# Patient Record
Sex: MALE | Race: BLACK OR AFRICAN AMERICAN | Employment: UNEMPLOYED | URBAN - METROPOLITAN AREA
[De-identification: names, ages, dates, MRNs, and addresses within clinical notes are randomized per-mention and may not be internally consistent; named-entity substitution may affect disease eponyms.]

---

## 2019-09-15 ENCOUNTER — HOSPITAL ENCOUNTER (EMERGENCY)
Age: 44
Discharge: HOME | End: 2019-09-15
Payer: MEDICARE

## 2019-09-15 DIAGNOSIS — L23.7 ALLERGIC DERMATITIS DUE TO POISON IVY: Primary | ICD-10-CM

## 2019-09-15 PROCEDURE — 99282 EMERGENCY DEPT VISIT SF MDM: CPT

## 2019-10-09 ENCOUNTER — HOSPITAL ENCOUNTER (EMERGENCY)
Age: 44
Discharge: HOME | End: 2019-10-09
Attending: EMERGENCY MEDICINE
Payer: MEDICARE

## 2019-10-09 ENCOUNTER — HOSPITAL ENCOUNTER (EMERGENCY)
Age: 44
Discharge: HOME | End: 2019-10-10
Attending: FAMILY MEDICINE
Payer: MEDICARE

## 2019-10-09 ENCOUNTER — APPOINTMENT (OUTPATIENT)
Dept: GENERAL RADIOLOGY | Age: 44
End: 2019-10-09
Payer: MEDICARE

## 2019-10-09 VITALS
SYSTOLIC BLOOD PRESSURE: 119 MMHG | TEMPERATURE: 98 F | BODY MASS INDEX: 25.06 KG/M2 | WEIGHT: 185 LBS | DIASTOLIC BLOOD PRESSURE: 88 MMHG | OXYGEN SATURATION: 100 % | HEART RATE: 73 BPM | RESPIRATION RATE: 14 BRPM | HEIGHT: 72 IN

## 2019-10-09 LAB
ALBUMIN SERPL-MCNC: 4.1 GM/DL (ref 3.4–5)
ALBUMIN SERPL-MCNC: 4.3 GM/DL (ref 3.4–5)
ALP BLD-CCNC: 102 IU/L (ref 40–129)
ALP BLD-CCNC: 87 IU/L (ref 40–128)
ALT SERPL-CCNC: 28 U/L (ref 10–40)
ALT SERPL-CCNC: 32 U/L (ref 10–40)
ANION GAP SERPL CALCULATED.3IONS-SCNC: 10 MMOL/L (ref 4–16)
ANION GAP SERPL CALCULATED.3IONS-SCNC: 13 MMOL/L (ref 4–16)
AST SERPL-CCNC: 51 IU/L (ref 15–37)
AST SERPL-CCNC: 65 IU/L (ref 15–37)
BACTERIA: NEGATIVE /HPF
BASOPHILS ABSOLUTE: 0 K/CU MM
BASOPHILS ABSOLUTE: 0 K/CU MM
BASOPHILS RELATIVE PERCENT: 0.2 % (ref 0–1)
BASOPHILS RELATIVE PERCENT: 0.5 % (ref 0–1)
BILIRUB SERPL-MCNC: 0.3 MG/DL (ref 0–1)
BILIRUB SERPL-MCNC: 0.4 MG/DL (ref 0–1)
BILIRUBIN URINE: NEGATIVE MG/DL
BLOOD, URINE: NEGATIVE
BUN BLDV-MCNC: 6 MG/DL (ref 6–23)
BUN BLDV-MCNC: 9 MG/DL (ref 6–23)
CALCIUM SERPL-MCNC: 8.6 MG/DL (ref 8.3–10.6)
CALCIUM SERPL-MCNC: 9.1 MG/DL (ref 8.3–10.6)
CHLORIDE BLD-SCNC: 95 MMOL/L (ref 99–110)
CHLORIDE BLD-SCNC: 98 MMOL/L (ref 99–110)
CLARITY: CLEAR
CO2: 26 MMOL/L (ref 21–32)
CO2: 26 MMOL/L (ref 21–32)
COLOR: ABNORMAL
CREAT SERPL-MCNC: 0.8 MG/DL (ref 0.9–1.3)
CREAT SERPL-MCNC: 0.9 MG/DL (ref 0.9–1.3)
DIFFERENTIAL TYPE: ABNORMAL
DIFFERENTIAL TYPE: ABNORMAL
EOSINOPHILS ABSOLUTE: 0 K/CU MM
EOSINOPHILS ABSOLUTE: 0.1 K/CU MM
EOSINOPHILS RELATIVE PERCENT: 0.7 % (ref 0–3)
EOSINOPHILS RELATIVE PERCENT: 2 % (ref 0–3)
GFR AFRICAN AMERICAN: >60 ML/MIN/1.73M2
GFR AFRICAN AMERICAN: >60 ML/MIN/1.73M2
GFR NON-AFRICAN AMERICAN: >60 ML/MIN/1.73M2
GFR NON-AFRICAN AMERICAN: >60 ML/MIN/1.73M2
GLUCOSE BLD-MCNC: 117 MG/DL (ref 70–99)
GLUCOSE BLD-MCNC: 90 MG/DL (ref 70–99)
GLUCOSE, URINE: NEGATIVE MG/DL
HCT VFR BLD CALC: 39.4 % (ref 42–52)
HCT VFR BLD CALC: 43.3 % (ref 42–52)
HEMOGLOBIN: 12.6 GM/DL (ref 13.5–18)
HEMOGLOBIN: 13.8 GM/DL (ref 13.5–18)
IMMATURE NEUTROPHIL %: 0.2 % (ref 0–0.43)
IMMATURE NEUTROPHIL %: 0.2 % (ref 0–0.43)
KETONES, URINE: NEGATIVE MG/DL
LEUKOCYTE ESTERASE, URINE: NEGATIVE
LIPASE: 57 IU/L (ref 13–60)
LIPASE: 78 IU/L (ref 13–60)
LYMPHOCYTES ABSOLUTE: 1.9 K/CU MM
LYMPHOCYTES ABSOLUTE: 2.3 K/CU MM
LYMPHOCYTES RELATIVE PERCENT: 45.8 % (ref 24–44)
LYMPHOCYTES RELATIVE PERCENT: 57 % (ref 24–44)
MCH RBC QN AUTO: 29.4 PG (ref 27–31)
MCH RBC QN AUTO: 29.4 PG (ref 27–31)
MCHC RBC AUTO-ENTMCNC: 31.9 % (ref 32–36)
MCHC RBC AUTO-ENTMCNC: 32 % (ref 32–36)
MCV RBC AUTO: 92.1 FL (ref 78–100)
MCV RBC AUTO: 92.3 FL (ref 78–100)
MONOCYTES ABSOLUTE: 0.4 K/CU MM
MONOCYTES ABSOLUTE: 0.5 K/CU MM
MONOCYTES RELATIVE PERCENT: 11.6 % (ref 0–4)
MONOCYTES RELATIVE PERCENT: 9.6 % (ref 0–4)
NITRITE URINE, QUANTITATIVE: NEGATIVE
NUCLEATED RBC %: 0 %
NUCLEATED RBC %: 0 %
PDW BLD-RTO: 13.8 % (ref 11.7–14.9)
PDW BLD-RTO: 13.9 % (ref 11.7–14.9)
PH, URINE: 6 (ref 5–8)
PLATELET # BLD: 172 K/CU MM (ref 140–440)
PLATELET # BLD: 189 K/CU MM (ref 140–440)
PMV BLD AUTO: 8.6 FL (ref 7.5–11.1)
PMV BLD AUTO: 8.9 FL (ref 7.5–11.1)
POTASSIUM SERPL-SCNC: 3.6 MMOL/L (ref 3.5–5.1)
POTASSIUM SERPL-SCNC: 3.8 MMOL/L (ref 3.5–5.1)
PROTEIN UA: NEGATIVE MG/DL
RBC # BLD: 4.28 M/CU MM (ref 4.6–6.2)
RBC # BLD: 4.69 M/CU MM (ref 4.6–6.2)
RBC URINE: ABNORMAL /HPF (ref 0–3)
SEGMENTED NEUTROPHILS ABSOLUTE COUNT: 1.2 K/CU MM
SEGMENTED NEUTROPHILS ABSOLUTE COUNT: 1.7 K/CU MM
SEGMENTED NEUTROPHILS RELATIVE PERCENT: 30.7 % (ref 36–66)
SEGMENTED NEUTROPHILS RELATIVE PERCENT: 41.5 % (ref 36–66)
SODIUM BLD-SCNC: 131 MMOL/L (ref 135–145)
SODIUM BLD-SCNC: 137 MMOL/L (ref 135–145)
SPECIFIC GRAVITY UA: 1 (ref 1–1.03)
SQUAMOUS EPITHELIAL: <1 /HPF
TOTAL IMMATURE NEUTOROPHIL: 0.01 K/CU MM
TOTAL IMMATURE NEUTOROPHIL: 0.01 K/CU MM
TOTAL NUCLEATED RBC: 0 K/CU MM
TOTAL NUCLEATED RBC: 0 K/CU MM
TOTAL PROTEIN: 7.1 GM/DL (ref 6.4–8.2)
TOTAL PROTEIN: 7.6 GM/DL (ref 6.4–8.2)
TRICHOMONAS: ABNORMAL /HPF
UROBILINOGEN, URINE: NORMAL MG/DL (ref 0.2–1)
WBC # BLD: 4.1 K/CU MM (ref 4–10.5)
WBC # BLD: 4.2 K/CU MM (ref 4–10.5)
WBC UA: ABNORMAL /HPF (ref 0–2)

## 2019-10-09 PROCEDURE — 83690 ASSAY OF LIPASE: CPT

## 2019-10-09 PROCEDURE — 71045 X-RAY EXAM CHEST 1 VIEW: CPT

## 2019-10-09 PROCEDURE — 81001 URINALYSIS AUTO W/SCOPE: CPT

## 2019-10-09 PROCEDURE — 85025 COMPLETE CBC W/AUTO DIFF WBC: CPT

## 2019-10-09 PROCEDURE — 99284 EMERGENCY DEPT VISIT MOD MDM: CPT

## 2019-10-09 PROCEDURE — 36415 COLL VENOUS BLD VENIPUNCTURE: CPT

## 2019-10-09 PROCEDURE — 80053 COMPREHEN METABOLIC PANEL: CPT

## 2019-10-09 RX ORDER — 0.9 % SODIUM CHLORIDE 0.9 %
1000 INTRAVENOUS SOLUTION INTRAVENOUS ONCE
Status: COMPLETED | OUTPATIENT
Start: 2019-10-09 | End: 2019-10-10

## 2019-10-09 ASSESSMENT — PAIN SCALES - GENERAL
PAINLEVEL_OUTOF10: 10
PAINLEVEL_OUTOF10: 7

## 2019-10-09 ASSESSMENT — PAIN DESCRIPTION - PAIN TYPE
TYPE: ACUTE PAIN
TYPE: ACUTE PAIN

## 2019-10-09 ASSESSMENT — PAIN DESCRIPTION - ORIENTATION: ORIENTATION: RIGHT;UPPER

## 2019-10-09 ASSESSMENT — PAIN DESCRIPTION - LOCATION
LOCATION: ABDOMEN
LOCATION: ABDOMEN

## 2019-10-09 NOTE — ED PROVIDER NOTES
Triage Chief Complaint:   Abdominal Pain (RUQ)    [place]:  Shawn Richards is a 37 y.o. male that presents with main complaint of abdominal pain. He states that over the last 3 months he has had intermittent right upper quadrant pain that is worse with movement. Denies any recent injury. Denies nausea, vomiting, diarrhea, constipation, chest pain, shortness of breath, fever, cough. He has been eating and drinking without difficulty. States the pain is not going away and that is why he came in. He has not tried to take anything for the pain. Also has a myriad of other chronic complaints including nosebleed within the past few weeks, and episode last month were his body \"froze up\" and then resumed working and is worried that he had a stroke. Also states that in the past he has had blood in his stool and blood in his urine. Denies any drug or alcohol use. ROS:  At least 14 systems reviewed and otherwise acutely negative except as in the [address] Past Medical History:   Diagnosis Date    Hypertension     does not take medications     History reviewed. No pertinent surgical history. History reviewed. No pertinent family history.   Social History     Socioeconomic History    Marital status: Single     Spouse name: Not on file    Number of children: Not on file    Years of education: Not on file    Highest education level: Not on file   Occupational History    Not on file   Social Needs    Financial resource strain: Not on file    Food insecurity:     Worry: Not on file     Inability: Not on file    Transportation needs:     Medical: Not on file     Non-medical: Not on file   Tobacco Use    Smoking status: Current Some Day Smoker    Smokeless tobacco: Never Used   Substance and Sexual Activity    Alcohol use: Yes     Comment: most days    Drug use: No    Sexual activity: Not on file   Lifestyle    Physical activity:     Days per week: Not on file     Minutes per session: Not on file    Stress: Not on file   Relationships    Social connections:     Talks on phone: Not on file     Gets together: Not on file     Attends Anabaptist service: Not on file     Active member of club or organization: Not on file     Attends meetings of clubs or organizations: Not on file     Relationship status: Not on file    Intimate partner violence:     Fear of current or ex partner: Not on file     Emotionally abused: Not on file     Physically abused: Not on file     Forced sexual activity: Not on file   Other Topics Concern    Not on file   Social History Narrative    Not on file     No current facility-administered medications for this encounter. Current Outpatient Medications   Medication Sig Dispense Refill    naproxen (NAPROSYN) 500 MG tablet Take 1 tablet by mouth 2 times daily 60 tablet 0     Allergies   Allergen Reactions    Pcn [Penicillins] Nausea And Vomiting    Vicodin [Hydrocodone-Acetaminophen] Nausea And Vomiting       Nursing Notes Reviewed    Physical Exam:  ED Triage Vitals [10/09/19 0217]   Enc Vitals Group      BP (!) 146/102      Pulse 86      Resp 16      Temp 98 °F (36.7 °C)      Temp Source Oral      SpO2       Weight 185 lb (83.9 kg)      Height 6' (1.829 m)      Head Circumference       Peak Flow       Pain Score       Pain Loc       Pain Edu? Excl. in [address]? GENERAL APPEARANCE: Awake and alert. Cooperative. No acute distress. HEAD: Normocephalic. Atraumatic. EYES: EOM's grossly intact. Sclera anicteric. ENT: Mucous membranes are moist. Tolerates saliva. No trismus. NECK: Supple. Trachea midline. HEART: RRR. Radial pulses 2+. LUNGS: Respirations unlabored. CTAB  ABDOMEN: Soft. Non-tender. No guarding or rebound. EXTREMITIES: No acute deformities. SKIN: Warm and dry. NEUROLOGICAL: No gross facial drooping. Moves all 4 extremities spontaneously. PSYCHIATRIC: Normal mood.     I have reviewed and interpreted all of the currently available lab results from this visit (if applicable):  Results for orders placed or performed during the hospital encounter of 10/09/19   Comprehensive Metabolic Panel w/ Reflex to MG   Result Value Ref Range    Sodium 137 135 - 145 MMOL/L    Potassium 3.8 3.5 - 5.1 MMOL/L    Chloride 98 (L) 99 - 110 mMol/L    CO2 26 21 - 32 MMOL/L    BUN 6 6 - 23 MG/DL    CREATININE 0.8 (L) 0.9 - 1.3 MG/DL    Glucose 90 70 - 99 MG/DL    Calcium 9.1 8.3 - 10.6 MG/DL    Alb 4.3 3.4 - 5.0 GM/DL    Total Protein 7.6 6.4 - 8.2 GM/DL    Total Bilirubin 0.4 0.0 - 1.0 MG/DL    ALT 28 10 - 40 U/L    AST 51 (H) 15 - 37 IU/L    Alkaline Phosphatase 87 40 - 128 IU/L    GFR Non-African American >60 >60 mL/min/1.73m2    GFR African American >60 >60 mL/min/1.73m2    Anion Gap 13 4 - 16   CBC Auto Differential   Result Value Ref Range    WBC 4.1 4.0 - 10.5 K/CU MM    RBC 4.69 4.6 - 6.2 M/CU MM    Hemoglobin 13.8 13.5 - 18.0 GM/DL    Hematocrit 43.3 42 - 52 %    MCV 92.3 78 - 100 FL    MCH 29.4 27 - 31 PG    MCHC 31.9 (L) 32.0 - 36.0 %    RDW 13.8 11.7 - 14.9 %    Platelets 007 104 - 357 K/CU MM    MPV 8.9 7.5 - 11.1 FL    Differential Type AUTOMATED DIFFERENTIAL     Segs Relative 30.7 (L) 36 - 66 %    Lymphocytes % 57.0 (H) 24 - 44 %    Monocytes % 9.6 (H) 0 - 4 %    Eosinophils % 2.0 0 - 3 %    Basophils % 0.5 0 - 1 %    Segs Absolute 1.2 K/CU MM    Lymphocytes Absolute 2.3 K/CU MM    Monocytes Absolute 0.4 K/CU MM    Eosinophils Absolute 0.1 K/CU MM    Basophils Absolute 0.0 K/CU MM    Nucleated RBC % 0.0 %    Total Nucleated RBC 0.0 K/CU MM    Total Immature Neutrophil 0.01 K/CU MM    Immature Neutrophil % 0.2 0 - 0.43 %   Lipase   Result Value Ref Range    Lipase 78 (H) 13 - 60 IU/L   Urinalysis (Lab)   Result Value Ref Range    Color, UA STRAW (A) YELLOW    Clarity, UA CLEAR CLEAR    Glucose, Urine NEGATIVE NEGATIVE MG/DL    Bilirubin Urine NEGATIVE NEGATIVE MG/DL    Ketones, Urine NEGATIVE NEGATIVE MG/DL    Specific Gravity, UA 1.002 1.001 - 1.035    Blood, Urine NEGATIVE NEGATIVE pH, Urine 6.0 5.0 - 8.0    Protein, UA NEGATIVE NEGATIVE MG/DL    Urobilinogen, Urine NORMAL 0.2 - 1.0 MG/DL    Nitrite Urine, Quantitative NEGATIVE NEGATIVE    Leukocyte Esterase, Urine NEGATIVE NEGATIVE    RBC, UA NONE SEEN 0 - 3 /HPF    WBC, UA NONE SEEN 0 - 2 /HPF    Bacteria, UA NEGATIVE NEGATIVE /HPF    Squam Epithel, UA <1 /HPF    Trichomonas, UA NONE SEEN NONE SEEN /HPF      Radiographs (if obtained):  [] The following radiograph was interpreted by myself in the absence of a radiologist:  [] Radiologist's Report Reviewed:    EKG (if obtained): (All EKG's are interpreted by myself in the absence of a cardiologist)    MDM:  Plan of care is discussed thoroughly with the patient and family if present. If performed, all imaging and lab work also discussed with patient. All relevant prior results and chart reviewed if available. Patient is well-appearing with normal vital signs, chronic complaints but mostly concerned about right upper quadrant pain. He has benign abdominal exam and I do not suspect acute intra-abdominal emergency at this time. His lab work is unremarkable including no evidence of anemia or electrolyte abnormality. He has no transaminitis, hyperbilirubinemia or evidence of pancreatitis. Patient is resting comfortably on reevaluation. He is given outpatient follow-up information and discharged in good condition. Clinical Impression:  1.  Abdominal pain, right upper quadrant      (Please note that portions of this note may have been completed with a voice recognition program. Efforts were made to edit the dictations but occasionally words are mis-transcribed.)    MD Margie Beaulieu MD  10/09/19 9405

## 2019-10-10 ENCOUNTER — APPOINTMENT (OUTPATIENT)
Dept: CT IMAGING | Age: 44
End: 2019-10-10
Payer: MEDICARE

## 2019-10-10 VITALS
TEMPERATURE: 98.2 F | SYSTOLIC BLOOD PRESSURE: 150 MMHG | WEIGHT: 185 LBS | DIASTOLIC BLOOD PRESSURE: 96 MMHG | RESPIRATION RATE: 14 BRPM | HEART RATE: 76 BPM | OXYGEN SATURATION: 97 % | HEIGHT: 72 IN | BODY MASS INDEX: 25.06 KG/M2

## 2019-10-10 LAB
ALCOHOL SCREEN SERUM: 0.36 %WT/VOL
AMPHETAMINES: NEGATIVE
BACTERIA: ABNORMAL /HPF
BARBITURATE SCREEN URINE: NEGATIVE
BENZODIAZEPINE SCREEN, URINE: NEGATIVE
BILIRUBIN URINE: NEGATIVE MG/DL
BLOOD, URINE: ABNORMAL
CANNABINOID SCREEN URINE: NEGATIVE
CLARITY: CLEAR
COCAINE METABOLITE: NEGATIVE
COLOR: ABNORMAL
GLUCOSE, URINE: NEGATIVE MG/DL
KETONES, URINE: NEGATIVE MG/DL
LEUKOCYTE ESTERASE, URINE: NEGATIVE
MUCUS: ABNORMAL HPF
NITRITE URINE, QUANTITATIVE: NEGATIVE
OPIATES, URINE: NEGATIVE
OXYCODONE: NORMAL
PH, URINE: 5 (ref 5–8)
PHENCYCLIDINE, URINE: NEGATIVE
PROTEIN UA: NEGATIVE MG/DL
RBC URINE: 1 /HPF (ref 0–3)
SPECIFIC GRAVITY UA: 1.02 (ref 1–1.03)
TRICHOMONAS: ABNORMAL /HPF
UROBILINOGEN, URINE: NORMAL MG/DL (ref 0.2–1)
WBC UA: <1 /HPF (ref 0–2)

## 2019-10-10 PROCEDURE — G0480 DRUG TEST DEF 1-7 CLASSES: HCPCS

## 2019-10-10 PROCEDURE — 2580000003 HC RX 258: Performed by: FAMILY MEDICINE

## 2019-10-10 PROCEDURE — 81001 URINALYSIS AUTO W/SCOPE: CPT

## 2019-10-10 PROCEDURE — 96374 THER/PROPH/DIAG INJ IV PUSH: CPT

## 2019-10-10 PROCEDURE — 6370000000 HC RX 637 (ALT 250 FOR IP): Performed by: FAMILY MEDICINE

## 2019-10-10 PROCEDURE — 6360000002 HC RX W HCPCS: Performed by: FAMILY MEDICINE

## 2019-10-10 PROCEDURE — 6360000004 HC RX CONTRAST MEDICATION: Performed by: FAMILY MEDICINE

## 2019-10-10 PROCEDURE — 80307 DRUG TEST PRSMV CHEM ANLYZR: CPT

## 2019-10-10 PROCEDURE — 74177 CT ABD & PELVIS W/CONTRAST: CPT

## 2019-10-10 RX ORDER — HYOSCYAMINE SULFATE 0.125 MG
250 TABLET ORAL EVERY 4 HOURS PRN
Status: DISCONTINUED | OUTPATIENT
Start: 2019-10-10 | End: 2019-10-10

## 2019-10-10 RX ORDER — MAGNESIUM HYDROXIDE/ALUMINUM HYDROXICE/SIMETHICONE 120; 1200; 1200 MG/30ML; MG/30ML; MG/30ML
30 SUSPENSION ORAL ONCE
Status: COMPLETED | OUTPATIENT
Start: 2019-10-10 | End: 2019-10-10

## 2019-10-10 RX ORDER — SODIUM CHLORIDE 0.9 % (FLUSH) 0.9 %
10 SYRINGE (ML) INJECTION 2 TIMES DAILY
Status: DISCONTINUED | OUTPATIENT
Start: 2019-10-10 | End: 2019-10-10 | Stop reason: HOSPADM

## 2019-10-10 RX ORDER — SUCRALFATE 1 G/1
1 TABLET ORAL 4 TIMES DAILY
Qty: 60 TABLET | Refills: 0 | Status: SHIPPED | OUTPATIENT
Start: 2019-10-10 | End: 2021-02-15

## 2019-10-10 RX ORDER — 0.9 % SODIUM CHLORIDE 0.9 %
1000 INTRAVENOUS SOLUTION INTRAVENOUS ONCE
Status: COMPLETED | OUTPATIENT
Start: 2019-10-10 | End: 2019-10-10

## 2019-10-10 RX ORDER — ONDANSETRON 2 MG/ML
8 INJECTION INTRAMUSCULAR; INTRAVENOUS ONCE
Status: COMPLETED | OUTPATIENT
Start: 2019-10-10 | End: 2019-10-10

## 2019-10-10 RX ORDER — OMEPRAZOLE 20 MG/1
40 CAPSULE, DELAYED RELEASE ORAL DAILY
Qty: 60 CAPSULE | Refills: 0 | Status: SHIPPED | OUTPATIENT
Start: 2019-10-10 | End: 2021-02-15

## 2019-10-10 RX ADMIN — SODIUM CHLORIDE 1000 ML: 9 INJECTION, SOLUTION INTRAVENOUS at 00:26

## 2019-10-10 RX ADMIN — Medication 10 ML: at 00:51

## 2019-10-10 RX ADMIN — ONDANSETRON 8 MG: 2 INJECTION INTRAMUSCULAR; INTRAVENOUS at 00:26

## 2019-10-10 RX ADMIN — SODIUM CHLORIDE 1000 ML: 9 INJECTION, SOLUTION INTRAVENOUS at 00:27

## 2019-10-10 RX ADMIN — IOPAMIDOL 80 ML: 755 INJECTION, SOLUTION INTRAVENOUS at 00:51

## 2019-10-10 RX ADMIN — ALUMINUM HYDROXIDE, MAGNESIUM HYDROXIDE, AND SIMETHICONE 30 ML: 200; 200; 20 SUSPENSION ORAL at 00:26

## 2019-10-10 NOTE — ED PROVIDER NOTES
file     Inability: Not on file    Transportation needs:     Medical: Not on file     Non-medical: Not on file   Tobacco Use    Smoking status: Current Some Day Smoker    Smokeless tobacco: Never Used   Substance and Sexual Activity    Alcohol use: Yes     Comment: most days    Drug use: No    Sexual activity: Not on file   Lifestyle    Physical activity:     Days per week: Not on file     Minutes per session: Not on file    Stress: Not on file   Relationships    Social connections:     Talks on phone: Not on file     Gets together: Not on file     Attends Congregation service: Not on file     Active member of club or organization: Not on file     Attends meetings of clubs or organizations: Not on file     Relationship status: Not on file    Intimate partner violence:     Fear of current or ex partner: Not on file     Emotionally abused: Not on file     Physically abused: Not on file     Forced sexual activity: Not on file   Other Topics Concern    Not on file   Social History Narrative    Not on file     Current Facility-Administered Medications   Medication Dose Route Frequency Provider Last Rate Last Dose    sodium chloride flush 0.9 % injection 10 mL  10 mL Intravenous BID Genia Chacko MD   10 mL at 10/10/19 0051     Current Outpatient Medications   Medication Sig Dispense Refill    omeprazole (PRILOSEC) 20 MG delayed release capsule Take 2 capsules by mouth Daily 60 capsule 0    sucralfate (CARAFATE) 1 GM tablet Take 1 tablet by mouth 4 times daily for 15 days 60 tablet 0    naproxen (NAPROSYN) 500 MG tablet Take 1 tablet by mouth 2 times daily 60 tablet 0     Allergies   Allergen Reactions    Pcn [Penicillins] Nausea And Vomiting    Vicodin [Hydrocodone-Acetaminophen] Nausea And Vomiting       Nursing Notes Reviewed    Physical Exam:  ED Triage Vitals [10/09/19 2143]   Enc Vitals Group      BP (!) 142/107      Pulse 95      Resp 18      Temp 98.2 °F (36.8 °C)      Temp Source Oral BOWEL/ULCER, thus I consider the discharge disposition reasonable. Tyler Oden (or their surrogate) and I have discussed the diagnosis and risks, and we agree with discharging home with close follow-up. We also discussed returning to the Emergency Department immediately if new or worsening symptoms occur. We have discussed the symptoms which are most concerning that necessitate immediate return. Clinical Impression:  1. Dyspepsia    2. Acute alcoholic intoxication without complication [facility])      Disposition referral (if applicable):  [facility] Emergency Department  [address] [phone]  Go to   If symptoms worsen    Disposition medications (if applicable):  New Prescriptions    OMEPRAZOLE (PRILOSEC) 20 MG DELAYED RELEASE CAPSULE    Take 2 capsules by mouth Daily    SUCRALFATE (CARAFATE) 1 GM TABLET    Take 1 tablet by mouth 4 times daily for 15 days       Comment: Please note this report has been produced using speech recognition software and may contain errors related to that system including errors in grammar, punctuation, and spelling, as well as words and phrases that may be inappropriate. If there are any questions or concerns please feel free to contact the dictating provider for clarification.      Wes Giordano MD  10/10/19 0162

## 2020-12-26 ENCOUNTER — APPOINTMENT (OUTPATIENT)
Dept: GENERAL RADIOLOGY | Age: 45
DRG: 351 | End: 2020-12-26
Payer: MEDICARE

## 2020-12-26 ENCOUNTER — APPOINTMENT (OUTPATIENT)
Dept: ULTRASOUND IMAGING | Age: 45
DRG: 351 | End: 2020-12-26
Payer: MEDICARE

## 2020-12-26 ENCOUNTER — HOSPITAL ENCOUNTER (INPATIENT)
Age: 45
LOS: 12 days | Discharge: NURSING, REHAB OR LONG-TERM CARE | DRG: 351 | End: 2021-01-07
Attending: EMERGENCY MEDICINE | Admitting: INTERNAL MEDICINE
Payer: MEDICARE

## 2020-12-26 DIAGNOSIS — R74.8 ELEVATED CK: ICD-10-CM

## 2020-12-26 DIAGNOSIS — T69.9XXA COLD INJURY, INITIAL ENCOUNTER: Primary | ICD-10-CM

## 2020-12-26 DIAGNOSIS — E87.20 LACTIC ACIDOSIS: ICD-10-CM

## 2020-12-26 PROBLEM — M62.82 RHABDOMYOLYSIS: Status: ACTIVE | Noted: 2020-12-26

## 2020-12-26 LAB
ANION GAP SERPL CALCULATED.3IONS-SCNC: 15 MMOL/L (ref 4–16)
BASOPHILS ABSOLUTE: 0.1 K/CU MM
BASOPHILS RELATIVE PERCENT: 0.5 % (ref 0–1)
BUN BLDV-MCNC: 15 MG/DL (ref 6–23)
CALCIUM SERPL-MCNC: 9.4 MG/DL (ref 8.3–10.6)
CHLORIDE BLD-SCNC: 94 MMOL/L (ref 99–110)
CO2: 24 MMOL/L (ref 21–32)
CREAT SERPL-MCNC: 1.2 MG/DL (ref 0.9–1.3)
DIFFERENTIAL TYPE: ABNORMAL
EOSINOPHILS ABSOLUTE: 0 K/CU MM
EOSINOPHILS RELATIVE PERCENT: 0.1 % (ref 0–3)
GFR AFRICAN AMERICAN: >60 ML/MIN/1.73M2
GFR NON-AFRICAN AMERICAN: >60 ML/MIN/1.73M2
GLUCOSE BLD-MCNC: 85 MG/DL (ref 70–99)
HCT VFR BLD CALC: 46.3 % (ref 42–52)
HEMOGLOBIN: 15.3 GM/DL (ref 13.5–18)
IMMATURE NEUTROPHIL %: 0.3 % (ref 0–0.43)
LACTATE: 2.6 MMOL/L (ref 0.4–2)
LYMPHOCYTES ABSOLUTE: 1.1 K/CU MM
LYMPHOCYTES RELATIVE PERCENT: 10.7 % (ref 24–44)
MCH RBC QN AUTO: 29.3 PG (ref 27–31)
MCHC RBC AUTO-ENTMCNC: 33 % (ref 32–36)
MCV RBC AUTO: 88.7 FL (ref 78–100)
MONOCYTES ABSOLUTE: 0.9 K/CU MM
MONOCYTES RELATIVE PERCENT: 8.6 % (ref 0–4)
NUCLEATED RBC %: 0 %
PDW BLD-RTO: 14.6 % (ref 11.7–14.9)
PLATELET # BLD: 280 K/CU MM (ref 140–440)
PMV BLD AUTO: 8.4 FL (ref 7.5–11.1)
POTASSIUM SERPL-SCNC: 4.1 MMOL/L (ref 3.5–5.1)
RBC # BLD: 5.22 M/CU MM (ref 4.6–6.2)
SEGMENTED NEUTROPHILS ABSOLUTE COUNT: 8.1 K/CU MM
SEGMENTED NEUTROPHILS RELATIVE PERCENT: 79.8 % (ref 36–66)
SODIUM BLD-SCNC: 133 MMOL/L (ref 135–145)
TOTAL CK: ABNORMAL IU/L (ref 38–174)
TOTAL IMMATURE NEUTOROPHIL: 0.03 K/CU MM
TOTAL NUCLEATED RBC: 0 K/CU MM
WBC # BLD: 10.1 K/CU MM (ref 4–10.5)

## 2020-12-26 PROCEDURE — 6360000002 HC RX W HCPCS: Performed by: EMERGENCY MEDICINE

## 2020-12-26 PROCEDURE — 73620 X-RAY EXAM OF FOOT: CPT

## 2020-12-26 PROCEDURE — 96375 TX/PRO/DX INJ NEW DRUG ADDON: CPT

## 2020-12-26 PROCEDURE — 71045 X-RAY EXAM CHEST 1 VIEW: CPT

## 2020-12-26 PROCEDURE — 6370000000 HC RX 637 (ALT 250 FOR IP): Performed by: PHYSICIAN ASSISTANT

## 2020-12-26 PROCEDURE — 87040 BLOOD CULTURE FOR BACTERIA: CPT

## 2020-12-26 PROCEDURE — 2580000003 HC RX 258: Performed by: PHYSICIAN ASSISTANT

## 2020-12-26 PROCEDURE — 85025 COMPLETE CBC W/AUTO DIFF WBC: CPT

## 2020-12-26 PROCEDURE — 96374 THER/PROPH/DIAG INJ IV PUSH: CPT

## 2020-12-26 PROCEDURE — 82550 ASSAY OF CK (CPK): CPT

## 2020-12-26 PROCEDURE — 6360000002 HC RX W HCPCS: Performed by: PHYSICIAN ASSISTANT

## 2020-12-26 PROCEDURE — 90715 TDAP VACCINE 7 YRS/> IM: CPT | Performed by: PHYSICIAN ASSISTANT

## 2020-12-26 PROCEDURE — 93925 LOWER EXTREMITY STUDY: CPT

## 2020-12-26 PROCEDURE — 1200000000 HC SEMI PRIVATE

## 2020-12-26 PROCEDURE — 83605 ASSAY OF LACTIC ACID: CPT

## 2020-12-26 PROCEDURE — 80048 BASIC METABOLIC PNL TOTAL CA: CPT

## 2020-12-26 PROCEDURE — 90471 IMMUNIZATION ADMIN: CPT | Performed by: PHYSICIAN ASSISTANT

## 2020-12-26 PROCEDURE — 99285 EMERGENCY DEPT VISIT HI MDM: CPT

## 2020-12-26 PROCEDURE — 90471 IMMUNIZATION ADMIN: CPT

## 2020-12-26 RX ORDER — FENTANYL CITRATE 50 UG/ML
25 INJECTION, SOLUTION INTRAMUSCULAR; INTRAVENOUS ONCE
Status: COMPLETED | OUTPATIENT
Start: 2020-12-26 | End: 2020-12-26

## 2020-12-26 RX ORDER — SODIUM CHLORIDE 9 MG/ML
INJECTION, SOLUTION INTRAVENOUS CONTINUOUS
Status: DISCONTINUED | OUTPATIENT
Start: 2020-12-26 | End: 2020-12-27

## 2020-12-26 RX ORDER — 0.9 % SODIUM CHLORIDE 0.9 %
1000 INTRAVENOUS SOLUTION INTRAVENOUS ONCE
Status: COMPLETED | OUTPATIENT
Start: 2020-12-26 | End: 2020-12-26

## 2020-12-26 RX ORDER — HYDROCODONE BITARTRATE AND ACETAMINOPHEN 5; 325 MG/1; MG/1
1 TABLET ORAL ONCE
Status: COMPLETED | OUTPATIENT
Start: 2020-12-26 | End: 2020-12-26

## 2020-12-26 RX ORDER — KETOROLAC TROMETHAMINE 30 MG/ML
30 INJECTION, SOLUTION INTRAMUSCULAR; INTRAVENOUS ONCE
Status: COMPLETED | OUTPATIENT
Start: 2020-12-26 | End: 2020-12-26

## 2020-12-26 RX ORDER — ONDANSETRON 2 MG/ML
4 INJECTION INTRAMUSCULAR; INTRAVENOUS EVERY 30 MIN PRN
Status: DISCONTINUED | OUTPATIENT
Start: 2020-12-26 | End: 2020-12-27 | Stop reason: SDUPTHER

## 2020-12-26 RX ORDER — BUTALBITAL, ACETAMINOPHEN AND CAFFEINE 50; 325; 40 MG/1; MG/1; MG/1
2 TABLET ORAL ONCE
Status: COMPLETED | OUTPATIENT
Start: 2020-12-26 | End: 2020-12-26

## 2020-12-26 RX ADMIN — FENTANYL CITRATE 25 MCG: 50 INJECTION INTRAMUSCULAR; INTRAVENOUS at 22:21

## 2020-12-26 RX ADMIN — HYDROCODONE BITARTRATE AND ACETAMINOPHEN 1 TABLET: 5; 325 TABLET ORAL at 20:21

## 2020-12-26 RX ADMIN — BUTALBITAL, ACETAMINOPHEN AND CAFFEINE 2 TABLET: 50; 325; 40 TABLET ORAL at 20:20

## 2020-12-26 RX ADMIN — TETANUS TOXOID, REDUCED DIPHTHERIA TOXOID AND ACELLULAR PERTUSSIS VACCINE, ADSORBED 0.5 ML: 5; 2.5; 8; 8; 2.5 SUSPENSION INTRAMUSCULAR at 20:20

## 2020-12-26 RX ADMIN — ONDANSETRON 4 MG: 2 INJECTION INTRAMUSCULAR; INTRAVENOUS at 20:21

## 2020-12-26 RX ADMIN — SODIUM CHLORIDE: 9 INJECTION, SOLUTION INTRAVENOUS at 23:24

## 2020-12-26 RX ADMIN — KETOROLAC TROMETHAMINE 30 MG: 30 INJECTION, SOLUTION INTRAMUSCULAR; INTRAVENOUS at 20:21

## 2020-12-26 RX ADMIN — SODIUM CHLORIDE 1000 ML: 9 INJECTION, SOLUTION INTRAVENOUS at 20:20

## 2020-12-26 RX ADMIN — SODIUM CHLORIDE 1000 ML: 9 INJECTION, SOLUTION INTRAVENOUS at 22:20

## 2020-12-26 ASSESSMENT — PAIN SCALES - GENERAL
PAINLEVEL_OUTOF10: 10
PAINLEVEL_OUTOF10: 10
PAINLEVEL_OUTOF10: 9

## 2020-12-27 LAB
ANION GAP SERPL CALCULATED.3IONS-SCNC: 6 MMOL/L (ref 4–16)
ANION GAP SERPL CALCULATED.3IONS-SCNC: 8 MMOL/L (ref 4–16)
BACTERIA: NEGATIVE /HPF
BASOPHILS ABSOLUTE: 0 K/CU MM
BASOPHILS RELATIVE PERCENT: 0.3 % (ref 0–1)
BILIRUBIN URINE: NEGATIVE MG/DL
BLOOD, URINE: ABNORMAL
BUN BLDV-MCNC: 15 MG/DL (ref 6–23)
BUN BLDV-MCNC: 21 MG/DL (ref 6–23)
CALCIUM SERPL-MCNC: 6.9 MG/DL (ref 8.3–10.6)
CALCIUM SERPL-MCNC: 8.1 MG/DL (ref 8.3–10.6)
CHLORIDE BLD-SCNC: 101 MMOL/L (ref 99–110)
CHLORIDE BLD-SCNC: 103 MMOL/L (ref 99–110)
CLARITY: CLEAR
CO2: 24 MMOL/L (ref 21–32)
CO2: 24 MMOL/L (ref 21–32)
COLOR: YELLOW
CREAT SERPL-MCNC: 0.9 MG/DL (ref 0.9–1.3)
CREAT SERPL-MCNC: 1.3 MG/DL (ref 0.9–1.3)
DIFFERENTIAL TYPE: ABNORMAL
EOSINOPHILS ABSOLUTE: 0 K/CU MM
EOSINOPHILS RELATIVE PERCENT: 0.1 % (ref 0–3)
GFR AFRICAN AMERICAN: >60 ML/MIN/1.73M2
GFR AFRICAN AMERICAN: >60 ML/MIN/1.73M2
GFR NON-AFRICAN AMERICAN: 59 ML/MIN/1.73M2
GFR NON-AFRICAN AMERICAN: >60 ML/MIN/1.73M2
GLUCOSE BLD-MCNC: 87 MG/DL (ref 70–99)
GLUCOSE BLD-MCNC: 91 MG/DL (ref 70–99)
GLUCOSE, URINE: NEGATIVE MG/DL
HCT VFR BLD CALC: 36 % (ref 42–52)
HEMOGLOBIN: 11.7 GM/DL (ref 13.5–18)
IMMATURE NEUTROPHIL %: 0.3 % (ref 0–0.43)
KETONES, URINE: NEGATIVE MG/DL
LEUKOCYTE ESTERASE, URINE: NEGATIVE
LYMPHOCYTES ABSOLUTE: 1.8 K/CU MM
LYMPHOCYTES RELATIVE PERCENT: 24.4 % (ref 24–44)
MCH RBC QN AUTO: 29.2 PG (ref 27–31)
MCHC RBC AUTO-ENTMCNC: 32.5 % (ref 32–36)
MCV RBC AUTO: 89.8 FL (ref 78–100)
MONOCYTES ABSOLUTE: 1.1 K/CU MM
MONOCYTES RELATIVE PERCENT: 14.5 % (ref 0–4)
MUCUS: ABNORMAL HPF
NITRITE URINE, QUANTITATIVE: NEGATIVE
NUCLEATED RBC %: 0 %
PDW BLD-RTO: 14.8 % (ref 11.7–14.9)
PH, URINE: 5 (ref 5–8)
PLATELET # BLD: 207 K/CU MM (ref 140–440)
PMV BLD AUTO: 8.8 FL (ref 7.5–11.1)
POTASSIUM SERPL-SCNC: 4.4 MMOL/L (ref 3.5–5.1)
POTASSIUM SERPL-SCNC: 5.9 MMOL/L (ref 3.5–5.1)
PROTEIN UA: NEGATIVE MG/DL
RBC # BLD: 4.01 M/CU MM (ref 4.6–6.2)
RBC URINE: <1 /HPF (ref 0–3)
SEGMENTED NEUTROPHILS ABSOLUTE COUNT: 4.5 K/CU MM
SEGMENTED NEUTROPHILS RELATIVE PERCENT: 60.4 % (ref 36–66)
SODIUM BLD-SCNC: 131 MMOL/L (ref 135–145)
SODIUM BLD-SCNC: 135 MMOL/L (ref 135–145)
SPECIFIC GRAVITY UA: 1.01 (ref 1–1.03)
SQUAMOUS EPITHELIAL: <1 /HPF
TOTAL CK: ABNORMAL IU/L (ref 38–174)
TOTAL IMMATURE NEUTOROPHIL: 0.02 K/CU MM
TOTAL NUCLEATED RBC: 0 K/CU MM
TRICHOMONAS: ABNORMAL /HPF
UROBILINOGEN, URINE: NORMAL MG/DL (ref 0.2–1)
WBC # BLD: 7.5 K/CU MM (ref 4–10.5)
WBC UA: <1 /HPF (ref 0–2)

## 2020-12-27 PROCEDURE — 85025 COMPLETE CBC W/AUTO DIFF WBC: CPT

## 2020-12-27 PROCEDURE — 6360000002 HC RX W HCPCS: Performed by: INTERNAL MEDICINE

## 2020-12-27 PROCEDURE — 81001 URINALYSIS AUTO W/SCOPE: CPT

## 2020-12-27 PROCEDURE — 6370000000 HC RX 637 (ALT 250 FOR IP): Performed by: PHYSICIAN ASSISTANT

## 2020-12-27 PROCEDURE — 80048 BASIC METABOLIC PNL TOTAL CA: CPT

## 2020-12-27 PROCEDURE — 2580000003 HC RX 258: Performed by: INTERNAL MEDICINE

## 2020-12-27 PROCEDURE — 1200000000 HC SEMI PRIVATE

## 2020-12-27 PROCEDURE — 2500000003 HC RX 250 WO HCPCS: Performed by: INTERNAL MEDICINE

## 2020-12-27 PROCEDURE — 2580000003 HC RX 258: Performed by: PHYSICIAN ASSISTANT

## 2020-12-27 PROCEDURE — 82550 ASSAY OF CK (CPK): CPT

## 2020-12-27 PROCEDURE — 36415 COLL VENOUS BLD VENIPUNCTURE: CPT

## 2020-12-27 PROCEDURE — 99253 IP/OBS CNSLTJ NEW/EST LOW 45: CPT | Performed by: SURGERY

## 2020-12-27 RX ORDER — ONDANSETRON 2 MG/ML
4 INJECTION INTRAMUSCULAR; INTRAVENOUS EVERY 6 HOURS PRN
Status: DISCONTINUED | OUTPATIENT
Start: 2020-12-27 | End: 2021-01-07 | Stop reason: HOSPADM

## 2020-12-27 RX ORDER — MORPHINE SULFATE 2 MG/ML
2 INJECTION, SOLUTION INTRAMUSCULAR; INTRAVENOUS EVERY 4 HOURS PRN
Status: DISCONTINUED | OUTPATIENT
Start: 2020-12-27 | End: 2021-01-07 | Stop reason: HOSPADM

## 2020-12-27 RX ORDER — ACETAMINOPHEN 650 MG/1
650 SUPPOSITORY RECTAL EVERY 6 HOURS PRN
Status: DISCONTINUED | OUTPATIENT
Start: 2020-12-27 | End: 2021-01-07 | Stop reason: HOSPADM

## 2020-12-27 RX ORDER — SODIUM POLYSTYRENE SULFONATE 15 G/60ML
15 SUSPENSION ORAL; RECTAL ONCE
Status: COMPLETED | OUTPATIENT
Start: 2020-12-27 | End: 2020-12-27

## 2020-12-27 RX ORDER — POLYETHYLENE GLYCOL 3350 17 G/17G
17 POWDER, FOR SOLUTION ORAL DAILY PRN
Status: DISCONTINUED | OUTPATIENT
Start: 2020-12-27 | End: 2021-01-07 | Stop reason: HOSPADM

## 2020-12-27 RX ORDER — SODIUM CHLORIDE 0.9 % (FLUSH) 0.9 %
10 SYRINGE (ML) INJECTION PRN
Status: DISCONTINUED | OUTPATIENT
Start: 2020-12-27 | End: 2021-01-07 | Stop reason: HOSPADM

## 2020-12-27 RX ORDER — PROMETHAZINE HYDROCHLORIDE 12.5 MG/1
12.5 TABLET ORAL EVERY 6 HOURS PRN
Status: DISCONTINUED | OUTPATIENT
Start: 2020-12-27 | End: 2021-01-07 | Stop reason: HOSPADM

## 2020-12-27 RX ORDER — SODIUM CHLORIDE 0.9 % (FLUSH) 0.9 %
10 SYRINGE (ML) INJECTION EVERY 12 HOURS SCHEDULED
Status: DISCONTINUED | OUTPATIENT
Start: 2020-12-27 | End: 2021-01-07 | Stop reason: HOSPADM

## 2020-12-27 RX ORDER — OXYCODONE HYDROCHLORIDE AND ACETAMINOPHEN 5; 325 MG/1; MG/1
1 TABLET ORAL EVERY 4 HOURS PRN
Status: DISCONTINUED | OUTPATIENT
Start: 2020-12-27 | End: 2021-01-07 | Stop reason: HOSPADM

## 2020-12-27 RX ORDER — ACETAMINOPHEN 325 MG/1
650 TABLET ORAL EVERY 6 HOURS PRN
Status: DISCONTINUED | OUTPATIENT
Start: 2020-12-27 | End: 2021-01-07 | Stop reason: HOSPADM

## 2020-12-27 RX ADMIN — SODIUM CHLORIDE: 9 INJECTION, SOLUTION INTRAVENOUS at 08:53

## 2020-12-27 RX ADMIN — POTASSIUM CHLORIDE: 2 INJECTION, SOLUTION, CONCENTRATE INTRAVENOUS at 21:35

## 2020-12-27 RX ADMIN — ENOXAPARIN SODIUM 40 MG: 40 INJECTION SUBCUTANEOUS at 08:54

## 2020-12-27 RX ADMIN — SODIUM POLYSTYRENE SULFONATE 15 G: 15 SUSPENSION ORAL; RECTAL at 21:35

## 2020-12-27 RX ADMIN — SODIUM CHLORIDE: 9 INJECTION, SOLUTION INTRAVENOUS at 03:39

## 2020-12-27 RX ADMIN — SODIUM CHLORIDE, PRESERVATIVE FREE 10 ML: 5 INJECTION INTRAVENOUS at 08:54

## 2020-12-27 RX ADMIN — POTASSIUM CHLORIDE: 2 INJECTION, SOLUTION, CONCENTRATE INTRAVENOUS at 13:01

## 2020-12-27 NOTE — ED PROVIDER NOTES
Triage Chief Complaint:   Foot Pain (bilat)    [place]:  Today in the ED I had the pleasure of caring for Gold Smith who is a 50 y.o. male that presents  Today with bilat foor pain L>R. He states he is homeless and has been outside all day (temperature currently 25 degrees F) he states 3-4 hours ago he noticed increased pain aand swelling inhis foot. He states \"I think I have frostbite\" he endorses sharp pain ranked 9/10 and decreased mobility of that foot/ankle, toes. No biilateral calf pain. No trauma. He endorses subjuective chills alternating with hot flashes, and intermittent nausea. ROS:  REVIEW OF SYSTEMS    At least 10 systems reviewed      All other review of systems are negative  See HPI and nursing notes for additional information       Past Medical History:   Diagnosis Date    Hypertension     does not take medications     No past surgical history on file. No family history on file.   Social History     Socioeconomic History    Marital status: Single     Spouse name: Not on file    Number of children: Not on file    Years of education: Not on file    Highest education level: Not on file   Occupational History    Not on file   Social Needs    Financial resource strain: Not on file    Food insecurity     Worry: Not on file     Inability: Not on file    Transportation needs     Medical: Not on file     Non-medical: Not on file   Tobacco Use    Smoking status: Current Some Day Smoker    Smokeless tobacco: Never Used   Substance and Sexual Activity    Alcohol use: Yes     Comment: most days    Drug use: No    Sexual activity: Not on file   Lifestyle    Physical activity     Days per week: Not on file     Minutes per session: Not on file    Stress: Not on file   Relationships    Social connections     Talks on phone: Not on file     Gets together: Not on file     Attends Sikh service: Not on file     Active member of club or organization: Not on file     Attends meetings of clubs or epistaxis. Oral mucosa is moist no exudate buccal mucosa shows no ulcerations. Uvula is midline    Neck: Neck is supple full range of motion trachea midline thyroid nonpalpable  Cardiac: Heart regular rate rhythm no murmurs rubs clicks or gallops  Lungs: Lungs are clear to auscultation there is no wheezing rhonchi or rales. There is no use of accessory muscles no nasal flaring identified. Chest wall: There is no tenderness to palpation over the chest wall or over ribs  Abdomen: Abdomen is soft nontender nondistended. There is no firm or pulsatile masses no rebound rigidity or guarding negative Mckeon's negative McBurney, no peritoneal signs  Suprapubic:  there is no tenderness to palpation over the external bladder   Musculoskeletal: Patient's left foot does reveal discoloration paleness coolness. Dorsalis pedis posterior toes pulse 2+. Distal sensation is intact. Movement is present in all toes. However limited secondary to pain. There are no breaks in skin. No cellulitis noted.   Dermatology: Skin is warm and dry there is no obvious abscesses lacerations or lesions noted  Psych: Mentation is grossly normal cognition is grossly normal. Affect is appropriate                    I have reviewed and interpreted all of the currently available lab results from this visit (if applicable):  Results for orders placed or performed during the hospital encounter of 12/26/20   CK   Result Value Ref Range    Total CK 11,905 (H) 38 - 174 IU/L   CBC auto diff   Result Value Ref Range    WBC 10.1 4.0 - 10.5 K/CU MM    RBC 5.22 4.6 - 6.2 M/CU MM    Hemoglobin 15.3 13.5 - 18.0 GM/DL    Hematocrit 46.3 42 - 52 %    MCV 88.7 78 - 100 FL    MCH 29.3 27 - 31 PG    MCHC 33.0 32.0 - 36.0 %    RDW 14.6 11.7 - 14.9 %    Platelets 584 722 - 552 K/CU MM    MPV 8.4 7.5 - 11.1 FL    Differential Type AUTOMATED DIFFERENTIAL     Segs Relative 79.8 (H) 36 - 66 %    Lymphocytes % 10.7 (L) 24 - 44 %    Monocytes % 8.6 (H) 0 - 4 % Eosinophils % 0.1 0 - 3 %    Basophils % 0.5 0 - 1 %    Segs Absolute 8.1 K/CU MM    Lymphocytes Absolute 1.1 K/CU MM    Monocytes Absolute 0.9 K/CU MM    Eosinophils Absolute 0.0 K/CU MM    Basophils Absolute 0.1 K/CU MM    Nucleated RBC % 0.0 %    Total Nucleated RBC 0.0 K/CU MM    Total Immature Neutrophil 0.03 K/CU MM    Immature Neutrophil % 0.3 0 - 0.43 %   BMP   Result Value Ref Range    Sodium 133 (L) 135 - 145 MMOL/L    Potassium 4.1 3.5 - 5.1 MMOL/L    Chloride 94 (L) 99 - 110 mMol/L    CO2 24 21 - 32 MMOL/L    Anion Gap 15 4 - 16    BUN 15 6 - 23 MG/DL    CREATININE 1.2 0.9 - 1.3 MG/DL    Glucose 85 70 - 99 MG/DL    Calcium 9.4 8.3 - 10.6 MG/DL    GFR Non-African American >60 >60 mL/min/1.73m2    GFR African American >60 >60 mL/min/1.73m2   Lactic Acid, Plasma   Result Value Ref Range    Lactate 2.6 (HH) 0.4 - 2.0 mMOL/L      Radiographs (if obtained):  [] The following radiograph was interpreted by myself in the absence of a radiologist:   [] Radiologist's Report Reviewed:  VL DUP LOWER EXTREMITY ARTERIES BILATERAL   Final Result   All arteries of both lower extremities demonstrate normal triphasic waveforms   indicating no hemodynamically significant stenosis. XR FOOT LEFT (2 VIEWS)   Final Result   No acute abnormality. Punctate radiodensities in the soft tissues of the 2nd   digit. XR CHEST PORTABLE   Final Result   No acute abnormality. EKG (if obtained):   Please See Note of attending physician for EKG interpretation. Chart review shows recent radiograph(s):  No results found. MDM:   She presents today to the emergency department with side symptoms history consistent with a nonbreathing cold injury. No evidence of necrosis here vascular compromise, gangrene, frostbite at this time. Feet are cool to touch. However he does have good flow. Sensation and movement. CK is elevated at almost 12,000. Lactic is up to 2.6.   Patient is provided with 2 L of fluid as

## 2020-12-27 NOTE — ED PROVIDER NOTES
I independently examined and evaluated Quintin Flores. In brief, 13-year-old male presents with bilateral foot pain, left worse than right. He is homeless, has been outside all day, temperatures got below the T-max yesterday and currently 25 degrees. He has been having worsening pain and feels like his foot is cold and he cannot feel it. Having increased pain and swelling in the left foot. He cannot move the toes. No calf pain or ankle pain. No trauma or other injury. .  Pain is 10/10, not taken anything for it. .    Focused exam revealed patient in no acute distress seated on the cot. No swelling of the ankles or over the dorsal feet. The left distal foot is swollen, cold to touch at the toes and over the plantar, but the dorsum is warm. Pulses are intact in bilateral feet. He has full range of motion at the ankle but cannot move his toes. They are cold to touch and there is no capillary refill. Toes are blanched and white. Poor hygiene in general.  Tender over the bottom of the foot and dorsal foot, does not feel me touching his toes on the left. The right foot appears to have no swelling, toes themselves do feel cold. .  Tachycardic with regular rhythm, nonlabored respiration. And soft and nondistended. ED course: Appears to have likely frostbite versus chilblains of the left foot, very concerning for tissue damage, possibly early necrosis, blanched, cold to touch, no capillary refill, ordered lactate, CK, x-ray, fluids. Arterial Doppler had been performed and he does have good pulses but those I can actually feel, it is more the distal tissue that I am concerned about. CK is 11,900, lactic acid is mildly elevated, getting fluids, kidney function appears to be stable currently. We will consult our general surgery team, will require admission for observation, monitoring of tissue perfusion and to see if would require debridement if becomes necrotic.     Dr. Laura Kenures on call for gen surg, nothing to do acutely from surgical standpoint, usually just monitor to see if will regain tissue perfusion or not and once demarcated necrosis would look at debridement in a few weeks. Appropriate to keep at our hospital.       Total critical care time today provided was 30 minutes. This excludes seperately billable procedure. Critical care time provided for tissue damage/rhabdomyolysis that required close evaluation and/or intervention with concern for patient decompensation. All diagnostic, treatment, and disposition decisions were made by myself in conjunction with the advanced practice provider. For all further details of the patient's emergency department visit, please see the advanced practice provider's documentation. Comment: Please note this report has been produced using speech recognition software and may contain errors related to that system including errors in grammar, punctuation, and spelling, as well as words and phrases that may be inappropriate. If there are any questions or concerns please feel free to contact the dictating provider for clarification.         Ailyn Garcia MD  12/26/20 4491

## 2020-12-27 NOTE — CONSULTS
Department of General Surgery   Surgical Service Dr. Elsa Nuñez   Consult Note    Date of Consult: 12/27/20    Reason for Consult:  frostbite  Requesting Physician:  Tarun Cha    CHIEF COMPLAINT:  Cold exposure, pain/numbness in bilateral feet    History Obtained From:  patient    HISTORY OF PRESENT ILLNESS:    The patient is a 50 y.o. male who presented last evening with pain and numbness in his feet. 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 He denies pain or feeling of coldness in his feet until he went to the [facility] to get supper. Once his feet warmed they became extremely painful. They were numb and between the pain and the numbness he could hardly walk. He then called EMS and was transported to the ED. Pain was 9/10 on arrival.  He has some associated nausea, denies other complaints. Pain is worsened by walking, better with medications. Past Medical History:    Past Medical History:   Diagnosis Date    Hypertension     does not take medications       Past Surgical History:    No past surgical history on file.     Current Medications:   Current Facility-Administered Medications   Medication Dose Route Frequency Provider Last Rate Last Admin    sodium chloride flush 0.9 % injection 10 mL  10 mL Intravenous 2 times per day Mel Bauer MD   10 mL at 12/27/20 0854    sodium chloride flush 0.9 % injection 10 mL  10 mL Intravenous PRN Mel Bauer MD        enoxaparin (LOVENOX) injection 40 mg  40 mg Subcutaneous Daily Mel Bauer MD   40 mg at 12/27/20 0854    promethazine (PHENERGAN) tablet 12.5 mg  12.5 mg Oral Q6H PRN Mel Bauer MD        Or    ondansetron (ZOFRAN) injection 4 mg  4 mg Intravenous Q6H PRN Mel Bauer MD        polyethylene glycol (GLYCOLAX) packet 17 g  17 g Oral Daily PRN Mel Bauer MD        acetaminophen (TYLENOL) tablet 650 mg  650 mg Oral Q6H PRN Hamida MD Ivette        Or   [facility] acetaminophen (TYLENOL) suppository 650 mg  650 mg Rectal Q6H PRN Carmelina Hargrove MD        sodium bicarbonate 50 mEq, potassium chloride 20 mEq in sodium chloride 0.45 % 1,000 mL infusion   Intravenous Continuous Bianca Gómez  mL/hr at 12/27/20 1301 New Bag at 12/27/20 1301    oxyCODONE-acetaminophen (PERCOCET) 5-325 MG per tablet 1 tablet  1 tablet Oral Q4H PRN Bianca Gómez MD        morphine (PF) injection 2 mg  2 mg Intravenous Q4H PRN Bianca Gómez MD           Allergies:  Pcn [penicillins] and Vicodin [hydrocodone-acetaminophen]    Social History:   Social History     Socioeconomic History    Marital status: Single     Spouse name: Not on file    Number of children: Not on file    Years of education: Not on file    Highest education level: Not on file   Occupational History    Not on file   Social Needs    Financial resource strain: Not on file    Food insecurity     Worry: Not on file     Inability: Not on file    Transportation needs     Medical: Not on file     Non-medical: Not on file   Tobacco Use    Smoking status: Current Some Day Smoker    Smokeless tobacco: Never Used   Substance and Sexual Activity    Alcohol use: Yes     Comment: most days    Drug use: No    Sexual activity: Not on file   Lifestyle    Physical activity     Days per week: Not on file     Minutes per session: Not on file    Stress: Not on file   Relationships    Social connections     Talks on phone: Not on file     Gets together: Not on file     Attends Worship service: Not on file     Active member of club or organization: Not on file     Attends meetings of clubs or organizations: Not on file     Relationship status: Not on file    Intimate partner violence     Fear of current or ex partner: Not on file     Emotionally abused: Not on file     Physically abused: Not on file     Forced sexual activity: Not on file   Other Topics Concern    Not on file   Social History Narrative    Not on file       Family History:   No family history on file. REVIEW OFSYSTEMS:    Review of Systems   Constitutional: Negative for chills. Negative for fever. HENT: Negative for congestion. Negative for rhinorrhea. Respiratory: Negative for cough. Negative for shortness of breath. Negative for wheezing. Cardiovascular: Negative for chest pain. Gastrointestinal: positive for nausea,  Negative for constipation. Negative for diarrhea. Genitourinary: Negative for difficulty urinating. Neurological: Negative for dizziness, syncope. Does have numbness in bilateral feet at the toes. Hematological: Does not bruise/bleed easily. PHYSICAL EXAM:  Vitals:    12/26/20 2236 12/27/20 0000 12/27/20 0425 12/27/20 0945   BP:  (!) 142/91 136/79 118/65   Pulse: 110 115 98 119   Resp:  16 16 16   Temp:  98.6 °F (37 °C) 98.1 °F (36.7 °C) 98.1 °F (36.7 °C)   TempSrc:  Oral Oral Oral   SpO2:  96% 97% 98%   Weight:  162 lb 3.2 oz (73.6 kg) 172 lb 12.8 oz (78.4 kg)    Height:  5' 11\" (1.803 m)         Physical Exam  General: awake, alert, in no acute distress  HEENT: mucous membranes moist  Respiratory: normal effort, no wheezes appreciated  CV: appears well perfused, regular rate and rhythm  Abdomen: Soft, non-tender, non-distended. Skin: warm and dry    Extremities: bilateral feet with blue discoloration of the toes, all of his distal toes are cool to the touch and lack sensation, warmth and sensation is regained in the distal foot. Small amount of blistering beginning bilaterally.     Neuro: no focal deficits noted  Psych: mood normal        DATA:    Lab Results   Component Value Date    WBC 7.5 12/27/2020    HGB 11.7 (L) 12/27/2020    HCT 36.0 (L) 12/27/2020    MCV 89.8 12/27/2020     12/27/2020     Lab Results   Component Value Date     12/27/2020    K 4.4 12/27/2020     12/27/2020    CO2 24 12/27/2020    BUN 21 12/27/2020    CREATININE 1.3 12/27/2020    GLUCOSE 87 12/27/2020    CALCIUM 8.1 12/27/2020      Arterial US:  All arteries of both lower extremities demonstrate normal triphasic waveforms   indicating no hemodynamically significant stenosis. IMPRESSION:    50 y.o. male with significant frostbite to bilateral feet/toes resulting in rhabdomyolysis.     Patient Active Problem List:     Rhabdomyolysis        PLAN:  - keep feet warm and dry  - will order pillow boots to protect from further injury  - limit ambulation, up to bathroom only  - will allow tissue loss to demarcate, I expect blistering will worsen  - Wound care consult tomorrow        Electronically signed by Charanjit Chakraborty MD on 12/27/2020 at 3:28 PM

## 2020-12-27 NOTE — PROGRESS NOTES
Hospitalist Progress Note         Admit Date: 12/26/2020    PCP: No primary care provider on file. Chief Complaint   Patient presents with    Foot Pain     bilat        Assessment and Plan:     -Frostbite left foot/rhabdomyolysis DC normal saline and start bicarb fluids. Pain control-Percocet and morphine IV as needed. Monitor daily CK. -Acute kidney injury from rhabdomyolysis. Continue fluids and monitor BMP. Replace electrolytes. Current Facility-Administered Medications   Medication Dose Route Frequency Provider Last Rate Last Admin    sodium chloride flush 0.9 % injection 10 mL  10 mL Intravenous 2 times per day Mel Bauer MD   10 mL at 12/27/20 0854    sodium chloride flush 0.9 % injection 10 mL  10 mL Intravenous PRN Mel Bauer MD        enoxaparin (LOVENOX) injection 40 mg  40 mg Subcutaneous Daily Mel Bauer MD   40 mg at 12/27/20 0854    promethazine (PHENERGAN) tablet 12.5 mg  12.5 mg Oral Q6H PRN Mel Bauer MD        Or    ondansetron (ZOFRAN) injection 4 mg  4 mg Intravenous Q6H PRN Mel Bauer MD        polyethylene glycol (GLYCOLAX) packet 17 g  17 g Oral Daily PRN Mel Bauer MD        acetaminophen (TYLENOL) tablet 650 mg  650 mg Oral Q6H PRN Mel Bauer MD        Or   [facility] acetaminophen (TYLENOL) suppository 650 mg  650 mg Rectal Q6H PRN Mel Bauer MD        sodium bicarbonate 50 mEq, potassium chloride 20 mEq in sodium chloride 0.45 % 1,000 mL infusion   Intravenous Continuous Mitchell Rodriguez MD        oxyCODONE-acetaminophen (PERCOCET) 5-325 MG per tablet 1 tablet  1 tablet Oral Q4H PRN Mitchell Rodriguez MD        morphine (PF) injection 2 mg  2 mg Intravenous Q4H PRN Mitchell Rodriguez MD           Subjective:     Patient has significant pain in left as well as right leg   He feels numbness and stiffened foot. Otherwise no significant events    Objective:        Intake/Output Summary (Last 24 hours) at 12/27/2020 1148  Last data filed at 12/27/2020 0957  Gross per 24 hour   Intake --   Output 1400 ml   Net -1400 ml      Vitals:   Vitals:    12/27/20 0945   BP: 118/65   Pulse: 119   Resp: 16   Temp: 98.1 °F (36.7 °C)   SpO2: 98%     Physical Exam:  General Appearance:    Alert, cooperative, no distress  Head:      Normocephalic, without obvious abnormality, atraumatic  Eyes:       Conjunctiva/corneas clear, EOM's intact  Lungs:    Clear to auscultation bilaterally, respirations unlabored  Heart:                Regular rate and rhythm, S1 and S2 normal, no murmur,   rub or gallop  Abdomen:     Soft, non-tender, bowel sounds active, no masses, no organomegaly  Extremities:   Right and left foot looking bluish/cyanotic left >Rt, tenderness +  Neurological:   Grossly Intact. Significant Diagnostic Studies:   DATA:    CBC   Recent Labs     12/26/20 2015 12/27/20 0459   WBC 10.1 7.5   HGB 15.3 11.7*   HCT 46.3 36.0*    207      BMP   Recent Labs     12/26/20 2015 12/27/20 0459   * 135   K 4.1 4.4   CL 94* 103   CO2 24 24   BUN 15 21   CREATININE 1.2 1.3     LFT'S No results for input(s): AST, ALT, ALB, BILIDIR, BILITOT, ALKPHOS in the last 72 hours. COAG No results for input(s): INR in the last 72 hours. POC: No results found for: POCGLU  [id number]:No results found for: [address]     12/26/20 2015 12/27/20 0459   CKTOTAL 11,905* 13,367*     Troponin: No results for input(s): TROPONINT in the last 72 hours. BNP: No results for input(s): PROBNP in the last 72 hours.   U/A:    Lab Results   Component Value Date    COLORU STRAW 10/10/2019    WBCUA <1 10/10/2019    RBCUA 1 10/10/2019    MUCUS RARE 10/10/2019    BACTERIA RARE 10/10/2019    CLARITYU CLEAR 10/10/2019    SPECGRAV 1.016 10/10/2019    LEUKOCYTESUR NEGATIVE 10/10/2019    BLOODU SMALL 10/10/2019       Xr Foot Left (2 Views)    Result Date: 12/26/2020  EXAMINATION: 3 XRAY VIEWS OF THE LEFT FOOT 12/26/2020 169.7 cm/sec Prof.            122.3 cm/sec SFA Prox.     108.3 cm/sec SFA Mid.      125.0 cm/sec SFA Dist.      111.1 cm/sec Pop.             97.2 cm/sec PTA             147.2 cm/sec Peron. 111.0 cm/sec MAGGIE             94.2 cm/sec     All arteries of both lower extremities demonstrate normal triphasic waveforms indicating no hemodynamically significant stenosis.            [facility]

## 2020-12-27 NOTE — H&P
non-contributory  Social History     Socioeconomic History    Marital status: Single     Spouse name: Not on file    Number of children: Not on file    Years of education: Not on file    Highest education level: Not on file   Occupational History    Not on file   Social Needs    Financial resource strain: Not on file    Food insecurity     Worry: Not on file     Inability: Not on file    Transportation needs     Medical: Not on file     Non-medical: Not on file   Tobacco Use    Smoking status: Current Some Day Smoker    Smokeless tobacco: Never Used   Substance and Sexual Activity    Alcohol use: Yes     Comment: most days    Drug use: No    Sexual activity: Not on file   Lifestyle    Physical activity     Days per week: Not on file     Minutes per session: Not on file    Stress: Not on file   Relationships    Social connections     Talks on phone: Not on file     Gets together: Not on file     Attends [employer] service: Not on file     Active member of club or organization: Not on file     Attends meetings of clubs or organizations: Not on file     Relationship status: Not on file    Intimate partner violence     Fear of current or ex partner: Not on file     Emotionally abused: Not on file     Physically abused: Not on file     Forced sexual activity: Not on file   Other Topics Concern    Not on file   Social History Narrative    Not on file       MEDICATIONS   Medications Prior to Admission  Not in a hospital admission.     Current Medications  Current Facility-Administered Medications   Medication Dose Route Frequency Provider Last Rate Last Admin    ondansetron (ZOFRAN) injection 4 mg  4 mg Intravenous Q30 Min PRN Jaclyn Mayorga PA-C   4 mg at 12/26/20 2021    0.9 % sodium chloride bolus  1,000 mL Intravenous Once Jaclyn Mayorga PA-C 1,000 mL/hr at 12/26/20 2220 1,000 mL at 12/26/20 2220    0.9 % sodium chloride infusion   Intravenous Continuous Jaclyn Mayorga PA-C Current Outpatient Medications   Medication Sig Dispense Refill    omeprazole (PRILOSEC) 20 MG delayed release capsule Take 2 capsules by mouth Daily 60 capsule 0    sucralfate (CARAFATE) 1 GM tablet Take 1 tablet by mouth 4 times daily for 15 days 60 tablet 0    naproxen (NAPROSYN) 500 MG tablet Take 1 tablet by mouth 2 times daily 60 tablet 0         Allergies  Allergies   Allergen Reactions    Pcn [Penicillins] Nausea And Vomiting    Vicodin [Hydrocodone-Acetaminophen] Nausea And Vomiting       REVIEW OF SYSTEMS   Within above limitations. 14 point review of systems reviewed. Pertinent positive or negative as per HPI or otherwise negative per 14 point systems review. PHYSICAL EXAM     Wt Readings from Last 3 Encounters:   12/26/20 181 lb (82.1 kg)   10/09/19 185 lb (83.9 kg)   10/09/19 185 lb (83.9 kg)       Blood pressure (!) 136/91, pulse 110, temperature 97.9 °F (36.6 °C), resp. rate 18, height 5' 11\" (1.803 m), weight 181 lb (82.1 kg), SpO2 96 %. GEN  -Awake, alert, NAD.   EYES   -PERRL. Conjunctival erythema. HENT  -MM are moist.   RESP  -LS CTA equal bilat, no wheezes, rales or rhonchi. Symmetric chest movement. No respiratory distress noted. C/V  -S1/S2 auscultated, tachycardia without appreciable M/R/G. No peripheral edema. No reproducible chest wall tenderness. MS  -bilateral feet tender to touch, bilateral DP, PT pulses felt, pain with moving the toes, decreased sensation to gross touch bilateral toes on dorsal and plantar aspect, very cold to touch. Dorsum of foot on bilateral feet and lower extremities warm to touch. SKIN  -Normal coloration, warm, dry. NEURO  -CN 2-12 appear grossly intact, normal speech, no lateralizing weakness. PSYC  -Awake, alert, oriented x 3. Appropriate affect. LABS AND IMAGING     Results for Jane Ramirez (MRN [id number]) as of 12/27/2020 02:27   Ref.  Range 12/26/2020 20:15   Sodium Latest Ref Range: 135 - 145 MMOL/L 133 (L)   Potassium Latest Ref Range: 3.5 - 5.1 MMOL/L 4.1   Chloride Latest Ref Range: 99 - 110 mMol/L 94 (L)   CO2 Latest Ref Range: 21 - 32 MMOL/L 24   BUN Latest Ref Range: 6 - 23 MG/DL 15   Creatinine Latest Ref Range: 0.9 - 1.3 MG/DL 1.2   Anion Gap Latest Ref Range: 4 - 16  15   GFR Non- Latest Ref Range: >60 mL/min/1.73m2 >60   GFR African American Latest Ref Range: >60 mL/min/1.73m2 >60   Glucose Latest Ref Range: 70 - 99 MG/DL 85   Calcium Latest Ref Range: 8.3 - 10.6 MG/DL 9.4   Total CK Latest Ref Range: 38 - 174 IU/L 11,905 (H)   WBC Latest Ref Range: 4.0 - 10.5 K/CU MM 10.1   RBC Latest Ref Range: 4.6 - 6.2 M/CU MM 5.22   Hemoglobin Quant Latest Ref Range: 13.5 - 18.0 GM/DL 15.3   Hematocrit Latest Ref Range: 42 - 52 % 46.3   MCV Latest Ref Range: 78 - 100 FL 88.7   MCH Latest Ref Range: 27 - 31 PG 29.3   MCHC Latest Ref Range: 32.0 - 36.0 % 33.0   MPV Latest Ref Range: 7.5 - 11.1 FL 8.4   RDW Latest Ref Range: 11.7 - 14.9 % 14.6   Platelet Count Latest Ref Range: 140 - 440 K/CU    Lymphocyte % Latest Ref Range: 24 - 44 % 10.7 (L)   Monocytes % Latest Ref Range: 0 - 4 % 8.6 (H)   Eosinophils % Latest Ref Range: 0 - 3 % 0.1   Basophils % Latest Ref Range: 0 - 1 % 0.5   Lymphocytes Absolute Latest Units: K/CU MM 1.1   Monocytes Absolute Latest Units: K/CU MM 0.9   Eosinophils Absolute Latest Units: K/CU MM 0.0   Basophils Absolute Latest Units: K/CU MM 0.1   Differential Type Unknown AUTOMATED DIFFERENTIAL   Segs Relative Latest Ref Range: 36 - 66 % 79.8 (H)   Segs Absolute Latest Units: K/CU MM 8.1   Nucleated RBC % Latest Units: % 0.0   Immature Neutrophil % Latest Ref Range: 0 - 0.43 % 0.3   Total Immature Neutrophil Latest Units: K/CU MM 0.03   Total Nucleated RBC Latest Units: K/CU MM 0.0     Results for Reyes Ross (MRN [id number]) as of 12/27/2020 02:27   Ref.  Range 10/10/2019 02:00   Amphetamines Latest Ref Range: NEGATIVE  NEGATIVE   Cocaine Metabolite Latest Ref Range: NEGATIVE  NEGATIVE Barbiturate Screen, Ur Latest Ref Range: NEGATIVE  NEGATIVE   Benzodiazepine Screen, Urine Latest Ref Range: NEGATIVE  NEGATIVE   Cannabinoid Scrn, Ur Latest Ref Range: NEGATIVE  NEGATIVE   Opiates, Urine Latest Ref Range: NEGATIVE  NEGATIVE   Oxycodone Latest Ref Range: NEGATIVE  NEGATIVE. .. Results for Jessica Connell (MRN [id number]) as of 12/27/2020 02:27   Ref. Range 10/10/2019 02:00   Color, UA Latest Ref Range: YELLOW  STRAW (A)   Clarity, UA Latest Ref Range: CLEAR  CLEAR   Bilirubin, Urine Latest Ref Range: NEGATIVE MG/DL NEGATIVE   Ketones, Urine Latest Ref Range: NEGATIVE MG/DL NEGATIVE   Specific Gravity, UA Latest Ref Range: 1.001 - 1.035  1.016   Blood, Urine Latest Ref Range: NEGATIVE  SMALL (A)   Protein, UA Latest Ref Range: NEGATIVE MG/DL NEGATIVE   Urobilinogen, Urine Latest Ref Range: 0.2 - 1.0 MG/DL NORMAL   Leukocyte Esterase, Urine Latest Ref Range: NEGATIVE  NEGATIVE   Glucose, Urine Latest Ref Range: NEGATIVE MG/DL NEGATIVE   Nitrite Urine, Quantitative Latest Ref Range: NEGATIVE  NEGATIVE   pH, Urine Latest Ref Range: 5.0 - 8.0  5.0   Mucus, UA Latest Ref Range: NEGATIVE HPF RARE (A)   WBC, UA Latest Ref Range: 0 - 2 /HPF <1   RBC, UA Latest Ref Range: 0 - 3 /HPF 1   Bacteria, UA Latest Ref Range: NEGATIVE /HPF RARE (A)   Trichomonas, UA Latest Ref Range: NONE SEEN /HPF NONE SEEN   URINE RT REFLEX TO CULTURE Unknown Rpt (A)   Phencyclidine, Urine Latest Ref Range: NEGATIVE  NEGATIVE     Results for Jessica Connell (MRN [id number]) as of 12/27/2020 02:27   Ref.  Range 12/26/2020 20:16   Lactate, ser/plas Latest Ref Range: 0.4 - 2.0 mMOL/L 2.6 (HH)     Recent Imaging     VL DUP LOWER EXTREMITY ARTERIES BILATERAL [[id number]] Collected: 12/26/20 2157     Order Status: Completed Updated: 12/26/20 2207     Narrative:       EXAMINATION:   ARTERIAL DUPLEX ULTRASOUND OF THE BILATERAL LOWER EXTREMITIES  12/26/2020   8:26 pm     TECHNIQUE:   Duplex ultrasound and Doppler images were obtained of the bilateral lower   extremities     COMPARISON:   None. HISTORY:   ORDERING SYSTEM PROVIDED HISTORY: pain   TECHNOLOGIST PROVIDED HISTORY:   Reason for exam:->pain   Reason for Exam: pain   Acuity: Acute   Type of Exam: Initial     FINDINGS:   The arteries of both lower extremities demonstrated normal triphasic   waveforms. Right:     Flow velocities were measured as follows:     Com. Fem.   177.5 cm/sec     Prof.            92.5 cm/sec     SFA Prox.     116.1 cm/sec     SFA Mid.      114.1 cm/sec     SFA Dist.      122.9 cm/sec     Pop.             92.7 cm/sec     PTA             69.4 cm/sec     Peron.          98.4 cm/sec     MAGGIE             77.4 cm/sec       Left:     Flow velocities were measured as follows:     Com. Fem.   169.7 cm/sec     Prof.            122.3 cm/sec     SFA Prox.     108.3 cm/sec     SFA Mid.      125.0 cm/sec     SFA Dist.      111.1 cm/sec     Pop.             97.2 cm/sec     PTA             147.2 cm/sec     Peron.          111.0 cm/sec     MAGGIE             94.2 cm/sec      Impression:       All arteries of both lower extremities demonstrate normal triphasic waveforms   indicating no hemodynamically significant stenosis.      XR CHEST PORTABLE [[id number]] Collected: 12/26/20 2105     Order Status: Completed Updated: 12/26/20 2110     Narrative:       EXAMINATION:   ONE XRAY VIEW OF THE CHEST     12/26/2020 7:41 pm     COMPARISON:   10/09/2019     HISTORY:   ORDERING SYSTEM PROVIDED HISTORY: emergency   TECHNOLOGIST PROVIDED HISTORY:   Reason for exam:->emergency   Reason for Exam: frost bite   Acuity: Acute   Type of Exam: Initial     FINDINGS:   No lung infiltrate or consolidation. No pneumothorax or pleural effusion.    Heart size is normal.      Impression:       No acute abnormality.      XR FOOT LEFT (2 VIEWS) [[id number]] Collected: 12/26/20 2103     Order Status: Completed Updated: 12/26/20 2109     Narrative:       EXAMINATION:   3 XRAY VIEWS OF THE LEFT FOOT 12/26/2020 8:41 pm     COMPARISON:   None. HISTORY:   ORDERING SYSTEM PROVIDED HISTORY: pain   TECHNOLOGIST PROVIDED HISTORY:   Reason for exam:->pain   Reason for Exam: frost bite   Acuity: Acute   Type of Exam: Initial   Relevant Medical/Surgical History: best imaging possible due to patient pain   tolerance     FINDINGS:   Punctate radiodensities are present in soft tissues of the 2nd digit. No acute periostitis or bony destruction. No acute fracture or dislocation. Mild 1st and 2nd TMT degenerative changes.  Remaining bones and joint spaces   are maintained.      Impression:       No acute abnormality.  Punctate radiodensities in the soft tissues of the 2nd   digit.      CTA ABDOMINAL AORTA W BILAT RUNOFF W CONTRAST [[id number]]      Order Status: Canceled            Relevant labs and imaging reviewed    ASSESSMENT AND PLAN     #. Rhabdomyolysis:  -CK-11,905, creatinine 1.2  -Patient received 2 L NS boluses in the ER  -We will continue with NS at 250 cc/h  -Monitor with repeat CK, BMP    #. Foot pain:  -Probably secondary to exposure to cold  -Arterial Doppler-no significant stenosis  -Dr. Faustina Pineda consulted from ER for possible frostbite  -Monitor closely. #.  History of hypertension:  -Patient reports he is not on any medications currently. Could not recall what he took in the past.  -Monitor closely and start antihypertensives as needed. #.  Tobacco use and alcohol use    DVT Prophylaxis: Lovenox  GI Prophylaxis: Not indicated  Code Status: full.     Case d/w ED physician    Princess Almeida MD  Hospitalist, Internal Medicine  12/26/2020 at 11:02 PM

## 2020-12-27 NOTE — ED NOTES
Pt. States about 3-4 hours ago he was not able to walk on his left foot and had to call EMS. Pt. States he is also having some lightheadedness. Pts foot appears swollen. Pt. States no specific injury.      Leida Porter  12/26/20 1950

## 2020-12-27 NOTE — ED NOTES
Bed: ED-18  Expected date:   Expected time:   Means of arrival:   Comments:  [address] , [facility]  12/26/20 7876

## 2020-12-28 LAB
ANION GAP SERPL CALCULATED.3IONS-SCNC: 9 MMOL/L (ref 4–16)
BUN BLDV-MCNC: 7 MG/DL (ref 6–23)
CALCIUM SERPL-MCNC: 8 MG/DL (ref 8.3–10.6)
CHLORIDE BLD-SCNC: 100 MMOL/L (ref 99–110)
CO2: 25 MMOL/L (ref 21–32)
CREAT SERPL-MCNC: 0.9 MG/DL (ref 0.9–1.3)
GFR AFRICAN AMERICAN: >60 ML/MIN/1.73M2
GFR NON-AFRICAN AMERICAN: >60 ML/MIN/1.73M2
GLUCOSE BLD-MCNC: 107 MG/DL (ref 70–99)
MAGNESIUM: 1.6 MG/DL (ref 1.8–2.4)
PHOSPHORUS: 2.3 MG/DL (ref 2.5–4.9)
POTASSIUM SERPL-SCNC: 3.8 MMOL/L (ref 3.5–5.1)
SODIUM BLD-SCNC: 134 MMOL/L (ref 135–145)
TOTAL CK: ABNORMAL IU/L (ref 38–174)

## 2020-12-28 PROCEDURE — 2580000003 HC RX 258: Performed by: INTERNAL MEDICINE

## 2020-12-28 PROCEDURE — 80048 BASIC METABOLIC PNL TOTAL CA: CPT

## 2020-12-28 PROCEDURE — 84100 ASSAY OF PHOSPHORUS: CPT

## 2020-12-28 PROCEDURE — 99213 OFFICE O/P EST LOW 20 MIN: CPT

## 2020-12-28 PROCEDURE — 83735 ASSAY OF MAGNESIUM: CPT

## 2020-12-28 PROCEDURE — 2500000003 HC RX 250 WO HCPCS: Performed by: INTERNAL MEDICINE

## 2020-12-28 PROCEDURE — 6370000000 HC RX 637 (ALT 250 FOR IP): Performed by: INTERNAL MEDICINE

## 2020-12-28 PROCEDURE — 94761 N-INVAS EAR/PLS OXIMETRY MLT: CPT

## 2020-12-28 PROCEDURE — 82550 ASSAY OF CK (CPK): CPT

## 2020-12-28 PROCEDURE — 6360000002 HC RX W HCPCS: Performed by: INTERNAL MEDICINE

## 2020-12-28 PROCEDURE — 1200000000 HC SEMI PRIVATE

## 2020-12-28 PROCEDURE — 36415 COLL VENOUS BLD VENIPUNCTURE: CPT

## 2020-12-28 PROCEDURE — 99232 SBSQ HOSP IP/OBS MODERATE 35: CPT | Performed by: SURGERY

## 2020-12-28 PROCEDURE — 6370000000 HC RX 637 (ALT 250 FOR IP): Performed by: PHYSICIAN ASSISTANT

## 2020-12-28 RX ORDER — MAGNESIUM SULFATE IN WATER 40 MG/ML
2 INJECTION, SOLUTION INTRAVENOUS ONCE
Status: COMPLETED | OUTPATIENT
Start: 2020-12-28 | End: 2020-12-28

## 2020-12-28 RX ORDER — GABAPENTIN 100 MG/1
100 CAPSULE ORAL ONCE
Status: COMPLETED | OUTPATIENT
Start: 2020-12-28 | End: 2020-12-28

## 2020-12-28 RX ADMIN — ENOXAPARIN SODIUM 40 MG: 40 INJECTION SUBCUTANEOUS at 09:30

## 2020-12-28 RX ADMIN — POTASSIUM CHLORIDE: 2 INJECTION, SOLUTION, CONCENTRATE INTRAVENOUS at 09:30

## 2020-12-28 RX ADMIN — OXYCODONE HYDROCHLORIDE AND ACETAMINOPHEN 1 TABLET: 5; 325 TABLET ORAL at 20:10

## 2020-12-28 RX ADMIN — SODIUM CHLORIDE, PRESERVATIVE FREE 10 ML: 5 INJECTION INTRAVENOUS at 09:31

## 2020-12-28 RX ADMIN — MAGNESIUM SULFATE HEPTAHYDRATE 2 G: 40 INJECTION, SOLUTION INTRAVENOUS at 10:51

## 2020-12-28 RX ADMIN — POTASSIUM CHLORIDE: 2 INJECTION, SOLUTION, CONCENTRATE INTRAVENOUS at 17:03

## 2020-12-28 RX ADMIN — OXYCODONE HYDROCHLORIDE AND ACETAMINOPHEN 1 TABLET: 5; 325 TABLET ORAL at 15:50

## 2020-12-28 RX ADMIN — GABAPENTIN 100 MG: 100 CAPSULE ORAL at 20:10

## 2020-12-28 ASSESSMENT — PAIN SCALES - GENERAL
PAINLEVEL_OUTOF10: 8
PAINLEVEL_OUTOF10: 9

## 2020-12-28 ASSESSMENT — PAIN DESCRIPTION - ORIENTATION: ORIENTATION: RIGHT;LEFT

## 2020-12-28 NOTE — CONSULTS
[facility] 75 Continence Nurse  Consult Note       Edel Whiteside  AGE: 50 y.o. GENDER: male  : 1975  TODAY'S DATE:  2020    Subjective:     Reason for  Evaluation and Assessment: wound assessment      Edel Whiteside is a 50 y.o. male referred by:   [x] Physician  [] Nursing  [] Other:     Wound Identification:  Wound Type: traumatic and frostbite  Contributing Factors: smoking and substance abuse        PAST MEDICAL HISTORY        Diagnosis Date    Hypertension     does not take medications       PAST SURGICAL HISTORY    No past surgical history on file. FAMILY HISTORY    No family history on file. SOCIAL HISTORY    Social History     Tobacco Use    Smoking status: Current Some Day Smoker    Smokeless tobacco: Never Used   Substance Use Topics    Alcohol use: Yes     Comment: most days    Drug use: No       ALLERGIES    Allergies   Allergen Reactions    Pcn [Penicillins] Nausea And Vomiting    Vicodin [Hydrocodone-Acetaminophen] Nausea And Vomiting       MEDICATIONS    No current facility-administered medications on file prior to encounter.       Current Outpatient Medications on File Prior to Encounter   Medication Sig Dispense Refill    omeprazole (PRILOSEC) 20 MG delayed release capsule Take 2 capsules by mouth Daily 60 capsule 0    sucralfate (CARAFATE) 1 GM tablet Take 1 tablet by mouth 4 times daily for 15 days 60 tablet 0    naproxen (NAPROSYN) 500 MG tablet Take 1 tablet by mouth 2 times daily 60 tablet 0         Objective:      BP (!) 153/88   Pulse 88   Temp 97.8 °F (36.6 °C) (Oral)   Resp 15   Ht 5' 11\" (1.803 m)   Wt 172 lb 12.8 oz (78.4 kg)   SpO2 98%   BMI 24.10 kg/m²   Naveen Risk Score: Naveen Scale Score: 20    LABS    CBC:   Lab Results   Component Value Date    WBC 7.5 2020    RBC 4.01 2020    HGB 11.7 2020    HCT 36.0 2020    MCV 89.8 2020    MCH 29.2 2020    MCHC 32.5 2020    RDW 14.8 2020     cm^2 12/28/20 1100   Wound Volume (cm^3) 0 cm^3 12/28/20 1100   Distance Tunneling (cm) 0 cm 12/28/20 1100   Tunneling Position ___ O'Clock 0 12/28/20 1100   Undermining Starts ___ O'Clock 0 12/28/20 1100   Undermining Ends___ O'Clock 0 12/28/20 1100   Undermining Maxium Distance (cm) 0 12/28/20 1100   Wound Assessment Fluid filled blister; Purple/maroon 12/28/20 1100   Drainage Amount None 12/28/20 1100   Odor None 12/28/20 1100   Magi-wound Assessment Intact 12/28/20 1100   Margins Attached edges 12/28/20 1100   Number of days: 0       Wound 12/28/20 Toe (Comment  which one) Anterior;Right great toe (Active)   Wound Etiology Traumatic 12/28/20 1100   Dressing Status Other (Comment) 12/28/20 1100   Dressing/Treatment Open to air 12/28/20 1100   Wound Length (cm) 1.5 cm 12/28/20 1100   Wound Width (cm) 2.5 cm 12/28/20 1100   Wound Depth (cm) 0 cm 12/28/20 1100   Wound Surface Area (cm^2) 3.75 cm^2 12/28/20 1100   Wound Volume (cm^3) 0 cm^3 12/28/20 1100   Distance Tunneling (cm) 0 cm 12/28/20 1100   Tunneling Position ___ O'Clock 0 12/28/20 1100   Undermining Starts ___ O'Clock 0 12/28/20 1100   Undermining Ends___ O'Clock 0 12/28/20 1100   Undermining Maxium Distance (cm) 0 12/28/20 1100   Wound Assessment Fluid filled blister 12/28/20 1100   Drainage Amount None 12/28/20 1100   Odor None 12/28/20 1100   Magi-wound Assessment Intact 12/28/20 1100   Margins Attached edges 12/28/20 1100   Number of days: 0       Wound 12/28/20 Foot Right;Lateral cluster (Active)   Wound Etiology Traumatic 12/28/20 1100   Dressing Status New dressing applied 12/28/20 1100   Wound Cleansed Cleansed with saline 12/28/20 1100   Dressing/Treatment Silicone border 87/38/78 1100   Wound Length (cm) 3 cm 12/28/20 1100   Wound Width (cm) 1.5 cm 12/28/20 1100   Wound Depth (cm) 0.1 cm 12/28/20 1100   Wound Surface Area (cm^2) 4.5 cm^2 12/28/20 1100   Wound Volume (cm^3) 0.45 cm^3 12/28/20 1100   Distance Tunneling (cm) 0 cm 12/28/20 1100 Tunneling Position ___ O'Clock 0 12/28/20 1100   Undermining Starts ___ O'Clock 0 12/28/20 1100   Undermining Ends___ O'Clock 0 12/28/20 1100   Undermining Maxium Distance (cm) 0 12/28/20 1100   Wound Assessment Pink/red;Fluid filled blister 12/28/20 1100   Drainage Amount Small 12/28/20 1100   Drainage Description Serous 12/28/20 1100   Odor None 12/28/20 1100   Magi-wound Assessment Intact 12/28/20 1100   Margins Defined edges; Attached edges 12/28/20 1100   Number of days: 0       Wound 12/28/20 Foot Left;Dorsal cluster (Active)   Wound Etiology Traumatic 12/28/20 1100   Dressing Status Other (Comment) 12/28/20 1100   Dressing/Treatment Open to air 12/28/20 1100   Wound Length (cm) 5.5 cm 12/28/20 1100   Wound Width (cm) 8.5 cm 12/28/20 1100   Wound Depth (cm) 0 cm 12/28/20 1100   Wound Surface Area (cm^2) 46.75 cm^2 12/28/20 1100   Wound Volume (cm^3) 0 cm^3 12/28/20 1100   Distance Tunneling (cm) 0 cm 12/28/20 1100   Tunneling Position ___ O'Clock 0 12/28/20 1100   Undermining Starts ___ O'Clock 0 12/28/20 1100   Undermining Ends___ O'Clock 0 12/28/20 1100   Undermining Maxium Distance (cm) 0 12/28/20 1100   Wound Assessment Fluid filled blister 12/28/20 1100   Drainage Amount None 12/28/20 1100   Odor None 12/28/20 1100   Magi-wound Assessment Intact 12/28/20 1100   Margins Attached edges 12/28/20 1100   Number of days: 0       Wound 12/28/20 Foot Left;Plantar (Active)   Wound Etiology Traumatic 12/28/20 1100   Dressing Status Other (Comment) 12/28/20 1100   Dressing/Treatment Open to air 12/28/20 1100   Wound Length (cm) 1 cm 12/28/20 1100   Wound Width (cm) 1.5 cm 12/28/20 1100   Wound Depth (cm) 0 cm 12/28/20 1100   Wound Surface Area (cm^2) 1.5 cm^2 12/28/20 1100   Wound Volume (cm^3) 0 cm^3 12/28/20 1100   Distance Tunneling (cm) 0 cm 12/28/20 1100   Tunneling Position ___ O'Clock 0 12/28/20 1100   Undermining Starts ___ O'Clock 0 12/28/20 1100   Undermining Ends___ O'Clock 0 12/28/20 1100   Undermining Maxium Distance (cm) 0 12/28/20 1100   Wound Assessment Fluid filled blister 12/28/20 1100   Drainage Amount None 12/28/20 1100   Odor None 12/28/20 1100   Magi-wound Assessment Intact 12/28/20 1100   Margins Attached edges 12/28/20 1100   Number of days: 0       Wound 12/28/20 Toe (Comment  which one) Anterior; Left cluster 1st-5th toes (Active)   Wound Etiology Traumatic 12/28/20 1100   Dressing Status Other (Comment) 12/28/20 1100   Dressing/Treatment Open to air 12/28/20 1100   Wound Length (cm) 2.5 cm 12/28/20 1100   Wound Width (cm) 8.5 cm 12/28/20 1100   Wound Depth (cm) 0 cm 12/28/20 1100   Wound Surface Area (cm^2) 21.25 cm^2 12/28/20 1100   Wound Volume (cm^3) 0 cm^3 12/28/20 1100   Distance Tunneling (cm) 0 cm 12/28/20 1100   Tunneling Position ___ O'Clock 0 12/28/20 1100   Undermining Starts ___ O'Clock 0 12/28/20 1100   Undermining Ends___ O'Clock 0 12/28/20 1100   Undermining Maxium Distance (cm) 0 12/28/20 1100   Wound Assessment Fluid filled blister 12/28/20 1100   Drainage Amount None 12/28/20 1100   Odor None 12/28/20 1100   Magi-wound Assessment Intact 12/28/20 1100   Margins Attached edges 12/28/20 1100   Number of days: 0       Response to treatment:  Well tolerated by patient. Pain Assessment:  Severity:  mild  Quality of pain: sharp  Wound Pain Timing/Severity: intermittent  Premedicated: no    Plan:     Plan of Care: Wound 12/28/20 Foot Right;Dorsal cluster-Dressing/Treatment: Open to air  Wound 12/28/20 Foot Right;Plantar-Dressing/Treatment: Open to air  Wound 12/28/20 Toe (Comment  which one) Anterior;Right great toe-Dressing/Treatment: Open to air  Wound 12/28/20 Foot Right;Lateral cluster-Dressing/Treatment: Silicone border  Wound 12/28/20 Foot Left;Dorsal cluster-Dressing/Treatment: Open to air  Wound 12/28/20 Foot Left;Plantar-Dressing/Treatment: Open to air  Wound 12/28/20 Toe (Comment  which one) Anterior; Left cluster 1st-5th toes-Dressing/Treatment: Open to air    Pt in bed. Agreeable to wound assessment. Bilateral feet and toes with mostly intact fluid filled blisters. 1 ruptured blister noted to right lateral foot. Pictures and measurements taken. Toes with purplish discoloration. Pt sustained frostbite to feet. Dr. Rasheeda Moscoso active with pt. Notes reviewed. Allow to demarcate. Bilateral heel protector boots already in place. Heels intact. Optifoam silicone border applied to right lateral foot ruptured blister. Recommend to keep pressure off of feet and to keep elevated as much as possible. Leave KRISTA unless blisters rupture then apply silicone foam dressing such as Optifoam AG to wounds. Informed nurse. Dr. Rasheeda Moscoso agreeable. Buttock intact. Pt is generally not at risk for skin breakdown AEB Naveen. Specialty Bed Required : no  [] Low Air Loss   [] Pressure Redistribution  [] Fluid Immersion  [] Bariatric  [] Total Pressure Relief  [] Other:     Discharge Plan:  Placement for patient upon discharge: tbd  Hospice Care: no  Patient appropriate for Outpatient [address]: yes or follow up with Dr. Rasheeda Moscoso in his office    Patient/Caregiver Teaching:  Level of patient/caregiver understanding able to:   Voiced understanding.         Electronically signed by Jayant Yadav RN,  on 12/28/2020 at 1:04 PM

## 2020-12-28 NOTE — PROGRESS NOTES
Hospitalist Progress Note         Admit Date: 12/26/2020    PCP: No primary care provider on file. Chief Complaint   Patient presents with    Foot Pain     bilat        Assessment and Plan:     -Frostbite left foot/rhabdomyolysis  continue bicarb fluids. Pain control-Percocet and morphine IV as needed. Monitor daily CK. -Acute kidney injury from rhabdomyolysis. Continue fluids and monitor BMP. Replace electrolytes. -Hyperkalemia could be lab error.   Monitor BMP    Current Facility-Administered Medications   Medication Dose Route Frequency Provider Last Rate Last Admin    sodium chloride flush 0.9 % injection 10 mL  10 mL Intravenous 2 times per day Paul Guillory MD   10 mL at 12/28/20 0931    sodium chloride flush 0.9 % injection 10 mL  10 mL Intravenous PRN Paul Guillory MD        enoxaparin (LOVENOX) injection 40 mg  40 mg Subcutaneous Daily Paul Guillory MD   40 mg at 12/28/20 0930    promethazine (PHENERGAN) tablet 12.5 mg  12.5 mg Oral Q6H PRN Paul Guillory MD        Or    ondansetron (ZOFRAN) injection 4 mg  4 mg Intravenous Q6H PRN Paul Guillory MD        polyethylene glycol (GLYCOLAX) packet 17 g  17 g Oral Daily PRN Paul Guillory MD        acetaminophen (TYLENOL) tablet 650 mg  650 mg Oral Q6H PRN Paul Guillory MD        Or   Pamela Moreno acetaminophen (TYLENOL) suppository 650 mg  650 mg Rectal Q6H PRN Paul Guillory MD        sodium bicarbonate 50 mEq, potassium chloride 20 mEq in sodium chloride 0.45 % 1,000 mL infusion   Intravenous Continuous MD Lara 150 mL/hr at 12/28/20 1703 New Bag at 12/28/20 1703    oxyCODONE-acetaminophen (PERCOCET) 5-325 MG per tablet 1 tablet  1 tablet Oral Q4H PRN MD Lara   1 tablet at 12/28/20 1550    morphine (PF) injection 2 mg  2 mg Intravenous Q4H PRN MD Lara           Subjective:     Patient has significant pain in left as well as right leg   He feels numbness and stiffened foot. Otherwise no significant overnight events    Objective:     No intake or output data in the 24 hours ending 12/28/20 1823   Vitals:   Vitals:    12/28/20 1611   BP: (!) 143/82   Pulse: 93   Resp: 18   Temp: 98.6 °F (37 °C)   SpO2: 98%     Physical Exam:  General Appearance:    Alert, cooperative, no distress  Head:      Normocephalic, without obvious abnormality, atraumatic  Eyes:       Conjunctiva/corneas clear, EOM's intact  Lungs:    Clear to auscultation bilaterally, respirations unlabored  Heart:                Regular rate and rhythm, S1 and S2 normal, no murmur,   rub or gallop  Abdomen:     Soft, non-tender, bowel sounds active, no masses, no organomegaly  Extremities:   Right and left foot looking bluish/cyanotic left >Rt, tenderness +  Neurological:   Grossly Intact. Significant Diagnostic Studies:   DATA:    CBC   Recent Labs     12/26/20 2015 12/27/20 0459   WBC 10.1 7.5   HGB 15.3 11.7*   HCT 46.3 36.0*    207      BMP   Recent Labs     12/27/20  0459 12/27/20  1741 12/28/20  0619    131* 134*   K 4.4 5.9* 3.8    101 100   CO2 24 24 25   PHOS  --   --  2.3*   BUN 21 15 7   CREATININE 1.3 0.9 0.9     LFT'S No results for input(s): AST, ALT, ALB, BILIDIR, BILITOT, ALKPHOS in the last 72 hours. COAG No results for input(s): INR in the last 72 hours. POC: No results found for: POCGLU  [id number]:No results found for: [address]     12/26/20 2015 12/27/20 0459 12/28/20  0619   CKTOTAL 11,905* 13,367* 11,903*     Troponin: No results for input(s): TROPONINT in the last 72 hours. BNP: No results for input(s): PROBNP in the last 72 hours.   U/A:    Lab Results   Component Value Date    COLORU YELLOW 12/27/2020    WBCUA <1 12/27/2020    RBCUA <1 12/27/2020    MUCUS RARE 12/27/2020    BACTERIA NEGATIVE 12/27/2020    CLARITYU CLEAR 12/27/2020    SPECGRAV 1.015 12/27/2020    LEUKOCYTESUR NEGATIVE 12/27/2020    BLOODU SMALL 12/27/2020 Peron.          98.4 cm/sec MAGGIE             77.4 cm/sec Left: Flow velocities were measured as follows: Com. Fem.   169.7 cm/sec Prof.            122.3 cm/sec SFA Prox.     108.3 cm/sec SFA Mid.      125.0 cm/sec SFA Dist.      111.1 cm/sec Pop.             97.2 cm/sec PTA             147.2 cm/sec Peron. 111.0 cm/sec MAGGIE             94.2 cm/sec     All arteries of both lower extremities demonstrate normal triphasic waveforms indicating no hemodynamically significant stenosis.            Ene Wynn  [facility]

## 2020-12-28 NOTE — PROGRESS NOTES
GENERAL SURGERY PROGRESS NOTE    Gold Smith is a 50 y.o. male with frostbite to bilateral feet. Subjective:  Doing ok today. Pain well controlled. Making urine. Objective:    Vitals: VITALS:  BP (!) 153/88   Pulse 88   Temp 97.8 °F (36.6 °C) (Oral)   Resp 15   Ht 5' 11\" (1.803 m)   Wt 172 lb 12.8 oz (78.4 kg)   SpO2 98%   BMI 24.10 kg/m²     I/O: 12/27 0701 - 12/28 0700  In: -   Out: 2100 [Urine:2100]    Labs/Imaging Results:   Lab Results   Component Value Date     12/28/2020    K 3.8 12/28/2020     12/28/2020    CO2 25 12/28/2020    BUN 7 12/28/2020    CREATININE 0.9 12/28/2020    GLUCOSE 107 12/28/2020    CALCIUM 8.0 12/28/2020      Lab Results   Component Value Date    WBC 7.5 12/27/2020    HGB 11.7 (L) 12/27/2020    HCT 36.0 (L) 12/27/2020    MCV 89.8 12/27/2020     12/27/2020       IV Fluids: sodium bicarbonate infusion Last Rate: 150 mL/hr at 12/28/20 0930    Scheduled Meds:   magnesium sulfate, 2 g, Intravenous, Once    sodium chloride flush, 10 mL, Intravenous, 2 times per day    enoxaparin, 40 mg, Subcutaneous, Daily    Physical Exam:  General: awake, alert, in no acute distress  HEENT: mucous membranes moist  Respiratory: normal effort, no wheezes appreciated  CV: appears well perfused  Abdomen: Soft, non-tender, non-distended. Skin: warm and dry     Extremities: bilateral feet with blue discoloration of the toes, all of his distal toes are cool to the touch and lack sensation--pinker today in the proximal toes, warmth and sensation is regained in the distal foot. Increased blistering today in the distal foot     Neuro: no focal deficits noted  Psych: mood normal      Assessment and Plan:  50 y.o. male with frostbite to bilateral feet.     Patient Active Problem List:     Rhabdomyolysis      PLAN:  - keep feet warm and dry  - continue pillow boots to protect from further injury  - limit ambulation, up to bathroom only  - will allow tissue loss to demarcate, I expect blistering will continue to worsen  - Wound care consult     Alva Manzano MD

## 2020-12-29 PROCEDURE — 99232 SBSQ HOSP IP/OBS MODERATE 35: CPT | Performed by: SURGERY

## 2020-12-29 PROCEDURE — 80048 BASIC METABOLIC PNL TOTAL CA: CPT

## 2020-12-29 PROCEDURE — 2500000003 HC RX 250 WO HCPCS: Performed by: INTERNAL MEDICINE

## 2020-12-29 PROCEDURE — 82550 ASSAY OF CK (CPK): CPT

## 2020-12-29 PROCEDURE — 1200000000 HC SEMI PRIVATE

## 2020-12-29 PROCEDURE — 36415 COLL VENOUS BLD VENIPUNCTURE: CPT

## 2020-12-29 PROCEDURE — 6360000002 HC RX W HCPCS: Performed by: INTERNAL MEDICINE

## 2020-12-29 PROCEDURE — 2580000003 HC RX 258: Performed by: INTERNAL MEDICINE

## 2020-12-29 PROCEDURE — 6370000000 HC RX 637 (ALT 250 FOR IP): Performed by: INTERNAL MEDICINE

## 2020-12-29 PROCEDURE — 94761 N-INVAS EAR/PLS OXIMETRY MLT: CPT

## 2020-12-29 PROCEDURE — 83735 ASSAY OF MAGNESIUM: CPT

## 2020-12-29 PROCEDURE — 84100 ASSAY OF PHOSPHORUS: CPT

## 2020-12-29 RX ADMIN — OXYCODONE HYDROCHLORIDE AND ACETAMINOPHEN 1 TABLET: 5; 325 TABLET ORAL at 15:56

## 2020-12-29 RX ADMIN — MORPHINE SULFATE 2 MG: 2 INJECTION, SOLUTION INTRAMUSCULAR; INTRAVENOUS at 14:14

## 2020-12-29 RX ADMIN — OXYCODONE HYDROCHLORIDE AND ACETAMINOPHEN 1 TABLET: 5; 325 TABLET ORAL at 00:26

## 2020-12-29 RX ADMIN — POTASSIUM CHLORIDE: 2 INJECTION, SOLUTION, CONCENTRATE INTRAVENOUS at 06:25

## 2020-12-29 RX ADMIN — ENOXAPARIN SODIUM 40 MG: 40 INJECTION SUBCUTANEOUS at 08:46

## 2020-12-29 RX ADMIN — POTASSIUM CHLORIDE: 2 INJECTION, SOLUTION, CONCENTRATE INTRAVENOUS at 00:26

## 2020-12-29 RX ADMIN — POTASSIUM CHLORIDE: 2 INJECTION, SOLUTION, CONCENTRATE INTRAVENOUS at 15:56

## 2020-12-29 RX ADMIN — MORPHINE SULFATE 2 MG: 2 INJECTION, SOLUTION INTRAMUSCULAR; INTRAVENOUS at 18:41

## 2020-12-29 RX ADMIN — OXYCODONE HYDROCHLORIDE AND ACETAMINOPHEN 1 TABLET: 5; 325 TABLET ORAL at 06:25

## 2020-12-29 RX ADMIN — MORPHINE SULFATE 2 MG: 2 INJECTION, SOLUTION INTRAMUSCULAR; INTRAVENOUS at 08:46

## 2020-12-29 RX ADMIN — SODIUM CHLORIDE, PRESERVATIVE FREE 10 ML: 5 INJECTION INTRAVENOUS at 08:46

## 2020-12-29 ASSESSMENT — PAIN DESCRIPTION - FREQUENCY
FREQUENCY: CONTINUOUS

## 2020-12-29 ASSESSMENT — PAIN DESCRIPTION - PROGRESSION
CLINICAL_PROGRESSION: GRADUALLY WORSENING

## 2020-12-29 ASSESSMENT — PAIN SCALES - GENERAL
PAINLEVEL_OUTOF10: 9
PAINLEVEL_OUTOF10: 6
PAINLEVEL_OUTOF10: 6
PAINLEVEL_OUTOF10: 9
PAINLEVEL_OUTOF10: 7
PAINLEVEL_OUTOF10: 9

## 2020-12-29 ASSESSMENT — PAIN DESCRIPTION - PAIN TYPE
TYPE: ACUTE PAIN

## 2020-12-29 ASSESSMENT — PAIN DESCRIPTION - ONSET
ONSET: ON-GOING

## 2020-12-29 ASSESSMENT — PAIN DESCRIPTION - LOCATION
LOCATION: FOOT

## 2020-12-29 ASSESSMENT — PAIN - FUNCTIONAL ASSESSMENT
PAIN_FUNCTIONAL_ASSESSMENT: PREVENTS OR INTERFERES SOME ACTIVE ACTIVITIES AND ADLS

## 2020-12-29 ASSESSMENT — PAIN DESCRIPTION - DESCRIPTORS
DESCRIPTORS: SHARP
DESCRIPTORS: SHARP

## 2020-12-29 ASSESSMENT — PAIN DESCRIPTION - ORIENTATION
ORIENTATION: RIGHT;LEFT

## 2020-12-29 NOTE — CARE COORDINATION
Reviewed chart for discharge planning. Pt admitted from home, ind with ADLs. Pt has insurance but does not have pcp, provided with information. CM to cont to follow as pt progresses for possible wound care needs.

## 2020-12-29 NOTE — PROGRESS NOTES
GENERAL SURGERY PROGRESS NOTE    Bryan Barbosa is a 50 y.o. male with frostbite to bilateral feet. Subjective:  Doing ok today. Pain well controlled. Objective:    Vitals: VITALS:  BP (!) 160/98   Pulse 101   Temp 99 °F (37.2 °C) (Oral)   Resp 18   Ht 5' 11\" (1.803 m)   Wt 172 lb 12.8 oz (78.4 kg)   SpO2 97%   BMI 24.10 kg/m²     I/O: 12/28 0701 - 12/29 0700  In: 0   Out: 1600 [Urine:1600]    Labs/Imaging Results:   Lab Results   Component Value Date     12/28/2020    K 3.8 12/28/2020     12/28/2020    CO2 25 12/28/2020    BUN 7 12/28/2020    CREATININE 0.9 12/28/2020    GLUCOSE 107 12/28/2020    CALCIUM 8.0 12/28/2020      Lab Results   Component Value Date    WBC 7.5 12/27/2020    HGB 11.7 (L) 12/27/2020    HCT 36.0 (L) 12/27/2020    MCV 89.8 12/27/2020     12/27/2020       IV Fluids: sodium bicarbonate infusion Last Rate: 150 mL/hr at 12/29/20 1514    Scheduled Meds:   sodium chloride flush, 10 mL, Intravenous, 2 times per day    enoxaparin, 40 mg, Subcutaneous, Daily    Physical Exam:  General: awake, alert, in no acute distress  HEENT: mucous membranes moist  Respiratory: normal effort, no wheezes appreciated  CV: appears well perfused  Abdomen: Soft, non-tender, non-distended. Skin: warm and dry     Extremities: bilateral feet with blue discoloration of the toes, all of his distal toes are cool to the touch and lack sensation. Increased blistering today in the distal foot.      Neuro: no focal deficits noted  Psych: mood normal      Assessment and Plan:  50 y.o. male with frostbite to bilateral feet.     Patient Active Problem List:     Rhabdomyolysis   Frostbite      PLAN:  - keep feet warm and dry  - continue pillow boots to protect from further injury  - limit ambulation, up to bathroom only  - will allow tissue loss to demarcate, I expect blistering will continue to worsen  - appreciate Wound care input  - will continue to follow     Claribel Conn MD

## 2020-12-29 NOTE — PROGRESS NOTES
Hospitalist Progress Note         Admit Date: 12/26/2020    PCP: No primary care provider on file. Chief Complaint   Patient presents with    Foot Pain     bilat        Assessment and Plan:     -Frostbite left foot/rhabdomyolysis  continue bicarb fluids. Pain control-Percocet and morphine IV as needed. Monitor daily CK. -Acute kidney injury from rhabdomyolysis. Continue fluids and monitor BMP. -Hyperkalemia could be lab error.   Monitor BMP    DVT prophylaxis Lovenox    Current Facility-Administered Medications   Medication Dose Route Frequency Provider Last Rate Last Admin    sodium chloride flush 0.9 % injection 10 mL  10 mL Intravenous 2 times per day Yamile Ray MD   10 mL at 12/29/20 0846    sodium chloride flush 0.9 % injection 10 mL  10 mL Intravenous PRN Yamile Ray MD        enoxaparin (LOVENOX) injection 40 mg  40 mg Subcutaneous Daily Yamile Ray MD   40 mg at 12/29/20 0846    promethazine (PHENERGAN) tablet 12.5 mg  12.5 mg Oral Q6H PRN Yamile Ray MD        Or    ondansetron (ZOFRAN) injection 4 mg  4 mg Intravenous Q6H PRN Yamile Ray MD        polyethylene glycol (GLYCOLAX) packet 17 g  17 g Oral Daily PRN Yamile Ray MD        acetaminophen (TYLENOL) tablet 650 mg  650 mg Oral Q6H PRN Yamile Ray MD        Or   Larios acetaminophen (TYLENOL) suppository 650 mg  650 mg Rectal Q6H PRN Yamile Ray MD        sodium bicarbonate 50 mEq, potassium chloride 20 mEq in sodium chloride 0.45 % 1,000 mL infusion   Intravenous Continuous Hernando Whitman  mL/hr at 12/29/20 0625 New Bag at 12/29/20 0625    oxyCODONE-acetaminophen (PERCOCET) 5-325 MG per tablet 1 tablet  1 tablet Oral Q4H PRN Hernando Whitman MD   1 tablet at 12/29/20 0611    morphine (PF) injection 2 mg  2 mg Intravenous Q4H PRN Hernando Whitman MD   2 mg at 12/29/20 1338       Subjective:     Patient has stable B/L leg pain  Continue to complain numbness and stiffened foot. Otherwise no significant overnight events    Objective: Intake/Output Summary (Last 24 hours) at 12/29/2020 1238  Last data filed at 12/29/2020 0700  Gross per 24 hour   Intake 0 ml   Output 1600 ml   Net -1600 ml      Vitals:   Vitals:    12/29/20 0847   BP: (!) 160/98   Pulse: 101   Resp: 18   Temp: 99 °F (37.2 °C)   SpO2: 97%     Physical Exam:  General Appearance:    Alert, cooperative, no distress  Head:      Normocephalic, without obvious abnormality, atraumatic  Eyes:       Conjunctiva/corneas clear, EOM's intact  Lungs:    Clear to auscultation bilaterally, respirations unlabored  Heart:                Regular rate and rhythm, S1 and S2 normal, no murmur,   rub or gallop  Abdomen:     Soft, non-tender, bowel sounds active, no masses, no organomegaly  Extremities:   Right and left foot looking bluish/cyanotic left >Rt, tenderness with blebs +  Neurological:   Grossly Intact. Significant Diagnostic Studies:   DATA:    CBC   Recent Labs     12/26/20 2015 12/27/20 0459   WBC 10.1 7.5   HGB 15.3 11.7*   HCT 46.3 36.0*    207      BMP   Recent Labs     12/27/20  0459 12/27/20  1741 12/28/20  0619    131* 134*   K 4.4 5.9* 3.8    101 100   CO2 24 24 25   PHOS  --   --  2.3*   BUN 21 15 7   CREATININE 1.3 0.9 0.9     LFT'S No results for input(s): AST, ALT, ALB, BILIDIR, BILITOT, ALKPHOS in the last 72 hours. COAG No results for input(s): INR in the last 72 hours. POC: No results found for: POCGLU  [id number]:No results found for: [address]     12/26/20 2015 12/27/20 0459 12/28/20  0619   CKTOTAL 11,905* 13,367* 11,903*     Troponin: No results for input(s): TROPONINT in the last 72 hours. BNP: No results for input(s): PROBNP in the last 72 hours.   U/A:    Lab Results   Component Value Date    COLORU YELLOW 12/27/2020    WBCUA <1 12/27/2020    RBCUA <1 12/27/2020    MUCUS RARE 12/27/2020    BACTERIA NEGATIVE 12/27/2020 CLARITYU CLEAR 12/27/2020    SPECGRAV 1.015 12/27/2020    LEUKOCYTESUR NEGATIVE 12/27/2020    BLOODU SMALL 12/27/2020       Xr Foot Left (2 Views)    Result Date: 12/26/2020  EXAMINATION: 3 XRAY VIEWS OF THE LEFT FOOT 12/26/2020 8:41 pm COMPARISON: None. HISTORY: ORDERING SYSTEM PROVIDED HISTORY: pain TECHNOLOGIST PROVIDED HISTORY: Reason for exam:->pain Reason for Exam: frost bite Acuity: Acute Type of Exam: Initial Relevant Medical/Surgical History: best imaging possible due to patient pain tolerance FINDINGS: Punctate radiodensities are present in soft tissues of the 2nd digit. No acute periostitis or bony destruction. No acute fracture or dislocation. Mild 1st and 2nd TMT degenerative changes. Remaining bones and joint spaces are maintained. No acute abnormality. Punctate radiodensities in the soft tissues of the 2nd digit. Xr Chest Portable    Result Date: 12/26/2020  EXAMINATION: ONE XRAY VIEW OF THE CHEST 12/26/2020 7:41 pm COMPARISON: 10/09/2019 HISTORY: ORDERING SYSTEM PROVIDED HISTORY: emergency TECHNOLOGIST PROVIDED HISTORY: Reason for exam:->emergency Reason for Exam: frost bite Acuity: Acute Type of Exam: Initial FINDINGS: No lung infiltrate or consolidation. No pneumothorax or pleural effusion. Heart size is normal.     No acute abnormality. Vl Dup Lower Extremity Arteries Bilateral    Result Date: 12/26/2020  EXAMINATION: ARTERIAL DUPLEX ULTRASOUND OF THE BILATERAL LOWER EXTREMITIES  12/26/2020 8:26 pm TECHNIQUE: Duplex ultrasound and Doppler images were obtained of the bilateral lower extremities COMPARISON: None. HISTORY: ORDERING SYSTEM PROVIDED HISTORY: pain TECHNOLOGIST PROVIDED HISTORY: Reason for exam:->pain Reason for Exam: pain Acuity: Acute Type of Exam: Initial FINDINGS: The arteries of both lower extremities demonstrated normal triphasic waveforms. Right: Flow velocities were measured as follows: Com. Fem. 177.5 cm/sec Prof.            92.5 cm/sec SFA Prox.      116.1 cm/sec SFA Mid.      114.1 cm/sec SFA Dist.      122.9 cm/sec Pop.             92.7 cm/sec PTA             69.4 cm/sec Peron. 98.4 cm/sec MAGGIE             77.4 cm/sec Left: Flow velocities were measured as follows: Com. Fem.   169.7 cm/sec Prof.            122.3 cm/sec SFA Prox.     108.3 cm/sec SFA Mid.      125.0 cm/sec SFA Dist.      111.1 cm/sec Pop.             97.2 cm/sec PTA             147.2 cm/sec Peron. 111.0 cm/sec MAGGIE             94.2 cm/sec     All arteries of both lower extremities demonstrate normal triphasic waveforms indicating no hemodynamically significant stenosis.            [facility]

## 2020-12-30 LAB
ANION GAP SERPL CALCULATED.3IONS-SCNC: 12 MMOL/L (ref 4–16)
BUN BLDV-MCNC: 8 MG/DL (ref 6–23)
CALCIUM SERPL-MCNC: 8.8 MG/DL (ref 8.3–10.6)
CHLORIDE BLD-SCNC: 99 MMOL/L (ref 99–110)
CO2: 27 MMOL/L (ref 21–32)
CREAT SERPL-MCNC: 1.1 MG/DL (ref 0.9–1.3)
GFR AFRICAN AMERICAN: >60 ML/MIN/1.73M2
GFR NON-AFRICAN AMERICAN: >60 ML/MIN/1.73M2
GLUCOSE BLD-MCNC: 106 MG/DL (ref 70–99)
MAGNESIUM: 1.6 MG/DL (ref 1.8–2.4)
PHOSPHORUS: 3.8 MG/DL (ref 2.5–4.9)
POTASSIUM SERPL-SCNC: 4.5 MMOL/L (ref 3.5–5.1)
SODIUM BLD-SCNC: 138 MMOL/L (ref 135–145)

## 2020-12-30 PROCEDURE — 6360000002 HC RX W HCPCS: Performed by: INTERNAL MEDICINE

## 2020-12-30 PROCEDURE — 99232 SBSQ HOSP IP/OBS MODERATE 35: CPT | Performed by: SURGERY

## 2020-12-30 PROCEDURE — 6370000000 HC RX 637 (ALT 250 FOR IP): Performed by: INTERNAL MEDICINE

## 2020-12-30 PROCEDURE — 2500000003 HC RX 250 WO HCPCS: Performed by: INTERNAL MEDICINE

## 2020-12-30 PROCEDURE — 80048 BASIC METABOLIC PNL TOTAL CA: CPT

## 2020-12-30 PROCEDURE — 36415 COLL VENOUS BLD VENIPUNCTURE: CPT

## 2020-12-30 PROCEDURE — 83735 ASSAY OF MAGNESIUM: CPT

## 2020-12-30 PROCEDURE — 1200000000 HC SEMI PRIVATE

## 2020-12-30 PROCEDURE — 2580000003 HC RX 258: Performed by: INTERNAL MEDICINE

## 2020-12-30 PROCEDURE — 94761 N-INVAS EAR/PLS OXIMETRY MLT: CPT

## 2020-12-30 PROCEDURE — 84100 ASSAY OF PHOSPHORUS: CPT

## 2020-12-30 RX ORDER — MAGNESIUM SULFATE IN WATER 40 MG/ML
2 INJECTION, SOLUTION INTRAVENOUS ONCE
Status: COMPLETED | OUTPATIENT
Start: 2020-12-30 | End: 2020-12-30

## 2020-12-30 RX ORDER — AMLODIPINE BESYLATE 5 MG/1
5 TABLET ORAL DAILY
Status: DISCONTINUED | OUTPATIENT
Start: 2020-12-30 | End: 2021-01-07 | Stop reason: HOSPADM

## 2020-12-30 RX ADMIN — AMLODIPINE BESYLATE 5 MG: 5 TABLET ORAL at 16:32

## 2020-12-30 RX ADMIN — SODIUM CHLORIDE, PRESERVATIVE FREE 10 ML: 5 INJECTION INTRAVENOUS at 19:52

## 2020-12-30 RX ADMIN — MORPHINE SULFATE 2 MG: 2 INJECTION, SOLUTION INTRAMUSCULAR; INTRAVENOUS at 16:28

## 2020-12-30 RX ADMIN — POTASSIUM CHLORIDE: 2 INJECTION, SOLUTION, CONCENTRATE INTRAVENOUS at 12:41

## 2020-12-30 RX ADMIN — MORPHINE SULFATE 2 MG: 2 INJECTION, SOLUTION INTRAMUSCULAR; INTRAVENOUS at 10:24

## 2020-12-30 RX ADMIN — OXYCODONE HYDROCHLORIDE AND ACETAMINOPHEN 1 TABLET: 5; 325 TABLET ORAL at 12:40

## 2020-12-30 RX ADMIN — ENOXAPARIN SODIUM 40 MG: 40 INJECTION SUBCUTANEOUS at 10:24

## 2020-12-30 RX ADMIN — OXYCODONE HYDROCHLORIDE AND ACETAMINOPHEN 1 TABLET: 5; 325 TABLET ORAL at 22:23

## 2020-12-30 RX ADMIN — SODIUM CHLORIDE, PRESERVATIVE FREE 10 ML: 5 INJECTION INTRAVENOUS at 10:31

## 2020-12-30 RX ADMIN — MAGNESIUM SULFATE HEPTAHYDRATE 2 G: 40 INJECTION, SOLUTION INTRAVENOUS at 10:19

## 2020-12-30 RX ADMIN — POTASSIUM CHLORIDE: 2 INJECTION, SOLUTION, CONCENTRATE INTRAVENOUS at 21:14

## 2020-12-30 ASSESSMENT — PAIN DESCRIPTION - LOCATION
LOCATION: FOOT
LOCATION: FOOT

## 2020-12-30 ASSESSMENT — PAIN DESCRIPTION - PAIN TYPE
TYPE: ACUTE PAIN

## 2020-12-30 ASSESSMENT — PAIN DESCRIPTION - PROGRESSION
CLINICAL_PROGRESSION: GRADUALLY WORSENING

## 2020-12-30 ASSESSMENT — PAIN - FUNCTIONAL ASSESSMENT
PAIN_FUNCTIONAL_ASSESSMENT: PREVENTS OR INTERFERES SOME ACTIVE ACTIVITIES AND ADLS

## 2020-12-30 ASSESSMENT — PAIN SCALES - GENERAL
PAINLEVEL_OUTOF10: 9
PAINLEVEL_OUTOF10: 8
PAINLEVEL_OUTOF10: 7
PAINLEVEL_OUTOF10: 9
PAINLEVEL_OUTOF10: 6
PAINLEVEL_OUTOF10: 8

## 2020-12-30 ASSESSMENT — PAIN DESCRIPTION - DESCRIPTORS
DESCRIPTORS: SHARP
DESCRIPTORS: ACHING
DESCRIPTORS: SHARP

## 2020-12-30 ASSESSMENT — PAIN DESCRIPTION - FREQUENCY
FREQUENCY: CONTINUOUS
FREQUENCY: CONTINUOUS

## 2020-12-30 ASSESSMENT — PAIN DESCRIPTION - ORIENTATION
ORIENTATION: RIGHT;LEFT

## 2020-12-30 ASSESSMENT — PAIN DESCRIPTION - ONSET
ONSET: ON-GOING

## 2020-12-30 NOTE — PROGRESS NOTES
Comprehensive Nutrition Assessment    Type and Reason for Visit:  Initial(LOS)    Nutrition Recommendations/Plan:   Continue current diet as ordered  Add wound healing and high protein ONS  Encourage po intake as able  Please document po intake  Will monitor po intake, nutrition status, poc    Nutrition Assessment:  Pt admitted with rhabdomyolysis, lactic acidosis, cold injury, frostbite bilateral feet/toes noted, PMH: HTN, pt currently on general diet, no meals documented in the past 72 hr, no weight history available for review, pt is at moderate nutrition risk    Malnutrition Assessment:  Malnutrition Status: At risk for malnutrition (Comment)    Context:  Acute Illness       Estimated Daily Nutrient Needs:  Energy (kcal):  8465-9130(89-06 kcal/kg); Weight Used for Energy Requirements:  Current     Protein (g):  (1.2-1.4 g/kg); Weight Used for Protein Requirements:  Current        Fluid (ml/day):  2000; Method Used for Fluid Requirements:  1 ml/kcal      Wounds:  Multiple(bilateral feet/toe frostbite)       Current Nutrition Therapies:    DIET GENERAL; Anthropometric Measures:  · Height: 5' 10.98\" (180.3 cm)  · Current Body Weight: 172 lb 13.5 oz (78.4 kg)   · Usual Body Weight: (none available)     · Ideal Body Weight: 172 lbs; % Ideal Body Weight 100.5 %   · BMI: 24.1    · BMI Categories: Normal Weight (BMI 18.5-24. 9)       Nutrition Diagnosis:   · Increased nutrient needs related to healing as evidenced by frostbite on bilateral feet    Nutrition Interventions:   Food and/or Nutrient Delivery:  Continue Current Diet, Start Oral Nutrition Supplement  Nutrition Education/Counseling:  No recommendation at this time   Coordination of Nutrition Care:  Continue to monitor while inpatient    Goals:  pt will consume greater than 50% of meals and supplements       Nutrition Monitoring and Evaluation:   Food/Nutrient Intake Outcomes:  Supplement Intake, Food and Nutrient Intake  Physical Signs/Symptoms Outcomes:  Biochemical Data, Weight, GI Status, Hemodynamic Status     Discharge Planning:     Too soon to determine     Electronically signed by Rufino Morgan, MS, RD, LD on 12/30/20 at 1:42 PM EST    Contact: [phone]

## 2020-12-30 NOTE — PROGRESS NOTES
Hospitalist Progress Note         Admit Date: 12/26/2020    PCP: No primary care provider on file. Chief Complaint   Patient presents with    Foot Pain     bilat        Assessment and Plan:     -Frostbite left foot/rhabdomyolysis  continue bicarb fluids. Pain control-Percocet and morphine IV as needed. Monitor daily CK. -Acute kidney injury from rhabdomyolysis. Continue fluids and monitor BMP. -Hyperkalemia could be lab error. Monitor BMP  -HTN start Norvasc daily.   Monitor BP    DVT prophylaxis Lovenox    Current Facility-Administered Medications   Medication Dose Route Frequency Provider Last Rate Last Admin    sodium chloride flush 0.9 % injection 10 mL  10 mL Intravenous 2 times per day Kain Blunt MD   10 mL at 12/30/20 1031    sodium chloride flush 0.9 % injection 10 mL  10 mL Intravenous PRN Kain Blunt MD        enoxaparin (LOVENOX) injection 40 mg  40 mg Subcutaneous Daily Kain Blunt MD   40 mg at 12/30/20 1024    promethazine (PHENERGAN) tablet 12.5 mg  12.5 mg Oral Q6H PRN Kain Blunt MD        Or    ondansetron (ZOFRAN) injection 4 mg  4 mg Intravenous Q6H PRN Kain Blunt MD        polyethylene glycol (GLYCOLAX) packet 17 g  17 g Oral Daily PRN Kain Blunt MD        acetaminophen (TYLENOL) tablet 650 mg  650 mg Oral Q6H PRN Kani Blunt MD        Or   Staley Bars acetaminophen (TYLENOL) suppository 650 mg  650 mg Rectal Q6H PRN Kain Blunt MD        sodium bicarbonate 50 mEq, potassium chloride 20 mEq in sodium chloride 0.45 % 1,000 mL infusion   Intravenous Continuous Nellie Brown  mL/hr at 12/30/20 1241 New Bag at 12/30/20 1241    oxyCODONE-acetaminophen (PERCOCET) 5-325 MG per tablet 1 tablet  1 tablet Oral Q4H PRN Nellie Brown MD   1 tablet at 12/30/20 1240    morphine (PF) injection 2 mg  2 mg Intravenous Q4H PRN Nellie Brown MD   2 mg at 12/30/20 1024       Subjective:     Patient has stable B/L leg pain  Continue to complain numbness and stiffened foot. Noted last night high blood pressure. Objective: Intake/Output Summary (Last 24 hours) at 12/30/2020 1434  Last data filed at 12/30/2020 0819  Gross per 24 hour   Intake 1500 ml   Output 600 ml   Net 900 ml      Vitals:   Vitals:    12/30/20 0940   BP: (!) 158/94   Pulse: 93   Resp: 16   Temp: 98.1 °F (36.7 °C)   SpO2: 100%     Physical Exam:  General Appearance:    Alert, cooperative, no distress  Head:      Normocephalic, without obvious abnormality, atraumatic  Eyes:       Conjunctiva/corneas clear, EOM's intact  Lungs:    Clear to auscultation bilaterally, respirations unlabored  Heart:                Regular rate and rhythm, S1 and S2 normal, no murmur,   rub or gallop  Abdomen:     Soft, non-tender, bowel sounds active, no masses, no organomegaly  Extremities:   Right and left foot looking bluish/cyanotic left >Rt, tenderness with blebs +  Neurological:   Grossly Intact. Significant Diagnostic Studies:   DATA:    CBC   No results for input(s): WBC, HGB, HCT, PLT in the last 72 hours. BMP   Recent Labs     12/27/20  1741 12/28/20  0619 12/30/20  0621   * 134* 138   K 5.9* 3.8 4.5    100 99   CO2 24 25 27   PHOS  --  2.3* 3.8   BUN 15 7 8   CREATININE 0.9 0.9 1.1     LFT'S No results for input(s): AST, ALT, ALB, BILIDIR, BILITOT, ALKPHOS in the last 72 hours. COAG No results for input(s): INR in the last 72 hours. POC: No results found for: POCGLU  HrrgoxfhhiU9T:No results found for: [address]     12/28/20  0619   CKTOTAL 11,903*     Troponin: No results for input(s): TROPONINT in the last 72 hours. BNP: No results for input(s): PROBNP in the last 72 hours.   U/A:    Lab Results   Component Value Date    COLORU YELLOW 12/27/2020    WBCUA <1 12/27/2020    RBCUA <1 12/27/2020    MUCUS RARE 12/27/2020    BACTERIA NEGATIVE 12/27/2020    CLARITYU CLEAR 12/27/2020    SPECGRAV 1.015 12/27/2020 Pop.             92.7 cm/sec PTA             69.4 cm/sec Peron. 98.4 cm/sec MAGGIE             77.4 cm/sec Left: Flow velocities were measured as follows: Com. Fem.   169.7 cm/sec Prof.            122.3 cm/sec SFA Prox.     108.3 cm/sec SFA Mid.      125.0 cm/sec SFA Dist.      111.1 cm/sec Pop.             97.2 cm/sec PTA             147.2 cm/sec Peron. 111.0 cm/sec MAGGIE             94.2 cm/sec     All arteries of both lower extremities demonstrate normal triphasic waveforms indicating no hemodynamically significant stenosis.            [facility] Hospitalist

## 2020-12-31 LAB
CULTURE: NORMAL
Lab: NORMAL
MAGNESIUM: 1.8 MG/DL (ref 1.8–2.4)
PHOSPHORUS: 4.1 MG/DL (ref 2.5–4.9)
SPECIMEN: NORMAL
TOTAL CK: 1330 IU/L (ref 38–174)

## 2020-12-31 PROCEDURE — 83735 ASSAY OF MAGNESIUM: CPT

## 2020-12-31 PROCEDURE — 6360000002 HC RX W HCPCS: Performed by: INTERNAL MEDICINE

## 2020-12-31 PROCEDURE — 82550 ASSAY OF CK (CPK): CPT

## 2020-12-31 PROCEDURE — 1200000000 HC SEMI PRIVATE

## 2020-12-31 PROCEDURE — 2580000003 HC RX 258: Performed by: INTERNAL MEDICINE

## 2020-12-31 PROCEDURE — 36415 COLL VENOUS BLD VENIPUNCTURE: CPT

## 2020-12-31 PROCEDURE — 2500000003 HC RX 250 WO HCPCS: Performed by: INTERNAL MEDICINE

## 2020-12-31 PROCEDURE — 84100 ASSAY OF PHOSPHORUS: CPT

## 2020-12-31 PROCEDURE — 99232 SBSQ HOSP IP/OBS MODERATE 35: CPT | Performed by: SURGERY

## 2020-12-31 PROCEDURE — 6370000000 HC RX 637 (ALT 250 FOR IP): Performed by: INTERNAL MEDICINE

## 2020-12-31 PROCEDURE — 94761 N-INVAS EAR/PLS OXIMETRY MLT: CPT

## 2020-12-31 RX ORDER — MAGNESIUM SULFATE IN WATER 40 MG/ML
2 INJECTION, SOLUTION INTRAVENOUS ONCE
Status: COMPLETED | OUTPATIENT
Start: 2020-12-31 | End: 2020-12-31

## 2020-12-31 RX ADMIN — MAGNESIUM SULFATE HEPTAHYDRATE 2 G: 40 INJECTION, SOLUTION INTRAVENOUS at 10:16

## 2020-12-31 RX ADMIN — POLYETHYLENE GLYCOL (3350) 17 G: 17 POWDER, FOR SOLUTION ORAL at 10:15

## 2020-12-31 RX ADMIN — POTASSIUM CHLORIDE: 2 INJECTION, SOLUTION, CONCENTRATE INTRAVENOUS at 06:38

## 2020-12-31 RX ADMIN — AMLODIPINE BESYLATE 5 MG: 5 TABLET ORAL at 10:00

## 2020-12-31 RX ADMIN — ENOXAPARIN SODIUM 40 MG: 40 INJECTION SUBCUTANEOUS at 10:00

## 2020-12-31 RX ADMIN — POTASSIUM CHLORIDE: 2 INJECTION, SOLUTION, CONCENTRATE INTRAVENOUS at 20:21

## 2020-12-31 RX ADMIN — MORPHINE SULFATE 2 MG: 2 INJECTION, SOLUTION INTRAMUSCULAR; INTRAVENOUS at 23:03

## 2020-12-31 ASSESSMENT — PAIN DESCRIPTION - PROGRESSION
CLINICAL_PROGRESSION: GRADUALLY WORSENING

## 2020-12-31 NOTE — CARE COORDINATION
Pt states he will need to go to a nursing home until he is able to regain his independence. Pt has no preference for SNF [facility]. Sent PS to Dr. Ericka Schaffer to request PT/OT evals with weightbear restrictions so precert for SNF can be obtained. Pt gave permission to she PHI with SNFs for referral.     Reviewed in network options which include [facility], [facility] and [facility]- referral sent to all three for review. Precert cannot be obtained until Monday.

## 2020-12-31 NOTE — DISCHARGE INSTR - COC
Continuity of Care Form    Patient Name: Faby Trammell   :  1975  MRN:  [id number]    Admit date:  2020  Discharge date:  2021    Code Status Order: Full Code   Advance Directives:      Admitting Physician:  Princess Almeida MD  PCP: No primary care provider on file. Discharging Nurse: [facility] Unit/Room#: 4101/4101-A  Discharging Unit Phone Number: [phone]    Emergency Contact:   Extended Emergency Contact Information  Primary Emergency Contact: Arielle Manrique, [address] Phone: [phone]  Mobile Phone: [phone]  Relation: Aunt/Uncle   needed? No    Past Surgical History:  No past surgical history on file.     Immunization History:   Immunization History   Administered Date(s) Administered    Tdap (Boostrix, Adacel) 2018, 2020       Active Problems:  Patient Active Problem List   Diagnosis Code    Rhabdomyolysis M62.82       Isolation/Infection:   Isolation            No Isolation          Patient Infection Status       None to display            Nurse Assessment:  Last Vital Signs: /85   Pulse 88   Temp 98.4 °F (36.9 °C) (Oral)   Resp 16   Ht 5' 10.98\" (1.803 m)   Wt 172 lb 12.8 oz (78.4 kg)   SpO2 99%   BMI 24.11 kg/m²     Last documented pain score (0-10 scale): Pain Level: 8  Last Weight:   Wt Readings from Last 1 Encounters:   20 172 lb 12.8 oz (78.4 kg)     Mental Status:  oriented and alert    IV Access:  - None    Nursing Mobility/ADLs:  Walking   Assisted  Transfer  Assisted  Bathing  Independent  Dressing  Independent  Toileting  Assisted  Feeding  Independent  Med Admin  Independent  Med Delivery   whole    Wound Care Documentation and Therapy:  Wound 20 Foot Right;Dorsal cluster (Active)   Wound Etiology Traumatic 20 1100   Dressing Status Other (Comment) 20 0930   Dressing/Treatment Open to air 20 0930   Wound Length (cm) 11.5 cm 20 1100   Wound Width (cm) 5 cm 20 1100 Wound Depth (cm) 0 cm 12/28/20 1100   Wound Surface Area (cm^2) 57.5 cm^2 12/28/20 1100   Wound Volume (cm^3) 0 cm^3 12/28/20 1100   Distance Tunneling (cm) 0 cm 12/28/20 1100   Tunneling Position ___ O'Clock 0 12/28/20 1100   Undermining Starts ___ O'Clock 0 12/28/20 1100   Undermining Ends___ O'Clock 0 12/28/20 1100   Undermining Maxium Distance (cm) 0 12/28/20 1100   Wound Assessment Fluid filled blister 12/31/20 0930   Drainage Amount None 12/31/20 0930   Odor None 12/31/20 0930   Magi-wound Assessment Edematous; Intact 12/31/20 0930   Margins Attached edges 12/31/20 0930   Number of days: 3       Wound 12/28/20 Foot Right;Plantar (Active)   Wound Etiology Traumatic 12/28/20 1100   Dressing Status Other (Comment) 12/31/20 0930   Dressing/Treatment Open to air 12/31/20 0930   Wound Length (cm) 4.5 cm 12/28/20 1100   Wound Width (cm) 4 cm 12/28/20 1100   Wound Depth (cm) 0 cm 12/28/20 1100   Wound Surface Area (cm^2) 18 cm^2 12/28/20 1100   Wound Volume (cm^3) 0 cm^3 12/28/20 1100   Distance Tunneling (cm) 0 cm 12/28/20 1100   Tunneling Position ___ O'Clock 0 12/28/20 1100   Undermining Starts ___ O'Clock 0 12/28/20 1100   Undermining Ends___ O'Clock 0 12/28/20 1100   Undermining Maxium Distance (cm) 0 12/28/20 1100   Wound Assessment Fluid filled blister; Purple/maroon 12/31/20 0930   Drainage Amount None 12/31/20 0930   Odor None 12/31/20 0930   Magi-wound Assessment Intact 12/31/20 0930   Margins Attached edges 12/31/20 0930   Number of days: 3       Wound 12/28/20 Toe (Comment  which one) Anterior;Right great toe (Active)   Wound Etiology Traumatic 12/28/20 1100   Dressing Status Other (Comment) 12/31/20 0930   Dressing/Treatment Open to air 12/31/20 0930   Wound Length (cm) 1.5 cm 12/28/20 1100   Wound Width (cm) 2.5 cm 12/28/20 1100   Wound Depth (cm) 0 cm 12/28/20 1100   Wound Surface Area (cm^2) 3.75 cm^2 12/28/20 1100   Wound Volume (cm^3) 0 cm^3 12/28/20 1100   Distance Tunneling (cm) 0 cm 12/28/20 1100 Tunneling Position ___ O'Clock 0 12/28/20 1100   Undermining Starts ___ O'Clock 0 12/28/20 1100   Undermining Ends___ O'Clock 0 12/28/20 1100   Undermining Maxium Distance (cm) 0 12/28/20 1100   Wound Assessment Fluid filled blister 12/31/20 0930   Drainage Amount None 12/31/20 0930   Odor None 12/31/20 0930   Magi-wound Assessment Intact 12/31/20 0930   Margins Attached edges 12/31/20 0930   Number of days: 3       Wound 12/28/20 Foot Right;Lateral cluster (Active)   Wound Etiology Traumatic 12/28/20 1100   Dressing Status New dressing applied 12/28/20 1900   Wound Cleansed Cleansed with saline 12/28/20 1100   Dressing/Treatment Silicone border 75/95/03 0930   Wound Length (cm) 3 cm 12/28/20 1100   Wound Width (cm) 1.5 cm 12/28/20 1100   Wound Depth (cm) 0.1 cm 12/28/20 1100   Wound Surface Area (cm^2) 4.5 cm^2 12/28/20 1100   Wound Volume (cm^3) 0.45 cm^3 12/28/20 1100   Distance Tunneling (cm) 0 cm 12/28/20 1100   Tunneling Position ___ O'Clock 0 12/28/20 1100   Undermining Starts ___ O'Clock 0 12/28/20 1100   Undermining Ends___ O'Clock 0 12/28/20 1100   Undermining Maxium Distance (cm) 0 12/28/20 1100   Wound Assessment Pink/red;Fluid filled blister 12/31/20 0930   Drainage Amount Small 12/31/20 0930   Drainage Description Serous 12/31/20 0930   Odor None 12/31/20 0930   Magi-wound Assessment Intact 12/31/20 0930   Margins Defined edges; Attached edges 12/31/20 0930   Number of days: 3       Wound 12/28/20 Foot Left;Dorsal cluster (Active)   Wound Etiology Traumatic 12/28/20 1100   Dressing Status Other (Comment) 12/31/20 0930   Dressing/Treatment Open to air 12/31/20 0930   Wound Length (cm) 5.5 cm 12/28/20 1100   Wound Width (cm) 8.5 cm 12/28/20 1100   Wound Depth (cm) 0 cm 12/28/20 1100   Wound Surface Area (cm^2) 46.75 cm^2 12/28/20 1100   Wound Volume (cm^3) 0 cm^3 12/28/20 1100   Distance Tunneling (cm) 0 cm 12/28/20 1100   Tunneling Position ___ O'Clock 0 12/28/20 1100   Undermining Starts ___ O'Clock 0 12/28/20 1100   Undermining Ends___ O'Clock 0 12/28/20 1100   Undermining Maxium Distance (cm) 0 12/28/20 1100   Wound Assessment Fluid filled blister 12/31/20 0930   Drainage Amount None 12/31/20 0930   Odor None 12/31/20 0930   Magi-wound Assessment Intact 12/31/20 0930   Margins Attached edges 12/31/20 0930   Number of days: 3       Wound 12/28/20 Foot Left;Plantar (Active)   Wound Etiology Traumatic 12/28/20 1100   Dressing Status Other (Comment) 12/31/20 0930   Dressing/Treatment Open to air 12/31/20 0930   Wound Length (cm) 1 cm 12/28/20 1100   Wound Width (cm) 1.5 cm 12/28/20 1100   Wound Depth (cm) 0 cm 12/28/20 1100   Wound Surface Area (cm^2) 1.5 cm^2 12/28/20 1100   Wound Volume (cm^3) 0 cm^3 12/28/20 1100   Distance Tunneling (cm) 0 cm 12/28/20 1100   Tunneling Position ___ O'Clock 0 12/28/20 1100   Undermining Starts ___ O'Clock 0 12/28/20 1100   Undermining Ends___ O'Clock 0 12/28/20 1100   Undermining Maxium Distance (cm) 0 12/28/20 1100   Wound Assessment Fluid filled blister 12/31/20 0930   Drainage Amount None 12/31/20 0930   Odor None 12/31/20 0930   Magi-wound Assessment Intact 12/31/20 0930   Margins Attached edges 12/31/20 0930   Number of days: 3       Wound 12/28/20 Toe (Comment  which one) Anterior; Left cluster 1st-5th toes (Active)   Wound Etiology Traumatic 12/28/20 1100   Dressing Status Other (Comment) 12/31/20 0930   Dressing/Treatment Open to air 12/31/20 0930   Wound Length (cm) 2.5 cm 12/28/20 1100   Wound Width (cm) 8.5 cm 12/28/20 1100   Wound Depth (cm) 0 cm 12/28/20 1100   Wound Surface Area (cm^2) 21.25 cm^2 12/28/20 1100   Wound Volume (cm^3) 0 cm^3 12/28/20 1100   Distance Tunneling (cm) 0 cm 12/28/20 1100   Tunneling Position ___ O'Clock 0 12/28/20 1100   Undermining Starts ___ O'Clock 0 12/28/20 1100   Undermining Ends___ O'Clock 0 12/28/20 1100   Undermining Maxium Distance (cm) 0 12/28/20 1100   Wound Assessment Fluid filled blister 12/31/20 0930   Drainage Amount None 12/31/20 0930   Odor None 12/31/20 0930   Magi-wound Assessment Intact 12/31/20 0930   Margins Attached edges 12/31/20 0930   Number of days: 3        Elimination:  Continence:   · Bowel: Yes  · Bladder: Yes  Urinary Catheter: None   Colostomy/Ileostomy/Ileal Conduit: No       Date of Last BM: 1/7/2021    Intake/Output Summary (Last 24 hours) at 12/31/2020 1532  Last data filed at 12/31/2020 0437  Gross per 24 hour   Intake 1697.5 ml   Output 2450 ml   Net -752.5 ml     I/O last 3 completed shifts: In: 1697.5 [I.V.:1647.5; IV Piggyback:50]  Out: 2450 [Urine:2450]    Safety Concerns: At Risk for Falls    Impairments/Disabilities:      None      Patient's personal belongings (please select all that are sent with patient):  Cell phone    RN SIGNATURE:  Electronically signed by Michael Carrizales RN on 1/7/21 at 3:59 PM EST    CASE MANAGEMENT/SOCIAL WORK SECTION    Inpatient Status Date: ***    Readmission Risk Assessment Score:  Readmission Risk              Risk of Unplanned Readmission:        6           Discharging to Facility/ Agency   · Name:   · Address:  · Phone:  · Fax:    Dialysis Facility (if applicable)   · Name:  · Address:  · Dialysis Schedule:  · Phone:  · Fax:    / signature: {Esignature:[id number]}    PHYSICIAN SECTION  Nutrition Therapy:  Current Nutrition Therapy:   - Oral Diet:  Cardiac    Routes of Feeding: Oral  Liquids: No Restrictions  Daily Fluid Restriction: no  Last Modified Barium Swallow with Video (Video Swallowing Test): not done    Treatments at the Time of Hospital Discharge:   Respiratory Treatments: none  Oxygen Therapy:  is not on home oxygen therapy.   Ventilator:    - No ventilator support    Rehab Therapies: Physical Therapy, Occupational Therapy, and   Weight Bearing Status/Restrictions: No weight bearing restirctions  Other Medical Equipment (for information only, NOT a DME order):  wheelchair and walker  Other Treatments:   Wound care: Right dorsal foot cluster/right plantar/right lateral foot/left dorsal foot cluster/left plantar cleanse with NS, apply Optifoam AG, kerlix change every 2 days and prn. Right toe cluster/left toe cluster cleanse with NS, apply Aquacel AG between toes, cover with kerlix change every 2 days and prn. Keep pressure off with heel protector boots and keep elevated as much as possible. Pt is generally not at risk for skin breakdown ROXI Hodge. Wound care to reassess approximately 1/11/21    Prognosis: Fair    Condition at Discharge: Stable    Rehab Potential (if transferring to Rehab): Fair    Recommended Labs or Other Treatments After Discharge:     Physician Certification: I certify the above information and transfer of Luisito Fuentes  is necessary for the continuing treatment of the diagnosis listed and that he requires [facility] for less 30 days.      Update Admission H&P: No change in H&P    PHYSICIAN SIGNATURE:  Electronically signed by Leanne Valle MD on 1/7/21 at 2:10 PM EST

## 2020-12-31 NOTE — PROGRESS NOTES
GENERAL SURGERY PROGRESS NOTE    Noelle Pablo is a 50 y.o. male with frostbite to bilateral feet. Subjective:   Doing ok today. Pain well controlled. Wounds stable. Having difficulty with mobility due to pain/numbness in the feet. Objective:    Vitals: VITALS:  /85   Pulse 88   Temp 98.4 °F (36.9 °C) (Oral)   Resp 16   Ht 5' 10.98\" (1.803 m)   Wt 172 lb 12.8 oz (78.4 kg)   SpO2 99%   BMI 24.11 kg/m²     I/O: 12/30 0701 - 12/31 0700  In: 1697.5 [I.V.:1647.5]  Out: 3050 [Urine:3050]    Labs/Imaging Results:   Lab Results   Component Value Date     12/30/2020    K 4.5 12/30/2020    CL 99 12/30/2020    CO2 27 12/30/2020    BUN 8 12/30/2020    CREATININE 1.1 12/30/2020    GLUCOSE 106 12/30/2020    CALCIUM 8.8 12/30/2020      Lab Results   Component Value Date    WBC 7.5 12/27/2020    HGB 11.7 (L) 12/27/2020    HCT 36.0 (L) 12/27/2020    MCV 89.8 12/27/2020     12/27/2020       IV Fluids: sodium bicarbonate infusion Last Rate: 150 mL/hr at 12/31/20 1486    Scheduled Meds:   amLODIPine, 5 mg, Oral, Daily    sodium chloride flush, 10 mL, Intravenous, 2 times per day    enoxaparin, 40 mg, Subcutaneous, Daily    Physical Exam:  General: awake, alert, in no acute distress  HEENT: mucous membranes moist  Respiratory: normal effort, no wheezes appreciated  CV: appears well perfused  Abdomen: Soft, non-tender, non-distended. Skin: warm and dry     Extremities: bilateral feet with blue discoloration of the toes, all of his distal toes are cool to the touch and lack sensation. blistering stable.      Neuro: no focal deficits noted  Psych: mood normal      Assessment and Plan:  50 y.o. male with frostbite to bilateral feet. Wounds stable with current cares. Patient Active Problem List:     Rhabdomyolysis   Frostbite      PLAN:  - ok with discharge from a surgical standpoint once arrangements can be made. Continue current wound cares. keep feet warm and dry.  Pillow boots to protect from further injury. Limit ambulation, up to bathroom and essential activities only.   - will need to allow tissue to demarcate for several weeks prior to planning any surgical intervention.  - will order follow up with me and Wound Care clinic  - appreciate Wound care input  - will sign off, please call if surgery can be of further assistance     Ariadna Lomas MD

## 2020-12-31 NOTE — PROGRESS NOTES
Hospitalist Progress Note         Admit Date: 12/26/2020    PCP: No primary care provider on file. Chief Complaint   Patient presents with    Foot Pain     bilat        Assessment and Plan:     -Frostbite left foot/rhabdomyolysis  continue bicarb fluids. Pain control-Percocet and morphine IV as needed. Current CK 1300.  -Acute kidney injury from rhabdomyolysis. Continue fluids and monitor BMP. -Hyperkalemia could be lab error. Monitor BMP  -HTN start Norvasc daily. Monitor BP    DVT prophylaxis Lovenox  DC home possibly in 1-2 days.     Current Facility-Administered Medications   Medication Dose Route Frequency Provider Last Rate Last Admin    amLODIPine (NORVASC) tablet 5 mg  5 mg Oral Daily Elizabeth Burk MD   5 mg at 12/31/20 1000    sodium chloride flush 0.9 % injection 10 mL  10 mL Intravenous 2 times per day Rui Glaser MD   10 mL at 12/30/20 1952    sodium chloride flush 0.9 % injection 10 mL  10 mL Intravenous PRN Rui Glaser MD        enoxaparin (LOVENOX) injection 40 mg  40 mg Subcutaneous Daily Rui Glaser MD   40 mg at 12/31/20 1000    promethazine (PHENERGAN) tablet 12.5 mg  12.5 mg Oral Q6H PRN Rui Glaser MD        Or    ondansetron (ZOFRAN) injection 4 mg  4 mg Intravenous Q6H PRN Rui Glaser MD        polyethylene glycol (GLYCOLAX) packet 17 g  17 g Oral Daily PRN Rui Glaser MD        acetaminophen (TYLENOL) tablet 650 mg  650 mg Oral Q6H PRN Rui Glaser MD        Or   Lucinda Rhonda acetaminophen (TYLENOL) suppository 650 mg  650 mg Rectal Q6H PRN Rui Glaser MD        sodium bicarbonate 50 mEq, potassium chloride 20 mEq in sodium chloride 0.45 % 1,000 mL infusion   Intravenous Continuous Elizabeth Burk  mL/hr at 12/31/20 0638 New Bag at 12/31/20 0638    oxyCODONE-acetaminophen (PERCOCET) 5-325 MG per tablet 1 tablet  1 tablet Oral Q4H PRN Elizabeth Burk MD   1 tablet at 12/30/20 2223    morphine (PF) injection 2 mg  2 mg Intravenous Q4H PRN Biju Alcaraz MD   2 mg at 12/30/20 1628       Subjective:     Patient has stable B/L leg pain  Still complain numbness and stiffened foot. Blood pressures seem to be much better today. Objective: Intake/Output Summary (Last 24 hours) at 12/31/2020 1007  Last data filed at 12/31/2020 0437  Gross per 24 hour   Intake 1697.5 ml   Output 2450 ml   Net -752.5 ml      Vitals:   Vitals:    12/31/20 0953   BP: 134/85   Pulse: 88   Resp: 16   Temp: 98.4 °F (36.9 °C)   SpO2: 99%     Physical Exam:  General Appearance:    Alert, cooperative, no distress  Head:      Normocephalic, without obvious abnormality, atraumatic  Eyes:       Conjunctiva/corneas clear, EOM's intact  Lungs:    Clear to auscultation bilaterally, respirations unlabored  Heart:                Regular rate and rhythm, S1 and S2 normal, no murmur,   rub or gallop  Abdomen:     Soft, non-tender, bowel sounds active, no masses, no organomegaly  Extremities:   Right and left foot looking bluish/cyanotic left >Rt, tenderness with blebs +  Neurological:   Grossly Intact. Significant Diagnostic Studies:   DATA:    CBC   No results for input(s): WBC, HGB, HCT, PLT in the last 72 hours. BMP   Recent Labs     12/30/20  0621 12/31/20  0538     --    K 4.5  --    CL 99  --    CO2 27  --    PHOS 3.8 4.1   BUN 8  --    CREATININE 1.1  --      LFT'S No results for input(s): AST, ALT, ALB, BILIDIR, BILITOT, ALKPHOS in the last 72 hours. COAG No results for input(s): INR in the last 72 hours. POC: No results found for: POCGLU  [id number]:No results found for: [facility]  CARDIAC ENZYMES    Recent Labs     12/31/20  0538   CKTOTAL 1,330*     Troponin: No results for input(s): TROPONINT in the last 72 hours. BNP: No results for input(s): PROBNP in the last 72 hours.   U/A:    Lab Results   Component Value Date    COLORU YELLOW 12/27/2020    WBCUA <1 12/27/2020    RBCUA <1 12/27/2020    MUCUS RARE 12/27/2020 BACTERIA NEGATIVE 12/27/2020    CLARITYU CLEAR 12/27/2020    SPECGRAV 1.015 12/27/2020    LEUKOCYTESUR NEGATIVE 12/27/2020    BLOODU SMALL 12/27/2020       Xr Foot Left (2 Views)    Result Date: 12/26/2020  EXAMINATION: 3 XRAY VIEWS OF THE LEFT FOOT 12/26/2020 8:41 pm COMPARISON: None. HISTORY: ORDERING SYSTEM PROVIDED HISTORY: pain TECHNOLOGIST PROVIDED HISTORY: Reason for exam:->pain Reason for Exam: frost bite Acuity: Acute Type of Exam: Initial Relevant Medical/Surgical History: best imaging possible due to patient pain tolerance FINDINGS: Punctate radiodensities are present in soft tissues of the 2nd digit. No acute periostitis or bony destruction. No acute fracture or dislocation. Mild 1st and 2nd TMT degenerative changes. Remaining bones and joint spaces are maintained. No acute abnormality. Punctate radiodensities in the soft tissues of the 2nd digit. Xr Chest Portable    Result Date: 12/26/2020  EXAMINATION: ONE XRAY VIEW OF THE CHEST 12/26/2020 7:41 pm COMPARISON: 10/09/2019 HISTORY: ORDERING SYSTEM PROVIDED HISTORY: emergency TECHNOLOGIST PROVIDED HISTORY: Reason for exam:->emergency Reason for Exam: frost bite Acuity: Acute Type of Exam: Initial FINDINGS: No lung infiltrate or consolidation. No pneumothorax or pleural effusion. Heart size is normal.     No acute abnormality. Vl Dup Lower Extremity Arteries Bilateral    Result Date: 12/26/2020  EXAMINATION: ARTERIAL DUPLEX ULTRASOUND OF THE BILATERAL LOWER EXTREMITIES  12/26/2020 8:26 pm TECHNIQUE: Duplex ultrasound and Doppler images were obtained of the bilateral lower extremities COMPARISON: None. HISTORY: ORDERING SYSTEM PROVIDED HISTORY: pain TECHNOLOGIST PROVIDED HISTORY: Reason for exam:->pain Reason for Exam: pain Acuity: Acute Type of Exam: Initial FINDINGS: The arteries of both lower extremities demonstrated normal triphasic waveforms. Right: Flow velocities were measured as follows: Com. Fem.    177.5 cm/sec Prof.            92.5 cm/sec SFA Prox. 116.1 cm/sec SFA Mid.      114.1 cm/sec SFA Dist.      122.9 cm/sec Pop.             92.7 cm/sec PTA             69.4 cm/sec Peron. 98.4 cm/sec MAGGIE             77.4 cm/sec Left: Flow velocities were measured as follows: Com. Fem.   169.7 cm/sec Prof.            122.3 cm/sec SFA Prox.     108.3 cm/sec SFA Mid.      125.0 cm/sec SFA Dist.      111.1 cm/sec Pop.             97.2 cm/sec PTA             147.2 cm/sec Peron. 111.0 cm/sec MAGGIE             94.2 cm/sec     All arteries of both lower extremities demonstrate normal triphasic waveforms indicating no hemodynamically significant stenosis.            Sanjiv Andradeciaty  [facility]burg[facility]

## 2020-12-31 NOTE — CARE COORDINATION
This note will not be viewable in [employer] for the following reason(s). Patient request to not have note available in MyChart. Met c pt to continue discharge planning. Pt states he is homeless but did not want any of the staff or physicians to know. Pt has requested my notes regarding his living situation and social concerns be hidden from view in [address] Pt states he has been working with Angelique Sierra [phone] and Augustin Frederick from [facility] for [address] [phone] in trying to establish housing. Pt states he will need to go to a nursing home until he is able to regain his independence. Pt has no preference for SNF [facility]. Sent PS to Dr. Veronica Muir to request PT/OT evals with weightbear restrictions so precert for SNF can be obtained. Pt gave permission to she PHI with SNFs for referral.     Reviewed in network options which include Nancy Zhang, [facility] and [facility]- referral sent to all three for review. Precert cannot be obtained until Monday.

## 2020-12-31 NOTE — PROGRESS NOTES
limit ambulation, up to bathroom only  - will allow tissue loss to demarcate  - appreciate Wound care input  - will continue to follow  Gianni Fernandez MD

## 2021-01-01 LAB
MAGNESIUM: 2 MG/DL (ref 1.8–2.4)
PHOSPHORUS: 4.2 MG/DL (ref 2.5–4.9)
TOTAL CK: 750 IU/L (ref 38–174)

## 2021-01-01 PROCEDURE — 83735 ASSAY OF MAGNESIUM: CPT

## 2021-01-01 PROCEDURE — 82550 ASSAY OF CK (CPK): CPT

## 2021-01-01 PROCEDURE — 94761 N-INVAS EAR/PLS OXIMETRY MLT: CPT

## 2021-01-01 PROCEDURE — 6360000002 HC RX W HCPCS: Performed by: INTERNAL MEDICINE

## 2021-01-01 PROCEDURE — 1200000000 HC SEMI PRIVATE

## 2021-01-01 PROCEDURE — 97167 OT EVAL HIGH COMPLEX 60 MIN: CPT

## 2021-01-01 PROCEDURE — 97535 SELF CARE MNGMENT TRAINING: CPT

## 2021-01-01 PROCEDURE — 2500000003 HC RX 250 WO HCPCS: Performed by: INTERNAL MEDICINE

## 2021-01-01 PROCEDURE — 97530 THERAPEUTIC ACTIVITIES: CPT

## 2021-01-01 PROCEDURE — 36415 COLL VENOUS BLD VENIPUNCTURE: CPT

## 2021-01-01 PROCEDURE — 6370000000 HC RX 637 (ALT 250 FOR IP): Performed by: INTERNAL MEDICINE

## 2021-01-01 PROCEDURE — 2580000003 HC RX 258: Performed by: INTERNAL MEDICINE

## 2021-01-01 PROCEDURE — 84100 ASSAY OF PHOSPHORUS: CPT

## 2021-01-01 RX ADMIN — ENOXAPARIN SODIUM 40 MG: 40 INJECTION SUBCUTANEOUS at 09:38

## 2021-01-01 RX ADMIN — MORPHINE SULFATE 2 MG: 2 INJECTION, SOLUTION INTRAMUSCULAR; INTRAVENOUS at 16:47

## 2021-01-01 RX ADMIN — AMLODIPINE BESYLATE 5 MG: 5 TABLET ORAL at 09:38

## 2021-01-01 RX ADMIN — POTASSIUM CHLORIDE: 2 INJECTION, SOLUTION, CONCENTRATE INTRAVENOUS at 03:22

## 2021-01-01 RX ADMIN — MORPHINE SULFATE 2 MG: 2 INJECTION, SOLUTION INTRAMUSCULAR; INTRAVENOUS at 07:39

## 2021-01-01 ASSESSMENT — PAIN DESCRIPTION - PROGRESSION
CLINICAL_PROGRESSION: GRADUALLY WORSENING

## 2021-01-01 NOTE — PROGRESS NOTES
Occupational Therapy  [facility] OCCUPATIONAL THERAPY EVALUATION    History  [place]:  The primary encounter diagnosis was Cold injury, initial encounter. Diagnoses of Elevated CK and Lactic acidosis were also pertinent to this visit. Restrictions:       Position Activity Restriction  Other position/activity restrictions: \"limit ambulation, up to bathroom only\" per gen surgeon                   Communication with other providers: PT    Subjective:  Patient states:  \"the doctor doesn't want me walking\"  Pain: 7/10 feet, L > R  Patient goal:  Nursing home--he has had a discussion with LSW    Occupational profile (relevant social history and personal factors):         Examination of body systems (includes body structures/functions, activity/participation limitations):  · Orientation: WFL   · Cognition:  WFL , at times exhibits attention deficits due to tangential speech. No confusion or command following difficulty  · Observation:  Received pt in bed. Alert and cooperative. Mckenna boots, L foot appears worse than R foot. · [facility]  [facility]   · Hearing:  WFL   · ROM:  WFL BUE  · Strength: WFL BUE  · Sensation: BUE WFL. Impairments in feet. ADLs  Feeding: INDEP     Grooming: SAV in seated    Dressing: UB SAV in seated in seated LB CGA for steadying for standing portions with less than 2UE supported for hiking l OT assisted with socks and moon-boots due to pain and swelling of feet    Bathing: UB SAV in seated LB CGA for steadying for standing portions iwht less than 2UE supported for hiking    Toileting: CGA for steadying for standing portions with less than 2UE supported for hiking    *Some ADL determined per observation of actual ADL performance, functional mobility, balance, activity tolerance, and cognition.      AM-PAC 6 click short form for inpatient daily activity:  Raw Score: 21  24/24 = unimpaired  23/24 = 1-20% impaired   20/24-22/24 = 21-40% impaired  15/24-19/24 = 41-59% impaired   10/24-14/24 = 60%-79% impaired  7/24-9/24 = 80%-99% impaired  6/24 = 100% impaired    Functional Mobility  Bed mobility:   Supine to sit: SAV  Sit to supine: SAV    Sitting balance: good/normal    Transfers:   Sit to stand: SBA for steadying from EOB up to RW, 2 reps  Stand to sit: SBA, initial cue for UE reach back to control descent, 2 reps from EOB  Squat pivot: vs SPT: not tested due to pt request to defer, predict SBA with proper setup (chair or w/c or bedside commode angled at 45* from bed with advanced toward R side since pt avoiding WB through LLE at this time)    Standing balance: SBA c RW, unsteady with less than 2UE supported, avoids WB through LLE so essentially standing on one leg with walker soley for ADL purposes (otherwise gen surgeon has restricted pt from needlessly standing)    Ambulation:  Deferred, will need to use w/c during headling of feet     Activity tolerance  Of course below baseline and limited by pain. Tolerating basic mobility and sitting activity WFL    Assessment:  Assessment  Performance deficits / Impairments: Decreased high-level IADLs, Decreased ADL status, Decreased functional mobility , Decreased balance  Treatment Diagnosis: frostbite bilateral feet  Prognosis: Good  Decision Making: High Complexity  REQUIRES OT FOLLOW UP: Yes    Pt is a 50year old M admitted for treatment of frost bite bilateral feet, L > R. Typically indep/modified indep and lives alone. Works or volunteers for a [employer]. Reports limited or no support for support with meals/groceries at d/c. Has stairs to enter his apartment. Limited therapy needs at this time. Needs a walker for support when standing for ADL, needs a w/c for mobility, likely needs skilled nursing/wound care at d/c , needs assistance with IADL. Will have pt on follow during acute stay here at hospital to address safe transfer, w/c mobility,  and modified ADL.        Goals:  By d/c or goals met:     Pt will perform all bed mobility with INDEP in prep for EOB/OOB activity. Pt will perform all functional transfers with SAV and appropriate use of LRD to bed, toilet, chair in prep for increased functional independence. Pt will perform UB ADLs with SAV to increase functional independence. Pt will perform LB ADLs with SAV to increase functional independence. Pt will perform all aspects of toileting with SAV to increase functional independence. Pt will participate in therex/therax c emphasis on strength, activity tolerance,  safety, SAV tasks. Plan:  Plan  Times per week: 2x      Recommendation for activity with nursing staff:  SBA for standing with walker (for ADL such as pants management and buttock hygiene)  Can pivot to chair or w/c or BSC c seat angled 45* from bed and pivoting toward his R side    Treatment today:    Self Care Training:   Self care training was performed today. Cues were given for safety, sequence, UE/LE placement, visual cues, and balance. Activities performed today included dressing simulation  Therapeutic Activity Training:   Therapeutic activity training was instructed today. Cues were given for safety, sequence, UE/LE placement, visual cues, and balance. Activities performed today included bed mobility training, sup-sit, sit-stand, SPT. Education: Role of OT, OT POC, d/c needs, home safety    Safety: Left in bed with all needs in reach. Gait belt used for transfer and mobility. Time in:  0740  Time out:  0820  Timed treatment minutes:  25  Total treatment time:  40    Electronically signed by:    Leti Land, OTR/L, [address] [facility]   [id number]   8:39 AM, 1/1/2021

## 2021-01-01 NOTE — PROGRESS NOTES
Hospitalist Progress Note         Admit Date: 12/26/2020    PCP: No primary care provider on file. Chief Complaint   Patient presents with    Foot Pain     bilat        Assessment and Plan:     -Frostbite left foot/rhabdomyolysis improved with bicarb fluids. DC'd today. Pain control-Percocet and morphine IV as needed. Current .  -Acute kidney injury from rhabdomyolysis. Improved with IVF. -Hyperkalemia could be lab error. Monitor BMP  -HTN continue Norvasc-BP stable    DVT prophylaxis Lovenox  DC home when appropriate disposition plan made by case management. Patient could not take care of himself while living alone. Medically stable for DC.     Current Facility-Administered Medications   Medication Dose Route Frequency Provider Last Rate Last Admin    amLODIPine (NORVASC) tablet 5 mg  5 mg Oral Daily Figueroa Stone MD   5 mg at 12/31/20 1000    sodium chloride flush 0.9 % injection 10 mL  10 mL Intravenous 2 times per day Bryant Steve MD   Stopped at 12/31/20 2023    sodium chloride flush 0.9 % injection 10 mL  10 mL Intravenous PRN Bryant Steve MD        enoxaparin (LOVENOX) injection 40 mg  40 mg Subcutaneous Daily Bryant Steve MD   40 mg at 12/31/20 1000    promethazine (PHENERGAN) tablet 12.5 mg  12.5 mg Oral Q6H PRN Bryant Steve MD        Or    ondansetron (ZOFRAN) injection 4 mg  4 mg Intravenous Q6H PRN Bryant Steve MD        polyethylene glycol (GLYCOLAX) packet 17 g  17 g Oral Daily PRN Bryant Steve MD   17 g at 12/31/20 1015    acetaminophen (TYLENOL) tablet 650 mg  650 mg Oral Q6H PRN Bryant Steve MD        Or    [facility] acetaminophen (TYLENOL) suppository 650 mg  650 mg Rectal Q6H PRN Bryant Steve MD        sodium bicarbonate 50 mEq, potassium chloride 20 mEq in sodium chloride 0.45 % 1,000 mL infusion   Intravenous Continuous Figueroa Stone  mL/hr at 01/01/21 0322 New Bag at 01/01/21 0322    oxyCODONE-acetaminophen (PERCOCET) 5-325 MG per tablet 1 tablet  1 tablet Oral Q4H PRN Suzy Peña MD   1 tablet at 12/30/20 2223    morphine (PF) injection 2 mg  2 mg Intravenous Q4H PRN Suzy Peña MD   2 mg at 01/01/21 5636       Subjective:     Patient has stable B/L leg pain  Still complain numbness and stiffened foot. Patient does not feel he can go home. Objective:     No intake or output data in the 24 hours ending 01/01/21 0917   Vitals:   Vitals:    01/01/21 0724   BP: (!) 160/92   Pulse: 89   Resp: 18   Temp: 98.8 °F (37.1 °C)   SpO2: 99%     Physical Exam:  General Appearance:    Alert, cooperative, no distress  Head:      Normocephalic, without obvious abnormality, atraumatic  Eyes:       Conjunctiva/corneas clear, EOM's intact  Lungs:    Clear to auscultation bilaterally, respirations unlabored  Heart:                Regular rate and rhythm, S1 and S2 normal, no murmur,   rub or gallop  Abdomen:     Soft, non-tender, bowel sounds active, no masses, no organomegaly  Extremities:   Right and left foot looking bluish/cyanotic left >Rt, tenderness with blebs +  Neurological:   Grossly Intact. Significant Diagnostic Studies:   DATA:    CBC   No results for input(s): WBC, HGB, HCT, PLT in the last 72 hours. BMP   Recent Labs     12/30/20  0621 12/31/20  0538 01/01/21  0438     --   --    K 4.5  --   --    CL 99  --   --    CO2 27  --   --    PHOS 3.8 4.1 4.2   BUN 8  --   --    CREATININE 1.1  --   --      LFT'S No results for input(s): AST, ALT, ALB, BILIDIR, BILITOT, ALKPHOS in the last 72 hours. COAG No results for input(s): INR in the last 72 hours. POC: No results found for: POCGLU  [id number]:No results found for: [address]     12/31/20  0538 01/01/21  0438   CKTOTAL 1,330* 750*     Troponin: No results for input(s): TROPONINT in the last 72 hours. BNP: No results for input(s): PROBNP in the last 72 hours.   U/A:    Lab Results   Component Value Date    COLORU YELLOW 12/27/2020    WBCUA <1 12/27/2020    RBCUA <1 12/27/2020    MUCUS RARE 12/27/2020    BACTERIA NEGATIVE 12/27/2020    CLARITYU CLEAR 12/27/2020    SPECGRAV 1.015 12/27/2020    LEUKOCYTESUR NEGATIVE 12/27/2020    BLOODU SMALL 12/27/2020       Xr Foot Left (2 Views)    Result Date: 12/26/2020  EXAMINATION: 3 XRAY VIEWS OF THE LEFT FOOT 12/26/2020 8:41 pm COMPARISON: None. HISTORY: ORDERING SYSTEM PROVIDED HISTORY: pain TECHNOLOGIST PROVIDED HISTORY: Reason for exam:->pain Reason for Exam: frost bite Acuity: Acute Type of Exam: Initial Relevant Medical/Surgical History: best imaging possible due to patient pain tolerance FINDINGS: Punctate radiodensities are present in soft tissues of the 2nd digit. No acute periostitis or bony destruction. No acute fracture or dislocation. Mild 1st and 2nd TMT degenerative changes. Remaining bones and joint spaces are maintained. No acute abnormality. Punctate radiodensities in the soft tissues of the 2nd digit. Xr Chest Portable    Result Date: 12/26/2020  EXAMINATION: ONE XRAY VIEW OF THE CHEST 12/26/2020 7:41 pm COMPARISON: 10/09/2019 HISTORY: ORDERING SYSTEM PROVIDED HISTORY: emergency TECHNOLOGIST PROVIDED HISTORY: Reason for exam:->emergency Reason for Exam: frost bite Acuity: Acute Type of Exam: Initial FINDINGS: No lung infiltrate or consolidation. No pneumothorax or pleural effusion. Heart size is normal.     No acute abnormality. Vl Dup Lower Extremity Arteries Bilateral    Result Date: 12/26/2020  EXAMINATION: ARTERIAL DUPLEX ULTRASOUND OF THE BILATERAL LOWER EXTREMITIES  12/26/2020 8:26 pm TECHNIQUE: Duplex ultrasound and Doppler images were obtained of the bilateral lower extremities COMPARISON: None.  HISTORY: ORDERING SYSTEM PROVIDED HISTORY: pain TECHNOLOGIST PROVIDED HISTORY: Reason for exam:->pain Reason for Exam: pain Acuity: Acute Type of Exam: Initial FINDINGS: The arteries of both lower extremities demonstrated normal triphasic waveforms. Right: Flow velocities were measured as follows: Com. Fem. 177.5 cm/sec Prof.            92.5 cm/sec SFA Prox. 116.1 cm/sec SFA Mid.      114.1 cm/sec SFA Dist.      122.9 cm/sec Pop.             92.7 cm/sec PTA             69.4 cm/sec Peron. 98.4 cm/sec MAGGIE             77.4 cm/sec Left: Flow velocities were measured as follows: Com. Fem.   169.7 cm/sec Prof.            122.3 cm/sec SFA Prox.     108.3 cm/sec SFA Mid.      125.0 cm/sec SFA Dist.      111.1 cm/sec Pop.             97.2 cm/sec PTA             147.2 cm/sec Peron. 111.0 cm/sec MAGGIE             94.2 cm/sec     All arteries of both lower extremities demonstrate normal triphasic waveforms indicating no hemodynamically significant stenosis.            Suzy Peña  [facility] Centerist

## 2021-01-02 LAB
MAGNESIUM: 1.8 MG/DL (ref 1.8–2.4)
PHOSPHORUS: 4.8 MG/DL (ref 2.5–4.9)
TOTAL CK: 415 IU/L (ref 38–174)

## 2021-01-02 PROCEDURE — 97163 PT EVAL HIGH COMPLEX 45 MIN: CPT

## 2021-01-02 PROCEDURE — 6360000002 HC RX W HCPCS: Performed by: INTERNAL MEDICINE

## 2021-01-02 PROCEDURE — 97116 GAIT TRAINING THERAPY: CPT

## 2021-01-02 PROCEDURE — 82550 ASSAY OF CK (CPK): CPT

## 2021-01-02 PROCEDURE — 83735 ASSAY OF MAGNESIUM: CPT

## 2021-01-02 PROCEDURE — 1200000000 HC SEMI PRIVATE

## 2021-01-02 PROCEDURE — 94761 N-INVAS EAR/PLS OXIMETRY MLT: CPT

## 2021-01-02 PROCEDURE — 84100 ASSAY OF PHOSPHORUS: CPT

## 2021-01-02 PROCEDURE — 36415 COLL VENOUS BLD VENIPUNCTURE: CPT

## 2021-01-02 PROCEDURE — 6370000000 HC RX 637 (ALT 250 FOR IP): Performed by: INTERNAL MEDICINE

## 2021-01-02 PROCEDURE — 2580000003 HC RX 258: Performed by: INTERNAL MEDICINE

## 2021-01-02 RX ADMIN — AMLODIPINE BESYLATE 5 MG: 5 TABLET ORAL at 11:55

## 2021-01-02 RX ADMIN — SODIUM CHLORIDE, PRESERVATIVE FREE 10 ML: 5 INJECTION INTRAVENOUS at 11:56

## 2021-01-02 RX ADMIN — MORPHINE SULFATE 2 MG: 2 INJECTION, SOLUTION INTRAMUSCULAR; INTRAVENOUS at 03:49

## 2021-01-02 RX ADMIN — ENOXAPARIN SODIUM 40 MG: 40 INJECTION SUBCUTANEOUS at 11:55

## 2021-01-02 RX ADMIN — OXYCODONE HYDROCHLORIDE AND ACETAMINOPHEN 1 TABLET: 5; 325 TABLET ORAL at 21:56

## 2021-01-02 RX ADMIN — SODIUM CHLORIDE, PRESERVATIVE FREE 10 ML: 5 INJECTION INTRAVENOUS at 19:48

## 2021-01-02 RX ADMIN — OXYCODONE HYDROCHLORIDE AND ACETAMINOPHEN 1 TABLET: 5; 325 TABLET ORAL at 12:03

## 2021-01-02 ASSESSMENT — PAIN DESCRIPTION - LOCATION: LOCATION: FOOT

## 2021-01-02 ASSESSMENT — PAIN DESCRIPTION - PROGRESSION
CLINICAL_PROGRESSION: GRADUALLY IMPROVING
CLINICAL_PROGRESSION: GRADUALLY WORSENING
CLINICAL_PROGRESSION: GRADUALLY WORSENING

## 2021-01-02 ASSESSMENT — PAIN SCALES - GENERAL
PAINLEVEL_OUTOF10: 9
PAINLEVEL_OUTOF10: 10

## 2021-01-02 ASSESSMENT — PAIN DESCRIPTION - ONSET: ONSET: ON-GOING

## 2021-01-02 ASSESSMENT — PAIN DESCRIPTION - PAIN TYPE: TYPE: ACUTE PAIN

## 2021-01-02 ASSESSMENT — PAIN DESCRIPTION - DESCRIPTORS: DESCRIPTORS: ACHING

## 2021-01-02 NOTE — PROGRESS NOTES
independent  · Transfers: min A  · Sitting balance:  good. · Standing balance:  fair. · Gait: 5 ft with min A and FWW. [facility] 6 Clicks Inpatient Mobility:  AM-PAC Inpatient Mobility Raw Score : 13    Treatment:  Pt cued in use of AD during transfers and ambulation. Safety: patient left in bed in with alarm, call light within reach, RN notified, gait belt used. Assessment:  Pt is a 50year old male with complaints of B foot and LE pain. Pt requires assistance for transfers and ambulation due to increased pain with weight bearing. Pt will benefit from continued skilled therapy to improve ADL's, balance, and strength. Recommending SNF with PT. Complexity: high  Prognosis: Good, no significant barriers to participation at this time. Plan 1-2/week, 1 week  Discharge Recommendations: ECF with PT, Subacute/Skilled Nursing Facility  Equipment: FWW    Goals:  Short term goals  Short term goal 1: Pt will perform sit to stand transfers with CGA. Short term goal 2: Pt will perform ambulation more than 25 ft with FWW. Short term goal 3: Pt will perform standing ballance good for 30 seconds with equal weight bearing. Treatment plan:  Bed mobility, transfers, balance, gait, TA, TX.   Recommendations for NURSING mobility: FWW with min A    Time:   Time in: 1220  Time out: 1248  Timed treatment minutes: 10  Total time: 28    Electronically signed by:    Julio César Zamorano PT  1/2/2021, 12:38 PM

## 2021-01-03 LAB
MAGNESIUM: 1.7 MG/DL (ref 1.8–2.4)
PHOSPHORUS: 4.3 MG/DL (ref 2.5–4.9)
TOTAL CK: 243 IU/L (ref 38–174)

## 2021-01-03 PROCEDURE — 6370000000 HC RX 637 (ALT 250 FOR IP): Performed by: INTERNAL MEDICINE

## 2021-01-03 PROCEDURE — 6360000002 HC RX W HCPCS: Performed by: INTERNAL MEDICINE

## 2021-01-03 PROCEDURE — 82550 ASSAY OF CK (CPK): CPT

## 2021-01-03 PROCEDURE — 83735 ASSAY OF MAGNESIUM: CPT

## 2021-01-03 PROCEDURE — 84100 ASSAY OF PHOSPHORUS: CPT

## 2021-01-03 PROCEDURE — 1200000000 HC SEMI PRIVATE

## 2021-01-03 PROCEDURE — 94761 N-INVAS EAR/PLS OXIMETRY MLT: CPT

## 2021-01-03 PROCEDURE — 36415 COLL VENOUS BLD VENIPUNCTURE: CPT

## 2021-01-03 RX ORDER — MAGNESIUM SULFATE IN WATER 40 MG/ML
2 INJECTION, SOLUTION INTRAVENOUS ONCE
Status: COMPLETED | OUTPATIENT
Start: 2021-01-03 | End: 2021-01-03

## 2021-01-03 RX ADMIN — MAGNESIUM SULFATE HEPTAHYDRATE 2 G: 40 INJECTION, SOLUTION INTRAVENOUS at 12:15

## 2021-01-03 RX ADMIN — OXYCODONE HYDROCHLORIDE AND ACETAMINOPHEN 1 TABLET: 5; 325 TABLET ORAL at 18:05

## 2021-01-03 RX ADMIN — OXYCODONE HYDROCHLORIDE AND ACETAMINOPHEN 1 TABLET: 5; 325 TABLET ORAL at 09:49

## 2021-01-03 RX ADMIN — ENOXAPARIN SODIUM 40 MG: 40 INJECTION SUBCUTANEOUS at 09:49

## 2021-01-03 RX ADMIN — AMLODIPINE BESYLATE 5 MG: 5 TABLET ORAL at 09:49

## 2021-01-03 ASSESSMENT — PAIN DESCRIPTION - PROGRESSION
CLINICAL_PROGRESSION: GRADUALLY WORSENING
CLINICAL_PROGRESSION: GRADUALLY WORSENING

## 2021-01-03 ASSESSMENT — PAIN SCALES - GENERAL
PAINLEVEL_OUTOF10: 10
PAINLEVEL_OUTOF10: 6

## 2021-01-03 NOTE — PROGRESS NOTES
Hospitalist Progress Note         Admit Date: 12/26/2020    PCP: No primary care provider on file. Chief Complaint   Patient presents with    Foot Pain     bilat        Assessment and Plan:     -Frostbite left foot/rhabdomyolysis improved with bicarb fluids. Current . Pain control-Percocet and morphine IV as needed.  -Acute kidney injury from rhabdomyolysis. Improved with IVF. -Hyperkalemia could be lab error. Monitor BMP  -HTN continue Norvasc-BP stable    DVT prophylaxis Lovenox  PT/OT recommend ECF.   CM working on this    Current Facility-Administered Medications   Medication Dose Route Frequency Provider Last Rate Last Admin    magnesium sulfate 2 g in 50 mL IVPB premix  2 g Intravenous Once Tre Prince MD        amLODIPine (NORVASC) tablet 5 mg  5 mg Oral Daily Tre Prince MD   5 mg at 01/03/21 0949    sodium chloride flush 0.9 % injection 10 mL  10 mL Intravenous 2 times per day Hermilo Pendleton MD   10 mL at 01/02/21 1948    sodium chloride flush 0.9 % injection 10 mL  10 mL Intravenous PRN Hermilo Pendleton MD        enoxaparin (LOVENOX) injection 40 mg  40 mg Subcutaneous Daily Hermilo Pendleton MD   40 mg at 01/03/21 0949    promethazine (PHENERGAN) tablet 12.5 mg  12.5 mg Oral Q6H PRN Hermilo Pendleton MD        Or    ondansetron (ZOFRAN) injection 4 mg  4 mg Intravenous Q6H PRN Hermilo Pendleton MD        polyethylene glycol (GLYCOLAX) packet 17 g  17 g Oral Daily PRN Hermilo Pendleton MD   17 g at 12/31/20 1015    acetaminophen (TYLENOL) tablet 650 mg  650 mg Oral Q6H PRN Hermilo Pendleton MD        Or    acetaminophen (TYLENOL) suppository 650 mg  650 mg Rectal Q6H PRN Hermilo Pendleton MD        oxyCODONE-acetaminophen (PERCOCET) 5-325 MG per tablet 1 tablet  1 tablet Oral Q4H PRN Tre Prince MD   1 tablet at 01/03/21 0949    morphine (PF) injection 2 mg  2 mg Intravenous Q4H PRN Tre Prince MD   2 mg at 01/02/21 0013 Subjective:     Patient has stable B/L leg pain  Still report numbness and stiffened foot. Patient does not feel he can take care of himself at home    Objective: Intake/Output Summary (Last 24 hours) at 1/3/2021 1153  Last data filed at 1/3/2021 0846  Gross per 24 hour   Intake 10 ml   Output 1600 ml   Net -1590 ml      Vitals:   Vitals:    01/03/21 0846   BP: 128/82   Pulse: 84   Resp: 16   Temp: 97.7 °F (36.5 °C)   SpO2: 99%     Physical Exam:  General Appearance:    Alert, cooperative, no distress  Head:      Normocephalic, without obvious abnormality, atraumatic  Eyes:       Conjunctiva/corneas clear, EOM's intact  Lungs:    Clear to auscultation bilaterally, respirations unlabored  Heart:                Regular rate and rhythm, S1 and S2 normal, no murmur,   rub or gallop  Abdomen:     Soft, non-tender, bowel sounds active, no masses, no organomegaly  Extremities:   Right and left foot looking bluish/cyanotic left >Rt, tenderness with blebs +  Neurological:   Grossly Intact. Significant Diagnostic Studies:   DATA:    CBC   No results for input(s): WBC, HGB, HCT, PLT in the last 72 hours. BMP   Recent Labs     01/01/21  0438 01/02/21  0335 01/03/21  0638   PHOS 4.2 4.8 4.3     LFT'S No results for input(s): AST, ALT, ALB, BILIDIR, BILITOT, ALKPHOS in the last 72 hours. COAG No results for input(s): INR in the last 72 hours. POC: No results found for: POCGLU  [id number]:No results found for: [facility]  CARDIAC ENZYMES    Recent Labs     01/01/21  0438 01/02/21  0335 01/03/21  0638   CKTOTAL 750* 415* 243*     Troponin: No results for input(s): TROPONINT in the last 72 hours. BNP: No results for input(s): PROBNP in the last 72 hours.   U/A:    Lab Results   Component Value Date    COLORU YELLOW 12/27/2020    WBCUA <1 12/27/2020    RBCUA <1 12/27/2020    MUCUS RARE 12/27/2020    BACTERIA NEGATIVE 12/27/2020    CLARITYU CLEAR 12/27/2020    SPECGRAV 1.015 12/27/2020    LEUKOCYTESUR NEGATIVE 12/27/2020    BLOODU SMALL 12/27/2020       Xr Foot Left (2 Views)    Result Date: 12/26/2020  EXAMINATION: 3 XRAY VIEWS OF THE LEFT FOOT 12/26/2020 8:41 pm COMPARISON: None. HISTORY: ORDERING SYSTEM PROVIDED HISTORY: pain TECHNOLOGIST PROVIDED HISTORY: Reason for exam:->pain Reason for Exam: frost bite Acuity: Acute Type of Exam: Initial Relevant Medical/Surgical History: best imaging possible due to patient pain tolerance FINDINGS: Punctate radiodensities are present in soft tissues of the 2nd digit. No acute periostitis or bony destruction. No acute fracture or dislocation. Mild 1st and 2nd TMT degenerative changes. Remaining bones and joint spaces are maintained. No acute abnormality. Punctate radiodensities in the soft tissues of the 2nd digit. Xr Chest Portable    Result Date: 12/26/2020  EXAMINATION: ONE XRAY VIEW OF THE CHEST 12/26/2020 7:41 pm COMPARISON: 10/09/2019 HISTORY: ORDERING SYSTEM PROVIDED HISTORY: emergency TECHNOLOGIST PROVIDED HISTORY: Reason for exam:->emergency Reason for Exam: frost bite Acuity: Acute Type of Exam: Initial FINDINGS: No lung infiltrate or consolidation. No pneumothorax or pleural effusion. Heart size is normal.     No acute abnormality. Vl Dup Lower Extremity Arteries Bilateral    Result Date: 12/26/2020  EXAMINATION: ARTERIAL DUPLEX ULTRASOUND OF THE BILATERAL LOWER EXTREMITIES  12/26/2020 8:26 pm TECHNIQUE: Duplex ultrasound and Doppler images were obtained of the bilateral lower extremities COMPARISON: None. HISTORY: ORDERING SYSTEM PROVIDED HISTORY: pain TECHNOLOGIST PROVIDED HISTORY: Reason for exam:->pain Reason for Exam: pain Acuity: Acute Type of Exam: Initial FINDINGS: The arteries of both lower extremities demonstrated normal triphasic waveforms. Right: Flow velocities were measured as follows: Com. Fem. 177.5 cm/sec Prof.            92.5 cm/sec SFA Prox.      116.1 cm/sec SFA Mid.      114.1 cm/sec SFA Dist.      122.9 cm/sec Pop.             92.7 cm/sec PTA             69.4 cm/sec Peron. 98.4 cm/sec MAGGIE             77.4 cm/sec Left: Flow velocities were measured as follows: Com. Fem.   169.7 cm/sec Prof.            122.3 cm/sec SFA Prox.     108.3 cm/sec SFA Mid.      125.0 cm/sec SFA Dist.      111.1 cm/sec Pop.             97.2 cm/sec PTA             147.2 cm/sec Peron. 111.0 cm/sec MAGGIE             94.2 cm/sec     All arteries of both lower extremities demonstrate normal triphasic waveforms indicating no hemodynamically significant stenosis.            Zora Maya  [facility]

## 2021-01-04 LAB
ANION GAP SERPL CALCULATED.3IONS-SCNC: 11 MMOL/L (ref 4–16)
BUN BLDV-MCNC: 20 MG/DL (ref 6–23)
CALCIUM SERPL-MCNC: 9.4 MG/DL (ref 8.3–10.6)
CHLORIDE BLD-SCNC: 101 MMOL/L (ref 99–110)
CO2: 27 MMOL/L (ref 21–32)
CREAT SERPL-MCNC: 1 MG/DL (ref 0.9–1.3)
GFR AFRICAN AMERICAN: >60 ML/MIN/1.73M2
GFR NON-AFRICAN AMERICAN: >60 ML/MIN/1.73M2
GLUCOSE BLD-MCNC: 102 MG/DL (ref 70–99)
MAGNESIUM: 1.8 MG/DL (ref 1.8–2.4)
PHOSPHORUS: 4.4 MG/DL (ref 2.5–4.9)
POTASSIUM SERPL-SCNC: 5 MMOL/L (ref 3.5–5.1)
SODIUM BLD-SCNC: 139 MMOL/L (ref 135–145)

## 2021-01-04 PROCEDURE — 6370000000 HC RX 637 (ALT 250 FOR IP): Performed by: INTERNAL MEDICINE

## 2021-01-04 PROCEDURE — 2580000003 HC RX 258: Performed by: INTERNAL MEDICINE

## 2021-01-04 PROCEDURE — 80048 BASIC METABOLIC PNL TOTAL CA: CPT

## 2021-01-04 PROCEDURE — 97110 THERAPEUTIC EXERCISES: CPT

## 2021-01-04 PROCEDURE — 97530 THERAPEUTIC ACTIVITIES: CPT

## 2021-01-04 PROCEDURE — 97116 GAIT TRAINING THERAPY: CPT

## 2021-01-04 PROCEDURE — 99213 OFFICE O/P EST LOW 20 MIN: CPT

## 2021-01-04 PROCEDURE — 97542 WHEELCHAIR MNGMENT TRAINING: CPT

## 2021-01-04 PROCEDURE — 83735 ASSAY OF MAGNESIUM: CPT

## 2021-01-04 PROCEDURE — 36415 COLL VENOUS BLD VENIPUNCTURE: CPT

## 2021-01-04 PROCEDURE — 84100 ASSAY OF PHOSPHORUS: CPT

## 2021-01-04 PROCEDURE — 97535 SELF CARE MNGMENT TRAINING: CPT

## 2021-01-04 PROCEDURE — 94761 N-INVAS EAR/PLS OXIMETRY MLT: CPT

## 2021-01-04 PROCEDURE — 1200000000 HC SEMI PRIVATE

## 2021-01-04 RX ADMIN — AMLODIPINE BESYLATE 5 MG: 5 TABLET ORAL at 09:14

## 2021-01-04 RX ADMIN — OXYCODONE HYDROCHLORIDE AND ACETAMINOPHEN 1 TABLET: 5; 325 TABLET ORAL at 21:37

## 2021-01-04 RX ADMIN — OXYCODONE HYDROCHLORIDE AND ACETAMINOPHEN 1 TABLET: 5; 325 TABLET ORAL at 09:14

## 2021-01-04 RX ADMIN — SODIUM CHLORIDE, PRESERVATIVE FREE 10 ML: 5 INJECTION INTRAVENOUS at 21:38

## 2021-01-04 RX ADMIN — OXYCODONE HYDROCHLORIDE AND ACETAMINOPHEN 1 TABLET: 5; 325 TABLET ORAL at 00:16

## 2021-01-04 ASSESSMENT — PAIN SCALES - GENERAL
PAINLEVEL_OUTOF10: 9
PAINLEVEL_OUTOF10: 8

## 2021-01-04 NOTE — CARE COORDINATION
Received call from Jasmin at [facility] that they will take pt once precert obtained and she will start today. Updated pt on status.

## 2021-01-04 NOTE — PROGRESS NOTES
Occupational Therapy  . Occupational Therapy Treatment Note  Name: Ramin Dobbins MRN: [id number] :   1975   Date:  2021   Admission Date: 2020 Room:  [address] [place] [address]A   Restrictions/Precautions:    General precautions; Fall Risk; \"limit ambulation, up to bathroom only\" per gen surgeon. Communication with other providers:  Per chart review, patient is appropriate for therapeutic intervention. Partial co-Tx c PTA Jurline Majors. Subjective:  Patient states: \"My left foot hurts way more than the right one. I can't bear weight on it at all, it hurts too bad. \" Pt reports having sensation of the floor surface c the R foot, but reports cannot feel surface c L foot, only pain. Pain:   Location, Type, Intensity (0/10 to 10/10):  8/10, R foot, 10/10, L foot \"It's more like a 25.\"    Objective:    Observation:  Pt received in semi-fowlers performing BLE therapeutic exercises at bed level c PT. Pt actively participated c very limited WB c LLE, exhibiting rare / intermittent touchdown only during functional transfers. Use of wheelchair for hallway, utilized RW for very limited ambulation to bathroom only. Darkened areas of skin on toes and soles of B feet. Pt exhibits good BUE strength throughout functional transfers. Objective Measures:  N/A    Treatment, including education:  Therapeutic Exercise:  Cues were given for technique, safety, recruitment, and rationale. Cues were verbal and/or tactile. Pt Sup + vc's for technique to perform BUE AROM exercises to include: shoulder flex/extension, internal/external rotation, chest presses, bicep curls, rowing, and supination/pronation x15 reps each. Self Care Training:   Cues were given for safety, sequence, UE/LE placement, visual cues, and balance. Activities performed today included LB dressing, toileting ADLs.   LB Dressing: Min A for touching assist to adjust L sock after pt sat of bed + increased time to don B  socks c L sock twisted and requiring assistance to adjust.  Toilet Transfer: Intermittent CGA c RW for steadying + min vc's for safe body positioning c RW  Toileting: Min A for steadying assist during clothing mgmt down, then performed SBA for remainder including hygiene and clothing mgmt up. Therapeutic Activity Training:   Therapeutic activity training was instructed today. Cues were given for safety, sequence, UE/LE placement, awareness, and balance. Activities performed today included bed mobility training, sup-sit, sit-stand, SPT. Supine<>sit: Mod A c bed features. Scooting: Sup  Sit<>stands: Intermittent CGA / SBA c RW + cues for hand placement / safe body positioning, especially during stand to sits. SPT: Intermittent CGA c RW for EOB<>wheelchair  Functional Mobility: Limited to RW for bathroom only, ~6 ft from wheelchair to toilet and ~10 ft from bathroom to edge of bed, pt c rare touchdown of LLE, using hop to pattern c RLE and requiring occasional vc for safe body positioning. See PT note for additional details and wheelchair mobility in hallway. All therapeutic intervention performed c emphasis on BUE therapeutic exercises, toileting and LB ADLs, dynamic balance / standing tolerance to inc strength, endurance and act tolerance for inc Indep c ADL tasks, func transfers / mobility. Safety  Patient safely in bed + alarm activated at end of session, with call light/phone in reach, and nursing aware. Gait belt was used for func transfers / mobility. Assessment / Impression:        Patient's tolerance of treatment:  Well   Adverse Reaction: None  Significant change in status and impact:  None  Barriers to improvement:  Pain, limited weightbearing    Plan for Next Session:    Continue per OT POC c emphasis on safe body positioning during functional transfers for ADL tasks.     Time in:  0953  Time out:  1047  Timed treatment minutes:  54  Total treatment time:  54    Electronically signed by:    Luis Manuel Concepcion ARRIAGA/L  1/4/2021, 8:44 AM    Previously filed values:       Goals:  By d/c or goals met:  Pt will perform all bed mobility with INDEP in prep for EOB/OOB activity. Pt will perform all functional transfers with SAV and appropriate use of LRD to bed, toilet, chair in prep for increased functional independence. Pt will perform UB ADLs with SAV to increase functional independence. Pt will perform LB ADLs with SAV to increase functional independence. Pt will perform all aspects of toileting with SAV to increase functional independence.    Pt will participate in therex/therax c emphasis on strength, activity tolerance,  safety, SAV tasks.

## 2021-01-04 NOTE — CONSULTS
[facility] Continence Nurse  Consult Note       Meseret Charles  AGE: 50 y.o. GENDER: male  : 1975  TODAY'S DATE:  2021    Subjective:     Reason for  Evaluation and Assessment: wound reassessment      Meseret Charles is a 50 y.o. male referred by:   [x] Physician  [] Nursing  [] Other:     Wound Identification:  Wound Type: traumatic and frostbite  Contributing Factors: edema and decreased mobility        PAST MEDICAL HISTORY        Diagnosis Date    Hypertension     does not take medications       PAST SURGICAL HISTORY    No past surgical history on file. FAMILY HISTORY    No family history on file. SOCIAL HISTORY    Social History     Tobacco Use    Smoking status: Current Some Day Smoker    Smokeless tobacco: Never Used   Substance Use Topics    Alcohol use: Yes     Comment: most days    Drug use: No       ALLERGIES    Allergies   Allergen Reactions    Pcn [Penicillins] Nausea And Vomiting    Vicodin [Hydrocodone-Acetaminophen] Nausea And Vomiting       MEDICATIONS    No current facility-administered medications on file prior to encounter.       Current Outpatient Medications on File Prior to Encounter   Medication Sig Dispense Refill    omeprazole (PRILOSEC) 20 MG delayed release capsule Take 2 capsules by mouth Daily 60 capsule 0    sucralfate (CARAFATE) 1 GM tablet Take 1 tablet by mouth 4 times daily for 15 days 60 tablet 0    naproxen (NAPROSYN) 500 MG tablet Take 1 tablet by mouth 2 times daily 60 tablet 0         Objective:      /81   Pulse 90   Temp 98.6 °F (37 °C) (Oral)   Resp 18   Ht 5' 10.98\" (1.803 m)   Wt 172 lb 12.8 oz (78.4 kg)   SpO2 100%   BMI 24.11 kg/m²   Naveen Risk Score: Naveen Scale Score: 19    LABS    CBC:   Lab Results   Component Value Date    WBC 7.5 2020    RBC 4.01 2020    HGB 11.7 2020    HCT 36.0 2020    MCV 89.8 2020    MCH 29.2 2020    MCHC 32.5 2020    RDW 14.8 2020     12/27/2020    MPV 8.8 12/27/2020     CMP:    Lab Results   Component Value Date     01/04/2021    K 5.0 01/04/2021     01/04/2021    CO2 27 01/04/2021    BUN 20 01/04/2021    CREATININE 1.0 01/04/2021    GFRAA >60 01/04/2021    LABGLOM >60 01/04/2021    GLUCOSE 102 01/04/2021    PROT 7.1 10/09/2019    PROT 8.0 10/14/2010    LABALBU 4.1 10/09/2019    CALCIUM 9.4 01/04/2021    BILITOT 0.3 10/09/2019    ALKPHOS 102 10/09/2019    AST 65 10/09/2019    ALT 32 10/09/2019     Albumin:    Lab Results   Component Value Date    LABALBU 4.1 10/09/2019     PT/INR:  No results found for: PROTIME, INR  HgBA1c:  No results found for: LABA1C      Assessment:     Patient Active Problem List   Diagnosis    Rhabdomyolysis       Measurements:  Wound 12/28/20 Foot Right;Dorsal cluster (Active)   Wound Etiology Traumatic 01/04/21 1500   Dressing Status New dressing applied 01/04/21 1500   Wound Cleansed Cleansed with saline 01/04/21 1500   Dressing/Treatment Open to air 01/04/21 1230   Wound Length (cm) 11.5 cm 01/04/21 1500   Wound Width (cm) 5 cm 01/04/21 1500   Wound Depth (cm) 0.1 cm 01/04/21 1500   Wound Surface Area (cm^2) 57.5 cm^2 01/04/21 1500   Change in Wound Size % (l*w) 0 01/04/21 1500   Wound Volume (cm^3) 5.75 cm^3 01/04/21 1500   Distance Tunneling (cm) 0 cm 01/04/21 1500   Tunneling Position ___ O'Clock 0 01/04/21 1500   Undermining Starts ___ O'Clock 0 01/04/21 1500   Undermining Ends___ O'Clock 0 01/04/21 1500   Undermining Maxium Distance (cm) 0 01/04/21 1500   Wound Assessment Purple/maroon;Pink/red;Ruptured blister 01/04/21 1500   Drainage Amount None 01/04/21 1500   Odor None 01/04/21 1500   Magi-wound Assessment Intact 01/04/21 1500   Margins Attached edges 01/04/21 1500   Number of days: 7       Wound 12/28/20 Foot Right;Plantar (Active)   Wound Etiology Traumatic 01/04/21 1500   Dressing Status New dressing applied 01/04/21 1500   Wound Cleansed Cleansed with saline 01/04/21 1500   Dressing/Treatment Open to air 01/04/21 1230   Wound Length (cm) 3.5 cm 01/04/21 1500   Wound Width (cm) 4 cm 01/04/21 1500   Wound Depth (cm) 0 cm 01/04/21 1500   Wound Surface Area (cm^2) 14 cm^2 01/04/21 1500   Change in Wound Size % (l*w) 22.22 01/04/21 1500   Wound Volume (cm^3) 0 cm^3 01/04/21 1500   Distance Tunneling (cm) 0 cm 01/04/21 1500   Tunneling Position ___ O'Clock 0 01/04/21 1500   Undermining Starts ___ O'Clock 0 01/04/21 1500   Undermining Ends___ O'Clock 0 01/04/21 1500   Undermining Maxium Distance (cm) 0 01/04/21 1500   Wound Assessment Purple/maroon;Pink/red;Fluid filled blister 01/04/21 1500   Drainage Amount None 01/04/21 1500   Odor None 01/04/21 1500   Magi-wound Assessment Dry/flaky 01/04/21 1500   Margins Attached edges 01/04/21 1500   Number of days: 7       Wound 12/28/20 Toe (Comment  which one) Anterior;Right cluster 1st-5th toes (Active)   Wound Etiology Traumatic 01/04/21 1500   Dressing Status New dressing applied 01/04/21 1500   Wound Cleansed Cleansed with saline 01/04/21 1500   Dressing/Treatment Open to air 01/04/21 1230   Wound Length (cm) 5 cm 01/04/21 1500   Wound Width (cm) 10 cm 01/04/21 1500   Wound Depth (cm) 0.1 cm 01/04/21 1500   Wound Surface Area (cm^2) 50 cm^2 01/04/21 1500   Change in Wound Size % (l*w) -1233.33 01/04/21 1500   Wound Volume (cm^3) 5 cm^3 01/04/21 1500   Distance Tunneling (cm) 0 cm 01/04/21 1500   Tunneling Position ___ O'Clock 0 01/04/21 1500   Undermining Starts ___ O'Clock 0 01/04/21 1500   Undermining Ends___ O'Clock 0 01/04/21 1500   Undermining Maxium Distance (cm) 0 01/04/21 1500   Wound Assessment Fluid filled blister; Purple/maroon;Eschar moist 01/04/21 1500   Drainage Amount Small 01/04/21 1500   Drainage Description Serosanguinous; Yellow 01/04/21 1500   Odor Mild 01/04/21 1500   Magi-wound Assessment Maceration 01/04/21 1500   Margins Attached edges; Defined edges 01/04/21 1500   Wound Thickness Description not for Pressure Injury Full thickness 01/04/21 1500   Number of days: 7       Wound 12/28/20 Foot Right;Lateral cluster (Active)   Wound Etiology Traumatic 01/04/21 1500   Dressing Status New dressing applied 01/04/21 1500   Wound Cleansed Cleansed with saline 01/04/21 1500   Dressing/Treatment Silicone border 79/29/40 1230   Wound Length (cm) 4 cm 01/04/21 1500   Wound Width (cm) 3 cm 01/04/21 1500   Wound Depth (cm) 0.1 cm 01/04/21 1500   Wound Surface Area (cm^2) 12 cm^2 01/04/21 1500   Change in Wound Size % (l*w) -166.67 01/04/21 1500   Wound Volume (cm^3) 1.2 cm^3 01/04/21 1500   Wound Healing % -167 01/04/21 1500   Distance Tunneling (cm) 0 cm 01/04/21 1500   Tunneling Position ___ O'Clock 0 01/04/21 1500   Undermining Starts ___ O'Clock 0 01/04/21 1500   Undermining Ends___ O'Clock 0 01/04/21 1500   Undermining Maxium Distance (cm) 0 01/04/21 1500   Wound Assessment Pink/red;Ruptured blister; Purple/maroon 01/04/21 1500   Drainage Amount Scant 01/04/21 1500   Drainage Description Serous 01/04/21 1500   Odor None 01/04/21 1500   Magi-wound Assessment Dry/flaky 01/04/21 1500   Margins Defined edges 01/04/21 1500   Wound Thickness Description not for Pressure Injury Partial thickness 01/04/21 1500   Number of days: 7       Wound 12/28/20 Foot Left;Dorsal cluster (Active)   Wound Etiology Traumatic 01/04/21 1500   Dressing Status New dressing applied 01/04/21 1500   Wound Cleansed Cleansed with saline 01/04/21 1500   Dressing/Treatment Open to air 01/04/21 1230   Wound Length (cm) 3.8 cm 01/04/21 1500   Wound Width (cm) 8.5 cm 01/04/21 1500   Wound Depth (cm) 0.1 cm 01/04/21 1500   Wound Surface Area (cm^2) 32.3 cm^2 01/04/21 1500   Change in Wound Size % (l*w) 30.91 01/04/21 1500   Wound Volume (cm^3) 3.23 cm^3 01/04/21 1500   Distance Tunneling (cm) 0 cm 01/04/21 1500   Tunneling Position ___ O'Clock 0 01/04/21 1500   Undermining Starts ___ O'Clock 0 01/04/21 1500   Undermining Ends___ O'Clock 00 01/04/21 1500   Undermining Maxium Distance (cm) 0 12/28/20 O'Clock 0 01/04/21 1500   Undermining Maxium Distance (cm) 0 01/04/21 1500   Wound Assessment Eschar moist;Fluid filled blister; Purple/maroon 01/04/21 1500   Drainage Amount Small 01/04/21 1500   Drainage Description Yellow;Serosanguinous 01/04/21 1500   Odor Mild 01/04/21 1500   Magi-wound Assessment Maceration 01/04/21 1500   Margins Defined edges; Attached edges 01/04/21 1500   Wound Thickness Description not for Pressure Injury Full thickness 01/04/21 1500   Number of days: 7       Response to treatment:  With complaints of pain. Pain Assessment:  Severity:  moderate  Quality of pain: sharp  Wound Pain Timing/Severity: intermittent  Premedicated: no    Plan:     Plan of Care: Wound 12/28/20 Foot Right;Dorsal cluster-Dressing/Treatment: (optifoam AG, kerlix)  Wound 12/28/20 Foot Right;Plantar-Dressing/Treatment: (optifoam ag, kerlix)  Wound 12/28/20 Toe (Comment  which one) Anterior;Right cluster 1st-5th toes-Dressing/Treatment: (aquacel AG between toes, kerlix)  Wound 12/28/20 Foot Right;Lateral cluster-Dressing/Treatment: (optifoam AG, kerlix)  Wound 12/28/20 Foot Left;Dorsal cluster-Dressing/Treatment: (optifoam AG, kerlix)  Wound 12/28/20 Foot Left;Plantar-Dressing/Treatment: (optifoam AG, kerlix)  Wound 12/28/20 Toe (Comment  which one) Anterior; Left cluster 1st-5th toes-Dressing/Treatment: (aquacel AG betweeen toes, kerlix)     Pt in bed. Agreeable to wound reassessment. Dr. Janak Irving following and awaiting tissue of feet to demarcate. Wounds noted as above. Less edema and blisters mostly absorbed. Toes dark. Maceration noted between toes of both feet. Mild odor noted. Cleansed with NS. Pictures and measurements taken. Aquacel AG applied between toes and Optifoam AG to wounds noted as above. Wrapped with kerlix. Pt generally not at risk for skin breakdown AEB Naveen. Updated Dr. Janak Irving of dressing change recommendations. Agreeable.      Specialty Bed Required : no  [] Low Air Loss   [] Pressure Redistribution  [] Fluid Immersion  [] Bariatric  [] Total Pressure Relief  [] Other:     Discharge Plan:  Placement for patient upon discharge: tbd  Hospice Care: no  Patient appropriate for Outpatient [address]: yes-follow up with Dr. Darlin Delgado    Patient/Caregiver Teaching:  Level of patient/caregiver understanding able to:   Voiced understanding regarding wound care.         Electronically signed by Mary Jane Burnette RN,  on 1/4/2021 at 4:06 PM

## 2021-01-04 NOTE — PROGRESS NOTES
Comprehensive Nutrition Assessment    Type and Reason for Visit:  Reassess    Nutrition Recommendations/Plan:   · Continue General Diet and supplements  · Please record all po intake    Nutrition Assessment:  Feeding self and tolerating General Diet. No po intake recorded during stay of 9 days. Oral supplements ordered. Will continue to follow as moderate nutrition risk. Pt is n/a during visit. Malnutrition Assessment:  Malnutrition Status: At risk for malnutrition   Context:  Acute Illness       Estimated Daily Nutrient Needs:  Energy (kcal):  3914-9637(25-30 kcal/kg); Weight Used for Energy Requirements:  Current     Protein (g):  (1.2-1.4 g/kg); Weight Used for Protein Requirements:  Current        Fluid (ml/day):  2000; Method Used for Fluid Requirements:  1 ml/kcal      Wounds:  Multiple(bilateral feet/toe frostbite)       Current Nutrition Therapies:    DIET GENERAL;  Dietary Nutrition Supplements: Wound Healing Oral Supplement  Dietary Nutrition Supplements: Low Calorie High Protein Supplement    Anthropometric Measures:  · Height: 5' 10.98\" (180.3 cm)  · Current Body Weight: 172 lb 12.8 oz (78.4 kg)   · Admission Body Weight:   162 lb 3.2 oz     · Usual Body Weight: (none available)     · Ideal Body Weight: 172 lbs; % Ideal Body Weight 100.5 %   · BMI: 24.1  · BMI Categories: Normal Weight (BMI 18.5-24. 9)       Nutrition Diagnosis:   · Increased nutrient needs related to (healing) as evidenced by (frostbite on bilateral feet)    Nutrition Interventions:   Food and/or Nutrient Delivery:  Continue Current Diet, Continue Oral Nutrition Supplement  Nutrition Education/Counseling:  No recommendation at this time   Coordination of Nutrition Care:  Continue to monitor while inpatient, Feeding Assistance/Environment Change    Goals:  pt will consume greater than 50% of meals and supplements       Nutrition Monitoring and Evaluation:   Food/Nutrient Intake Outcomes:  Supplement Intake, Food and Nutrient

## 2021-01-04 NOTE — PROGRESS NOTES
Hospitalist Progress Note         Admit Date: 12/26/2020    PCP: No primary care provider on file. Chief Complaint   Patient presents with    Foot Pain     bilat        Assessment and Plan:     -Frostbite left foot/rhabdomyolysis improved with bicarb fluids. Pain control-Percocet and morphine IV as needed.  -Acute kidney injury from rhabdomyolysis. Improved with IVF. -Hyperkalemia could be lab error. Monitor BMP  -HTN continue Norvasc-BP stable    DVT prophylaxis Lovenox  PT/OT recommend ECF.   CM working on this  Medically cleared for discharge 2 days ago    Current Facility-Administered Medications   Medication Dose Route Frequency Provider Last Rate Last Admin    amLODIPine (NORVASC) tablet 5 mg  5 mg Oral Daily Radha Ventura MD   5 mg at 01/04/21 0914    sodium chloride flush 0.9 % injection 10 mL  10 mL Intravenous 2 times per day Heri Carrasco MD   Stopped at 01/03/21 2347    sodium chloride flush 0.9 % injection 10 mL  10 mL Intravenous PRN Heri Carrasco MD        enoxaparin (LOVENOX) injection 40 mg  40 mg Subcutaneous Daily Heri Carrasco MD   Stopped at 01/04/21 0915    promethazine (PHENERGAN) tablet 12.5 mg  12.5 mg Oral Q6H PRN Heri Carrasco MD        Or    ondansetron (ZOFRAN) injection 4 mg  4 mg Intravenous Q6H PRN Heri Carrasco MD        polyethylene glycol (GLYCOLAX) packet 17 g  17 g Oral Daily PRN Heri Carrasco MD   17 g at 12/31/20 1015    acetaminophen (TYLENOL) tablet 650 mg  650 mg Oral Q6H PRN Heri Carrasco MD        Or    acetaminophen (TYLENOL) suppository 650 mg  650 mg Rectal Q6H PRN Heri Carrasco MD        oxyCODONE-acetaminophen (PERCOCET) 5-325 MG per tablet 1 tablet  1 tablet Oral Q4H PRN Radha Ventura MD   1 tablet at 01/04/21 0914    morphine (PF) injection 2 mg  2 mg Intravenous Q4H PRN Radha Ventura MD   2 mg at 01/02/21 8847       Subjective:     Patient has stable B/L leg pain  Relatively improved numbness and stiffened foot. Patient does not feel he can take care of himself at home    Objective: Intake/Output Summary (Last 24 hours) at 1/4/2021 1253  Last data filed at 1/3/2021 1613  Gross per 24 hour   Intake --   Output 850 ml   Net -850 ml      Vitals:   Vitals:    01/04/21 0803   BP: 133/81   Pulse: 90   Resp: 18   Temp: 98.6 °F (37 °C)   SpO2: 100%     Physical Exam:  General Appearance:    Alert, cooperative, no distress  Head:      Normocephalic, without obvious abnormality, atraumatic  Eyes:       Conjunctiva/corneas clear, EOM's intact  Lungs:    Clear to auscultation bilaterally, respirations unlabored  Heart:                Regular rate and rhythm, S1 and S2 normal, no murmur,   rub or gallop  Abdomen:     Soft, non-tender, bowel sounds active, no masses, no organomegaly  Extremities:   Right and left toes slightly blackish left >Rt, tenderness with blebs +  Neurological:   Grossly Intact. Significant Diagnostic Studies:   DATA:    CBC   No results for input(s): WBC, HGB, HCT, PLT in the last 72 hours. BMP   Recent Labs     01/02/21  0335 01/03/21  0638 01/04/21  0554   NA  --   --  139   K  --   --  5.0   CL  --   --  101   CO2  --   --  27   PHOS 4.8 4.3 4.4   BUN  --   --  20   CREATININE  --   --  1.0     LFT'S No results for input(s): AST, ALT, ALB, BILIDIR, BILITOT, ALKPHOS in the last 72 hours. COAG No results for input(s): INR in the last 72 hours. POC: No results found for: POCGLU  [id number]:No results found for: [facility]  CARDIAC ENZYMES    Recent Labs     01/02/21 0335 01/03/21  0638   CKTOTAL 415* 243*     Troponin: No results for input(s): TROPONINT in the last 72 hours. BNP: No results for input(s): PROBNP in the last 72 hours.   U/A:    Lab Results   Component Value Date    COLORU YELLOW 12/27/2020    WBCUA <1 12/27/2020    RBCUA <1 12/27/2020    MUCUS RARE 12/27/2020    BACTERIA NEGATIVE 12/27/2020    CLARITYU CLEAR 12/27/2020    SPECGRAV 1.015 12/27/2020 LEUKOCYTESUR NEGATIVE 12/27/2020    BLOODU SMALL 12/27/2020       Xr Foot Left (2 Views)    Result Date: 12/26/2020  EXAMINATION: 3 XRAY VIEWS OF THE LEFT FOOT 12/26/2020 8:41 pm COMPARISON: None. HISTORY: ORDERING SYSTEM PROVIDED HISTORY: pain TECHNOLOGIST PROVIDED HISTORY: Reason for exam:->pain Reason for Exam: frost bite Acuity: Acute Type of Exam: Initial Relevant Medical/Surgical History: best imaging possible due to patient pain tolerance FINDINGS: Punctate radiodensities are present in soft tissues of the 2nd digit. No acute periostitis or bony destruction. No acute fracture or dislocation. Mild 1st and 2nd TMT degenerative changes. Remaining bones and joint spaces are maintained. No acute abnormality. Punctate radiodensities in the soft tissues of the 2nd digit. Xr Chest Portable    Result Date: 12/26/2020  EXAMINATION: ONE XRAY VIEW OF THE CHEST 12/26/2020 7:41 pm COMPARISON: 10/09/2019 HISTORY: ORDERING SYSTEM PROVIDED HISTORY: emergency TECHNOLOGIST PROVIDED HISTORY: Reason for exam:->emergency Reason for Exam: frost bite Acuity: Acute Type of Exam: Initial FINDINGS: No lung infiltrate or consolidation. No pneumothorax or pleural effusion. Heart size is normal.     No acute abnormality. Vl Dup Lower Extremity Arteries Bilateral    Result Date: 12/26/2020  EXAMINATION: ARTERIAL DUPLEX ULTRASOUND OF THE BILATERAL LOWER EXTREMITIES  12/26/2020 8:26 pm TECHNIQUE: Duplex ultrasound and Doppler images were obtained of the bilateral lower extremities COMPARISON: None. HISTORY: ORDERING SYSTEM PROVIDED HISTORY: pain TECHNOLOGIST PROVIDED HISTORY: Reason for exam:->pain Reason for Exam: pain Acuity: Acute Type of Exam: Initial FINDINGS: The arteries of both lower extremities demonstrated normal triphasic waveforms. Right: Flow velocities were measured as follows: Com. Fem. 177.5 cm/sec Prof.            92.5 cm/sec SFA Prox.      116.1 cm/sec SFA Mid.      114.1 cm/sec SFA Dist.      122.9 cm/sec

## 2021-01-04 NOTE — PROGRESS NOTES
Physical Therapy    Physical Therapy Treatment Note  Name: Calista Ramos MRN: [id number] :   1975   Date:  2021   Admission Date: 2020 Room:  [address] [place], [place] [address]   Restrictions/Precautions:        fall risk,  frostbite to bilateral feet  Communication with other providers:  Co-tx with [facility]. Per discussion with Valeria Nur and [facility] PT doctor has requested just  WB thru feet with transfers and amb to bathroom at this time. Per discussion will do wc training. Subjective:  Patient states:  Pt agreeable and motivated  Pain:  Location, Type, Intensity (0/10 to 10/10):  Rate in right foot with gt 7 and pain in his left foot with activity \"25\". Pt reports no pain at rest but would have sudden muscle spasms in left with ex needing to stop and would visibile wince. Objective:    Observation:  Alert and oriented but at times needing increased verbal/tactile cues with wc mobility. Pt's feet have black areas on toes and forefoot area. Treatment, including education/measures:  Sup<>sit Aramis  Sit<=>stand with sba/cga of 2 and cues for hand placement for safety. amb with rw 3 steps x 1, 6' x 1, 10' x 1 with cga and cues for walker safety. Pt has very strong upper body strength and tends to have a very slow and controled hop on right foot with only touch down of left due to pain. Pt given written copy of leg ex:  Trunk stretches with deep breathing  10 reps aps but very limited ROM. Pt reports he has always amb on his toes  10 reps quad sets  10 reps glut sets  10 reps heel slides    wc mobility 140' with education on safety with brakes and wc management. Pt needing cues and increased time to figure out how to manage and steer wc with UE. Safety  Patient left safely in the bed, with call light/phone in reach with alarm applied. Gait belt and mask were used for transfers and gait.   Assessment / Impression:       Patient's tolerance of treatment:  good  Adverse Reaction: na  Significant change in status and impact: na  Barriers to improvement:  Pain in feet  Plan for Next Session:    Cont. POC  Time in:  0945  Time out:  1045  Timed treatment minutes:  60  Total treatment time:  61    Previously filed items:  Social/Functional History  Lives With: Alone  Type of Home: House  Home Layout: Two level, Bed/Bath upstairs  Home Access: Stairs to enter with rails  Entrance Stairs - Number of Steps: 3  Bathroom Shower/Tub: Tub/Shower unit  Bathroom Toilet: Standard  Bathroom Accessibility: Accessible  Receives Help From: Family, Friend(s)  ADL Assistance: Independent  Homemaking Assistance: Independent  Homemaking Responsibilities: Yes  Ambulation Assistance: Independent  Transfer Assistance: Independent  Active : Yes  Mode of Transportation: Car  Occupation: Full time employment  Type of occupation: [employer] working as [employer]  Short term goals  Short term goal 1: Pt will perform sit to stand transfers with CGA. Short term goal 2: Pt will perform ambulation more than 25 ft with FWW. Short term goal 3: Pt will perform standing ballance good for 30 seconds with equal weight bearing.        Electronically signed by:    Bonny Black PTA  1/4/2021, 8:25 AM

## 2021-01-05 LAB
MAGNESIUM: 2 MG/DL (ref 1.8–2.4)
PHOSPHORUS: 4.6 MG/DL (ref 2.5–4.9)

## 2021-01-05 PROCEDURE — 6370000000 HC RX 637 (ALT 250 FOR IP): Performed by: INTERNAL MEDICINE

## 2021-01-05 PROCEDURE — 2580000003 HC RX 258: Performed by: INTERNAL MEDICINE

## 2021-01-05 PROCEDURE — 84100 ASSAY OF PHOSPHORUS: CPT

## 2021-01-05 PROCEDURE — 6360000002 HC RX W HCPCS: Performed by: INTERNAL MEDICINE

## 2021-01-05 PROCEDURE — 1200000000 HC SEMI PRIVATE

## 2021-01-05 PROCEDURE — 97542 WHEELCHAIR MNGMENT TRAINING: CPT

## 2021-01-05 PROCEDURE — 97530 THERAPEUTIC ACTIVITIES: CPT

## 2021-01-05 PROCEDURE — 99232 SBSQ HOSP IP/OBS MODERATE 35: CPT | Performed by: SURGERY

## 2021-01-05 PROCEDURE — 83735 ASSAY OF MAGNESIUM: CPT

## 2021-01-05 PROCEDURE — 36415 COLL VENOUS BLD VENIPUNCTURE: CPT

## 2021-01-05 RX ADMIN — OXYCODONE HYDROCHLORIDE AND ACETAMINOPHEN 1 TABLET: 5; 325 TABLET ORAL at 20:28

## 2021-01-05 RX ADMIN — OXYCODONE HYDROCHLORIDE AND ACETAMINOPHEN 1 TABLET: 5; 325 TABLET ORAL at 13:08

## 2021-01-05 RX ADMIN — AMLODIPINE BESYLATE 5 MG: 5 TABLET ORAL at 08:22

## 2021-01-05 RX ADMIN — OXYCODONE HYDROCHLORIDE AND ACETAMINOPHEN 1 TABLET: 5; 325 TABLET ORAL at 07:53

## 2021-01-05 RX ADMIN — SODIUM CHLORIDE, PRESERVATIVE FREE 10 ML: 5 INJECTION INTRAVENOUS at 08:23

## 2021-01-05 RX ADMIN — SODIUM CHLORIDE, PRESERVATIVE FREE 10 ML: 5 INJECTION INTRAVENOUS at 20:28

## 2021-01-05 RX ADMIN — POLYETHYLENE GLYCOL (3350) 17 G: 17 POWDER, FOR SOLUTION ORAL at 13:08

## 2021-01-05 RX ADMIN — ENOXAPARIN SODIUM 40 MG: 40 INJECTION SUBCUTANEOUS at 08:23

## 2021-01-05 ASSESSMENT — PAIN - FUNCTIONAL ASSESSMENT: PAIN_FUNCTIONAL_ASSESSMENT: PREVENTS OR INTERFERES SOME ACTIVE ACTIVITIES AND ADLS

## 2021-01-05 ASSESSMENT — PAIN DESCRIPTION - PROGRESSION
CLINICAL_PROGRESSION: GRADUALLY WORSENING

## 2021-01-05 ASSESSMENT — PAIN DESCRIPTION - LOCATION
LOCATION: FOOT
LOCATION: FOOT

## 2021-01-05 ASSESSMENT — PAIN SCALES - GENERAL
PAINLEVEL_OUTOF10: 8
PAINLEVEL_OUTOF10: 9
PAINLEVEL_OUTOF10: 9
PAINLEVEL_OUTOF10: 8

## 2021-01-05 ASSESSMENT — PAIN DESCRIPTION - ONSET: ONSET: PROGRESSIVE

## 2021-01-05 ASSESSMENT — PAIN DESCRIPTION - ORIENTATION: ORIENTATION: RIGHT;LEFT

## 2021-01-05 ASSESSMENT — PAIN DESCRIPTION - PAIN TYPE: TYPE: ACUTE PAIN

## 2021-01-05 ASSESSMENT — PAIN DESCRIPTION - DESCRIPTORS: DESCRIPTORS: ACHING;SHOOTING

## 2021-01-05 NOTE — FLOWSHEET NOTE
Patient identifies as Rastafarian. Patient stated that he believes in healing power of prayer and requested prayer support. Patient was in good spirit during the visit. Patient is open to pastoral care support and will appreciate further visits, he said. This inform him how to contact the  as needs arise. Rev. Erin, [address]       01/05/21 4531   Encounter Summary   Services provided to: Patient   Referral/Consult From: [facility] Family members   Place of [address] [place] [address] Visiting Yes  (patient is open to pastoral care)   Volunteer Visit Yes   Complexity of Encounter Low   Length of Encounter 30 minutes   Spiritual Assessment Completed Yes   Routine   Type Initial   Assessment Approachable;Calm;Coping;Loneliness   Intervention Empowerment;Nurtured hope;Explored coping resources; Explored feelings, thoughts, concerns; Active listening;Prayer   Outcome Coping;Expressed feelings/needs/concerns;Expressed gratitude;Encouraged   Spiritual/Congregation   Type Spiritual support

## 2021-01-05 NOTE — PROGRESS NOTES
Hospitalist Progress Note         Admit Date: 12/26/2020    PCP: No primary care provider on file. Chief Complaint   Patient presents with    Foot Pain     bilat        Assessment and Plan:     Frostbite left foot/rhabdomyolysis   improved with bicarb fluids. Pain control-Percocet and morphine IV as needed. Acute kidney injury from rhabdomyolysis. Improved with IVF. Hyperkalemia   Monitor BMP  -potassium within normal limits. HTN   continue Norvasc-BP stable    DVT prophylaxis Lovenox  PT/OT recommend ECF.   CM working on this  Medically cleared for discharge     Current Facility-Administered Medications   Medication Dose Route Frequency Provider Last Rate Last Admin    amLODIPine (NORVASC) tablet 5 mg  5 mg Oral Daily Hernando Whitman MD   5 mg at 01/05/21 1778    sodium chloride flush 0.9 % injection 10 mL  10 mL Intravenous 2 times per day Yamile Ray MD   10 mL at 01/05/21 0823    sodium chloride flush 0.9 % injection 10 mL  10 mL Intravenous PRN Yamile Ray MD        enoxaparin (LOVENOX) injection 40 mg  40 mg Subcutaneous Daily Yamile Ray MD   40 mg at 01/05/21 0823    promethazine (PHENERGAN) tablet 12.5 mg  12.5 mg Oral Q6H PRN Yamile Ray MD        Or    ondansetron (ZOFRAN) injection 4 mg  4 mg Intravenous Q6H PRN Yamile Ray MD        polyethylene glycol (GLYCOLAX) packet 17 g  17 g Oral Daily PRN Yamile Ray MD   17 g at 12/31/20 1015    acetaminophen (TYLENOL) tablet 650 mg  650 mg Oral Q6H PRN Yamile Ray MD        Or    acetaminophen (TYLENOL) suppository 650 mg  650 mg Rectal Q6H PRN Yamile Ray MD        oxyCODONE-acetaminophen (PERCOCET) 5-325 MG per tablet 1 tablet  1 tablet Oral Q4H PRN Hernando Whitman MD   1 tablet at 01/05/21 8723    morphine (PF) injection 2 mg  2 mg Intravenous Q4H PRN Hernando Whitman MD   2 mg at 01/02/21 3721       Subjective:     Having pain in both feet, but decreased sensation in left foot    Objective: Intake/Output Summary (Last 24 hours) at 1/5/2021 0955  Last data filed at 1/4/2021 2139  Gross per 24 hour   Intake --   Output 275 ml   Net -275 ml      Vitals:   Vitals:    01/05/21 0822   BP: (!) 120/92   Pulse: 93   Resp: 16   Temp:    SpO2: 99%     Physical Exam:  General Appearance:    Alert, cooperative, no distress  Head:      Normocephalic, without obvious abnormality, atraumatic  Eyes:       No discharge  Lungs:    Clear to auscultation bilaterally, respirations unlabored  Heart:                Regular rate and rhythm, S1 and S2 normal, no murmur,   rub or gallop  Abdomen:     Soft, non-tender, bowel sounds active, no masses, no organomegaly  Extremities:   Right and left feet wrrapped  Neurological:   Grossly Intact. , has sensation in right foot, and sensation in mid left foot and proximal    Significant Diagnostic Studies:   DATA:    CBC   No results for input(s): WBC, HGB, HCT, PLT in the last 72 hours. BMP   Recent Labs     01/03/21  0638 01/04/21  0554 01/05/21  0714   NA  --  139  --    K  --  5.0  --    CL  --  101  --    CO2  --  27  --    PHOS 4.3 4.4 4.6   BUN  --  20  --    CREATININE  --  1.0  --      LFT'S No results for input(s): AST, ALT, ALB, BILIDIR, BILITOT, ALKPHOS in the last 72 hours. COAG No results for input(s): INR in the last 72 hours. POC: No results found for: POCGLU  [id number]:No results found for: [facility]  CARDIAC ENZYMES    Recent Labs     01/03/21  0638   CKTOTAL 243*     Troponin: No results for input(s): TROPONINT in the last 72 hours. BNP: No results for input(s): PROBNP in the last 72 hours.   U/A:    Lab Results   Component Value Date    COLORU YELLOW 12/27/2020    WBCUA <1 12/27/2020    RBCUA <1 12/27/2020    MUCUS RARE 12/27/2020    BACTERIA NEGATIVE 12/27/2020    CLARITYU CLEAR 12/27/2020    SPECGRAV 1.015 12/27/2020    LEUKOCYTESUR NEGATIVE 12/27/2020    BLOODU SMALL 12/27/2020       Xr Foot Left (2 Views)    Result Date: 12/26/2020  EXAMINATION: 3 XRAY VIEWS OF THE LEFT FOOT 12/26/2020 8:41 pm COMPARISON: None. HISTORY: ORDERING SYSTEM PROVIDED HISTORY: pain TECHNOLOGIST PROVIDED HISTORY: Reason for exam:->pain Reason for Exam: frost bite Acuity: Acute Type of Exam: Initial Relevant Medical/Surgical History: best imaging possible due to patient pain tolerance FINDINGS: Punctate radiodensities are present in soft tissues of the 2nd digit. No acute periostitis or bony destruction. No acute fracture or dislocation. Mild 1st and 2nd TMT degenerative changes. Remaining bones and joint spaces are maintained. No acute abnormality. Punctate radiodensities in the soft tissues of the 2nd digit. Xr Chest Portable    Result Date: 12/26/2020  EXAMINATION: ONE XRAY VIEW OF THE CHEST 12/26/2020 7:41 pm COMPARISON: 10/09/2019 HISTORY: ORDERING SYSTEM PROVIDED HISTORY: emergency TECHNOLOGIST PROVIDED HISTORY: Reason for exam:->emergency Reason for Exam: frost bite Acuity: Acute Type of Exam: Initial FINDINGS: No lung infiltrate or consolidation. No pneumothorax or pleural effusion. Heart size is normal.     No acute abnormality. Vl Dup Lower Extremity Arteries Bilateral    Result Date: 12/26/2020  EXAMINATION: ARTERIAL DUPLEX ULTRASOUND OF THE BILATERAL LOWER EXTREMITIES  12/26/2020 8:26 pm TECHNIQUE: Duplex ultrasound and Doppler images were obtained of the bilateral lower extremities COMPARISON: None. HISTORY: ORDERING SYSTEM PROVIDED HISTORY: pain TECHNOLOGIST PROVIDED HISTORY: Reason for exam:->pain Reason for Exam: pain Acuity: Acute Type of Exam: Initial FINDINGS: The arteries of both lower extremities demonstrated normal triphasic waveforms. Right: Flow velocities were measured as follows: Com. Fem. 177.5 cm/sec Prof.            92.5 cm/sec SFA Prox. 116.1 cm/sec SFA Mid.      114.1 cm/sec SFA Dist.      122.9 cm/sec Pop.             92.7 cm/sec PTA             69.4 cm/sec Peron.           98.4 cm/sec MAGGIE 77.4 cm/sec Left: Flow velocities were measured as follows: Com. Fem.   169.7 cm/sec Prof.            122.3 cm/sec SFA Prox.     108.3 cm/sec SFA Mid.      125.0 cm/sec SFA Dist.      111.1 cm/sec Pop.             97.2 cm/sec PTA             147.2 cm/sec Peron. 111.0 cm/sec MAGGIE             94.2 cm/sec     All arteries of both lower extremities demonstrate normal triphasic waveforms indicating no hemodynamically significant stenosis.        Loy Jensen MD

## 2021-01-05 NOTE — PROGRESS NOTES
Physical Therapy  Due to pt's limited [address] precautions, goals adjusted for appropriate plan of care.

## 2021-01-05 NOTE — PROGRESS NOTES
Physical Therapy    Physical Therapy Treatment Note  Name: Quintin Flores MRN: [id number] :   1975   Date:  2021   Admission Date: 2020 Room:  [address] [place] [address]   Restrictions/Precautions:        fall risk,  frostbite to bilateral feet  Communication with other providers:    Subjective:  Patient states:  Pt agreeable to tx  Pain:   Location, Type, Intensity (0/10 to 10/10): Pt reports feeling better and receiving pain meds before tx session but still 8 with mobility in feet. Objective:    Observation:  Alert and oriented  Treatment, including education/measures:  Pt needing assist to do bilateral post op shoes and educated on importance of wearing them to protect his feet. Pt verbalizes understanding. amb with rw 2-3 steps bed to wc and cues for safety. SPT bed<> wc<>commodewith min assist and cues for hand placement for safety. Pt encouraged to use wc and not amb to bathroom. Pt instructed on wc safety and managing brakes but still needed cues to lock brakes with each transfer. Pt needing min assist and cues to manage leg rest. Pt using bilateral UE to propel and steer wc. Pt doing better in hallway but needing increased time and cues for 360 turning and to keep a straight path. Pt made several attempts with cues to set wc up for transfer but dependent for final position for commode and bed transfer. Safety  Patient left safely in the bed, with call light/phone in reach with alarm applied. Gait belt and mask were used for transfers and gait. Assessment / Impression:       Patient's tolerance of treatment:  good  Adverse Reaction: na  Significant change in status and impact:  na  Barriers to improvement:  Limited amb due to frost bite in feet. Pt has difficult coordinating with wc mobility and decreased safety awareness. Plan for Next Session:    Cont.  POC  Time in:  0900  Time out: 0955    Timed treatment minutes:  55  Total treatment time:  55    Previously filed items:  Social/Functional History  Lives With: Alone  Type of Home: House  Home Layout: Two level, Bed/Bath upstairs  Home Access: Stairs to enter with rails  Entrance Stairs - Number of Steps: 3  Bathroom Shower/Tub: Tub/Shower unit  Bathroom Toilet: Standard  Bathroom Accessibility: Accessible  Receives Help From: Family, Friend(s)  ADL Assistance: Independent  Homemaking Assistance: Independent  Homemaking Responsibilities: Yes  Ambulation Assistance: Independent  Transfer Assistance: Independent  Active : Yes  Mode of Transportation: Car  Occupation: Full time employment  Type of occupation: [id number] working as [employer]  Short term goals  Short term goal 1: Pt will perform sit to stand transfers with CGA. Short term goal 2: Pt will perform ambulation more than 25 ft with FWW. Short term goal 3: Pt will perform standing ballance good for 30 seconds with equal weight bearing.        Electronically signed by:    Leoncio Peña PTA  1/5/2021, 8:50 AM

## 2021-01-06 LAB
MAGNESIUM: 1.9 MG/DL (ref 1.8–2.4)
PHOSPHORUS: 4.4 MG/DL (ref 2.5–4.9)

## 2021-01-06 PROCEDURE — 1200000000 HC SEMI PRIVATE

## 2021-01-06 PROCEDURE — 6360000002 HC RX W HCPCS: Performed by: INTERNAL MEDICINE

## 2021-01-06 PROCEDURE — 97110 THERAPEUTIC EXERCISES: CPT

## 2021-01-06 PROCEDURE — 36415 COLL VENOUS BLD VENIPUNCTURE: CPT

## 2021-01-06 PROCEDURE — 6370000000 HC RX 637 (ALT 250 FOR IP): Performed by: INTERNAL MEDICINE

## 2021-01-06 PROCEDURE — 83735 ASSAY OF MAGNESIUM: CPT

## 2021-01-06 PROCEDURE — 84100 ASSAY OF PHOSPHORUS: CPT

## 2021-01-06 PROCEDURE — 99232 SBSQ HOSP IP/OBS MODERATE 35: CPT | Performed by: SURGERY

## 2021-01-06 PROCEDURE — 94761 N-INVAS EAR/PLS OXIMETRY MLT: CPT

## 2021-01-06 PROCEDURE — 2580000003 HC RX 258: Performed by: INTERNAL MEDICINE

## 2021-01-06 RX ADMIN — AMLODIPINE BESYLATE 5 MG: 5 TABLET ORAL at 08:45

## 2021-01-06 RX ADMIN — MAGNESIUM HYDROXIDE 30 ML: 400 SUSPENSION ORAL at 08:45

## 2021-01-06 RX ADMIN — OXYCODONE HYDROCHLORIDE AND ACETAMINOPHEN 1 TABLET: 5; 325 TABLET ORAL at 19:55

## 2021-01-06 RX ADMIN — SODIUM CHLORIDE, PRESERVATIVE FREE 10 ML: 5 INJECTION INTRAVENOUS at 08:45

## 2021-01-06 RX ADMIN — OXYCODONE HYDROCHLORIDE AND ACETAMINOPHEN 1 TABLET: 5; 325 TABLET ORAL at 08:45

## 2021-01-06 RX ADMIN — ENOXAPARIN SODIUM 40 MG: 40 INJECTION SUBCUTANEOUS at 08:46

## 2021-01-06 ASSESSMENT — PAIN DESCRIPTION - LOCATION: LOCATION: FOOT

## 2021-01-06 ASSESSMENT — PAIN DESCRIPTION - PROGRESSION
CLINICAL_PROGRESSION: GRADUALLY WORSENING
CLINICAL_PROGRESSION: GRADUALLY WORSENING

## 2021-01-06 ASSESSMENT — PAIN SCALES - GENERAL
PAINLEVEL_OUTOF10: 0
PAINLEVEL_OUTOF10: 9

## 2021-01-06 ASSESSMENT — PAIN DESCRIPTION - DESCRIPTORS: DESCRIPTORS: ACHING;SHOOTING

## 2021-01-06 ASSESSMENT — PAIN DESCRIPTION - PAIN TYPE: TYPE: ACUTE PAIN

## 2021-01-06 NOTE — PROGRESS NOTES
GENERAL SURGERY PROGRESS NOTE    Lennox Barnacle is a 50 y.o. male with frostbite to bilateral feet. Subjective:   Doing ok today. Pain adequately controlled. Possible d/c to [id number] [place] soon. Objective:    Vitals: VITALS:  BP (!) 140/85   Pulse 83   Temp 97.7 °F (36.5 °C) (Oral)   Resp 16   Ht 5' 10.98\" (1.803 m)   Wt 172 lb 12.8 oz (78.4 kg)   SpO2 100%   BMI 24.11 kg/m²     I/O: No intake/output data recorded. Labs/Imaging Results:   Lab Results   Component Value Date     01/04/2021    K 5.0 01/04/2021     01/04/2021    CO2 27 01/04/2021    BUN 20 01/04/2021    CREATININE 1.0 01/04/2021    GLUCOSE 102 01/04/2021    CALCIUM 9.4 01/04/2021      Lab Results   Component Value Date    WBC 7.5 12/27/2020    HGB 11.7 (L) 12/27/2020    HCT 36.0 (L) 12/27/2020    MCV 89.8 12/27/2020     12/27/2020         Scheduled Meds: amLODIPine, 5 mg, Oral, Daily    sodium chloride flush, 10 mL, Intravenous, 2 times per day    enoxaparin, 40 mg, Subcutaneous, Daily    Physical Exam:  General: awake, alert, in no acute distress  HEENT: mucous membranes moist  Respiratory: normal effort, no wheezes appreciated  CV: appears well perfused  Skin: warm and dry     Extremities: bilateral feet with blue discoloration of the toes, all of his distal toes are cool to the touch and lack sensation. blistering improving with most of the blisters ruptured. Maceration is improving between the toes in the web spaces, no extending erythema or significant edema      Neuro: no focal deficits noted  Psych: mood normal      Assessment and Plan:  50 y.o. male with frostbite to bilateral feet. Wounds stable with current cares.     Patient Active Problem List:     Rhabdomyolysis   Frostbite      PLAN:  - continue aquacel AG between the toes to help with maceration  - will need to allow tissue to demarcate for several weeks prior to planning any surgical intervention.  - follow up with me and Wound Care clinic     Marce Hammans, MD

## 2021-01-06 NOTE — PROGRESS NOTES
Occupational Therapy  . Occupational Therapy Treatment Note  Name: Kassi Dooley MRN: [id number] :   1975   Date:  2021   Admission Date: 2020 Room:  [address] [place], [place] [address]   Restrictions/Precautions:    General precautions; Fall Risk; \"limit ambulation, up to bathroom only\" per gen surgeon    Communication with other providers:  Per chart review, patient is appropriate for therapeutic intervention. Subjective:  Patient states:  Pt agreeable to OT Tx session, reports that doctor put the gauze wraps on this morning. Pain:   Location, Type, Intensity (0/10 to 10/10):  8/10, L foot and 7/10, R foot. Objective:    Observation:  Pt received in semi-fowlers, B feet wrapped in gauze. Objective Measures:  N/A    Treatment, including education:  Therapeutic Exercise:  Cues were given for technique, safety, recruitment, and rationale. Cues were verbal and/or tactile. Pt Sup + vc's for technique to perform BUE AROM exercises to include: shoulder flex/extension, internal/external rotation, chest presses, bicep curls, rowing, and supination/pronation x15-20 reps. All therapeutic intervention performed c emphasis on BUE therapeutic exercises to inc strength, endurance and act tolerance for inc Indep c ADL tasks, func transfers / mobility. Safety  Patient safely in bed + alarm at end of session, with call light/phone in reach, and nursing aware. Assessment / Impression:        Patient's tolerance of treatment:  Well   Adverse Reaction: None  Significant change in status and impact:  None  Barriers to improvement:  B foot wounds, limited weightbearing / mobility     Plan for Next Session:    Continue per OT POC per pt's tolerance.     Time in:  1525  Time out:  1542  Timed treatment minutes:  17  Total treatment time:  17    Electronically signed by:    Leopold Nickel, COTA/L  2021, 1:25 PM    Previously filed values:       Goals:  By d/c or goals met:  Pt will perform all bed mobility with INDEP in prep for EOB/OOB activity. Pt will perform all functional transfers with SAV and appropriate use of LRD to bed, toilet, chair in prep for increased functional independence. Pt will perform UB ADLs with SAV to increase functional independence. Pt will perform LB ADLs with SAV to increase functional independence. Pt will perform all aspects of toileting with SAV to increase functional independence. Pt will participate in therex/therax c emphasis on strength, activity tolerance,  safety, SAV tasks.

## 2021-01-06 NOTE — PROGRESS NOTES
Hospitalist Progress Note      Name:  Faby Trammell /Age/Sex: 1975  (50 y.o. male)   MRN & CSN:  [id number] & [id number] Admission Date/Time: 2020  7:20 PM   Location:  [address] [place] [address]-A PCP: No primary care provider on file. Hospital Day: 12    Assessment and Plan:   Faby Trammell is a 50 y.o.  male  who presents with rhabdomyolysis     #.  Frostbite  #. Rhabdomyolysis  #. Hyperkalemia, resolved  -CK-11,905, creatinine 1.2  -Probably secondary to exposure to cold  -Arterial Doppler-no significant stenosis  -Continue with wound dressing  -Continue pain control  -Dr. Faustina Pineda consulted : Conservative management for now, will need tissue to demarcate several weeks and then proceed with surgical intervention.  -Monitor closely.     #.  History of hypertension:  -Continue amlodipine 5 mg/day.     #. Tobacco use and alcohol use     DVT Prophylaxis: Lovenox    #Disposition: Patient pending placement    Diet DIET GENERAL;  Dietary Nutrition Supplements: Wound Healing Oral Supplement  Dietary Nutrition Supplements: Low Calorie High Protein Supplement   DVT Prophylaxis [] Lovenox, []  Heparin, [] SCDs, [] Ambulation   GI Prophylaxis [] PPI,  [] H2 Blocker,  [] Carafate,  [] Diet/Tube Feeds   Code Status Full Code   Disposition Patient pending placement         History of Present Illness:     Chief Complaint: <principal problem not specified>  Faby Trammell is a 50 y.o.  male  who presents with bilateral lower extremity pain left more than right. Denies any pain today       Ten point ROS reviewed negative, unless as noted above    Objective: Intake/Output Summary (Last 24 hours) at 2021 1226  Last data filed at 2021 0700  Gross per 24 hour   Intake 0 ml   Output --   Net 0 ml      Vitals:   Vitals:    21 0911   BP: (!) 140/85   Pulse:    Resp:    Temp:    SpO2:      Physical Exam:   GEN Awake male, sitting upright in bed in no apparent distress. Appears given age.   EYES Pupils are equally round. No scleral erythema, discharge, or conjunctivitis. HENT Mucous membranes are moist. Oral pharynx without exudates, no evidence of thrush. NECK Supple, no apparent thyromegaly or masses. RESP Clear to auscultation, no wheezes, rales or rhonchi. Symmetric chest movement while on room air. CARDIO/VASC S1/S2 auscultated. Regular rate without appreciable murmurs, rubs, or gallops. No JVD or carotid bruits. Peripheral pulses equal bilaterally and palpable. No peripheral edema. GI Abdomen is soft without significant tenderness, masses, or guarding. Bowel sounds are normoactive. Rectal exam deferred.  No costovertebral angle tenderness. Normal appearing external genitalia. Underwood catheter is not present. MSK No gross joint deformities. SKIN Normal coloration, warm, dry. Bilateral foot in clean dressing. NEURO Cranial nerves appear grossly intact, normal speech, no lateralizing weakness. PSYCH Awake, alert, oriented x 4. Affect appropriate.     Medications:   Medications:    amLODIPine  5 mg Oral Daily    sodium chloride flush  10 mL Intravenous 2 times per day    enoxaparin  40 mg Subcutaneous Daily      Infusions:   PRN Meds:     magnesium hydroxide, 30 mL, Daily PRN      sodium chloride flush, 10 mL, PRN      promethazine, 12.5 mg, Q6H PRN    Or      ondansetron, 4 mg, Q6H PRN      polyethylene glycol, 17 g, Daily PRN      acetaminophen, 650 mg, Q6H PRN    Or      acetaminophen, 650 mg, Q6H PRN      oxyCODONE-acetaminophen, 1 tablet, Q4H PRN      morphine, 2 mg, Q4H PRN          Electronically signed by Trevor Tapia MD on 1/6/2021 at 12:26 PM

## 2021-01-07 VITALS
HEART RATE: 86 BPM | OXYGEN SATURATION: 100 % | BODY MASS INDEX: 23.69 KG/M2 | RESPIRATION RATE: 18 BRPM | HEIGHT: 71 IN | TEMPERATURE: 98.4 F | DIASTOLIC BLOOD PRESSURE: 84 MMHG | WEIGHT: 169.2 LBS | SYSTOLIC BLOOD PRESSURE: 132 MMHG

## 2021-01-07 LAB
MAGNESIUM: 1.9 MG/DL (ref 1.8–2.4)
PHOSPHORUS: 4.4 MG/DL (ref 2.5–4.9)

## 2021-01-07 PROCEDURE — 6360000002 HC RX W HCPCS: Performed by: INTERNAL MEDICINE

## 2021-01-07 PROCEDURE — 6370000000 HC RX 637 (ALT 250 FOR IP): Performed by: INTERNAL MEDICINE

## 2021-01-07 PROCEDURE — 84100 ASSAY OF PHOSPHORUS: CPT

## 2021-01-07 PROCEDURE — 2580000003 HC RX 258: Performed by: INTERNAL MEDICINE

## 2021-01-07 PROCEDURE — 97542 WHEELCHAIR MNGMENT TRAINING: CPT

## 2021-01-07 PROCEDURE — 97110 THERAPEUTIC EXERCISES: CPT

## 2021-01-07 PROCEDURE — 99232 SBSQ HOSP IP/OBS MODERATE 35: CPT | Performed by: SURGERY

## 2021-01-07 PROCEDURE — 94761 N-INVAS EAR/PLS OXIMETRY MLT: CPT

## 2021-01-07 PROCEDURE — 36415 COLL VENOUS BLD VENIPUNCTURE: CPT

## 2021-01-07 PROCEDURE — 83735 ASSAY OF MAGNESIUM: CPT

## 2021-01-07 RX ORDER — AMLODIPINE BESYLATE 5 MG/1
5 TABLET ORAL DAILY
Qty: 30 TABLET | Refills: 3 | Status: ON HOLD
Start: 2021-01-08 | End: 2021-08-28 | Stop reason: HOSPADM

## 2021-01-07 RX ADMIN — AMLODIPINE BESYLATE 5 MG: 5 TABLET ORAL at 08:42

## 2021-01-07 RX ADMIN — SODIUM CHLORIDE, PRESERVATIVE FREE 10 ML: 5 INJECTION INTRAVENOUS at 08:44

## 2021-01-07 RX ADMIN — ENOXAPARIN SODIUM 40 MG: 40 INJECTION SUBCUTANEOUS at 08:43

## 2021-01-07 RX ADMIN — OXYCODONE HYDROCHLORIDE AND ACETAMINOPHEN 1 TABLET: 5; 325 TABLET ORAL at 08:42

## 2021-01-07 RX ADMIN — OXYCODONE HYDROCHLORIDE AND ACETAMINOPHEN 1 TABLET: 5; 325 TABLET ORAL at 18:30

## 2021-01-07 RX ADMIN — OXYCODONE HYDROCHLORIDE AND ACETAMINOPHEN 1 TABLET: 5; 325 TABLET ORAL at 03:21

## 2021-01-07 RX ADMIN — MAGNESIUM HYDROXIDE 30 ML: 400 SUSPENSION ORAL at 08:53

## 2021-01-07 ASSESSMENT — PAIN DESCRIPTION - PROGRESSION
CLINICAL_PROGRESSION: GRADUALLY WORSENING

## 2021-01-07 ASSESSMENT — PAIN DESCRIPTION - DESCRIPTORS: DESCRIPTORS: ACHING;SHOOTING

## 2021-01-07 ASSESSMENT — PAIN SCALES - GENERAL
PAINLEVEL_OUTOF10: 9
PAINLEVEL_OUTOF10: 8

## 2021-01-07 ASSESSMENT — PAIN DESCRIPTION - FREQUENCY: FREQUENCY: CONTINUOUS

## 2021-01-07 ASSESSMENT — PAIN DESCRIPTION - ORIENTATION: ORIENTATION: RIGHT;LEFT

## 2021-01-07 ASSESSMENT — PAIN DESCRIPTION - LOCATION: LOCATION: FOOT

## 2021-01-07 ASSESSMENT — PAIN DESCRIPTION - PAIN TYPE: TYPE: ACUTE PAIN

## 2021-01-07 ASSESSMENT — PAIN SCALES - WONG BAKER: WONGBAKER_NUMERICALRESPONSE: 0

## 2021-01-07 ASSESSMENT — PAIN - FUNCTIONAL ASSESSMENT: PAIN_FUNCTIONAL_ASSESSMENT: PREVENTS OR INTERFERES SOME ACTIVE ACTIVITIES AND ADLS

## 2021-01-07 NOTE — PROGRESS NOTES
GENERAL SURGERY PROGRESS NOTE    Roderick Osman is a 50 y.o. male with frostbite to bilateral feet. Subjective:   Doing ok today. Pain adequately controlled. Possible d/c to [facility] [place] soon. Objective:    Vitals: VITALS:  /84   Pulse 86   Temp 98.4 °F (36.9 °C) (Oral)   Resp 18   Ht 5' 10.98\" (1.803 m)   Wt 169 lb 3.2 oz (76.7 kg)   SpO2 100%   BMI 23.61 kg/m²     I/O: 01/06 0701 - 01/07 0700  In: 325 [P.O.:325]  Out: 875 [Urine:875]    Labs/Imaging Results:   Lab Results   Component Value Date     01/04/2021    K 5.0 01/04/2021     01/04/2021    CO2 27 01/04/2021    BUN 20 01/04/2021    CREATININE 1.0 01/04/2021    GLUCOSE 102 01/04/2021    CALCIUM 9.4 01/04/2021      Lab Results   Component Value Date    WBC 7.5 12/27/2020    HGB 11.7 (L) 12/27/2020    HCT 36.0 (L) 12/27/2020    MCV 89.8 12/27/2020     12/27/2020         Scheduled Meds: amLODIPine, 5 mg, Oral, Daily    sodium chloride flush, 10 mL, Intravenous, 2 times per day    enoxaparin, 40 mg, Subcutaneous, Daily    Physical Exam:  General: awake, alert, in no acute distress  HEENT: mucous membranes moist  Respiratory: normal effort, no wheezes appreciated  CV: appears well perfused  Skin: warm and dry     Extremities: bilateral feet with blue discoloration of the toes, all of his distal toes are cool to the touch and lack sensation. blistering improving with most of the blisters ruptured. Maceration is improving between the toes in the web spaces, no extending erythema or significant edema      Neuro: no focal deficits noted  Psych: mood normal      Assessment and Plan:  50 y.o. male with frostbite to bilateral feet. Wounds stable with current cares.     Patient Active Problem List:     Rhabdomyolysis   Frostbite      PLAN:  - continue aquacel AG between the toes to help with maceration  - will need to allow tissue to demarcate for several weeks prior to planning any surgical intervention.  - follow up with me and Wound Care clinic     Anuj London MD

## 2021-01-07 NOTE — DISCHARGE SUMMARY
132/84   Pulse 86   Temp 98.4 °F (36.9 °C) (Oral)   Resp 18   Ht 5' 10.98\" (1.803 m)   Wt 169 lb 3.2 oz (76.7 kg)   SpO2 100%   BMI 23.61 kg/m²            PHYSICAL EXAM   GEN    Awake male, sitting upright in bed in no apparent distress. Appears given age. EYES   Pupils are equally round.  No scleral erythema, discharge, or conjunctivitis. HENT  Mucous membranes are moist. Oral pharynx without exudates, no evidence of thrush. NECK  Supple, no apparent thyromegaly or masses. RESP  Clear to auscultation, no wheezes, rales or rhonchi.  Symmetric chest movement while on room air. CARDIO/VASC           S1/S2 auscultated. Regular rate without appreciable murmurs, rubs, or gallops. No JVD or carotid bruits. Peripheral pulses equal bilaterally and palpable. No peripheral edema. Randol Feil is soft without significant tenderness, masses, or guarding. Bowel sounds are normoactive. Rectal exam deferred.        No costovertebral angle tenderness. Normal appearing external genitalia. Underwood catheter is not present. MSK    No gross joint deformities. SKIN    Normal coloration, warm, dry.  Bilateral foot in clean dressing. NEURO           Cranial nerves appear grossly intact, normal speech, no lateralizing weakness. PSYCH            Awake, alert, oriented x 4.  Affect appropriate. BMP/CBC  No results for input(s): NA, K, CL, CO2, BUN, CREATININE, WBC, HEMOGLOBIN, HCT, PLT in the last 72 hours.     Invalid input(s): GLU    IMAGING:      Discharge Time of 33 minutes    Electronically signed by Trevor Tapia MD on 1/7/2021 at 2:05 PM

## 2021-01-07 NOTE — CARE COORDINATION
Precert is still pending for [facility]. .    [id number]  Precert approved    Pt is new to [facility] at discharge. Please call report to [phone] and include orders, AVS, and ЕКАТЕРИНА and scripts in the packet. Patient can update his family. [facility] is setting up transport.

## 2021-01-07 NOTE — CARE COORDINATION
Received call from [facility] with approval for SNF. Updated pt and sent PS to Dr. Julio César Erazo to update.

## 2021-01-07 NOTE — PROGRESS NOTES
Physical Therapy    Physical Therapy Treatment Note  Name: Luisito Fuentes MRN: [id number] :   1975   Date:  2021   Admission Date: 2020 Room:  [address] [place] [address]   Restrictions/Precautions:        fall risk,  frostbite to bilateral feet  Communication with other providers:  Per nurse ok to tx  Subjective:  Patient states: Motivated and agreeable to tx  Pain:   Location, Type, Intensity (0/10 to 10/10):  C/o pain left foot/leg but did not rate  Objective:    Observation:  Alert and oriented  Treatment, including education/measures:  Pt needing assist to do bilateral post op shoes and educated on importance of wearing them to protect his feet. Pt verbalizes understanding. amb with rw 2-3 steps bed to wc and cues for safety. SPT bed<> wc<>commodewith min assist and cues for hand placement for safety. Pt encouraged to use wc and not amb to bathroom. Pt instructed on wc safety and managing brakes but still needed cues to lock brakes with each transfer. Pt needing min assist and cues to manage leg rest. Pt using bilateral UE to propel and steer wc. Pt doing better in hallway but needing increased time and cues for 360 turning and to keep a straight path. Pt made several attempts with cues to set wc up for transfer bed. Commode and verbalized instructions fr shower but dependent for final position for commode and bed transfer. Safety  Patient left safely in the bed, with call light/phone in reach with alarm applied. Gait belt and mask were used for transfers and gait. Assessment / Impression:       Patient's tolerance of treatment:  good  Adverse Reaction: na  Significant change in status and impact:  na  Barriers to improvement:  strength and safety  Plan for Next Session:    Cont.  POC  Time in:  1320  Time out:  1405  Timed treatment minutes:  45  Total treatment time:  39    Previously filed items:  Social/Functional History  Lives With: Alone  Type of Home: House  Home Layout: Two level, Bed/Bath upstairs  Home Access: Stairs to enter with rails  Entrance Stairs - Number of Steps: 3  Bathroom Shower/Tub: Tub/Shower unit  Bathroom Toilet: Standard  Bathroom Accessibility: Accessible  Receives Help From: Family, Friend(s)  ADL Assistance: Independent  Homemaking Assistance: Independent  Homemaking Responsibilities: Yes  Ambulation Assistance: Independent  Transfer Assistance: Independent  Active : Yes  Mode of Transportation: Car  Occupation: Full time employment  Type of occupation: [employer] working as [employer]  Short term goals  Short term goal 1: Pt will perform stand pivot transfers to/from [facility] with LRAD mod I  Short term goal 2: Pt will propell ' mod I  Short term goal 3: Pt will perform standing ballance good for 30 seconds with equal weight bearing.        Electronically signed by:    Blade Vazquez PTA  1/7/2021, 8:18 AM

## 2021-01-11 ENCOUNTER — HOSPITAL ENCOUNTER (OUTPATIENT)
Age: 46
Setting detail: SPECIMEN
Discharge: HOME | End: 2021-01-11
Payer: MEDICARE

## 2021-01-11 LAB
ALBUMIN SERPL-MCNC: 3.5 GM/DL (ref 3.4–5)
ALP BLD-CCNC: 74 IU/L (ref 40–128)
ALT SERPL-CCNC: 24 U/L (ref 10–40)
ANION GAP SERPL CALCULATED.3IONS-SCNC: 9 MMOL/L (ref 4–16)
AST SERPL-CCNC: 17 IU/L (ref 15–37)
BASOPHILS ABSOLUTE: 0 K/CU MM
BASOPHILS RELATIVE PERCENT: 0.6 % (ref 0–1)
BILIRUB SERPL-MCNC: 0.2 MG/DL (ref 0–1)
BUN BLDV-MCNC: 15 MG/DL (ref 6–23)
CALCIUM SERPL-MCNC: 9 MG/DL (ref 8.3–10.6)
CHLORIDE BLD-SCNC: 102 MMOL/L (ref 99–110)
CO2: 28 MMOL/L (ref 21–32)
CREAT SERPL-MCNC: 0.9 MG/DL (ref 0.9–1.3)
DIFFERENTIAL TYPE: ABNORMAL
EOSINOPHILS ABSOLUTE: 0.2 K/CU MM
EOSINOPHILS RELATIVE PERCENT: 3.7 % (ref 0–3)
GFR AFRICAN AMERICAN: >60 ML/MIN/1.73M2
GFR NON-AFRICAN AMERICAN: >60 ML/MIN/1.73M2
GLUCOSE BLD-MCNC: 88 MG/DL (ref 70–99)
HCT VFR BLD CALC: 39 % (ref 42–52)
HEMOGLOBIN: 12.2 GM/DL (ref 13.5–18)
IMMATURE NEUTROPHIL %: 0.2 % (ref 0–0.43)
LYMPHOCYTES ABSOLUTE: 2.4 K/CU MM
LYMPHOCYTES RELATIVE PERCENT: 46.5 % (ref 24–44)
MCH RBC QN AUTO: 28.2 PG (ref 27–31)
MCHC RBC AUTO-ENTMCNC: 31.3 % (ref 32–36)
MCV RBC AUTO: 90.1 FL (ref 78–100)
MONOCYTES ABSOLUTE: 0.4 K/CU MM
MONOCYTES RELATIVE PERCENT: 7.9 % (ref 0–4)
NUCLEATED RBC %: 0 %
PDW BLD-RTO: 13.8 % (ref 11.7–14.9)
PLATELET # BLD: 652 K/CU MM (ref 140–440)
PMV BLD AUTO: 8.4 FL (ref 7.5–11.1)
POTASSIUM SERPL-SCNC: 5 MMOL/L (ref 3.5–5.1)
RBC # BLD: 4.33 M/CU MM (ref 4.6–6.2)
SEGMENTED NEUTROPHILS ABSOLUTE COUNT: 2.1 K/CU MM
SEGMENTED NEUTROPHILS RELATIVE PERCENT: 41.1 % (ref 36–66)
SODIUM BLD-SCNC: 139 MMOL/L (ref 135–145)
TOTAL IMMATURE NEUTOROPHIL: 0.01 K/CU MM
TOTAL NUCLEATED RBC: 0 K/CU MM
TOTAL PROTEIN: 6.9 GM/DL (ref 6.4–8.2)
WBC # BLD: 5.2 K/CU MM (ref 4–10.5)

## 2021-01-11 PROCEDURE — 80053 COMPREHEN METABOLIC PANEL: CPT

## 2021-01-11 PROCEDURE — 36415 COLL VENOUS BLD VENIPUNCTURE: CPT

## 2021-01-11 PROCEDURE — 85025 COMPLETE CBC W/AUTO DIFF WBC: CPT

## 2021-01-13 ENCOUNTER — OFFICE VISIT (OUTPATIENT)
Dept: SURGERY | Age: 46
End: 2021-01-13
Payer: MEDICARE

## 2021-01-13 VITALS
TEMPERATURE: 97.7 F | WEIGHT: 171 LBS | OXYGEN SATURATION: 98 % | RESPIRATION RATE: 16 BRPM | SYSTOLIC BLOOD PRESSURE: 132 MMHG | HEIGHT: 72 IN | HEART RATE: 74 BPM | DIASTOLIC BLOOD PRESSURE: 70 MMHG | BODY MASS INDEX: 23.16 KG/M2

## 2021-01-13 DIAGNOSIS — T33.822A FROSTBITE OF BOTH FEET, INITIAL ENCOUNTER: Primary | ICD-10-CM

## 2021-01-13 DIAGNOSIS — T33.821A FROSTBITE OF BOTH FEET, INITIAL ENCOUNTER: Primary | ICD-10-CM

## 2021-01-13 PROCEDURE — G8484 FLU IMMUNIZE NO ADMIN: HCPCS | Performed by: SURGERY

## 2021-01-13 PROCEDURE — G8420 CALC BMI NORM PARAMETERS: HCPCS | Performed by: SURGERY

## 2021-01-13 PROCEDURE — G8428 CUR MEDS NOT DOCUMENT: HCPCS | Performed by: SURGERY

## 2021-01-13 PROCEDURE — 99213 OFFICE O/P EST LOW 20 MIN: CPT | Performed by: SURGERY

## 2021-01-13 PROCEDURE — 4004F PT TOBACCO SCREEN RCVD TLK: CPT | Performed by: SURGERY

## 2021-01-13 PROCEDURE — 1111F DSCHRG MED/CURRENT MED MERGE: CPT | Performed by: SURGERY

## 2021-01-13 RX ORDER — DIPHENHYDRAMINE HCL 25 MG
25 CAPSULE ORAL NIGHTLY PRN
Qty: 30 CAPSULE | Refills: 1 | Status: SHIPPED | OUTPATIENT
Start: 2021-01-13 | End: 2021-02-12

## 2021-01-13 RX ORDER — OXYCODONE AND ACETAMINOPHEN TABLETS 10; 300 MG/1; MG/1
1 TABLET ORAL EVERY 4 HOURS PRN
COMMUNITY
End: 2021-02-15

## 2021-01-13 NOTE — PROGRESS NOTES
Post-Operative Clinic Note    Chief Complaint   Patient presents with    Wound Check     Bilateral frost bite both feet         SUBJECTIVE:  Patient is here today for a follow up s/p hospitalization for frostbite to bilateral feet. Patient is doing ok. He was placed at [facility] upon discharge from the hospital.  He reports intermittent dressing changes, last dressing change was ~36 hours before his appointment. His pain is controlled. He reports difficulty sleeping. Intermittently he feels warm or cold, no known fevers. No past surgical history on file. Past Medical History:   Diagnosis Date    Hypertension     does not take medications     No family history on file.   Social History     Socioeconomic History    Marital status: Single     Spouse name: Not on file    Number of children: Not on file    Years of education: Not on file    Highest education level: Not on file   Occupational History    Not on file   Social Needs    Financial resource strain: Not on file    Food insecurity     Worry: Not on file     Inability: Not on file    Transportation needs     Medical: Not on file     Non-medical: Not on file   Tobacco Use    Smoking status: Current Some Day Smoker    Smokeless tobacco: Never Used   Substance and Sexual Activity    Alcohol use: Yes     Comment: most days    Drug use: No    Sexual activity: Not on file   Lifestyle    Physical activity     Days per week: Not on file     Minutes per session: Not on file    Stress: Not on file   Relationships    Social connections     Talks on phone: Not on file     Gets together: Not on file     Attends Sabianism service: Not on file     Active member of club or organization: Not on file     Attends meetings of clubs or organizations: Not on file     Relationship status: Not on file    Intimate partner violence     Fear of current or ex partner: Not on file     Emotionally abused: Not on file     Physically abused: Not on file     Forced sexual activity: Not on file   Other Topics Concern    Not on file   Social History Narrative    Not on file       OBJECTIVE:  Physical Exam  General: awake, alert, in no acute distress  HEENT: mucous membranes moist  Respiratory: normal effort, no wheezes appreciated  CV: appears well perfused  Abdomen: Soft, non-tender, non-distended  Skin: warm and dry  Extremities: bilateral feet with dry gangrene of bilateral feet secondary to frost bite. Some maceration between the toes. No erythema, minimal edema, foul odor noted. Feet to not appear clinically infected. Neuro: no focal deficits noted  Psych: mood normal        ASSESSMENT:  50 y.o. male s/p bilateral foot frostbite. 1. Frostbite of both feet, initial encounter        PLAN:  - needs daily washing of feet with a damp washcloth cleaning between the toes  - BID dressing changes with dry gauze between the toes. Can hold in place with kerlex wrap around the foot and ankle or with tape.   Use moisturizer to proximal feet and ankles with dressing changes  - follow up in 1 week with recheck      Lakisha Adorno MD  1/13/2021  2:50 PM

## 2021-01-20 ENCOUNTER — OFFICE VISIT (OUTPATIENT)
Dept: SURGERY | Age: 46
End: 2021-01-20
Payer: MEDICARE

## 2021-01-20 VITALS — SYSTOLIC BLOOD PRESSURE: 136 MMHG | TEMPERATURE: 98.2 F | DIASTOLIC BLOOD PRESSURE: 96 MMHG | HEART RATE: 104 BPM

## 2021-01-20 DIAGNOSIS — T33.90XD FROSTBITE, SUBSEQUENT ENCOUNTER: Primary | ICD-10-CM

## 2021-01-20 PROBLEM — T33.90XA FROSTBITE: Status: ACTIVE | Noted: 2021-01-13

## 2021-01-20 PROCEDURE — G8484 FLU IMMUNIZE NO ADMIN: HCPCS | Performed by: SURGERY

## 2021-01-20 PROCEDURE — 99212 OFFICE O/P EST SF 10 MIN: CPT | Performed by: SURGERY

## 2021-01-20 PROCEDURE — 1111F DSCHRG MED/CURRENT MED MERGE: CPT | Performed by: SURGERY

## 2021-01-20 PROCEDURE — G8420 CALC BMI NORM PARAMETERS: HCPCS | Performed by: SURGERY

## 2021-01-20 PROCEDURE — 1036F TOBACCO NON-USER: CPT | Performed by: SURGERY

## 2021-01-20 PROCEDURE — G8427 DOCREV CUR MEDS BY ELIG CLIN: HCPCS | Performed by: SURGERY

## 2021-01-20 NOTE — PROGRESS NOTES
Post-Operative Clinic Note    Chief Complaint   Patient presents with    Wound Check     1 wk f/u bilateral frost bite         SUBJECTIVE:  Patient is here today for a follow up s/p hospitalization for frostbite to bilateral feet. 21  Patient is doing ok. He was placed at [facility] upon discharge from the hospital.  He reports intermittent dressing changes, last dressing change was ~36 hours before his appointment. His pain is controlled. He reports difficulty sleeping. Intermittently he feels warm or cold, no known fevers. 2021  Tanvir German is here today for a recheck of his feet. He denies problems with his feet/wounds since last seen in clinic. No fevers or chills. Pain is controlled but he does still have a fair amount of pain with ambulation. Denies other complaints. No past surgical history on file. Past Medical History:   Diagnosis Date    Hypertension     does not take medications     No family history on file.   Social History     Socioeconomic History    Marital status: Single     Spouse name: Not on file    Number of children: Not on file    Years of education: Not on file    Highest education level: Not on file   Occupational History    Not on file   Social Needs    Financial resource strain: Not on file    Food insecurity     Worry: Not on file     Inability: Not on file    Transportation needs     Medical: Not on file     Non-medical: Not on file   Tobacco Use    Smoking status: Former Smoker     Quit date: 2021     Years since quittin.0    Smokeless tobacco: Never Used   Substance and Sexual Activity    Alcohol use: Yes     Comment: most days    Drug use: No    Sexual activity: Not on file   Lifestyle    Physical activity     Days per week: Not on file     Minutes per session: Not on file    Stress: Not on file   Relationships    Social connections     Talks on phone: Not on file     Gets together: Not on file     Attends Mu-ism service: Not on file Active member of club or organization: Not on file     Attends meetings of clubs or organizations: Not on file     Relationship status: Not on file    Intimate partner violence     Fear of current or ex partner: Not on file     Emotionally abused: Not on file     Physically abused: Not on file     Forced sexual activity: Not on file   Other Topics Concern    Not on file   Social History Narrative    Not on file       OBJECTIVE:  Physical Exam  General: awake, alert, in no acute distress  HEENT: mucous membranes moist  Respiratory: normal effort, no wheezes appreciated  CV: appears well perfused  Abdomen: Soft, non-tender, non-distended  Skin: warm and dry  Extremities: bilateral feet with dry gangrene of bilateral feet secondary to frost bite. Maceration between the toes improving. No erythema, minimal edema, mild foul odor. Feet do not appear clinically infected. Neuro: no focal deficits noted  Psych: mood normal        ASSESSMENT:  50 y.o. male s/p bilateral foot frostbite. 1. Frostbite, subsequent encounter        PLAN:  - needs daily washing of feet with a damp washcloth cleaning between the toes  - Daily dressing changes with dry gauze between the toes. Can hold in place with kerlex wrap around the foot and ankle or with tape. Use moisturizer to proximal feet and ankles with dressing changes  - OK to increase ambulation on feet. Weight bearing as tolerated.   - follow up in 1 week with recheck      Brianna Meza MD  1/20/2021  3:34 PM

## 2021-01-27 ENCOUNTER — OFFICE VISIT (OUTPATIENT)
Dept: SURGERY | Age: 46
End: 2021-01-27
Payer: MEDICARE

## 2021-01-27 VITALS
OXYGEN SATURATION: 97 % | BODY MASS INDEX: 23.16 KG/M2 | HEART RATE: 93 BPM | SYSTOLIC BLOOD PRESSURE: 124 MMHG | WEIGHT: 171 LBS | HEIGHT: 72 IN | DIASTOLIC BLOOD PRESSURE: 86 MMHG

## 2021-01-27 DIAGNOSIS — Z51.89 VISIT FOR WOUND CHECK: Primary | ICD-10-CM

## 2021-01-27 PROCEDURE — G8420 CALC BMI NORM PARAMETERS: HCPCS | Performed by: SURGERY

## 2021-01-27 PROCEDURE — 99212 OFFICE O/P EST SF 10 MIN: CPT | Performed by: SURGERY

## 2021-01-27 PROCEDURE — 1111F DSCHRG MED/CURRENT MED MERGE: CPT | Performed by: SURGERY

## 2021-01-27 PROCEDURE — 1036F TOBACCO NON-USER: CPT | Performed by: SURGERY

## 2021-01-27 PROCEDURE — G8484 FLU IMMUNIZE NO ADMIN: HCPCS | Performed by: SURGERY

## 2021-01-27 PROCEDURE — G8427 DOCREV CUR MEDS BY ELIG CLIN: HCPCS | Performed by: SURGERY

## 2021-01-29 NOTE — PROGRESS NOTES
Post-Operative Clinic Note    Chief Complaint   Patient presents with    Follow-up     F/U 1 week [place] bite both feet         SUBJECTIVE:  Patient is here today for a follow up s/p hospitalization for frostbite to bilateral feet. 21  Patient is doing ok. He was placed at [facility] upon discharge from the hospital.  He reports intermittent dressing changes, last dressing change was ~36 hours before his appointment. His pain is controlled. He reports difficulty sleeping. Intermittently he feels warm or cold, no known fevers. 2021  Mary Jane vAina is here today for a recheck of his feet. He denies problems with his feet/wounds since last seen in clinic. No fevers or chills. Pain is controlled but he does still have a fair amount of pain with ambulation. Denies other complaints. 2021  Patient seen and evaluated. Denies fevers, chills or problems with his wounds since our last visit. He has been increasing ambulation but having significant pain in the left foot when walking/standing. No past surgical history on file. Past Medical History:   Diagnosis Date    Hypertension     does not take medications     No family history on file.   Social History     Socioeconomic History    Marital status: Single     Spouse name: Not on file    Number of children: Not on file    Years of education: Not on file    Highest education level: Not on file   Occupational History    Not on file   Social Needs    Financial resource strain: Not on file    Food insecurity     Worry: Not on file     Inability: Not on file    Transportation needs     Medical: Not on file     Non-medical: Not on file   Tobacco Use    Smoking status: Former Smoker     Quit date: 2021     Years since quittin.0    Smokeless tobacco: Never Used   Substance and Sexual Activity    Alcohol use: Yes     Comment: most days    Drug use: No    Sexual activity: Not on file   Lifestyle    Physical activity     Days per week: Not on file     Minutes per session: Not on file    Stress: Not on file   Relationships    Social connections     Talks on phone: Not on file     Gets together: Not on file     Attends Jehovah's witness service: Not on file     Active member of club or organization: Not on file     Attends meetings of clubs or organizations: Not on file     Relationship status: Not on file    Intimate partner violence     Fear of current or ex partner: Not on file     Emotionally abused: Not on file     Physically abused: Not on file     Forced sexual activity: Not on file   Other Topics Concern    Not on file   Social History Narrative    Not on file       OBJECTIVE:  Physical Exam  General: awake, alert, in no acute distress  HEENT: mucous membranes moist  Respiratory: normal effort, no wheezes appreciated  CV: appears well perfused  Abdomen: Soft, non-tender, non-distended  Skin: warm and dry    Extremities: bilateral feet with dry gangrene of bilateral feet secondary to frost bite. Maceration between the toes improved. There is some maceration at the demarcation line between dry gangrene and the more proximal toes on the left foot and the right great toe. Does not appear clinically infected at this time. No erythema, minimal edema, mild foul odor. Neuro: no focal deficits noted  Psych: mood normal        ASSESSMENT:  50 y.o. male s/p bilateral foot frostbite. 1. Visit for wound check        PLAN:  - needs daily washing of feet with a damp washcloth cleaning between the toes  - Daily dressing changes with dry gauze between the toes. Can hold in place with kerlex wrap around the foot and ankle or with tape. Use moisturizer to proximal feet and ankles with dressing changes  - Continue weight bearing as tolerated.   - follow up in 1 week with recheck, possible OR for toe amputations in 2-3 weeks, would need to do this sooner if infection develops      Bradley Eldridge MD  1/28/2021  9:29 PM

## 2021-02-03 ENCOUNTER — OFFICE VISIT (OUTPATIENT)
Dept: SURGERY | Age: 46
End: 2021-02-03

## 2021-02-03 VITALS
HEART RATE: 94 BPM | WEIGHT: 171 LBS | TEMPERATURE: 98 F | DIASTOLIC BLOOD PRESSURE: 88 MMHG | HEIGHT: 72 IN | SYSTOLIC BLOOD PRESSURE: 128 MMHG | OXYGEN SATURATION: 98 % | BODY MASS INDEX: 23.16 KG/M2

## 2021-02-03 DIAGNOSIS — T33.90XA FROSTBITE, INITIAL ENCOUNTER: Primary | ICD-10-CM

## 2021-02-03 PROCEDURE — 99024 POSTOP FOLLOW-UP VISIT: CPT | Performed by: SURGERY

## 2021-02-03 RX ORDER — SODIUM CHLORIDE 0.9 % (FLUSH) 0.9 %
10 SYRINGE (ML) INJECTION PRN
Status: CANCELLED | OUTPATIENT
Start: 2021-02-03

## 2021-02-03 RX ORDER — SODIUM CHLORIDE 0.9 % (FLUSH) 0.9 %
10 SYRINGE (ML) INJECTION EVERY 12 HOURS SCHEDULED
Status: CANCELLED | OUTPATIENT
Start: 2021-02-03

## 2021-02-03 NOTE — PROGRESS NOTES
Post-Operative Clinic Note    Chief Complaint   Patient presents with    Follow-up     F/U 1 wk  Moncho Mora Bite both feet  Wound Check         SUBJECTIVE:  Patient is here today for a follow up s/p hospitalization for frostbite to bilateral feet. 21  Patient is doing ok. He was placed at [facility] upon discharge from the hospital.  He reports intermittent dressing changes, last dressing change was ~36 hours before his appointment. His pain is controlled. He reports difficulty sleeping. Intermittently he feels warm or cold, no known fevers. 2021  Juan Francisco Lua is here today for a recheck of his feet. He denies problems with his feet/wounds since last seen in clinic. No fevers or chills. Pain is controlled but he does still have a fair amount of pain with ambulation. Denies other complaints. 2021  Patient seen and evaluated. Denies fevers, chills or problems with his wounds since our last visit. He has been increasing ambulation but having significant pain in the left foot when walking/standing. 2/3/2021  Seen and evaluated today. Doing ok. Denies problems with his foot wounds. No past surgical history on file. Past Medical History:   Diagnosis Date    Hypertension     does not take medications     No family history on file.   Social History     Socioeconomic History    Marital status: Single     Spouse name: Not on file    Number of children: Not on file    Years of education: Not on file    Highest education level: Not on file   Occupational History    Not on file   Social Needs    Financial resource strain: Not on file    Food insecurity     Worry: Not on file     Inability: Not on file    Transportation needs     Medical: Not on file     Non-medical: Not on file   Tobacco Use    Smoking status: Former Smoker     Quit date: 2021     Years since quittin.0    Smokeless tobacco: Never Used   Substance and Sexual Activity    Alcohol use: Yes     Comment: most days  Drug use: No    Sexual activity: Not on file   Lifestyle    Physical activity     Days per week: Not on file     Minutes per session: Not on file    Stress: Not on file   Relationships    Social connections     Talks on phone: Not on file     Gets together: Not on file     Attends [employer]ist service: Not on file     Active member of club or organization: Not on file     Attends meetings of clubs or organizations: Not on file     Relationship status: Not on file    Intimate partner violence     Fear of current or ex partner: Not on file     Emotionally abused: Not on file     Physically abused: Not on file     Forced sexual activity: Not on file   Other Topics Concern    Not on file   Social History Narrative    Not on file       OBJECTIVE:  Physical Exam  General: awake, alert, in no acute distress  HEENT: mucous membranes moist  Respiratory: normal effort, no wheezes appreciated  CV: appears well perfused  Abdomen: Soft, non-tender, non-distended  Skin: warm and dry    Extremities: bilateral feet with dry gangrene of bilateral feet secondary to frost bite. There is some maceration at the demarcation line between dry gangrene and the more proximal toes on the left foot and the right great toe. Does not appear clinically infected at this time. No erythema, minimal edema, mild foul odor. Neuro: no focal deficits noted  Psych: mood normal        ASSESSMENT:  50 y.o. male s/p bilateral foot frostbite. 1. Frostbite, initial encounter        PLAN:  - continue daily washing of feet with a damp washcloth cleaning between the toes  - Daily dressing changes with dry gauze between the toes. Can hold in place with kerlex wrap around the foot and ankle or with tape. - Continue weight bearing as tolerated. - will schedule for OR for bilateral foot debridement/partial toe amputations. The risks, benefits, complications, treatment options, and expected outcomes were discussed with the patient.  The possibilities of reaction to bleeding, infection and the need for additional procedures were discussed with the patient. There was concurrence with the proposed plan, and informed consent was obtained.          Tera Montero MD  2/3/2021  4:25 PM

## 2021-02-05 ENCOUNTER — TELEPHONE (OUTPATIENT)
Dept: SURGERY | Age: 46
End: 2021-02-05

## 2021-02-05 NOTE — TELEPHONE ENCOUNTER
Attempted to speak with RN at [facility] for [address] (Toe Debridement) SCHEDULED @ [facility]. NOTIFIED OF DATES, TIMES AND LOCATION    PHONE ASSESSMENT  SURGERY - 2/12/21 @ 1300, 1100 arrival  P/O    NPO AFTER MIDNIGHT  HOLD BLOOD THINNERS - Hold for 5 days if on any.  SENT      Attempted to call Kumar X3 times.   Rescheduled for 2/19/21

## 2021-02-09 ENCOUNTER — TELEPHONE (OUTPATIENT)
Dept: BARIATRICS/WEIGHT MGMT | Age: 46
End: 2021-02-09

## 2021-02-09 NOTE — TELEPHONE ENCOUNTER
Kedar Fragoso called from [facility] stating patient told her he was having surgery Friday but didn't know time or where. Upon reviewing chart I see surgery scheduled, but no covid test.   Sent message to [facility] to clarify as he has been trying to reach [address] [place] [address] for a while. Kedar Fragoso also stated it would take a week to set up transportation.

## 2021-02-10 ENCOUNTER — OFFICE VISIT (OUTPATIENT)
Dept: SURGERY | Age: 46
End: 2021-02-10
Payer: MEDICARE

## 2021-02-10 VITALS
SYSTOLIC BLOOD PRESSURE: 130 MMHG | HEART RATE: 116 BPM | BODY MASS INDEX: 23.16 KG/M2 | DIASTOLIC BLOOD PRESSURE: 88 MMHG | WEIGHT: 171 LBS | HEIGHT: 72 IN | OXYGEN SATURATION: 97 %

## 2021-02-10 DIAGNOSIS — T33.90XA FROSTBITE, INITIAL ENCOUNTER: Primary | ICD-10-CM

## 2021-02-10 PROCEDURE — G8420 CALC BMI NORM PARAMETERS: HCPCS | Performed by: SURGERY

## 2021-02-10 PROCEDURE — 1036F TOBACCO NON-USER: CPT | Performed by: SURGERY

## 2021-02-10 PROCEDURE — G8427 DOCREV CUR MEDS BY ELIG CLIN: HCPCS | Performed by: SURGERY

## 2021-02-10 PROCEDURE — G8484 FLU IMMUNIZE NO ADMIN: HCPCS | Performed by: SURGERY

## 2021-02-10 PROCEDURE — 99212 OFFICE O/P EST SF 10 MIN: CPT | Performed by: SURGERY

## 2021-02-10 NOTE — TELEPHONE ENCOUNTER
Called and left a VM for Dee Hill to schedule transport for Hardeep's surgery. LEFT MESSAGE FOR Dee Hill for [address] [facility] (Toe Debridement) SCHEDULED @ [facility].  NOTIFIED OF DATES, TIMES AND LOCATION    PHONE ASSESSMENT  SURGERY - 2/19/21 @ 1100, 0900 arrival  P/O - Will need to schedule upon call back    NPO AFTER MIDNIGHT  Will need to check Heath's ability to do covid  HOLD BLOOD THINNERS - Not on thinners   SENT

## 2021-02-10 NOTE — PROGRESS NOTES
Post-Operative Clinic Note    Chief Complaint   Patient presents with    Follow-up     F/U Bilat Mora Bite both feet         SUBJECTIVE:  Patient is here today for a follow up s/p hospitalization for frostbite to bilateral feet. 1/13/21  Patient is doing ok. He was placed at [facility] upon discharge from the hospital.  He reports intermittent dressing changes, last dressing change was ~36 hours before his appointment. His pain is controlled. He reports difficulty sleeping. Intermittently he feels warm or cold, no known fevers. 1/20/2021  Tanvir German is here today for a recheck of his feet. He denies problems with his feet/wounds since last seen in clinic. No fevers or chills. Pain is controlled but he does still have a fair amount of pain with ambulation. Denies other complaints. 1/27/2021  Patient seen and evaluated. Denies fevers, chills or problems with his wounds since our last visit. He has been increasing ambulation but having significant pain in the left foot when walking/standing. 2/3/2021  Seen and evaluated today. Doing ok. Denies problems with his foot wounds. 2/10/2021  Seen and evaluated today. Foot wounds stable. Was planned for OR Friday but cancelled due to being unable to arrange transportation from [facility]. No past surgical history on file. Past Medical History:   Diagnosis Date    Hypertension     does not take medications     No family history on file.   Social History     Socioeconomic History    Marital status: Single     Spouse name: Not on file    Number of children: Not on file    Years of education: Not on file    Highest education level: Not on file   Occupational History    Not on file   Social Needs    Financial resource strain: Not on file    Food insecurity     Worry: Not on file     Inability: Not on file    Transportation needs     Medical: Not on file     Non-medical: Not on file   Tobacco Use    Smoking status: Former Smoker     Quit date: 1/1/2021 Years since quittin.1    Smokeless tobacco: Never Used   Substance and Sexual Activity    Alcohol use: Yes     Comment: most days    Drug use: No    Sexual activity: Not on file   Lifestyle    Physical activity     Days per week: Not on file     Minutes per session: Not on file    Stress: Not on file   Relationships    Social connections     Talks on phone: Not on file     Gets together: Not on file     Attends Sabianist service: Not on file     Active member of club or organization: Not on file     Attends meetings of clubs or organizations: Not on file     Relationship status: Not on file    Intimate partner violence     Fear of current or ex partner: Not on file     Emotionally abused: Not on file     Physically abused: Not on file     Forced sexual activity: Not on file   Other Topics Concern    Not on file   Social History Narrative    Not on file       OBJECTIVE:  Physical Exam  General: awake, alert, in no acute distress  HEENT: mucous membranes moist  Respiratory: normal effort, no wheezes appreciated  CV: appears well perfused  Abdomen: Soft, non-tender, non-distended  Skin: warm and dry    Extremities: dry gangrene of bilateral feet secondary to frost bite. There is some maceration at the demarcation line between dry gangrene and the more proximal toes on the left foot and the right great toe. Does not appear clinically infected at this time. No erythema, minimal edema, mild foul odor. Neuro: no focal deficits noted  Psych: mood normal        ASSESSMENT:  50 y.o. male s/p bilateral foot frostbite. 1. Frostbite, initial encounter        PLAN:  - continue daily washing of feet with a damp washcloth cleaning between the toes  - Daily dressing changes with dry gauze between the toes. Can hold in place with kerlex wrap around the foot and ankle or with tape. - Continue weight bearing as tolerated. - OR soon for bilateral toe debridement/amputations.     Sophia Mcdermott MD  2/10/2021  8:19 PM

## 2021-02-15 RX ORDER — LISINOPRIL 20 MG/1
20 TABLET ORAL DAILY
Status: ON HOLD | COMMUNITY
End: 2021-08-28 | Stop reason: HOSPADM

## 2021-02-15 RX ORDER — OMEPRAZOLE 20 MG/1
20 CAPSULE, DELAYED RELEASE ORAL DAILY
COMMUNITY

## 2021-02-15 RX ORDER — OXYCODONE HYDROCHLORIDE AND ACETAMINOPHEN 5; 325 MG/1; MG/1
1 TABLET ORAL EVERY 6 HOURS PRN
Status: ON HOLD | COMMUNITY
End: 2021-04-12 | Stop reason: HOSPADM

## 2021-02-15 NOTE — PROGRESS NOTES
Surgery:  2/19/21                        Arrival time:   Nothing to eat or drink after midnight unless instructed to take certain medications by the doctor or the nurse the am of surgery  Arrive at the front information desk -1st floor /[facility] is on [address] [facility]  Please leave money and all other valuables at home. Wear comfortable clothing. If you wear contacts please bring a case. No make up. You may be asked to remove rings or body piercing. Please bring insurance cards and picture ID am of procedure. Please bring any consent or paper work from your doctor. If you become ill,such as a cold, sore throat or fever contact your doctor. Please bathe or shower am of procedure.   Medications to take AM of procedure:     Any questions call [facility]  [phone]

## 2021-02-18 ENCOUNTER — ANESTHESIA EVENT (OUTPATIENT)
Dept: OPERATING ROOM | Age: 46
End: 2021-02-18
Payer: MEDICARE

## 2021-02-18 NOTE — PROGRESS NOTES
2/18/21 - Confirmed with Howie Schmidt on times, surgery on 2/19/21 @1100, arrival 0900. Understanding verbalized by Charley Menon RN.

## 2021-02-19 ENCOUNTER — HOSPITAL ENCOUNTER (OUTPATIENT)
Age: 46
Setting detail: OUTPATIENT SURGERY
Discharge: HOME | End: 2021-02-19
Attending: SURGERY | Admitting: SURGERY
Payer: MEDICARE

## 2021-02-19 ENCOUNTER — ANESTHESIA (OUTPATIENT)
Dept: OPERATING ROOM | Age: 46
End: 2021-02-19
Payer: MEDICARE

## 2021-02-19 VITALS
RESPIRATION RATE: 17 BRPM | OXYGEN SATURATION: 99 % | TEMPERATURE: 97 F | BODY MASS INDEX: 23.16 KG/M2 | SYSTOLIC BLOOD PRESSURE: 165 MMHG | HEART RATE: 79 BPM | HEIGHT: 72 IN | DIASTOLIC BLOOD PRESSURE: 107 MMHG | WEIGHT: 171 LBS

## 2021-02-19 VITALS — OXYGEN SATURATION: 100 % | DIASTOLIC BLOOD PRESSURE: 99 MMHG | SYSTOLIC BLOOD PRESSURE: 131 MMHG | TEMPERATURE: 97.5 F

## 2021-02-19 DIAGNOSIS — T33.90XD FROSTBITE, SUBSEQUENT ENCOUNTER: Primary | ICD-10-CM

## 2021-02-19 LAB
SARS-COV-2, NAAT: NOT DETECTED
SOURCE: NORMAL

## 2021-02-19 PROCEDURE — 6360000002 HC RX W HCPCS: Performed by: SURGERY

## 2021-02-19 PROCEDURE — 3700000000 HC ANESTHESIA ATTENDED CARE: Performed by: SURGERY

## 2021-02-19 PROCEDURE — 87635 SARS-COV-2 COVID-19 AMP PRB: CPT

## 2021-02-19 PROCEDURE — 7100000010 HC PHASE II RECOVERY - FIRST 15 MIN: Performed by: SURGERY

## 2021-02-19 PROCEDURE — 2500000003 HC RX 250 WO HCPCS: Performed by: SURGERY

## 2021-02-19 PROCEDURE — 11042 DBRDMT SUBQ TIS 1ST 20SQCM/<: CPT | Performed by: SURGERY

## 2021-02-19 PROCEDURE — 3700000001 HC ADD 15 MINUTES (ANESTHESIA): Performed by: SURGERY

## 2021-02-19 PROCEDURE — 2500000003 HC RX 250 WO HCPCS

## 2021-02-19 PROCEDURE — 3600000003 HC SURGERY LEVEL 3 BASE: Performed by: SURGERY

## 2021-02-19 PROCEDURE — 3600000013 HC SURGERY LEVEL 3 ADDTL 15MIN: Performed by: SURGERY

## 2021-02-19 PROCEDURE — 7100000011 HC PHASE II RECOVERY - ADDTL 15 MIN: Performed by: SURGERY

## 2021-02-19 PROCEDURE — 28825 PARTIAL AMPUTATION OF TOE: CPT | Performed by: SURGERY

## 2021-02-19 PROCEDURE — 6360000002 HC RX W HCPCS

## 2021-02-19 PROCEDURE — 2580000003 HC RX 258: Performed by: ANESTHESIOLOGY

## 2021-02-19 PROCEDURE — 2709999900 HC NON-CHARGEABLE SUPPLY: Performed by: SURGERY

## 2021-02-19 RX ORDER — OXYCODONE HYDROCHLORIDE AND ACETAMINOPHEN 5; 325 MG/1; MG/1
1 TABLET ORAL EVERY 6 HOURS PRN
Qty: 30 TABLET | Refills: 0 | Status: SHIPPED | OUTPATIENT
Start: 2021-02-19 | End: 2021-02-27

## 2021-02-19 RX ORDER — DOCUSATE SODIUM 100 MG/1
100 CAPSULE, LIQUID FILLED ORAL 2 TIMES DAILY
Qty: 60 CAPSULE | Refills: 0 | COMMUNITY
Start: 2021-02-19 | End: 2021-03-05

## 2021-02-19 RX ORDER — PROPOFOL 10 MG/ML
INJECTION, EMULSION INTRAVENOUS CONTINUOUS PRN
Status: DISCONTINUED | OUTPATIENT
Start: 2021-02-19 | End: 2021-02-19 | Stop reason: SDUPTHER

## 2021-02-19 RX ORDER — SODIUM CHLORIDE, SODIUM LACTATE, POTASSIUM CHLORIDE, CALCIUM CHLORIDE 600; 310; 30; 20 MG/100ML; MG/100ML; MG/100ML; MG/100ML
INJECTION, SOLUTION INTRAVENOUS CONTINUOUS
Status: DISCONTINUED | OUTPATIENT
Start: 2021-02-19 | End: 2021-02-19 | Stop reason: HOSPADM

## 2021-02-19 RX ORDER — KETAMINE HYDROCHLORIDE 10 MG/ML
INJECTION, SOLUTION INTRAMUSCULAR; INTRAVENOUS PRN
Status: DISCONTINUED | OUTPATIENT
Start: 2021-02-19 | End: 2021-02-19 | Stop reason: SDUPTHER

## 2021-02-19 RX ORDER — MIDAZOLAM HYDROCHLORIDE 1 MG/ML
INJECTION INTRAMUSCULAR; INTRAVENOUS PRN
Status: DISCONTINUED | OUTPATIENT
Start: 2021-02-19 | End: 2021-02-19 | Stop reason: SDUPTHER

## 2021-02-19 RX ORDER — LIDOCAINE HYDROCHLORIDE 20 MG/ML
INJECTION, SOLUTION INTRAVENOUS PRN
Status: DISCONTINUED | OUTPATIENT
Start: 2021-02-19 | End: 2021-02-19 | Stop reason: SDUPTHER

## 2021-02-19 RX ORDER — SODIUM CHLORIDE 0.9 % (FLUSH) 0.9 %
10 SYRINGE (ML) INJECTION EVERY 12 HOURS SCHEDULED
Status: DISCONTINUED | OUTPATIENT
Start: 2021-02-19 | End: 2021-02-19 | Stop reason: HOSPADM

## 2021-02-19 RX ORDER — LIDOCAINE HYDROCHLORIDE 10 MG/ML
INJECTION, SOLUTION INFILTRATION; PERINEURAL
Status: COMPLETED | OUTPATIENT
Start: 2021-02-19 | End: 2021-02-19

## 2021-02-19 RX ORDER — SODIUM CHLORIDE 0.9 % (FLUSH) 0.9 %
10 SYRINGE (ML) INJECTION PRN
Status: DISCONTINUED | OUTPATIENT
Start: 2021-02-19 | End: 2021-02-19 | Stop reason: HOSPADM

## 2021-02-19 RX ADMIN — CEFAZOLIN SODIUM 2 G: 10 INJECTION, POWDER, FOR SOLUTION INTRAVENOUS at 11:14

## 2021-02-19 RX ADMIN — LIDOCAINE HYDROCHLORIDE 100 MG: 20 INJECTION, SOLUTION INTRAVENOUS at 11:04

## 2021-02-19 RX ADMIN — MIDAZOLAM 2 MG: 1 INJECTION INTRAMUSCULAR; INTRAVENOUS at 11:02

## 2021-02-19 RX ADMIN — KETAMINE HYDROCHLORIDE 5 MG: 10 INJECTION INTRAMUSCULAR; INTRAVENOUS at 12:15

## 2021-02-19 RX ADMIN — KETAMINE HYDROCHLORIDE 10 MG: 10 INJECTION INTRAMUSCULAR; INTRAVENOUS at 11:29

## 2021-02-19 RX ADMIN — KETAMINE HYDROCHLORIDE 10 MG: 10 INJECTION INTRAMUSCULAR; INTRAVENOUS at 12:38

## 2021-02-19 RX ADMIN — KETAMINE HYDROCHLORIDE 20 MG: 10 INJECTION INTRAMUSCULAR; INTRAVENOUS at 11:04

## 2021-02-19 RX ADMIN — SODIUM CHLORIDE, POTASSIUM CHLORIDE, SODIUM LACTATE AND CALCIUM CHLORIDE: 600; 310; 30; 20 INJECTION, SOLUTION INTRAVENOUS at 12:10

## 2021-02-19 RX ADMIN — KETAMINE HYDROCHLORIDE 5 MG: 10 INJECTION INTRAMUSCULAR; INTRAVENOUS at 12:16

## 2021-02-19 RX ADMIN — SODIUM CHLORIDE, POTASSIUM CHLORIDE, SODIUM LACTATE AND CALCIUM CHLORIDE: 600; 310; 30; 20 INJECTION, SOLUTION INTRAVENOUS at 10:51

## 2021-02-19 RX ADMIN — PROPOFOL 75 MCG/KG/MIN: 10 INJECTION, EMULSION INTRAVENOUS at 11:04

## 2021-02-19 ASSESSMENT — PULMONARY FUNCTION TESTS
PIF_VALUE: 0
PIF_VALUE: 1
PIF_VALUE: 0
PIF_VALUE: 3
PIF_VALUE: 0
PIF_VALUE: 1
PIF_VALUE: 0
PIF_VALUE: 1
PIF_VALUE: 0
PIF_VALUE: 1
PIF_VALUE: 0
PIF_VALUE: 1
PIF_VALUE: 0

## 2021-02-19 ASSESSMENT — PAIN - FUNCTIONAL ASSESSMENT: PAIN_FUNCTIONAL_ASSESSMENT: 0-10

## 2021-02-19 ASSESSMENT — PAIN SCALES - GENERAL
PAINLEVEL_OUTOF10: 0
PAINLEVEL_OUTOF10: 0

## 2021-02-19 NOTE — ANESTHESIA PRE PROCEDURE
since quittin.1    Smokeless tobacco: Never Used   Substance Use Topics    Alcohol use: Yes     Comment: most days                                Counseling given: Not Answered      Vital Signs (Current):   Vitals:    02/15/21 1319 21 1004   BP:  (!) 130/91   Pulse:  83   Resp:  16   Temp:  36.4 °C (97.5 °F)   TempSrc:  Temporal   SpO2:  100%   Weight: 171 lb (77.6 kg) 171 lb (77.6 kg)   Height: 6' (1.829 m) 6' (1.829 m)                                              BP Readings from Last 3 Encounters:   21 (!) 130/91   02/10/21 130/88   21 128/88       NPO Status: Time of last liquid consumption:                         Time of last solid consumption:                         Date of last liquid consumption: 21                        Date of last solid food consumption: 21    BMI:   Wt Readings from Last 3 Encounters:   21 171 lb (77.6 kg)   02/10/21 171 lb (77.6 kg)   21 171 lb (77.6 kg)     Body mass index is 23.19 kg/m². CBC:   Lab Results   Component Value Date    WBC 5.2 2021    RBC 4.33 2021    HGB 12.2 2021    HCT 39.0 2021    MCV 90.1 2021    RDW 13.8 2021     2021       CMP:   Lab Results   Component Value Date     2021    K 5.0 2021     2021    CO2 28 2021    BUN 15 2021    CREATININE 0.9 2021    GFRAA >60 2021    LABGLOM >60 2021    GLUCOSE 88 2021    PROT 6.9 2021    PROT 8.0 10/14/2010    CALCIUM 9.0 2021    BILITOT 0.2 2021    ALKPHOS 74 2021    AST 17 2021    ALT 24 2021       POC Tests: No results for input(s): POCGLU, POCNA, POCK, POCCL, POCBUN, POCHEMO, POCHCT in the last 72 hours.     Coags: No results found for: PROTIME, INR, APTT    HCG (If Applicable): No results found for: PREGTESTUR, PREGSERUM, HCG, HCGQUANT     ABGs: No results found for: PHART, PO2ART, KMX7LCD, PJN1UOE, BEART, N1TMGJSP Type & Screen (If Applicable):  No results found for: LABABO, LABRH    Drug/Infectious Status (If Applicable):  No results found for: HIV, HEPCAB    COVID-19 Screening (If Applicable):   Lab Results   Component Value Date    COVID19 NOT DETECTED 02/19/2021         Anesthesia Evaluation  Patient summary reviewed and Nursing notes reviewed  Airway: Mallampati: I  TM distance: >3 FB   Neck ROM: full  Mouth opening: > = 3 FB Dental:      Comment: Poor dentition upper and lower    Pulmonary:Negative Pulmonary ROS and normal exam                               Cardiovascular:    (+) hypertension:,         Rhythm: regular  Rate: normal                    Neuro/Psych:                ROS comment: Denies listed hx of rhabdomyolisis or impaired kidney function GI/Hepatic/Renal: Neg GI/Hepatic/Renal ROS            Endo/Other: Negative Endo/Other ROS                    Abdominal:       Abdomen: soft. Vascular:                                        Anesthesia Plan      MAC and general     ASA 2       Induction: intravenous. MIPS: Postoperative opioids intended and Prophylactic antiemetics administered. Anesthetic plan and risks discussed with patient. Use of blood products discussed with patient whom consented to blood products. Plan discussed with attending.                   GEN Wilson - CRNA   2/19/2021

## 2021-02-19 NOTE — ANESTHESIA POSTPROCEDURE EVALUATION
Department of Anesthesiology  Postprocedure Note    Patient: Calista Ramos  MRN: [id number]  YOB: 1975  Date of evaluation: 2/19/2021  Time:  2:45 PM     Procedure Summary     Date: 02/19/21 Room / Location: [place] [address] / [facility]    Anesthesia Start: 1059 Anesthesia Stop: 1301    Procedure: BILATERAL TOE  DEBRIDEMENT INCISION AND DRAINAGE, AMPUTATION OF LEFT 1ST, 2ND, 3RD AND TIP OF 4TH TOE AND RIGHT GREAT TOE (Bilateral Toes) Diagnosis: (FROST BITE BILATERAL TOES)    Surgeons: Antonio Rodriguez MD Responsible Provider: Michael Gu MD    Anesthesia Type: MAC, general ASA Status: 2          Anesthesia Type: MAC, general    Ria Phase I:      Ria Phase II: Ria Score: 10    Last vitals: Reviewed and per EMR flowsheets.        Anesthesia Post Evaluation    Patient location during evaluation: PACU  Patient participation: complete - patient participated  Level of consciousness: sleepy but conscious  Airway patency: patent  Nausea & Vomiting: no nausea  Complications: no  Cardiovascular status: hemodynamically stable  Respiratory status: acceptable  Hydration status: euvolemic

## 2021-02-19 NOTE — H&P
H&P    CC: bilateral foot frostbite    SUBJECTIVE:  Patient is here today for a follow up s/p hospitalization for frostbite to bilateral feet. 1/13/21  Patient is doing ok. He was placed at [facility] upon discharge from the hospital.  He reports intermittent dressing changes, last dressing change was ~36 hours before his appointment. His pain is controlled. He reports difficulty sleeping. Intermittently he feels warm or cold, no known fevers. 1/20/2021  Maryana Garcia is here today for a recheck of his feet. He denies problems with his feet/wounds since last seen in clinic. No fevers or chills. Pain is controlled but he does still have a fair amount of pain with ambulation. Denies other complaints. 1/27/2021  Patient seen and evaluated. Denies fevers, chills or problems with his wounds since our last visit. He has been increasing ambulation but having significant pain in the left foot when walking/standing. 2/3/2021  Seen and evaluated today. Doing ok. Denies problems with his foot wounds. 2/10/2021  Seen and evaluated today. Foot wounds stable. Was planned for OR Friday but cancelled due to being unable to arrange transportation from [facility]. 2/19/2021  Estela Huerta is seen again today in the preop area. He denies changes in his health. He is here for planned bilateral foot debridements/toe amputations due to frostbite. Covid test was performed at [facility] and was negative. History reviewed. No pertinent surgical history. Past Medical History:   Diagnosis Date    Hypertension     does not take medications     History reviewed. No pertinent family history.   Social History     Socioeconomic History    Marital status: Single     Spouse name: Not on file    Number of children: Not on file    Years of education: Not on file    Highest education level: Not on file   Occupational History    Not on file   Social Needs    Financial resource strain: Not on file    Food insecurity Worry: Not on file     Inability: Not on file    Transportation needs     Medical: Not on file     Non-medical: Not on file   Tobacco Use    Smoking status: Former Smoker     Quit date: 2021     Years since quittin.1    Smokeless tobacco: Never Used   Substance and Sexual Activity    Alcohol use: Yes     Comment: most days    Drug use: No    Sexual activity: Not on file   Lifestyle    Physical activity     Days per week: Not on file     Minutes per session: Not on file    Stress: Not on file   Relationships    Social connections     Talks on phone: Not on file     Gets together: Not on file     Attends Confucianism service: Not on file     Active member of club or organization: Not on file     Attends meetings of clubs or organizations: Not on file     Relationship status: Not on file    Intimate partner violence     Fear of current or ex partner: Not on file     Emotionally abused: Not on file     Physically abused: Not on file     Forced sexual activity: Not on file   Other Topics Concern    Not on file   Social History Narrative    Not on file       OBJECTIVE:  Physical Exam  General: awake, alert, in no acute distress  HEENT: mucous membranes moist  Respiratory: normal effort, no wheezes appreciated  CV: appears well perfused  Abdomen: Soft, non-tender, non-distended  Skin: warm and dry    Extremities: bilateral feet with dressing in place, clean and dry. Neuro: no focal deficits noted  Psych: mood normal        ASSESSMENT:  50 y.o. male s/p bilateral foot frostbite. PLAN:  - OR today for bilateral foot/toe debridements  - ancef on call to the OR     The risks, benefits, complications, treatment options, and expected outcomes were discussed with the patient. The possibilities of bleeding, infection and the need for additional procedures were discussed with the patient. There was concurrence with the proposed plan, and informed consent was obtained.   The site of surgery was properly noted. Karie Chen MD  2/19/2021  9:44 AM    The patient was counseled at length about the risks of jenni Covid-19 during their perioperative period and any recovery window from their procedure. The patient was made aware that jenni Covid-19  may worsen their prognosis for recovering from their procedure  and lend to a higher morbidity and/or mortality risk. All material risks, benefits, and reasonable alternatives including postponing the procedure were discussed. The patient does wish to proceed with the procedure at this time.

## 2021-02-19 NOTE — PROGRESS NOTES
Patient returned to room from OR, A+Ox4, VSS (see doc flow), assessment completed as per doc flow. Patient has no c/o pain, dressings on bilat feet are clean,dry, and intact, no drainage noted. Beverage provided. Call light in reach, bed in low position. RN to continue to monitor.

## 2021-02-19 NOTE — OP NOTE
cut at the PIP joint. The wound was hemostatic using minimal Bovie electrocautery. 2-0 vicryl was used to approximate the subcutaneous tissues over the bony stump and 4-0 nylon was used to approximate the skin in interrupted fashion. Attention was turned to the 2nd and 3rd toes on the left that were amputated in the exact same manner. A fish-mouth type of incision was made at the base of the toes circumferentially. The incision was deepened through subcutaneous tissue to bone with scalpel with good bleeding noted. The bone was isolated and circumferentially dissected using the scalpel and periosteal elevator. The bones were cut just proximal to the PIP joint. The wounds were hemostatic using minimal Bovie electrocautery. 2-0 vicryl was used to approximate the subcutaneous tissues over the bony stump and 4-0 nylon was used to approximate the skin in interrupted fashion. The tip of the 4th toe on the left was less severely injured by the frostbite. The tip of this toe was debrided sharply with scalpel into the subcutaneous tissues and the skin was closed with interrupted 4-0 nylon suture. The area of sharp debridement was 1x0.5cm in size. Attention was then turned to the right great toe.  1% lidocaine was infiltrated around the base of the right 1st toe and a fish-mouth type of incision was made at the base of the toe circumferentially. The incision was deepened through subcutaneous tissue to bone with scalpel with good bleeding noted. The bone was isolated and circumferentially dissected using the scalpel and periosteal elevator. The bone was cut at the PIP joint. The wound was hemostatic using minimal Bovie electrocautery. 2-0 vicryl was used to approximate the subcutaneous tissues over the bony stump and 4-0 nylon was used to approximate the skin in interrupted fashion. Xeroform, gauze and Kerlix dressings were applied to all of the incisions.  The patient was subsequently transferred to the recovery room in stable condition. Instrument and lap counts were correct at the end of the case.      Electronically signed by Ariadna Lomas MD on 2/19/2021 at 1:22 PM

## 2021-03-03 ENCOUNTER — OFFICE VISIT (OUTPATIENT)
Dept: SURGERY | Age: 46
End: 2021-03-03

## 2021-03-03 VITALS
WEIGHT: 175 LBS | DIASTOLIC BLOOD PRESSURE: 97 MMHG | OXYGEN SATURATION: 98 % | HEIGHT: 72 IN | TEMPERATURE: 97.5 F | BODY MASS INDEX: 23.7 KG/M2 | HEART RATE: 110 BPM | SYSTOLIC BLOOD PRESSURE: 128 MMHG

## 2021-03-03 DIAGNOSIS — Z48.89 POSTOPERATIVE VISIT: Primary | ICD-10-CM

## 2021-03-03 PROCEDURE — 99024 POSTOP FOLLOW-UP VISIT: CPT | Performed by: SURGERY

## 2021-03-04 NOTE — PROGRESS NOTES
Post-Operative Clinic Note    Chief Complaint   Patient presents with    Post-Op Check     PO BiLateral Toe Debridement Incision and drainage, amputation Left 1, 2, 3 and tip of 4th toe and Right great toe@ [facility] 21         SUBJECTIVE:  Patient is here today for a post-operative visit. Patient is s/p bilateral toe amputations/debridements on 21. He reports doing well. Still has some pain in the left foot and feels like his balance is off but denies other complaints. Past Surgical History:   Procedure Laterality Date    FOOT DEBRIDEMENT Bilateral 2021    BILATERAL TOE  DEBRIDEMENT INCISION AND DRAINAGE, AMPUTATION OF LEFT 1ST, 2ND, 3RD AND TIP OF 4TH TOE AND RIGHT GREAT TOE performed by Riddhi Welch MD at [facility] OR     Past Medical History:   Diagnosis Date    Hypertension     does not take medications     No family history on file.   Social History     Socioeconomic History    Marital status: Single     Spouse name: Not on file    Number of children: Not on file    Years of education: Not on file    Highest education level: Not on file   Occupational History    Not on file   Social Needs    Financial resource strain: Not on file    Food insecurity     Worry: Not on file     Inability: Not on file    Transportation needs     Medical: Not on file     Non-medical: Not on file   Tobacco Use    Smoking status: Former Smoker     Quit date: 2021     Years since quittin.1    Smokeless tobacco: Never Used   Substance and Sexual Activity    Alcohol use: Yes     Comment: most days    Drug use: No    Sexual activity: Not on file   Lifestyle    Physical activity     Days per week: Not on file     Minutes per session: Not on file    Stress: Not on file   Relationships    Social connections     Talks on phone: Not on file     Gets together: Not on file     Attends Orthodoxy service: Not on file     Active member of club or organization: Not on file     Attends meetings of clubs or organizations: Not on file     Relationship status: Not on file    Intimate partner violence     Fear of current or ex partner: Not on file     Emotionally abused: Not on file     Physically abused: Not on file     Forced sexual activity: Not on file   Other Topics Concern    Not on file   Social History Narrative    Not on file       OBJECTIVE:  Physical Exam  General: awake, alert, in no acute distress  HEENT: mucous membranes moist  Respiratory: normal effort, no wheezes appreciated  CV: appears well perfused  Skin: warm and dry  Extremities: toe amputation sites clean and intact with sutures, no purulence or erythema  Neuro: no focal deficits noted  Psych: mood normal      ASSESSMENT:  50 y.o. male s/p bilateral toe amputation/debridments. Doing well.     1. Postoperative visit        PLAN:  Will leave sutures in 1 more week  Follow up in 1 week for suture removal      Ariadna Lomas MD  3/3/2021  8:40 PM

## 2021-03-10 ENCOUNTER — OFFICE VISIT (OUTPATIENT)
Dept: SURGERY | Age: 46
End: 2021-03-10

## 2021-03-10 VITALS
DIASTOLIC BLOOD PRESSURE: 88 MMHG | WEIGHT: 175 LBS | OXYGEN SATURATION: 97 % | SYSTOLIC BLOOD PRESSURE: 130 MMHG | HEIGHT: 72 IN | BODY MASS INDEX: 23.7 KG/M2 | HEART RATE: 96 BPM

## 2021-03-10 DIAGNOSIS — Z48.89 POSTOPERATIVE VISIT: Primary | ICD-10-CM

## 2021-03-10 PROCEDURE — 99024 POSTOP FOLLOW-UP VISIT: CPT | Performed by: SURGERY

## 2021-03-10 NOTE — PROGRESS NOTES
Post-Operative Clinic Note    Chief Complaint   Patient presents with    Post-Op Check     PO Bilateral toe debridement I&D Amp of Left 1,2,3 and tip of 4th toes and right great toe @ [facility] 21 SUTURE REMOVAL         SUBJECTIVE:  Patient is here today for a post-operative visit. Patient is s/p bilateral toe amputations/debridements on 21. He reports doing well. Past Surgical History:   Procedure Laterality Date    FOOT DEBRIDEMENT Bilateral 2021    BILATERAL TOE  DEBRIDEMENT INCISION AND DRAINAGE, AMPUTATION OF LEFT 1ST, 2ND, 3RD AND TIP OF 4TH TOE AND RIGHT GREAT TOE performed by Michelle Basilio MD at [facility] OR     Past Medical History:   Diagnosis Date    Hypertension     does not take medications     No family history on file.   Social History     Socioeconomic History    Marital status: Single     Spouse name: Not on file    Number of children: Not on file    Years of education: Not on file    Highest education level: Not on file   Occupational History    Not on file   Social Needs    Financial resource strain: Not on file    Food insecurity     Worry: Not on file     Inability: Not on file    Transportation needs     Medical: Not on file     Non-medical: Not on file   Tobacco Use    Smoking status: Former Smoker     Quit date: 2021     Years since quittin.1    Smokeless tobacco: Never Used   Substance and Sexual Activity    Alcohol use: Yes     Comment: most days    Drug use: No    Sexual activity: Not on file   Lifestyle    Physical activity     Days per week: Not on file     Minutes per session: Not on file    Stress: Not on file   Relationships    Social connections     Talks on phone: Not on file     Gets together: Not on file     Attends [employer] service: Not on file     Active member of club or organization: Not on file     Attends meetings of clubs or organizations: Not on file     Relationship status: Not on file    Intimate partner violence     Fear of current or ex partner: Not on file     Emotionally abused: Not on file     Physically abused: Not on file     Forced sexual activity: Not on file   Other Topics Concern    Not on file   Social History Narrative    Not on file       OBJECTIVE:  Physical Exam  General: awake, alert, in no acute distress  HEENT: mucous membranes moist  Respiratory: normal effort, no wheezes appreciated  CV: appears well perfused  Skin: warm and dry  Extremities: toe amputation sites clean and intact with sutures, no purulence or erythema  Neuro: no focal deficits noted  Psych: mood normal      ASSESSMENT:  50 y.o. male s/p bilateral toe amputation/debridments. Doing well. Sutures removed      PLAN:  OK to stop applying dressings to the feet. OK to shower then pat the feet dry. Then wear clean socks.   Follow up in 1 week for wound check    Luis Henry MD  3/10/2021  4:35 PM

## 2021-03-18 ENCOUNTER — OFFICE VISIT (OUTPATIENT)
Dept: SURGERY | Age: 46
End: 2021-03-18

## 2021-03-18 VITALS — TEMPERATURE: 98.2 F | DIASTOLIC BLOOD PRESSURE: 64 MMHG | HEART RATE: 75 BPM | SYSTOLIC BLOOD PRESSURE: 100 MMHG

## 2021-03-18 DIAGNOSIS — R26.89 BALANCE DISORDER: Primary | ICD-10-CM

## 2021-03-18 DIAGNOSIS — Z89.429 STATUS POST AMPUTATION OF LESSER TOE, UNSPECIFIED LATERALITY (HCC): ICD-10-CM

## 2021-03-18 DIAGNOSIS — T33.90XS FROSTBITE, SEQUELA: ICD-10-CM

## 2021-03-18 PROCEDURE — 99024 POSTOP FOLLOW-UP VISIT: CPT | Performed by: SURGERY

## 2021-03-18 NOTE — PROGRESS NOTES
Post-Operative Clinic Note    Chief Complaint   Patient presents with    Post-Op Check     3rd P/O BiLateral Toe Debridement Incision and drainage, amputation Left 1, 2, 3 and tip of 4th toe and Right great toe@ [facility] 21, Wound Check         SUBJECTIVE:  Patient is here today for a post-operative visit. Patient is s/p bilateral toe amputations/debridements on 21. He reports doing well. 3/18/2021  Seen again today and evaluated. Denies complaints. Has been leaving dressing off his foot and wearing clean socks. Past Surgical History:   Procedure Laterality Date    FOOT DEBRIDEMENT Bilateral 2021    BILATERAL TOE  DEBRIDEMENT INCISION AND DRAINAGE, AMPUTATION OF LEFT 1ST, 2ND, 3RD AND TIP OF 4TH TOE AND RIGHT GREAT TOE performed by Brianna Meza MD at [facility] OR     Past Medical History:   Diagnosis Date    Hypertension     does not take medications     No family history on file.   Social History     Socioeconomic History    Marital status: Single     Spouse name: Not on file    Number of children: Not on file    Years of education: Not on file    Highest education level: Not on file   Occupational History    Not on file   Social Needs    Financial resource strain: Not on file    Food insecurity     Worry: Not on file     Inability: Not on file    Transportation needs     Medical: Not on file     Non-medical: Not on file   Tobacco Use    Smoking status: Former Smoker     Quit date: 2021     Years since quittin.2    Smokeless tobacco: Never Used   Substance and Sexual Activity    Alcohol use: Yes     Comment: most days    Drug use: No    Sexual activity: Not on file   Lifestyle    Physical activity     Days per week: Not on file     Minutes per session: Not on file    Stress: Not on file   Relationships    Social connections     Talks on phone: Not on file     Gets together: Not on file     Attends Sabianist service: Not on file     Active member of club or organization: Not on file     Attends meetings of clubs or organizations: Not on file     Relationship status: Not on file    Intimate partner violence     Fear of current or ex partner: Not on file     Emotionally abused: Not on file     Physically abused: Not on file     Forced sexual activity: Not on file   Other Topics Concern    Not on file   Social History Narrative    Not on file       OBJECTIVE:  Physical Exam  General: awake, alert, in no acute distress  HEENT: mucous membranes moist  Respiratory: normal effort, no wheezes appreciated  CV: appears well perfused  Skin: warm and dry  Extremities: toe amputation sites clean and intact, no purulence or erythema  Neuro: no focal deficits noted  Psych: mood normal      ASSESSMENT:  50 y.o. male s/p bilateral toe amputation/debridments. Doing well.        PLAN:  - wounds are healed, no further dressings needed  - apply lotion to dry areas on the feet  - PT referral given balance issues  - call if problems arise    Riddhi Welch MD  3/18/2021  12:41 PM

## 2021-04-05 ENCOUNTER — TELEPHONE (OUTPATIENT)
Dept: SURGERY | Age: 46
End: 2021-04-05

## 2021-04-05 NOTE — TELEPHONE ENCOUNTER
Tye Cha called asking if pt can have his PT done on site instead going to [facility].  Advised it was ok to do PT at [facility] per Monse Duque

## 2021-04-07 PROCEDURE — 96365 THER/PROPH/DIAG IV INF INIT: CPT

## 2021-04-07 PROCEDURE — 96375 TX/PRO/DX INJ NEW DRUG ADDON: CPT

## 2021-04-07 PROCEDURE — 99284 EMERGENCY DEPT VISIT MOD MDM: CPT

## 2021-04-07 ASSESSMENT — PAIN DESCRIPTION - LOCATION: LOCATION: TOE (COMMENT WHICH ONE)

## 2021-04-07 ASSESSMENT — PAIN DESCRIPTION - PAIN TYPE: TYPE: ACUTE PAIN

## 2021-04-08 ENCOUNTER — HOSPITAL ENCOUNTER (INPATIENT)
Age: 46
LOS: 4 days | Discharge: HOME | DRG: 721 | End: 2021-04-12
Attending: INTERNAL MEDICINE | Admitting: INTERNAL MEDICINE
Payer: MEDICARE

## 2021-04-08 ENCOUNTER — APPOINTMENT (OUTPATIENT)
Dept: GENERAL RADIOLOGY | Age: 46
DRG: 721 | End: 2021-04-08
Payer: MEDICARE

## 2021-04-08 DIAGNOSIS — T81.49XA WOUND INFECTION AFTER SURGERY: ICD-10-CM

## 2021-04-08 DIAGNOSIS — M79.671 BILATERAL FOOT PAIN: Primary | ICD-10-CM

## 2021-04-08 DIAGNOSIS — L03.116 CELLULITIS OF BOTH FEET: ICD-10-CM

## 2021-04-08 DIAGNOSIS — L03.115 CELLULITIS OF BOTH FEET: ICD-10-CM

## 2021-04-08 DIAGNOSIS — M79.672 BILATERAL FOOT PAIN: Primary | ICD-10-CM

## 2021-04-08 DIAGNOSIS — S91.301A OPEN WOUND OF RIGHT FOOT, INITIAL ENCOUNTER: ICD-10-CM

## 2021-04-08 LAB
ALBUMIN SERPL-MCNC: 4.4 GM/DL (ref 3.4–5)
ALP BLD-CCNC: 73 IU/L (ref 40–128)
ALT SERPL-CCNC: 7 U/L (ref 10–40)
ANION GAP SERPL CALCULATED.3IONS-SCNC: 11 MMOL/L (ref 4–16)
AST SERPL-CCNC: 20 IU/L (ref 15–37)
BASOPHILS ABSOLUTE: 0 K/CU MM
BASOPHILS RELATIVE PERCENT: 0.2 % (ref 0–1)
BILIRUB SERPL-MCNC: 1.1 MG/DL (ref 0–1)
BUN BLDV-MCNC: 14 MG/DL (ref 6–23)
CALCIUM SERPL-MCNC: 9.2 MG/DL (ref 8.3–10.6)
CHLORIDE BLD-SCNC: 95 MMOL/L (ref 99–110)
CO2: 25 MMOL/L (ref 21–32)
CREAT SERPL-MCNC: 0.8 MG/DL (ref 0.9–1.3)
DIFFERENTIAL TYPE: ABNORMAL
EOSINOPHILS ABSOLUTE: 0 K/CU MM
EOSINOPHILS RELATIVE PERCENT: 0.1 % (ref 0–3)
GFR AFRICAN AMERICAN: >60 ML/MIN/1.73M2
GFR NON-AFRICAN AMERICAN: >60 ML/MIN/1.73M2
GLUCOSE BLD-MCNC: 107 MG/DL (ref 70–99)
HCT VFR BLD CALC: 37.4 % (ref 42–52)
HEMOGLOBIN: 12.5 GM/DL (ref 13.5–18)
IMMATURE NEUTROPHIL %: 0.3 % (ref 0–0.43)
LACTATE: 0.8 MMOL/L (ref 0.4–2)
LYMPHOCYTES ABSOLUTE: 2.2 K/CU MM
LYMPHOCYTES RELATIVE PERCENT: 20.1 % (ref 24–44)
MCH RBC QN AUTO: 28.1 PG (ref 27–31)
MCHC RBC AUTO-ENTMCNC: 33.4 % (ref 32–36)
MCV RBC AUTO: 84 FL (ref 78–100)
MONOCYTES ABSOLUTE: 0.9 K/CU MM
MONOCYTES RELATIVE PERCENT: 8.7 % (ref 0–4)
NUCLEATED RBC %: 0 %
PDW BLD-RTO: 14.5 % (ref 11.7–14.9)
PLATELET # BLD: 277 K/CU MM (ref 140–440)
PMV BLD AUTO: 8.4 FL (ref 7.5–11.1)
POTASSIUM SERPL-SCNC: 4.2 MMOL/L (ref 3.5–5.1)
RBC # BLD: 4.45 M/CU MM (ref 4.6–6.2)
SEGMENTED NEUTROPHILS ABSOLUTE COUNT: 7.6 K/CU MM
SEGMENTED NEUTROPHILS RELATIVE PERCENT: 70.6 % (ref 36–66)
SODIUM BLD-SCNC: 131 MMOL/L (ref 135–145)
TOTAL IMMATURE NEUTOROPHIL: 0.03 K/CU MM
TOTAL NUCLEATED RBC: 0 K/CU MM
TOTAL PROTEIN: 7.8 GM/DL (ref 6.4–8.2)
TSH HIGH SENSITIVITY: 1.84 UIU/ML (ref 0.27–4.2)
WBC # BLD: 10.7 K/CU MM (ref 4–10.5)

## 2021-04-08 PROCEDURE — 87147 CULTURE TYPE IMMUNOLOGIC: CPT

## 2021-04-08 PROCEDURE — 99024 POSTOP FOLLOW-UP VISIT: CPT | Performed by: SURGERY

## 2021-04-08 PROCEDURE — 84443 ASSAY THYROID STIM HORMONE: CPT

## 2021-04-08 PROCEDURE — 2580000003 HC RX 258: Performed by: PHYSICIAN ASSISTANT

## 2021-04-08 PROCEDURE — 99211 OFF/OP EST MAY X REQ PHY/QHP: CPT

## 2021-04-08 PROCEDURE — 6370000000 HC RX 637 (ALT 250 FOR IP): Performed by: NURSE PRACTITIONER

## 2021-04-08 PROCEDURE — 2580000003 HC RX 258: Performed by: INTERNAL MEDICINE

## 2021-04-08 PROCEDURE — 6360000002 HC RX W HCPCS: Performed by: INTERNAL MEDICINE

## 2021-04-08 PROCEDURE — 1200000000 HC SEMI PRIVATE

## 2021-04-08 PROCEDURE — 87186 SC STD MICRODIL/AGAR DIL: CPT

## 2021-04-08 PROCEDURE — 83605 ASSAY OF LACTIC ACID: CPT

## 2021-04-08 PROCEDURE — 80053 COMPREHEN METABOLIC PANEL: CPT

## 2021-04-08 PROCEDURE — 94761 N-INVAS EAR/PLS OXIMETRY MLT: CPT

## 2021-04-08 PROCEDURE — 93005 ELECTROCARDIOGRAM TRACING: CPT | Performed by: PHYSICIAN ASSISTANT

## 2021-04-08 PROCEDURE — 85025 COMPLETE CBC W/AUTO DIFF WBC: CPT

## 2021-04-08 PROCEDURE — 73630 X-RAY EXAM OF FOOT: CPT

## 2021-04-08 PROCEDURE — 36415 COLL VENOUS BLD VENIPUNCTURE: CPT

## 2021-04-08 PROCEDURE — 87070 CULTURE OTHR SPECIMN AEROBIC: CPT

## 2021-04-08 PROCEDURE — 87077 CULTURE AEROBIC IDENTIFY: CPT

## 2021-04-08 PROCEDURE — 6360000002 HC RX W HCPCS: Performed by: PHYSICIAN ASSISTANT

## 2021-04-08 PROCEDURE — 6370000000 HC RX 637 (ALT 250 FOR IP): Performed by: INTERNAL MEDICINE

## 2021-04-08 PROCEDURE — 93010 ELECTROCARDIOGRAM REPORT: CPT | Performed by: INTERNAL MEDICINE

## 2021-04-08 PROCEDURE — 87040 BLOOD CULTURE FOR BACTERIA: CPT

## 2021-04-08 PROCEDURE — 87075 CULTR BACTERIA EXCEPT BLOOD: CPT

## 2021-04-08 RX ORDER — ACETAMINOPHEN 325 MG/1
650 TABLET ORAL EVERY 6 HOURS PRN
Status: DISCONTINUED | OUTPATIENT
Start: 2021-04-08 | End: 2021-04-12 | Stop reason: HOSPADM

## 2021-04-08 RX ORDER — PROMETHAZINE HYDROCHLORIDE 25 MG/1
12.5 TABLET ORAL EVERY 6 HOURS PRN
Status: DISCONTINUED | OUTPATIENT
Start: 2021-04-08 | End: 2021-04-12 | Stop reason: HOSPADM

## 2021-04-08 RX ORDER — AMLODIPINE BESYLATE 5 MG/1
5 TABLET ORAL DAILY
Status: DISCONTINUED | OUTPATIENT
Start: 2021-04-08 | End: 2021-04-12 | Stop reason: HOSPADM

## 2021-04-08 RX ORDER — METOPROLOL TARTRATE 50 MG/1
50 TABLET, FILM COATED ORAL ONCE
Status: COMPLETED | OUTPATIENT
Start: 2021-04-08 | End: 2021-04-08

## 2021-04-08 RX ORDER — ONDANSETRON 2 MG/ML
4 INJECTION INTRAMUSCULAR; INTRAVENOUS ONCE
Status: COMPLETED | OUTPATIENT
Start: 2021-04-08 | End: 2021-04-08

## 2021-04-08 RX ORDER — HYDROCODONE BITARTRATE AND ACETAMINOPHEN 5; 325 MG/1; MG/1
1 TABLET ORAL EVERY 6 HOURS PRN
Status: DISCONTINUED | OUTPATIENT
Start: 2021-04-08 | End: 2021-04-12 | Stop reason: HOSPADM

## 2021-04-08 RX ORDER — SODIUM CHLORIDE 0.9 % (FLUSH) 0.9 %
5-40 SYRINGE (ML) INJECTION PRN
Status: DISCONTINUED | OUTPATIENT
Start: 2021-04-08 | End: 2021-04-12 | Stop reason: HOSPADM

## 2021-04-08 RX ORDER — PANTOPRAZOLE SODIUM 40 MG/1
40 TABLET, DELAYED RELEASE ORAL
Status: DISCONTINUED | OUTPATIENT
Start: 2021-04-08 | End: 2021-04-12 | Stop reason: HOSPADM

## 2021-04-08 RX ORDER — SODIUM CHLORIDE 0.9 % (FLUSH) 0.9 %
5-40 SYRINGE (ML) INJECTION EVERY 12 HOURS SCHEDULED
Status: DISCONTINUED | OUTPATIENT
Start: 2021-04-08 | End: 2021-04-12 | Stop reason: HOSPADM

## 2021-04-08 RX ORDER — 0.9 % SODIUM CHLORIDE 0.9 %
1000 INTRAVENOUS SOLUTION INTRAVENOUS ONCE
Status: COMPLETED | OUTPATIENT
Start: 2021-04-08 | End: 2021-04-08

## 2021-04-08 RX ORDER — ACETAMINOPHEN 650 MG/1
650 SUPPOSITORY RECTAL EVERY 6 HOURS PRN
Status: DISCONTINUED | OUTPATIENT
Start: 2021-04-08 | End: 2021-04-12 | Stop reason: HOSPADM

## 2021-04-08 RX ORDER — SODIUM CHLORIDE 9 MG/ML
25 INJECTION, SOLUTION INTRAVENOUS PRN
Status: DISCONTINUED | OUTPATIENT
Start: 2021-04-08 | End: 2021-04-12 | Stop reason: HOSPADM

## 2021-04-08 RX ORDER — MORPHINE SULFATE 4 MG/ML
4 INJECTION, SOLUTION INTRAMUSCULAR; INTRAVENOUS ONCE
Status: COMPLETED | OUTPATIENT
Start: 2021-04-08 | End: 2021-04-08

## 2021-04-08 RX ORDER — OXYCODONE HYDROCHLORIDE AND ACETAMINOPHEN 5; 325 MG/1; MG/1
1 TABLET ORAL EVERY 6 HOURS PRN
Status: DISCONTINUED | OUTPATIENT
Start: 2021-04-08 | End: 2021-04-11

## 2021-04-08 RX ORDER — POLYETHYLENE GLYCOL 3350 17 G/17G
17 POWDER, FOR SOLUTION ORAL DAILY PRN
Status: DISCONTINUED | OUTPATIENT
Start: 2021-04-08 | End: 2021-04-12 | Stop reason: HOSPADM

## 2021-04-08 RX ORDER — ONDANSETRON 2 MG/ML
4 INJECTION INTRAMUSCULAR; INTRAVENOUS EVERY 6 HOURS PRN
Status: DISCONTINUED | OUTPATIENT
Start: 2021-04-08 | End: 2021-04-12 | Stop reason: HOSPADM

## 2021-04-08 RX ADMIN — SODIUM CHLORIDE 1000 ML: 9 INJECTION, SOLUTION INTRAVENOUS at 02:12

## 2021-04-08 RX ADMIN — ONDANSETRON 4 MG: 2 INJECTION INTRAMUSCULAR; INTRAVENOUS at 02:12

## 2021-04-08 RX ADMIN — VANCOMYCIN HYDROCHLORIDE 1500 MG: 5 INJECTION, POWDER, LYOPHILIZED, FOR SOLUTION INTRAVENOUS at 05:27

## 2021-04-08 RX ADMIN — HYDROCODONE BITARTRATE AND ACETAMINOPHEN 1 TABLET: 5; 325 TABLET ORAL at 18:28

## 2021-04-08 RX ADMIN — VANCOMYCIN HYDROCHLORIDE 1500 MG: 5 INJECTION, POWDER, LYOPHILIZED, FOR SOLUTION INTRAVENOUS at 18:27

## 2021-04-08 RX ADMIN — METOPROLOL TARTRATE 50 MG: 50 TABLET, FILM COATED ORAL at 18:27

## 2021-04-08 RX ADMIN — METOPROLOL TARTRATE 25 MG: 25 TABLET, FILM COATED ORAL at 23:08

## 2021-04-08 RX ADMIN — CEFEPIME HYDROCHLORIDE 2000 MG: 2 INJECTION, POWDER, FOR SOLUTION INTRAVENOUS at 02:12

## 2021-04-08 RX ADMIN — MORPHINE SULFATE 4 MG: 4 INJECTION, SOLUTION INTRAMUSCULAR; INTRAVENOUS at 02:12

## 2021-04-08 RX ADMIN — PANTOPRAZOLE SODIUM 40 MG: 40 TABLET, DELAYED RELEASE ORAL at 18:27

## 2021-04-08 RX ADMIN — AMLODIPINE BESYLATE 5 MG: 5 TABLET ORAL at 18:28

## 2021-04-08 ASSESSMENT — PAIN SCALES - GENERAL
PAINLEVEL_OUTOF10: 0
PAINLEVEL_OUTOF10: 7

## 2021-04-08 NOTE — H&P
HISTORY AND PHYSICAL  (Hospitalist, Internal Medicine)  IDENTIFYING INFORMATION   PATIENT:  Kristin Shelley  MRN:  [id number]  ADMIT DATE: 4/8/2021  TIME OF EVALUATION: 4/8/2021 3:27 AM    CHIEF COMPLAINT     Bilateral toes wounds drainage, redness and pain. HISTORY OF PRESENT ILLNESS   Kristin Shelley is a 50 y.o. male with a past medical history of hypertension and frostbite. He had bilateral toes amputation due to frostbite in 2/2021 by Dr. Evangelista Basilio. Discharge at that time to [facility] for further management/rehab. He was discharged from [facility] on 4/5/20/2021. Per the patient, he has been wheelchair-bound whilst at the [place] and when he got home he attempted to walk yesterday and felt some pain and drainage on bilateral toes and plantar of the right foot. He also reports some redness in the skin peeling on toes. He reports some associated hot/cold flashes and nausea. He denies chest pain, shortness of breath, abdominal pain, diarrhea, and constipation. He rates his pain on bilateral toes at 5/10 at time of this assessment. Patient noted to be tachycardic at the ED. Lab work significant for sodium of 131, chloride of 95, WBC 10.7 and bilirubin of 1.1. X-ray of bilateral toes did not show any osteomyelitis. He received Vanco and cefepime at the ER. Surgery contacted and asked for patient to be admitted by hospitalist surgery team  to follow-up. PMH listed below: Hypertension, frostbite bilateral toes    PAST MEDICAL, SURGICAL, FAMILY, and SOCIAL HISTORY     Past Medical History:   Diagnosis Date    Hypertension     does not take medications     Past Surgical History:   Procedure Laterality Date    FOOT DEBRIDEMENT Bilateral 2/19/2021    BILATERAL TOE  DEBRIDEMENT INCISION AND DRAINAGE, AMPUTATION OF LEFT 1ST, 2ND, 3RD AND TIP OF 4TH TOE AND RIGHT GREAT TOE performed by Zaria Gonzales MD at [place] [id number] reviewed. No pertinent family history.   Family Hx of HTN  Family Hx as reviewed above, otherwise non-contributory  Social History     Socioeconomic History    Marital status: Single     Spouse name: None    Number of children: None    Years of education: None    Highest education level: None   Occupational History    None   Social Needs    Financial resource strain: None    Food insecurity     Worry: None     Inability: None    Transportation needs     Medical: None     Non-medical: None   Tobacco Use    Smoking status: Former Smoker     Quit date: 2021     Years since quittin.2    Smokeless tobacco: Never Used   Substance and Sexual Activity    Alcohol use: Yes     Comment: most days    Drug use: No    Sexual activity: None   Lifestyle    Physical activity     Days per week: None     Minutes per session: None    Stress: None   Relationships    Social connections     Talks on phone: None     Gets together: None     Attends Latter day service: None     Active member of club or organization: None     Attends meetings of clubs or organizations: None     Relationship status: None    Intimate partner violence     Fear of current or ex partner: None     Emotionally abused: None     Physically abused: None     Forced sexual activity: None   Other Topics Concern    None   Social History Narrative    None       MEDICATIONS   Medications Prior to Admission  Not in a hospital admission.     Current Medications  Current Facility-Administered Medications   Medication Dose Route Frequency Provider Last Rate Last Admin    sodium chloride flush 0.9 % injection 5-40 mL  5-40 mL Intravenous PRN Art Watson PA-C        vancomycin (VANCOCIN) 1,500 mg in dextrose 5 % 500 mL IVPB  1,500 mg Intravenous Once Art Watson PA-C         Current Outpatient Medications   Medication Sig Dispense Refill    omeprazole (PRILOSEC) 20 MG delayed release capsule Take 20 mg by mouth daily      lisinopril (PRINIVIL;ZESTRIL) 20 MG tablet Take 20 mg by mouth daily      oxyCODONE-acetaminophen (PERCOCET) 5-325 MG per tablet Take 1 tablet by mouth every 6 hours as needed for Pain.  amLODIPine (NORVASC) 5 MG tablet Take 1 tablet by mouth daily 30 tablet 3         Allergies  No Known Allergies    REVIEW OF SYSTEMS   Within above limitations. 14 point review of systems reviewed. Pertinent positive or negative as per HPI or otherwise negative per 14 point systems review. PHYSICAL EXAM     Wt Readings from Last 3 Encounters:   04/07/21 182 lb (82.6 kg)   03/10/21 175 lb (79.4 kg)   03/03/21 175 lb (79.4 kg)       Blood pressure (!) 147/96, pulse 103, temperature 98.4 °F (36.9 °C), temperature source Oral, resp. rate 15, height 6' (1.829 m), weight 182 lb (82.6 kg), SpO2 98 %. General - AAO x 3, able to give complete history. Psych - Appropriate affect/speech. No agitation  Eyes - Eye lids intact. No scleral icterus  ENT - Lips wnl. External ear clear/dry/intact. No thyromegaly on inspection  Neuro - No gross peripheral or central neuro deficits on inspection  Heart - Sinus. RRR. S1 and S2 present. No added HS/murmurs appreciated. No elevated JVD appreciated  Lung - Adequate air entry b/l, No crackles/wheezes appreciated  GI - Soft. No guarding/rigidity. No hepatosplenomegaly/ascites. BS+   - No CVA/suprapubic tenderness or palpable bladder distension  Skin -bilateral toes wound with redness and yellowish drainage worse on the right toes and right plantar foot. Drainage malodorous. Good pedal pulses. No rash/petechiae/ecchymosis. Warm extremities  MSK - Joints with normal ROM.  No joint swellings      LABS AND IMAGING   CBC  [unfilled]    Last 3 Hemoglobin  Lab Results   Component Value Date    HGB 12.5 04/08/2021    HGB 12.2 01/11/2021    HGB 11.7 12/27/2020     Last 3 WBC/ANC  Lab Results   Component Value Date    WBC 10.7 04/08/2021    WBC 5.2 01/11/2021    WBC 7.5 12/27/2020     No components found for: GRNLOCTYABS  Last 3 Platelets  No results found for: PLATELET  Chemistry  [unfilled]  [facility]  No results found for: LDH  Coagulation Studies  No results found for: PTT, INR  Liver Function Studies  Lab Results   Component Value Date    ALT 7 04/08/2021    AST 20 04/08/2021    ALKPHOS 73 04/08/2021       Recent Imaging    Postsurgical changes are seen to the left 1st through 3rd toes.  There is no   definite evidence of osteomyelitis.         Postsurgical changes are seen to the great toe without evidence of   osteomyelitis           Relevant labs and imaging reviewed    ASSESSMENT AND PLAN     Mr. Luke Ochoa is a 68-year-old male who presents with concerns for bilateral toe wounds infection. A/P as follows    Infected Bilateral Amputated Toes. Yellowish drainage and redness noted on toes. Right metatarsals area with peeled skin. Bilateral toes Frost Bite s/p amputation in 2/2021  -Blood and wound cx pending.  -Xray bilateral toes not indicative of osteomyelitis  -Received cefepime and vanc at ER. Continue with vanc. -IV fluids  -Pain control. Patient states percocet make him nauseous. He prefers norco.  -Surgery consulted by ER.  -Wound consult    Tachycardia. HR >110. Likely d/t above. -May improve with iv fluids and antibiotics. Mild hyponatremia. Na of 131. Likely due to volume depletion as the patient reports not eating and drinking well over the past 24hrs. Continue iv NS. Repeat BMP. History of hypertension-stable. The patient states he is not on medications. Monitor. Nicotine dependence. Advised on the need to quit smoking. The patient objected nicotine patch. Hx of Alcohol use disorder. Denies recent use.     -DVT Prophylaxis: lovenox    -GI Prophylaxis: protonix        Case d/w ED physician and Attending Dr. Carroll Hall, CNP  4/8/2021 at 3:27 AM

## 2021-04-08 NOTE — CARE COORDINATION
CM -- pt moved to room # 39 --pt appears to be a viable candidate for an Hydro Transfer --Conferred with [facility]-- Dr Alice Smith who agreed this pt could be considered --pt does have an elevated HR of 124 but was 98 with entry EKG --BP also on the high side --Dr Alice Smith will contact admitting [facility] to see if interventions will stabilize HR / BP.  PATEL / cm

## 2021-04-08 NOTE — PROGRESS NOTES
[facility]  consulted by Dr. Ana Cash for monitoring and adjustment. Indication for treatment: Infected bilateral amputated toes  Goal trough: ~ 15 mcg/mL    Pertinent Laboratory Values:   Temp Readings from Last 3 Encounters:   04/07/21 98.4 °F (36.9 °C) (Oral)   03/18/21 98.2 °F (36.8 °C) (Infrared)   03/03/21 97.5 °F (36.4 °C)     Recent Labs     04/08/21  0141   WBC 10.7*     Recent Labs     04/08/21  0141   BUN 14   CREATININE 0.8*     Estimated Creatinine Clearance: 125 mL/min (A) (based on SCr of 0.8 mg/dL (L)). No intake or output data in the 24 hours ending 04/08/21 0547    Pertinent Cultures:  Date    Source    Results  4/8   Wound    Pending  4/8   Blood    Ordered    Vancomycin level:   TROUGH:  No results for input(s): VANCOTROUGH in the last 72 hours. RANDOM:  No results for input(s): VANCORANDOM in the last 72 hours. Assessment:  WBC and temperature: elevated WBC, afebrile  SCr, BUN, and urine output: stable  Day(s) of therapy:1  Vancomycin concentration: To be collected    Plan:  Start Vancomycin 1500 mg IVPB q12h  Pharmacy will continue to monitor patient and adjust therapy as indicated    [address] 04/09/21 @1766    Thank you for the consult.   Beau Tam RPh   4/8/2021 5:47 AM

## 2021-04-08 NOTE — PROGRESS NOTES
[address] [place] [address]    Ever Bhatt is a 50 y.o. male started on vancomycin for skin and soft tissue infection. Pharmacy consulted by ED provider Meghan Romero PA-C to order a dose of vancomycin in the emergency department. Other antimicrobials: Cefepime    Ht Readings from Last 1 Encounters:   04/07/21 6' (1.829 m)     Wt Readings from Last 3 Encounters:   04/07/21 182 lb (82.6 kg)   03/10/21 175 lb (79.4 kg)   03/03/21 175 lb (79.4 kg)        Pertinent Laboratory Values:   Temp Readings from Last 3 Encounters:   04/07/21 98.4 °F (36.9 °C) (Oral)   03/18/21 98.2 °F (36.8 °C) (Infrared)   03/03/21 97.5 °F (36.4 °C)     Recent Labs     04/08/21  0141   WBC 10.7*     Recent Labs     04/08/21  0141   BUN 14   CREATININE 0.8*     Estimated Creatinine Clearance: 125 mL/min (A) (based on SCr of 0.8 mg/dL (L)). No intake or output data in the 24 hours ending 04/08/21 0245    Assessment/Plan:  Pharmacy will order vancomycin 1500 mg (18.2 mg/kg). Please note, pharmacy will order a one-time dose of vancomycin for the Emergency Department. The consult will need to be re-ordered if vancomycin is to continue upon admission. Thank you for the consult.   Sharron Christian RPh  4/8/2021 2:45 AM

## 2021-04-08 NOTE — PROGRESS NOTES
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      HTN  -due to tachycardia today metoprolol added to his current inpatient meds

## 2021-04-08 NOTE — CONSULTS
[facility] Continence Nurse  Consult Note       Alan Melvin  AGE: 50 y.o. GENDER: male  : 1975  TODAY'S DATE:  2021    Subjective:     Reason for Evaluation and Assessment: wound care assessment       Alan Melvin is a 50 y.o. male referred by:   [x] Physician  [] Nursing  [] Other:     Wound Identification:  Wound Type: pressure, non-healing surgical and neuropathic   Contributing Factors: edema, chronic pressure and neuropathy        PAST MEDICAL HISTORY        Diagnosis Date    Hypertension     does not take medications       PAST SURGICAL HISTORY    Past Surgical History:   Procedure Laterality Date    FOOT DEBRIDEMENT Bilateral 2021    BILATERAL TOE  DEBRIDEMENT INCISION AND DRAINAGE, AMPUTATION OF LEFT 1ST, 2ND, 3RD AND TIP OF 4TH TOE AND RIGHT GREAT TOE performed by Josie Blanchard MD at [address] [place] [address]    History reviewed. No pertinent family history. SOCIAL HISTORY    Social History     Tobacco Use    Smoking status: Former Smoker     Quit date: 2021     Years since quittin.2    Smokeless tobacco: Never Used   Substance Use Topics    Alcohol use: Yes     Comment: most days    Drug use: No       ALLERGIES    No Known Allergies    MEDICATIONS    No current facility-administered medications on file prior to encounter. Current Outpatient Medications on File Prior to Encounter   Medication Sig Dispense Refill    omeprazole (PRILOSEC) 20 MG delayed release capsule Take 20 mg by mouth daily      lisinopril (PRINIVIL;ZESTRIL) 20 MG tablet Take 20 mg by mouth daily      oxyCODONE-acetaminophen (PERCOCET) 5-325 MG per tablet Take 1 tablet by mouth every 6 hours as needed for Pain.       amLODIPine (NORVASC) 5 MG tablet Take 1 tablet by mouth daily 30 tablet 3         Objective:      BP (!) 146/87   Pulse 106   Temp 98.4 °F (36.9 °C) (Oral)   Resp 25   Ht 6' (1.829 m)   Wt 182 lb (82.6 kg)   SpO2 98%   BMI 24.68 kg/m²   Naveen Risk Score: LABS    CBC:   Lab Results   Component Value Date    WBC 10.7 04/08/2021    RBC 4.45 04/08/2021    HGB 12.5 04/08/2021    HCT 37.4 04/08/2021    MCV 84.0 04/08/2021    MCH 28.1 04/08/2021    MCHC 33.4 04/08/2021    RDW 14.5 04/08/2021     04/08/2021    MPV 8.4 04/08/2021     CMP:    Lab Results   Component Value Date     04/08/2021    K 4.2 04/08/2021    CL 95 04/08/2021    CO2 25 04/08/2021    BUN 14 04/08/2021    CREATININE 0.8 04/08/2021    GFRAA >60 04/08/2021    LABGLOM >60 04/08/2021    GLUCOSE 107 04/08/2021    PROT 7.8 04/08/2021    PROT 8.0 10/14/2010    LABALBU 4.4 04/08/2021    CALCIUM 9.2 04/08/2021    BILITOT 1.1 04/08/2021    ALKPHOS 73 04/08/2021    AST 20 04/08/2021    ALT 7 04/08/2021     Albumin:    Lab Results   Component Value Date    LABALBU 4.4 04/08/2021     PT/INR:  No results found for: PROTIME, INR  HgBA1c:  No results found for: LABA1C      Assessment:     Patient Active Problem List   Diagnosis    Rhabdomyolysis    Frostbite    Wound infection after surgery       Measurements:  Wound 04/08/21 Right;Plantar (Active)   Dressing Status New dressing applied 04/08/21 1007   Wound Cleansed Cleansed with saline 04/08/21 1007   Wound Length (cm) 3.5 cm 04/08/21 1007   Wound Width (cm) 6 cm 04/08/21 1007   Wound Depth (cm) 0.1 cm 04/08/21 1007   Wound Surface Area (cm^2) 21 cm^2 04/08/21 1007   Wound Volume (cm^3) 2.1 cm^3 04/08/21 1007   Distance Tunneling (cm) 0 cm 04/08/21 1007   Tunneling Position ___ O'Clock 0 04/08/21 1007   Undermining Starts ___ O'Clock 0 04/08/21 1007   Undermining Ends___ O'Clock 0 04/08/21 1007   Undermining Maxium Distance (cm) 0 04/08/21 1007   Drainage Amount Moderate 04/08/21 1007   Drainage Description Serosanguinous; Yellow 04/08/21 1007   Odor None 04/08/21 1007   Magi-wound Assessment Intact 04/08/21 1007   Margins Defined edges 04/08/21 1007   Wound Thickness Description not for Pressure Injury Full thickness 04/08/21 1007   Number of days: 0       Wound 04/08/21 Toe (Comment  which one) Right great toe (Active)   Wound Etiology Non-Healing Surgical 04/08/21 1007   Dressing Status New dressing applied 04/08/21 1007   Wound Cleansed Cleansed with saline 04/08/21 1007   Wound Length (cm) 3 cm 04/08/21 1007   Wound Width (cm) 1.7 cm 04/08/21 1007   Wound Depth (cm) 0.1 cm 04/08/21 1007   Wound Surface Area (cm^2) 5.1 cm^2 04/08/21 1007   Wound Volume (cm^3) 0.51 cm^3 04/08/21 1007   Distance Tunneling (cm) 0 cm 04/08/21 1007   Tunneling Position ___ O'Clock 0 04/08/21 1007   Undermining Starts ___ O'Clock 0 04/08/21 1007   Undermining Ends___ O'Clock 0 04/08/21 1007   Undermining Maxium Distance (cm) 0 04/08/21 1007   Drainage Amount Moderate 04/08/21 1007   Drainage Description Purulent;Serosanguinous; Yellow 04/08/21 1007   Odor None 04/08/21 1007   Magi-wound Assessment Dry/flaky 04/08/21 1007   Margins Defined edges 04/08/21 1007   Wound Thickness Description not for Pressure Injury Full thickness 04/08/21 1007   Number of days: 0       Wound 04/08/21 Toe (Comment  which one) Right 2nd toe (Active)   Dressing Status New dressing applied 04/08/21 1007   Wound Cleansed Cleansed with saline 04/08/21 1007   Wound Length (cm) 0.9 cm 04/08/21 1007   Wound Width (cm) 1.5 cm 04/08/21 1007   Wound Depth (cm) 0.1 cm 04/08/21 1007   Wound Surface Area (cm^2) 1.35 cm^2 04/08/21 1007   Wound Volume (cm^3) 0.14 cm^3 04/08/21 1007   Distance Tunneling (cm) 0 cm 04/08/21 1007   Tunneling Position ___ O'Clock 0 04/08/21 1007   Undermining Starts ___ O'Clock 0 04/08/21 1007   Undermining Ends___ O'Clock 0 04/08/21 1007   Undermining Maxium Distance (cm) 0 04/08/21 1007   Drainage Amount Small 04/08/21 1007   Drainage Description Serosanguinous 04/08/21 1007   Odor None 04/08/21 1007   Magi-wound Assessment Dry/flaky 04/08/21 1007   Margins Attached edges 04/08/21 1007   Number of days: 0       Wound 04/08/21 Foot Right;Lateral (Active)   Wound Etiology Other 04/08/21 1007   Dressing Status New dressing applied 04/08/21 1007   Wound Cleansed Cleansed with saline 04/08/21 1007   Wound Length (cm) 3 cm 04/08/21 1007   Wound Width (cm) 3 cm 04/08/21 1007   Wound Depth (cm) 0.1 cm 04/08/21 1007   Wound Surface Area (cm^2) 9 cm^2 04/08/21 1007   Wound Volume (cm^3) 0.9 cm^3 04/08/21 1007   Distance Tunneling (cm) 0 cm 04/08/21 1007   Tunneling Position ___ O'Clock 0 04/08/21 1007   Undermining Starts ___ O'Clock 0 04/08/21 1007   Undermining Ends___ O'Clock 0 04/08/21 1007   Undermining Maxium Distance (cm) 0 04/08/21 1007   Drainage Amount Small 04/08/21 1007   Drainage Description Serosanguinous 04/08/21 1007   Odor None 04/08/21 1007   Magi-wound Assessment Intact 04/08/21 1007   Margins Defined edges 04/08/21 1007   Wound Thickness Description not for Pressure Injury Full thickness 04/08/21 1007   Number of days: 0       Wound 04/08/21 Left;Plantar blister cluster (Active)   Wound Etiology Other 04/08/21 1007   Dressing Status New dressing applied 04/08/21 1007   Wound Cleansed Cleansed with saline 04/08/21 1007   Wound Length (cm) 4.5 cm 04/08/21 1007   Wound Width (cm) 5 cm 04/08/21 1007   Wound Depth (cm) 0 cm 04/08/21 1007   Wound Surface Area (cm^2) 22.5 cm^2 04/08/21 1007   Wound Volume (cm^3) 0 cm^3 04/08/21 1007   Distance Tunneling (cm) 0 cm 04/08/21 1007   Tunneling Position ___ O'Clock 0 04/08/21 1007   Undermining Starts ___ O'Clock 0 04/08/21 1007   Undermining Ends___ O'Clock 0 04/08/21 1007   Undermining Maxium Distance (cm) 0 04/08/21 1007   Drainage Amount None 04/08/21 1007   Odor None 04/08/21 1007   Magi-wound Assessment Intact 04/08/21 1007   Margins Attached edges 04/08/21 1007   Number of days: 0       Wound 04/08/21 Toe (Comment  which one) Left great blister (Active)   Wound Etiology Non-Healing Surgical 04/08/21 1007   Dressing Status New dressing applied 04/08/21 1007   Wound Cleansed Cleansed with saline 04/08/21 1007   Wound Length (cm) 1.2 cm 04/08/21 1007   Wound Width (cm) 1.5 cm 04/08/21 1007   Wound Depth (cm) 0 cm 04/08/21 1007   Wound Surface Area (cm^2) 1.8 cm^2 04/08/21 1007   Wound Volume (cm^3) 0 cm^3 04/08/21 1007   Distance Tunneling (cm) 0 cm 04/08/21 1007   Tunneling Position ___ O'Clock 0 04/08/21 1007   Undermining Starts ___ O'Clock 0 04/08/21 1007   Undermining Ends___ O'Clock 0 04/08/21 1007   Undermining Maxium Distance (cm) 0 04/08/21 1007   Drainage Amount None 04/08/21 1007   Odor None 04/08/21 1007   Magi-wound Assessment Dry/flaky 04/08/21 1007   Margins Attached edges 04/08/21 1007   Number of days: 0       Wound 04/08/21 Toe (Comment  which one) Left 3rd toe (Active)   Dressing Status New dressing applied 04/08/21 1007   Wound Cleansed Cleansed with saline 04/08/21 1007   Wound Length (cm) 1 cm 04/08/21 1007   Wound Width (cm) 1 cm 04/08/21 1007   Wound Depth (cm) 0.1 cm 04/08/21 1007   Wound Surface Area (cm^2) 1 cm^2 04/08/21 1007   Wound Volume (cm^3) 0.1 cm^3 04/08/21 1007   Distance Tunneling (cm) 0 cm 04/08/21 1007   Tunneling Position ___ O'Clock 0 04/08/21 1007   Undermining Starts ___ O'Clock 0 04/08/21 1007   Undermining Ends___ O'Clock 0 04/08/21 1007   Undermining Maxium Distance (cm) 0 04/08/21 1007   Drainage Amount None 04/08/21 1007   Odor None 04/08/21 1007   Magi-wound Assessment Dry/flaky 04/08/21 1007   Margins Attached edges 04/08/21 1007   Number of days: 0       Wound 04/08/21 Toe (Comment  which one) Left 4th toe (Active)   Dressing Status New dressing applied 04/08/21 1007   Wound Cleansed Cleansed with saline 04/08/21 1007   Wound Length (cm) 1 cm 04/08/21 1007   Wound Width (cm) 2 cm 04/08/21 1007   Wound Depth (cm) 0.1 cm 04/08/21 1007   Wound Surface Area (cm^2) 2 cm^2 04/08/21 1007   Wound Volume (cm^3) 0.2 cm^3 04/08/21 1007   Distance Tunneling (cm) 0 cm 04/08/21 1007   Tunneling Position ___ O'Clock 0 04/08/21 1007   Undermining Starts ___ O'Clock 0 04/08/21 1007   Undermining Ends___ O'Clock 0 04/08/21 1007   Undermining Maxium Distance (cm) 0 04/08/21 1007   Drainage Amount None 04/08/21 1007   Odor None 04/08/21 1007   Magi-wound Assessment Dry/flaky 04/08/21 1007   Margins Defined edges 04/08/21 1007   Wound Thickness Description not for Pressure Injury Full thickness 04/08/21 1007   Number of days: 0       Wound 04/08/21 Toe (Comment  which one) Left 5th toe (Active)   Wound Etiology Other 04/08/21 1007   Dressing Status New dressing applied 04/08/21 1007   Wound Cleansed Cleansed with saline 04/08/21 1007   Wound Length (cm) 1.2 cm 04/08/21 1007   Wound Width (cm) 1.5 cm 04/08/21 1007   Wound Depth (cm) 0.1 cm 04/08/21 1007   Wound Surface Area (cm^2) 1.8 cm^2 04/08/21 1007   Wound Volume (cm^3) 0.18 cm^3 04/08/21 1007   Distance Tunneling (cm) 0 cm 04/08/21 1007   Tunneling Position ___ O'Clock 0 04/08/21 1007   Undermining Starts ___ O'Clock 0 04/08/21 1007   Undermining Ends___ O'Clock 0 04/08/21 1007   Undermining Maxium Distance (cm) 0 04/08/21 1007   Drainage Amount Small 04/08/21 1007   Drainage Description Serosanguinous 04/08/21 1007   Odor None 04/08/21 1007   Magi-wound Assessment Dry/flaky 04/08/21 1007   Margins Defined edges 04/08/21 1007   Wound Thickness Description not for Pressure Injury Full thickness 04/08/21 1007   Number of days: 0       Response to treatment:  Well tolerated by patient.      Pain Assessment:  Severity:  moderate  Quality of pain: sore  Wound Pain Timing/Severity: dressing change  Premedicated: no    Plan:     Plan of Care: Wound 04/08/21 Right;Plantar-Dressing/Treatment: (optifoam ag abd  kerlix tape)  Wound 04/08/21 Toe (Comment  which one) Right great toe-Dressing/Treatment: (optifoam ag abd  kerlix tape gauze in between toes )  Wound 04/08/21 Toe (Comment  which one) Right 2nd toe-Dressing/Treatment: (optifoam ag abd kerlix tape gauze in between toe)  Wound 04/08/21 Foot Right;Lateral-Dressing/Treatment: (optifoam ag kerlix tape)  Wound 04/08/21 Left;Plantar blister cluster-Dressing/Treatment: (optifoam ag kerlix tape )  Wound 04/08/21 Toe (Comment  which one) Left great blister-Dressing/Treatment: (optifoam ag abd kerlix gauze in between toes)  Wound 04/08/21 Toe (Comment  which one) Left 3rd toe-Dressing/Treatment: (optifoam ag abd kerlix tape gauze in between toes )  Wound 04/08/21 Toe (Comment  which one) Left 4th toe-Dressing/Treatment: (optifoam ag abd kerlix tape gauze in between toes )  Wound 04/08/21 Toe (Comment  which one) Left 5th toe-Dressing/Treatment: (optifoam ag abd kerlix tape gauze in between toes)     Patient in bed trying to order breakfast assisted how to call and pt ordered. Pt agreeable to wound care assessment. Pt has wounds to both feet admitted with frostbite in feb/2021 and had toes amputations by Dr Tiarra Nielson now with new blisters and cellulitis. Dr Joanie Macario saw pt today with pt to be admitted for the cellulitis/blisters. Dressings removed. Cleansed with NS. Measured and pictured. Applied dressings as above. Dr Joanie Macario came into the room this visit to give pt post-op shoes and would like to have  evaluate pt for front offloading shoes. Heels intact. Pt is generally not at risk for skin breakdown AEB soraida score. Specialty Bed Required : no  [] Low Air Loss   [] Pressure Redistribution  [] Fluid Immersion  [] Bariatric  [] Total Pressure Relief  [] Other:     Discharge Plan  Placement for patient upon discharge: tbd  Hospice Care: no  Patient appropriate for Outpatient [address]: pt follows with dr higgins in his office. Patient/Caregiver Teaching:  Level of patient/caregiver understanding able to: pt verbalized understanding of wound care. Electronically signed by Roberto Baugh RN,  on 4/8/2021 at 10:25 AM

## 2021-04-08 NOTE — CONSULTS
Department of General Surgery   Surgical Service Dr. Sanam Drake   Consult Note    Date of Consult: 4/8/21    Reason for Consult: Foot blistering and erythema  Requesting Physician:  Steve Cuadra PA-C    CHIEF COMPLAINT:  Foot infection s/p toe amputation for frostbite    History Obtained From:  patient, electronic medical record    HISTORY OF PRESENT ILLNESS:    The patient is a 50 y.o. male who presented with erythema and drainage from bilateral feet. He suffered frostbite this winter and underwent multiple partial toe amputations on 2/19. He had been followed in clinic and his toes were well healed when last seen. He had been using only a wheelchair for ambulation prior to 4 days ago. He found out he was to be discharged from the nursing home and knew he needed to be able to ambulate to care for himself once discharged. He has been walking on the feet for a few days now. Starting yesterday he developed worsening pain in the feet and drainage. He reported to the ER where he was found to have erythema and drainage and evidence of ruptured blisters on the feet. He denies fevers or chills but has felt nauseous and fatigued. He was tachycardic on presentation which has improved. Afebrile on vitals    Workup is significant for foot xrays negative for osteomyelitis. WBC of 10.7. Culture was sent from the wounds.           Past Medical History:    Past Medical History:   Diagnosis Date    Hypertension     does not take medications       Past Surgical History:    Past Surgical History:   Procedure Laterality Date    FOOT DEBRIDEMENT Bilateral 2/19/2021    BILATERAL TOE  DEBRIDEMENT INCISION AND DRAINAGE, AMPUTATION OF LEFT 1ST, 2ND, 3RD AND TIP OF 4TH TOE AND RIGHT GREAT TOE performed by Meri Falcon MD at [facility] OR       Current Medications:   Current Facility-Administered Medications   Medication Dose Route Frequency Provider Last Rate Last Admin    sodium chloride flush 0.9 % injection 5-40 mL 5-40 mL Intravenous PRN Richa Farnsworth PA-C        amLODIPine (NORVASC) tablet 5 mg  5 mg Oral Daily Sara Dapilah, APRN - CNP        oxyCODONE-acetaminophen (PERCOCET) 5-325 MG per tablet 1 tablet  1 tablet Oral Q6H PRN Sara Dapilah, APRN - CNP        sodium chloride flush 0.9 % injection 5-40 mL  5-40 mL Intravenous 2 times per day Sara Dapilah, APRN - CNP        sodium chloride flush 0.9 % injection 5-40 mL  5-40 mL Intravenous PRN [facility], APR[facility] - CNP        0.9 % sodium chloride infusion  25 mL Intravenous PRN [facility], APR[facility] - CNP        enoxaparin (LOVENOX) injection 40 mg  40 mg Subcutaneous Daily Sara Aletalah, APRN - CNP        promethazine (PHENERGAN) tablet 12.5 mg  12.5 mg Oral Q6H PRN [facility], APR[facility] - CNP        Or    ondansetron (ZOFRAN) injection 4 mg  4 mg Intravenous Q6H PRN [facility], APRN - CNP        polyethylene glycol (GLYCOLAX) packet 17 g  17 g Oral Daily PRN [facility], APRN - CNP        acetaminophen (TYLENOL) tablet 650 mg  650 mg Oral Q6H PRN [facility], APR[facility] - CNP        Or    acetaminophen (TYLENOL) suppository 650 mg  650 mg Rectal Q6H PRN Sara Dapilah, APRN - CNP        pantoprazole (PROTONIX) tablet 40 mg  40 mg Oral QAM AC [facility], APR[facility] - CNP        HYDROcodone-acetaminophen (NORCO) 5-325 MG per tablet 1 tablet  1 tablet Oral Q6H PRN [facility], APRN - CNP        vancomycin (VANCOCIN) 1,500 mg in dextrose 5 % 500 mL IVPB  1,500 mg Intravenous Q12H Rose Cleary MD         Current Outpatient Medications   Medication Sig Dispense Refill    omeprazole (PRILOSEC) 20 MG delayed release capsule Take 20 mg by mouth daily      lisinopril (PRINIVIL;ZESTRIL) 20 MG tablet Take 20 mg by mouth daily      oxyCODONE-acetaminophen (PERCOCET) 5-325 MG per tablet Take 1 tablet by mouth every 6 hours as needed for Pain.       amLODIPine (NORVASC) 5 MG tablet Take 1 tablet by mouth daily 30 tablet 3 Allergies:  Patient has no known allergies. Social History:   Social History     Socioeconomic History    Marital status: Single     Spouse name: None    Number of children: None    Years of education: None    Highest education level: None   Occupational History    None   Social Needs    Financial resource strain: None    Food insecurity     Worry: None     Inability: None    Transportation needs     Medical: None     Non-medical: None   Tobacco Use    Smoking status: Former Smoker     Quit date: 2021     Years since quittin.2    Smokeless tobacco: Never Used   Substance and Sexual Activity    Alcohol use: Yes     Comment: most days    Drug use: No    Sexual activity: None   Lifestyle    Physical activity     Days per week: None     Minutes per session: None    Stress: None   Relationships    Social connections     Talks on phone: None     Gets together: None     Attends Sikh service: None     Active member of club or organization: None     Attends meetings of clubs or organizations: None     Relationship status: None    Intimate partner violence     Fear of current or ex partner: None     Emotionally abused: None     Physically abused: None     Forced sexual activity: None   Other Topics Concern    None   Social History Narrative    None       Family History:   History reviewed. No pertinent family history. REVIEW OFSYSTEMS:    Review of Systems   Constitutional: Negative for chills. Negative for fever. Positive for fatigue  HENT: Negative for congestion. Negative for rhinorrhea. Respiratory: Negative for cough. Negative for shortness of breath. Negative for wheezing. Cardiovascular: Negative for chest pain. Gastrointestinal: positive for nausea. Negative for constipation. Negative for diarrhea. Genitourinary: Negative for difficulty urinating. Neurological: Negative for dizziness, syncope and numbness. Hematological: Does not bruise/bleed easily. PHYSICAL EXAM:  Vitals:    04/08/21 0459 04/08/21 0602 04/08/21 0630 04/08/21 0701   BP: 132/89 121/80 125/88 122/85   Pulse: 103 86 104 84   Resp: 24 12 16 22   Temp:       TempSrc:       SpO2: 98% 97%  98%   Weight:       Height:           Physical Exam  General: awake, alert, in no acute distress  HEENT: mucous membranes moist  Respiratory: normal effort, no wheezes appreciated  CV: appears well perfused, regular rate and rhythm  Abdomen: Soft, non-tender, non-distended. Skin: warm and dry  Extremities: bilateral feet with mild erythema and edema from the toes to mid foot. Unroofed blister over the plantar pad of the right foot and smaller intact/dried out blister on the plantar pad of the left foot. Mild seropurulent drainage, positive for odor but the foot had not been cleaned. Neuro: no focal deficits noted  Psych: mood normal        DATA:    Lab Results   Component Value Date    WBC 10.7 (H) 04/08/2021    HGB 12.5 (L) 04/08/2021    HCT 37.4 (L) 04/08/2021    MCV 84.0 04/08/2021     04/08/2021     Lab Results   Component Value Date     04/08/2021    K 4.2 04/08/2021    CL 95 04/08/2021    CO2 25 04/08/2021    BUN 14 04/08/2021    CREATININE 0.8 04/08/2021    GLUCOSE 107 04/08/2021    CALCIUM 9.2 04/08/2021        IMPRESSION:    50 y.o. male s/p toe amputations for frostbite now with blistering and cellulitis after increasing ambulation recently. Ruptured blisters were sharply debrided with scalpel at bedside this morning. Area debrided was ~15sq cm and only epidermis was removed. Feet were washed and dressings applied. Zero blood loss. No anesthetic was required and Nehemiah tolerated it well.     Patient Active Problem List:     Rhabdomyolysis     Frostbite     Wound infection after surgery    PLAN:  - agree with admission with IV antibiotics--tailor antibiotics once cultures are available  - does not appear to have any surgically drainable source of infection--I expect this is more cellulitis  - recommend front offloading shoes  - Wound Care evaluation for assistance with dressing changes  - PT/OT evaluation--ok for up once offloading shoes are available  - will continue to follow        Electronically signed by Jamila Crow MD on 4/8/2021 at 9:11 AM

## 2021-04-08 NOTE — ED NOTES
Patient presents to Ed s/p wound check states had toe amputations from Dr. Radha Herzog In December d/t frostbite states was told to do light ambulation a couple weeks ago. States today was walking and states the wounds have opened up. Per assessment right worse then left. Right has skin peeling off bottom of foot, odor noted. States the skin began to peel today.       Kaylen Andrade RN  04/07/21 0341

## 2021-04-08 NOTE — ED NOTES
EK lead EKG as interpreted by me reveals normal sinus rhythm. Axis is normal. There are no ischemic ST elevations or other suspicious ST changes;  QRS interval is narrow, QT interval is not prolonged. Final interpretation: Normal sinus rhythm.        Svitlana Stlol MD  [phone]

## 2021-04-08 NOTE — ED PROVIDER NOTES
EMERGENCY DEPARTMENT ENCOUNTER      PCP: No primary care provider on file. CHIEF COMPLAINT    Chief Complaint   Patient presents with    Toe Pain     bilateral, wound check, states had toe amputation in [facility] and wounds are open     Wound Check       This patient was not evaluated by the attending physician. I have independently evaluated this patient. My supervising physician was available for consultation. HPI    Nneka Franks is a 50 y.o. male who presents with redness of the skin located on both feet since earlier today at least.  Context is patient reports he had amputations performed back in December 2020 after frostbite occurred and was recently discharged 2 days ago from care facility. Patient has been ambulating recently and today noticed a large skin had peeled off the bottom of his right foot along with some blood and yellow fluid. Patient reports severe sharp pain of both feet that he rates 10 out of 10. Duration of pain has been constant since onset. Patient reports chronic bilateral foot pain since frostbite occurred but pain has been worse today. Aggravating factor is palpation of affected areas and ambulation. There are no alleviating factors. Patient reports he has been taking narcotic pain medication but that it only makes him feel nauseous and makes him want to vomit. He denies emesis. Patient reports associated subjective fever and chills as well as foul smell coming from his feet. He denies measured temperature. Patient denies numbness, weakness, tingling, functional/motor deficit.       REVIEW OF SYSTEMS    Constitutional: + subjective fever, chills  Respiratory:  No cough or shortness of breath   Cardiovascular:  No chest pain  GI: No nausea, vomiting  Musculoskeletal:  See HPI   Skin:  See HPI      All other review of systems are negative  See HPI and nursing notes for additional information     PAST MEDICAL HISTORY/SURGICAL HISTORY     Past Medical History: Diagnosis Date    Hypertension     does not take medications     Past Surgical History:   Procedure Laterality Date    FOOT DEBRIDEMENT Bilateral 2021    BILATERAL TOE  DEBRIDEMENT INCISION AND DRAINAGE, AMPUTATION OF LEFT 1ST, 2ND, 3RD AND TIP OF 4TH TOE AND RIGHT GREAT TOE performed by Zaria Gonzales MD at [address] [place] [address]    Current Outpatient Rx   Medication Sig Dispense Refill    omeprazole (PRILOSEC) 20 MG delayed release capsule Take 20 mg by mouth daily      lisinopril (PRINIVIL;ZESTRIL) 20 MG tablet Take 20 mg by mouth daily      oxyCODONE-acetaminophen (PERCOCET) 5-325 MG per tablet Take 1 tablet by mouth every 6 hours as needed for Pain.  amLODIPine (NORVASC) 5 MG tablet Take 1 tablet by mouth daily 30 tablet 3       ALLERGIES    No Known Allergies    FAMILY HISTORY/SOCIAL HISTORY    History reviewed. No pertinent family history.   Social History     Socioeconomic History    Marital status: Single     Spouse name: None    Number of children: None    Years of education: None    Highest education level: None   Occupational History    None   Social Needs    Financial resource strain: None    Food insecurity     Worry: None     Inability: None    Transportation needs     Medical: None     Non-medical: None   Tobacco Use    Smoking status: Former Smoker     Quit date: 2021     Years since quittin.2    Smokeless tobacco: Never Used   Substance and Sexual Activity    Alcohol use: Yes     Comment: most days    Drug use: No    Sexual activity: None   Lifestyle    Physical activity     Days per week: None     Minutes per session: None    Stress: None   Relationships    Social connections     Talks on phone: None     Gets together: None     Attends Jainism service: None     Active member of club or organization: None     Attends meetings of clubs or organizations: None     Relationship status: None    Intimate partner violence     Fear of current or ex partner: None     Emotionally abused: None     Physically abused: None     Forced sexual activity: None   Other Topics Concern    None   Social History Narrative    None       PHYSICAL EXAM    VITAL SIGNS: BP (!) 145/109   Pulse 124   Temp 98.4 °F (36.9 °C) (Oral)   Resp 20   Ht 6' (1.829 m)   Wt 182 lb (82.6 kg)   SpO2 96%   BMI 24.68 kg/m²    Constitutional:  Well developed, Well nourished  HENT:  Atraumatic, Normocephalic, PERRL, no eye drainage noted, bilateral external ears normal, no sinus tenderness, nose normal, oropharynx moist, no pharyngeal exudates. Neck- supple. No adenopathy. Respiratory:  No retractions, lungs are clear   Cardiovascular:  Heart rate mildly tachycardic, no JVD  GI:  Soft, No tenderness. Musculoskeletal:  No acute bony deformity, no obvious joint effusion. Bilateral feet as described below in integumentary section. There is diffuse tenderness palpation of the bilateral feet and toes without point tenderness. Decreased active range of motion of toes due to pain. Bilateral ankles are without gross deformity, swelling, discoloration, tenderness palpation, range of motion deficits. Integument:  No cyanosis. There is macular erythema of the bilateral distal feet and toes present without induration. There is slight blistering of skin with apparent purulent material underneath the dermis presents on dorsal distal left foot as well as dorsal distal left fourth and fifth toes. No drainage present. No crepitus. Right foot reveals a large area of ulceration with loss of epidermis and dermis of the dorsal distal aspect of the left foot overlying the first through third metatarsals. There is another area of blistering and sloughing of skin along the proximal fifth metatarsal region on the dorsal aspect of the foot. See pictures below. No vesicles, petechiae, purpura, drainage, bullae. No crepitus. No areas of necrosis are seen.        Document Information    Wound   Left foot 04/08/2021 01:46        Document Information    Wound   Right foot   04/08/2021 01:44       Vascular: Dorsalis pedis pulses 2+ equal bilaterally  Neurologic:  Alert & oriented, no slurred speech.        LABS:  Results for orders placed or performed during the hospital encounter of 04/08/21   CBC auto diff   Result Value Ref Range    WBC 10.7 (H) 4.0 - 10.5 K/CU MM    RBC 4.45 (L) 4.6 - 6.2 M/CU MM    Hemoglobin 12.5 (L) 13.5 - 18.0 GM/DL    Hematocrit 37.4 (L) 42 - 52 %    MCV 84.0 78 - 100 FL    MCH 28.1 27 - 31 PG    MCHC 33.4 32.0 - 36.0 %    RDW 14.5 11.7 - 14.9 %    Platelets 707 146 - 371 K/CU MM    MPV 8.4 7.5 - 11.1 FL    Differential Type AUTOMATED DIFFERENTIAL     Segs Relative 70.6 (H) 36 - 66 %    Lymphocytes % 20.1 (L) 24 - 44 %    Monocytes % 8.7 (H) 0 - 4 %    Eosinophils % 0.1 0 - 3 %    Basophils % 0.2 0 - 1 %    Segs Absolute 7.6 K/CU MM    Lymphocytes Absolute 2.2 K/CU MM    Monocytes Absolute 0.9 K/CU MM    Eosinophils Absolute 0.0 K/CU MM    Basophils Absolute 0.0 K/CU MM    Nucleated RBC % 0.0 %    Total Nucleated RBC 0.0 K/CU MM    Total Immature Neutrophil 0.03 K/CU MM    Immature Neutrophil % 0.3 0 - 0.43 %   CMP   Result Value Ref Range    Sodium 131 (L) 135 - 145 MMOL/L    Potassium 4.2 3.5 - 5.1 MMOL/L    Chloride 95 (L) 99 - 110 mMol/L    CO2 25 21 - 32 MMOL/L    BUN 14 6 - 23 MG/DL    CREATININE 0.8 (L) 0.9 - 1.3 MG/DL    Glucose 107 (H) 70 - 99 MG/DL    Calcium 9.2 8.3 - 10.6 MG/DL    Albumin 4.4 3.4 - 5.0 GM/DL    Total Protein 7.8 6.4 - 8.2 GM/DL    Total Bilirubin 1.1 (H) 0.0 - 1.0 MG/DL    ALT 7 (L) 10 - 40 U/L    AST 20 15 - 37 IU/L    Alkaline Phosphatase 73 40 - 128 IU/L    GFR Non-African American >60 >60 mL/min/1.73m2    GFR African American >60 >60 mL/min/1.73m2    Anion Gap 11 4 - 16   EKG 12 Lead   Result Value Ref Range    Ventricular Rate 95 BPM    Atrial Rate 95 BPM    P-R Interval 152 ms    QRS Duration 96 ms    Q-T Interval 374 ms    QTc Calculation (Bazett) 469 ms P Axis 64 degrees    R Axis -7 degrees    T Axis 54 degrees    Diagnosis       Normal sinus rhythm  Normal ECG  No previous ECGs available           IMAGING:  XR FOOT LEFT (MIN 3 VIEWS)   Preliminary Result   Postsurgical changes are seen to the left 1st through 3rd toes. There is no   definite evidence of osteomyelitis. XR FOOT RIGHT (MIN 3 VIEWS)   Preliminary Result   Postsurgical changes are seen to the great toe without evidence of   osteomyelitis. ED COURSE & MEDICAL DECISION MAKING       Vital signs and nursing notes reviewed during ED course. I have independently evaluated this patient. Supervising physician present in the Emergency Department, available for consultation, throughout entirety of patient care. History and exam consistent with bilateral foot pain from cellulitis of both feet with open wound of right foot. While in the ED today, labs and imaging were obtained. Labs remarkable for mild leukocytosis. Bilateral foot x-rays obtained reveal postsurgical changes present without definite evidence of osteomyelitis. I did consult with patient's general surgeon, Dr. Jose F Summers, and we discussed the patient's case. He agrees with plan for admission to hospitalist, IV antibiotics, and he will follow along with patient. Patient placed on IV cefepime and IV vancomycin with concern for sepsis given leukocytosis and tachycardia with source of infection being cellulitis of feet. Patient treated with IV morphine with improvement in pain and IV zofranwith improvement in nausea. Wound culture and blood cultures were ordered. Lactic acid ordered. I consulted with hospitalist and we discussed the patient's case. Hospitalist agrees to admit the patient at this time for further evaluation and care. The patient and/or the family were informed of the results of any tests/labs/imaging, the treatment plan, and time was allotted to answer questions.     Diagnosis and plan discussed in detail with patient who understands and agrees. Patient agrees to admission at this time for further evaluation care. In consideration of current COVID19 pandemic, with effort to minimize unnecessary provider exposure, this patient was seen at bedside by me independently. However, in compliance with current hospital JACQUES/ED protocol, prior to admission I did discuss this patient case with emergency department physician, Dr. Jessica Arenas. Clinical IMPRESSION    1. Bilateral foot pain    2. Open wound of right foot, initial encounter    3. Cellulitis of both feet         Disposition:  Admission      Comment: Please note this report has been produced using speech recognition software and may contain errors related to that system including errors in grammar, punctuation, and spelling, as well as words and phrases that may be inappropriate. If there are any questions or concerns please feel free to contact the dictating provider for clarification.           Julia Ford PA-C  04/08/21 2939

## 2021-04-08 NOTE — ED NOTES
Report called to [facility] RN at this time. Patient to go to [facility].  Ext [phone]     Kj Arroyo RN  04/08/21 [id number]

## 2021-04-09 LAB
AMPHETAMINES: NEGATIVE
ANION GAP SERPL CALCULATED.3IONS-SCNC: 7 MMOL/L (ref 4–16)
BARBITURATE SCREEN URINE: NEGATIVE
BENZODIAZEPINE SCREEN, URINE: NEGATIVE
BUN BLDV-MCNC: 14 MG/DL (ref 6–23)
CALCIUM SERPL-MCNC: 9 MG/DL (ref 8.3–10.6)
CANNABINOID SCREEN URINE: NEGATIVE
CHLORIDE BLD-SCNC: 100 MMOL/L (ref 99–110)
CO2: 28 MMOL/L (ref 21–32)
COCAINE METABOLITE: NEGATIVE
CREAT SERPL-MCNC: 0.8 MG/DL (ref 0.9–1.3)
GFR AFRICAN AMERICAN: >60 ML/MIN/1.73M2
GFR NON-AFRICAN AMERICAN: >60 ML/MIN/1.73M2
GLUCOSE BLD-MCNC: 103 MG/DL (ref 70–99)
HCT VFR BLD CALC: 35.3 % (ref 42–52)
HEMOGLOBIN: 11.5 GM/DL (ref 13.5–18)
HIGH SENSITIVE C-REACTIVE PROTEIN: 86.5 MG/L
MCH RBC QN AUTO: 28 PG (ref 27–31)
MCHC RBC AUTO-ENTMCNC: 32.6 % (ref 32–36)
MCV RBC AUTO: 85.9 FL (ref 78–100)
OPIATES, URINE: ABNORMAL
OXYCODONE: NEGATIVE
PDW BLD-RTO: 14.6 % (ref 11.7–14.9)
PHENCYCLIDINE, URINE: NEGATIVE
PLATELET # BLD: 296 K/CU MM (ref 140–440)
PMV BLD AUTO: 9.1 FL (ref 7.5–11.1)
POTASSIUM SERPL-SCNC: 4.4 MMOL/L (ref 3.5–5.1)
PROCALCITONIN: 0.09
RBC # BLD: 4.11 M/CU MM (ref 4.6–6.2)
SODIUM BLD-SCNC: 135 MMOL/L (ref 135–145)
WBC # BLD: 6.3 K/CU MM (ref 4–10.5)

## 2021-04-09 PROCEDURE — 80202 ASSAY OF VANCOMYCIN: CPT

## 2021-04-09 PROCEDURE — 2580000003 HC RX 258: Performed by: NURSE PRACTITIONER

## 2021-04-09 PROCEDURE — 2580000003 HC RX 258: Performed by: INTERNAL MEDICINE

## 2021-04-09 PROCEDURE — 6370000000 HC RX 637 (ALT 250 FOR IP): Performed by: NURSE PRACTITIONER

## 2021-04-09 PROCEDURE — 86141 C-REACTIVE PROTEIN HS: CPT

## 2021-04-09 PROCEDURE — 6360000002 HC RX W HCPCS: Performed by: INTERNAL MEDICINE

## 2021-04-09 PROCEDURE — 1200000000 HC SEMI PRIVATE

## 2021-04-09 PROCEDURE — 6370000000 HC RX 637 (ALT 250 FOR IP): Performed by: INTERNAL MEDICINE

## 2021-04-09 PROCEDURE — 80307 DRUG TEST PRSMV CHEM ANLYZR: CPT

## 2021-04-09 PROCEDURE — 36415 COLL VENOUS BLD VENIPUNCTURE: CPT

## 2021-04-09 PROCEDURE — 80048 BASIC METABOLIC PNL TOTAL CA: CPT

## 2021-04-09 PROCEDURE — 99024 POSTOP FOLLOW-UP VISIT: CPT | Performed by: SURGERY

## 2021-04-09 PROCEDURE — 94761 N-INVAS EAR/PLS OXIMETRY MLT: CPT

## 2021-04-09 PROCEDURE — 85027 COMPLETE CBC AUTOMATED: CPT

## 2021-04-09 PROCEDURE — 84145 PROCALCITONIN (PCT): CPT

## 2021-04-09 PROCEDURE — 6360000002 HC RX W HCPCS: Performed by: NURSE PRACTITIONER

## 2021-04-09 RX ADMIN — ENOXAPARIN SODIUM 40 MG: 40 INJECTION SUBCUTANEOUS at 09:07

## 2021-04-09 RX ADMIN — METOPROLOL TARTRATE 25 MG: 25 TABLET, FILM COATED ORAL at 09:07

## 2021-04-09 RX ADMIN — PANTOPRAZOLE SODIUM 40 MG: 40 TABLET, DELAYED RELEASE ORAL at 06:27

## 2021-04-09 RX ADMIN — AMLODIPINE BESYLATE 5 MG: 5 TABLET ORAL at 09:07

## 2021-04-09 RX ADMIN — HYDROCODONE BITARTRATE AND ACETAMINOPHEN 1 TABLET: 5; 325 TABLET ORAL at 21:50

## 2021-04-09 RX ADMIN — SODIUM CHLORIDE, PRESERVATIVE FREE 10 ML: 5 INJECTION INTRAVENOUS at 09:07

## 2021-04-09 RX ADMIN — CEFTRIAXONE 1000 MG: 1 INJECTION, POWDER, FOR SOLUTION INTRAMUSCULAR; INTRAVENOUS at 09:08

## 2021-04-09 RX ADMIN — HYDROCODONE BITARTRATE AND ACETAMINOPHEN 1 TABLET: 5; 325 TABLET ORAL at 09:07

## 2021-04-09 RX ADMIN — VANCOMYCIN HYDROCHLORIDE 1500 MG: 5 INJECTION, POWDER, LYOPHILIZED, FOR SOLUTION INTRAVENOUS at 15:02

## 2021-04-09 RX ADMIN — METOPROLOL TARTRATE 25 MG: 25 TABLET, FILM COATED ORAL at 21:50

## 2021-04-09 ASSESSMENT — PAIN DESCRIPTION - ORIENTATION: ORIENTATION: RIGHT;LEFT

## 2021-04-09 ASSESSMENT — PAIN SCALES - GENERAL: PAINLEVEL_OUTOF10: 8

## 2021-04-09 ASSESSMENT — PAIN DESCRIPTION - LOCATION: LOCATION: TOE (COMMENT WHICH ONE);FOOT

## 2021-04-09 ASSESSMENT — PAIN - FUNCTIONAL ASSESSMENT: PAIN_FUNCTIONAL_ASSESSMENT: PREVENTS OR INTERFERES SOME ACTIVE ACTIVITIES AND ADLS

## 2021-04-09 ASSESSMENT — PAIN DESCRIPTION - PAIN TYPE: TYPE: ACUTE PAIN;SURGICAL PAIN

## 2021-04-09 NOTE — PROGRESS NOTES
[address]    Hospitalist Progress Note      Name:  Kendall Jara /Age/Sex: 1975  (50 y.o. male)   MRN & CSN:  [id number] & [id number] Admission Date/Time: 2021 12:37 AM   Location:  [address] [place] [address]-A PCP: No primary care provider on file. Hospital Day: 2    Assessment and Plan:   Kendall Jara is a 50 y.o.  male  who presents with     Cellulitis and wound infection after surgery -both feet    Frostbite s/p amputation of toes -left and right feet -21-   Local wound care, pain control  Wound culture growing staph aureus  Continue with IV antibiotics -IV cefepime and vancomycin  General surgery following    Hypertension -continue with amlodipine and metoprolol    Issue with placement -recently discharged from [address] [place] [address];  Dietary Nutrition Supplements: Wound Healing Oral Supplement   DVT Prophylaxis [] Lovenox, []  Heparin, [] SCDs, []No VTE prophylaxis, patient ambulating   GI Prophylaxis [] PPI, [] H2 Blocker, [] No GI prophylaxis, patient is receiving diet/Tube Feeds   Code Status Full Code   Disposition Patient requires continued admission due to wound infection   MDM [] Low, [] Moderate,[]  High     History of Present Illness: Subjective     Patient Seen & Examined at the bedside      Patient feels that his pain was well controlled today -denies any fever    Ten point ROS reviewed negative, unless as noted above    Objective:   No intake or output data in the 24 hours ending 21 1120   Vitals:   Vitals:    21 0904   BP: (!) 141/85   Pulse: 87   Resp: 18   Temp: 98.5 °F (36.9 °C)   SpO2: 99%     Physical Exam:    GEN Awake male, resting in bed in no apparent distress. Appears given age. RESP Clear to auscultation, no wheezes, rales or rhonchi. CARDIO/VASC -S1/S2 auscultated. Regular rate without appreciable murmurs, rubs, or gallops. Peripheral pulses equal bilaterally and palpable. No peripheral edema.   GI Abdomen is soft without significant tenderness, masses, or guarding. Bowel sounds are normoactive. Rectal exam deferred.  Underwood catheter is not present.   MSK both feet dressed-not opened    Medications:   Medications:    vancomycin  1,500 mg Intravenous Q12H    cefTRIAXone (ROCEPHIN) IV  1,000 mg Intravenous Q24H    amLODIPine  5 mg Oral Daily    sodium chloride flush  5-40 mL Intravenous 2 times per day    enoxaparin  40 mg Subcutaneous Daily    pantoprazole  40 mg Oral QAM AC    metoprolol tartrate  25 mg Oral BID      Infusions:    sodium chloride       PRN Meds: sodium chloride flush, 5-40 mL, PRN  oxyCODONE-acetaminophen, 1 tablet, Q6H PRN  sodium chloride flush, 5-40 mL, PRN  sodium chloride, 25 mL, PRN  promethazine, 12.5 mg, Q6H PRN    Or  ondansetron, 4 mg, Q6H PRN  polyethylene glycol, 17 g, Daily PRN  acetaminophen, 650 mg, Q6H PRN    Or  acetaminophen, 650 mg, Q6H PRN  HYDROcodone 5 mg - acetaminophen, 1 tablet, Q6H PRN          Electronically signed by Raquel Astudillo MD on 4/9/2021 at 11:20 AM

## 2021-04-09 NOTE — PROGRESS NOTES
[facility]  consulted by Dr. Nico Li for monitoring and adjustment. Indication for treatment: Infected bilateral amputated toes  Goal trough: ~ 15 mcg/mL    Pertinent Laboratory Values:   Temp Readings from Last 3 Encounters:   04/09/21 97.8 °F (36.6 °C) (Oral)   03/18/21 98.2 °F (36.8 °C) (Infrared)   03/03/21 97.5 °F (36.4 °C)     Recent Labs     04/08/21  0141 04/08/21  0925 04/09/21  0646   WBC 10.7*  --  6.3   LACTATE  --  0.8  --      Recent Labs     04/08/21  0141 04/09/21  0646   BUN 14 14   CREATININE 0.8* 0.8*     Estimated Creatinine Clearance: 125 mL/min (A) (based on SCr of 0.8 mg/dL (L)). No intake or output data in the 24 hours ending 04/09/21 1503    Pertinent Cultures:  Date    Source    Results  4/8   Wound    Pending  4/8   Blood    Ordered    Vancomycin level:   TROUGH:  No results for input(s): VANCOTROUGH in the last 72 hours. RANDOM:  No results for input(s): VANCORANDOM in the last 72 hours. Assessment:  · WBC and temperature:  WBC wnL, afebrile  · SCr, BUN, and urine output: stable, no I/O data  · Day(s) of therapy:# 2  · Vancomycin concentration: To be collected    Plan:  · Continue Vancomycin 1500 mg IVPB q12h  · Trough level ordered prior to fourth dose  · Pharmacy will continue to monitor patient and adjust therapy as indicated    Devin 3 04/09/21 @ 0309. Thank you for the consult.   Mayra Haywood, [address]   4/9/2021 3:03 PM

## 2021-04-09 NOTE — PROGRESS NOTES
GENERAL SURGERY PROGRESS NOTE    Sharmin Nazario is a 50 y.o. male s/p toe amputations for frostbite, admitted with cellulitis and blisters. Subjective:  Doing ok today. Pain well controlled. Reports getting up to the bathroom without wearing protective shoes and having some bleeding from the right foot. Denies new complaints. Objective:    Vitals: VITALS:  BP (!) 141/85   Pulse 87   Temp 98.5 °F (36.9 °C) (Oral)   Resp 18   Ht 6' 0.01\" (1.829 m)   Wt 182 lb (82.6 kg)   SpO2 99%   BMI 24.68 kg/m²     I/O: No intake/output data recorded. Labs/Imaging Results:   Lab Results   Component Value Date     04/09/2021    K 4.4 04/09/2021     04/09/2021    CO2 28 04/09/2021    BUN 14 04/09/2021    CREATININE 0.8 04/09/2021    GLUCOSE 103 04/09/2021    CALCIUM 9.0 04/09/2021      Lab Results   Component Value Date    WBC 6.3 04/09/2021    HGB 11.5 (L) 04/09/2021    HCT 35.3 (L) 04/09/2021    MCV 85.9 04/09/2021     04/09/2021       IV Fluids: sodium chloride    Scheduled Meds:   vancomycin, 1,500 mg, Intravenous, Q12H    cefTRIAXone (ROCEPHIN) IV, 1,000 mg, Intravenous, Q24H    amLODIPine, 5 mg, Oral, Daily    sodium chloride flush, 5-40 mL, Intravenous, 2 times per day    enoxaparin, 40 mg, Subcutaneous, Daily    pantoprazole, 40 mg, Oral, QAM AC    metoprolol tartrate, 25 mg, Oral, BID    Physical Exam:  General: A&O x 3, no distress. HEENT: Anicteric sclerae, MMM. Extremities: Unroofed blister over the plantar pad of the right foot and smaller intact/dried out blister on the plantar pad of the left foot. Several toe tips with small lacerations. Mild fibrinous material present. Serosanguinous drainage drainage. Erythema improved  Abdomen: Soft, nontender, nondistended. Assessment and Plan:   50 y.o. male s/p toe amputations for frostbite now with blistering and cellulitis after increasing ambulation recently. Cellulitis improved.     Patient Active Problem List:     Rhabdomyolysis     Frostbite     Wound infection after surgery      - PT/OT  - Can work on placement, ok for discharge once assistance with wound cares and cares is arranged. - Orthotics referral for front offloading shoes.   - Continue dressing changes per wound care recommendations  - please call if surgical evaluation is needed over the weekend    Brianda Blair MD

## 2021-04-09 NOTE — PROGRESS NOTES
Comprehensive Nutrition Assessment    Type and Reason for Visit:  Wound    Nutrition Recommendations/Plan:   Add wound healing ONS BID  Encourage po intake as able  Please obtain measured weight  Please document all po intake  Will monitor po intake, nutrition status, poc    Nutrition Assessment:  Pt admitted for wound infection after surgery, s/p toe amputation, PMH: HTN, pt currently on general diet, no po intake documented, visited pt at bedside, pt reports UBW~180#, pt reports suspected wt loss over the past 3 mo d/t recent stay at rehab, no measured admission weight noted, pt denies any chewing or swallowing difficulty, pt agreeable to trial wound healing ONS, pt at moderate nutrition risk    Malnutrition Assessment:  Malnutrition Status:  Insufficient data    Context:  Acute Illness       Estimated Daily Nutrient Needs:  Energy (kcal):  5298-9257(48-96 kcal/kg); Weight Used for Energy Requirements:  Current     Protein (g):  (1.2-1.5 g/kg); Weight Used for Protein Requirements:  Current            Wounds:  Surgical Incision(foot wounds, non-healing surgical wounds)       Current Nutrition Therapies:    DIET GENERAL; Anthropometric Measures:  · Height: 6' 0.01\" (182.9 cm)  · Current Body Weight: 182 lb 1.6 oz (82.6 kg)(stated weight)   · Admission Body Weight: (n/a)    · Usual Body Weight: 169 lb 1.5 oz (76.7 kg)((12/26/20) only measured weight per chart review)     · Ideal Body Weight: 178 lbs; % Ideal Body Weight 102.3 %   · BMI: 24.7  · BMI Categories: Normal Weight (BMI 18.5-24. 9)       Nutrition Diagnosis:   · Increased nutrient needs related to healing as evidenced by wounds    Nutrition Interventions:   Food and/or Nutrient Delivery:  Continue Current Diet, Start Oral Nutrition Supplement  Nutrition Education/Counseling:  No recommendation at this time   Coordination of Nutrition Care:  Continue to monitor while inpatient    Goals:  pt will consume greater than 75% of meals and supplements Nutrition Monitoring and Evaluation:   Behavioral-Environmental Outcomes:  None Identified   Food/Nutrient Intake Outcomes:  Supplement Intake, Food and Nutrient Intake  Physical Signs/Symptoms Outcomes:  Biochemical Data, Weight, Hemodynamic Status, GI Status     Discharge Planning:     Too soon to determine     Electronically signed by Joseph Cole MS, RD, LD on 4/9/21 at 11:01 AM EDT    Contact: [phone]

## 2021-04-10 LAB
ANION GAP SERPL CALCULATED.3IONS-SCNC: 7 MMOL/L (ref 4–16)
ATYPICAL LYMPHOCYTE ABSOLUTE COUNT: ABNORMAL
BANDED NEUTROPHILS ABSOLUTE COUNT: 0.1 K/CU MM
BANDED NEUTROPHILS RELATIVE PERCENT: 2 % (ref 5–11)
BUN BLDV-MCNC: 17 MG/DL (ref 6–23)
CALCIUM SERPL-MCNC: 9.3 MG/DL (ref 8.3–10.6)
CHLORIDE BLD-SCNC: 100 MMOL/L (ref 99–110)
CO2: 30 MMOL/L (ref 21–32)
CREAT SERPL-MCNC: 0.8 MG/DL (ref 0.9–1.3)
DIFFERENTIAL TYPE: ABNORMAL
DOSE AMOUNT: NORMAL
DOSE TIME: NORMAL
ELLIPTOCYTES: ABNORMAL
EOSINOPHILS ABSOLUTE: 0.3 K/CU MM
EOSINOPHILS RELATIVE PERCENT: 7 % (ref 0–3)
GFR AFRICAN AMERICAN: >60 ML/MIN/1.73M2
GFR NON-AFRICAN AMERICAN: >60 ML/MIN/1.73M2
GLUCOSE BLD-MCNC: 104 MG/DL (ref 70–99)
HCT VFR BLD CALC: 37.1 % (ref 42–52)
HEMOGLOBIN: 11.8 GM/DL (ref 13.5–18)
HYPOCHROMIA: ABNORMAL
LYMPHOCYTES ABSOLUTE: 2.9 K/CU MM
LYMPHOCYTES RELATIVE PERCENT: 60 % (ref 24–44)
MCH RBC QN AUTO: 27.3 PG (ref 27–31)
MCHC RBC AUTO-ENTMCNC: 31.8 % (ref 32–36)
MCV RBC AUTO: 85.9 FL (ref 78–100)
MONOCYTES ABSOLUTE: 0.3 K/CU MM
MONOCYTES RELATIVE PERCENT: 7 % (ref 0–4)
PDW BLD-RTO: 14.1 % (ref 11.7–14.9)
PLATELET # BLD: 292 K/CU MM (ref 140–440)
PMV BLD AUTO: 8.4 FL (ref 7.5–11.1)
POTASSIUM SERPL-SCNC: 4.2 MMOL/L (ref 3.5–5.1)
RBC # BLD: 4.32 M/CU MM (ref 4.6–6.2)
SEGMENTED NEUTROPHILS ABSOLUTE COUNT: 1.2 K/CU MM
SEGMENTED NEUTROPHILS RELATIVE PERCENT: 24 % (ref 36–66)
SODIUM BLD-SCNC: 137 MMOL/L (ref 135–145)
VANCOMYCIN TROUGH: 11.8 UG/ML (ref 10–20)
WBC # BLD: 4.8 K/CU MM (ref 4–10.5)

## 2021-04-10 PROCEDURE — 2580000003 HC RX 258: Performed by: INTERNAL MEDICINE

## 2021-04-10 PROCEDURE — 6370000000 HC RX 637 (ALT 250 FOR IP): Performed by: NURSE PRACTITIONER

## 2021-04-10 PROCEDURE — 6360000002 HC RX W HCPCS: Performed by: INTERNAL MEDICINE

## 2021-04-10 PROCEDURE — 1200000000 HC SEMI PRIVATE

## 2021-04-10 PROCEDURE — 6370000000 HC RX 637 (ALT 250 FOR IP): Performed by: INTERNAL MEDICINE

## 2021-04-10 PROCEDURE — 97535 SELF CARE MNGMENT TRAINING: CPT

## 2021-04-10 PROCEDURE — 97166 OT EVAL MOD COMPLEX 45 MIN: CPT

## 2021-04-10 PROCEDURE — 80048 BASIC METABOLIC PNL TOTAL CA: CPT

## 2021-04-10 PROCEDURE — 6360000002 HC RX W HCPCS: Performed by: NURSE PRACTITIONER

## 2021-04-10 PROCEDURE — 94761 N-INVAS EAR/PLS OXIMETRY MLT: CPT

## 2021-04-10 PROCEDURE — 97162 PT EVAL MOD COMPLEX 30 MIN: CPT

## 2021-04-10 PROCEDURE — 85027 COMPLETE CBC AUTOMATED: CPT

## 2021-04-10 PROCEDURE — 97116 GAIT TRAINING THERAPY: CPT

## 2021-04-10 PROCEDURE — 2580000003 HC RX 258: Performed by: NURSE PRACTITIONER

## 2021-04-10 PROCEDURE — 36415 COLL VENOUS BLD VENIPUNCTURE: CPT

## 2021-04-10 PROCEDURE — 85007 BL SMEAR W/DIFF WBC COUNT: CPT

## 2021-04-10 RX ORDER — CIPROFLOXACIN 2 MG/ML
400 INJECTION, SOLUTION INTRAVENOUS EVERY 12 HOURS
Status: DISCONTINUED | OUTPATIENT
Start: 2021-04-10 | End: 2021-04-12 | Stop reason: HOSPADM

## 2021-04-10 RX ADMIN — ENOXAPARIN SODIUM 40 MG: 40 INJECTION SUBCUTANEOUS at 10:08

## 2021-04-10 RX ADMIN — VANCOMYCIN HYDROCHLORIDE 1750 MG: 5 INJECTION, POWDER, LYOPHILIZED, FOR SOLUTION INTRAVENOUS at 04:08

## 2021-04-10 RX ADMIN — CIPROFLOXACIN 400 MG: 2 INJECTION, SOLUTION INTRAVENOUS at 15:13

## 2021-04-10 RX ADMIN — METOPROLOL TARTRATE 25 MG: 25 TABLET, FILM COATED ORAL at 10:08

## 2021-04-10 RX ADMIN — SODIUM CHLORIDE, PRESERVATIVE FREE 10 ML: 5 INJECTION INTRAVENOUS at 20:32

## 2021-04-10 RX ADMIN — AMLODIPINE BESYLATE 5 MG: 5 TABLET ORAL at 10:08

## 2021-04-10 RX ADMIN — METOPROLOL TARTRATE 25 MG: 25 TABLET, FILM COATED ORAL at 20:31

## 2021-04-10 RX ADMIN — OXYCODONE HYDROCHLORIDE AND ACETAMINOPHEN 1 TABLET: 5; 325 TABLET ORAL at 22:29

## 2021-04-10 RX ADMIN — CEFTRIAXONE 1000 MG: 1 INJECTION, POWDER, FOR SOLUTION INTRAMUSCULAR; INTRAVENOUS at 10:07

## 2021-04-10 RX ADMIN — OXYCODONE HYDROCHLORIDE AND ACETAMINOPHEN 1 TABLET: 5; 325 TABLET ORAL at 10:21

## 2021-04-10 RX ADMIN — PANTOPRAZOLE SODIUM 40 MG: 40 TABLET, DELAYED RELEASE ORAL at 07:08

## 2021-04-10 ASSESSMENT — PAIN SCALES - GENERAL
PAINLEVEL_OUTOF10: 7
PAINLEVEL_OUTOF10: 7

## 2021-04-10 ASSESSMENT — PAIN DESCRIPTION - FREQUENCY: FREQUENCY: INTERMITTENT

## 2021-04-10 ASSESSMENT — PAIN DESCRIPTION - DESCRIPTORS: DESCRIPTORS: SHOOTING

## 2021-04-10 ASSESSMENT — PAIN DESCRIPTION - PROGRESSION: CLINICAL_PROGRESSION: NOT CHANGED

## 2021-04-10 ASSESSMENT — PAIN DESCRIPTION - PAIN TYPE: TYPE: ACUTE PAIN;NEUROPATHIC PAIN

## 2021-04-10 ASSESSMENT — PAIN DESCRIPTION - LOCATION: LOCATION: FOOT

## 2021-04-10 NOTE — PROGRESS NOTES
[address]    Hospitalist Progress Note      Name:  Alan Melvin /Age/Sex: 1975  (50 y.o. male)   MRN & CSN:  [id number] & [id number] Admission Date/Time: 2021 12:37 AM   Location:  [address] [place] [address]-A PCP: No primary care provider on file. Hospital Day: 3    Assessment and Plan:   Alan Melvin is a 50 y.o.  male  who presents with     Cellulitis and wound infection after surgery -both feet    Frostbite s/p amputation of toes -left and right feet -21-   Local wound care, pain control  Wound culture growing MSSA - sensitive to all  Continue with IV antibiotics DC cefepime and vancomycin and start Cipro  General surgery following    Hypertension -continue with amlodipine and metoprolol    Issue with placement -recently discharged from [address] [place] [address];  Dietary Nutrition Supplements: Wound Healing Oral Supplement   DVT Prophylaxis [] Lovenox, []  Heparin, [] SCDs, []No VTE prophylaxis, patient ambulating   GI Prophylaxis [] PPI, [] H2 Blocker, [] No GI prophylaxis, patient is receiving diet/Tube Feeds   Code Status Full Code   Disposition Patient requires continued admission due to wound infection   MDM [] Low, [] Moderate,[]  High     History of Present Illness: Subjective     Patient Seen & Examined at the bedside      Patient feels that his pain was well controlled today -denies any fever - no new complaints     Ten point ROS reviewed negative, unless as noted above    Objective: Intake/Output Summary (Last 24 hours) at 4/10/2021 1111  Last data filed at 2021 2150  Gross per 24 hour   Intake --   Output 2200 ml   Net -2200 ml      Vitals:   Vitals:    04/10/21 1000   BP: (!) 159/97   Pulse: 80   Resp: 16   Temp: 98.4 °F (36.9 °C)   SpO2: 99%     Physical Exam:    GEN Awake male, resting in bed in no apparent distress. Appears given age. RESP Clear to auscultation, no wheezes, rales or rhonchi. CARDIO/VASC -S1/S2 auscultated.  Regular rate without appreciable murmurs, rubs, or gallops. Peripheral pulses equal bilaterally and palpable. No peripheral edema. GI Abdomen is soft without significant tenderness, masses, or guarding. Bowel sounds are normoactive. Rectal exam deferred.  Underwood catheter is not present.   MSK both feet dressed-not opened    Medications:   Medications:    vancomycin  1,750 mg Intravenous Q12H    cefTRIAXone (ROCEPHIN) IV  1,000 mg Intravenous Q24H    amLODIPine  5 mg Oral Daily    sodium chloride flush  5-40 mL Intravenous 2 times per day    enoxaparin  40 mg Subcutaneous Daily    pantoprazole  40 mg Oral QAM AC    metoprolol tartrate  25 mg Oral BID      Infusions:    sodium chloride       PRN Meds: sodium chloride flush, 5-40 mL, PRN  oxyCODONE-acetaminophen, 1 tablet, Q6H PRN  sodium chloride flush, 5-40 mL, PRN  sodium chloride, 25 mL, PRN  promethazine, 12.5 mg, Q6H PRN    Or  ondansetron, 4 mg, Q6H PRN  polyethylene glycol, 17 g, Daily PRN  acetaminophen, 650 mg, Q6H PRN    Or  acetaminophen, 650 mg, Q6H PRN  HYDROcodone 5 mg - acetaminophen, 1 tablet, Q6H PRN          Electronically signed by Roslyn Perez MD on 4/10/2021 at 11:11 AM

## 2021-04-10 NOTE — CONSULTS
[address] [facility] PHYSICAL THERAPY EVALUATION  Mine Leon, 1975, 1120/1120-A, 4/10/2021    History  [place]:  The primary encounter diagnosis was Bilateral foot pain. Diagnoses of Open wound of right foot, initial encounter and Cellulitis of both feet were also pertinent to this visit. Patient  has a past medical history of Hypertension. Patient  has a past surgical history that includes Foot Debridement (Bilateral, 2/19/2021). Subjective:  Patient states: \"That [facility] is terrible\". Pain:  No c/o. Communication with other providers:  Handoff to RN, co-eval with OT. Restrictions: post op shoes when ambulating. Home Setup/Prior level of function  Reports he has been staying with friend but doesn't plan to return to that apartment. States he doesn't have any DME. Examination of body systems (includes body structures/functions, activity/participation limitations):  · Observation:  Supine in bed upon arrival  · Vision:  [facility] ID.me  · [facility]  [facility]  · Cardiopulmonary:  On room air  · Cognition: impaired, likely at baseline, possible baseline mental health concerns, see OT/SLP note for further evaluation. Musculoskeletal  · ROM R/L:  Decreased B PF. · Strength R/L:  4+/5, min weakness in function and endurance. · Neuro: WFL    · Gait pattern: decreased stride, wide BEAU with ER, use of RW, decreased krysta    Mobility:  · Supine to sit: Ind  · Transfers: SBA  · Sitting balance:  Ind.    · Standing balance:  SBA. · Gait: SBA    [facility] 6 Clicks Inpatient Mobility:  18/24    Treatment:  Transferred OOB ind. Assisted with applying B post op shoe. Gait: ambulated initially on toes with no heel strike or foot flat, educated on importance of decreasing WB through toe d/t surgery and encouraged foot flat. Compensatory hip ER, decreased stride, and knee hyperextension d/t B calf tightness. Improved with intermittent verbal cues. Balance WFL, SBA for gait.      Safety: patient left in bed with bed alarm (refused chair d/t thinking chairs are haunted because of a tv show he watched last night with haunted chairs), call light within reach, RN notified, gait belt used. Assessment:  Patient is a 51 yo male who presents with fall at home after d/c from [place], was at [place] after amputation and debridement of B toes after frostbite. Patient demonstrates min impaired gait mechanics but is ind with WC mobility and SBA for ambulation. Would benefit from OP therapy to address LE ROM affecting gait mechanics. Complexity: Moderate  Prognosis: Good, no significant barriers to participation at this time. Plan Times per week: 3+/week, 1 week,      Equipment: WC and RW. Goals:  Short term goals  Time Frame for Short term goals: 1 week  Short term goal 1: Patient will ambulate x100ft mod I with RW and improved gait mechanics. Treatment plan:  Bed mobility, transfers, balance, gait, TA, TX. Recommendations for NURSING mobility: ambulate to BR and in hallway with post op shoes and RW.     Time:   Time in: 1150  Time out: 1213  Timed treatment minutes: 10  Total time: 23    Electronically signed by:    Avtar Roberto, PT  4/10/2021, 2:52 PM

## 2021-04-10 NOTE — PROGRESS NOTES
[facility] [facility]  consulted by Dr. Royal Palmer for monitoring and adjustment. Indication for treatment: Infected bilateral amputated toes  Goal trough: ~ 15 mcg/mL    Pertinent Laboratory Values:   Temp Readings from Last 3 Encounters:   04/09/21 98 °F (36.7 °C) (Oral)   03/18/21 98.2 °F (36.8 °C) (Infrared)   03/03/21 97.5 °F (36.4 °C)     Recent Labs     04/08/21  0141 04/08/21  0925 04/09/21  0646   WBC 10.7*  --  6.3   LACTATE  --  0.8  --      Recent Labs     04/08/21  0141 04/09/21  0646   BUN 14 14   CREATININE 0.8* 0.8*     Estimated Creatinine Clearance: 125 mL/min (A) (based on SCr of 0.8 mg/dL (L)). Intake/Output Summary (Last 24 hours) at 4/10/2021 0246  Last data filed at 4/9/2021 2150  Gross per 24 hour   Intake --   Output 2200 ml   Net -2200 ml       Pertinent Cultures:  Date                            Source                                     Results  4/8                               Wound                                     Staph Aureus moderate growth  4/8                               Blood                                       Pending    Vancomycin level:   TROUGH:    Recent Labs     04/09/21  2338   VANCOTROUGH 11.8     Assessment:  WBC WNL at 6.3; Afebrile  SCr = 0.8, BUN = 14, Good UOP: Renal function stable  Day(s) of therapy: 3  Vancomycin concentration:   04/09 - 11.8    Plan:  Adjust to Vancomycin 1,750 mg IV Q 12 Hours  Pharmacy will continue to monitor patient and adjust therapy as indicated    [address] 04/11/2021 @ 14:00    Thank you for the consult.   Marifer Ornelas [place]   4/10/2021 2:46 AM
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
## 2021-04-11 PROCEDURE — 6370000000 HC RX 637 (ALT 250 FOR IP): Performed by: NURSE PRACTITIONER

## 2021-04-11 PROCEDURE — 94761 N-INVAS EAR/PLS OXIMETRY MLT: CPT

## 2021-04-11 PROCEDURE — 6360000002 HC RX W HCPCS: Performed by: NURSE PRACTITIONER

## 2021-04-11 PROCEDURE — 6360000002 HC RX W HCPCS: Performed by: INTERNAL MEDICINE

## 2021-04-11 PROCEDURE — 6370000000 HC RX 637 (ALT 250 FOR IP): Performed by: INTERNAL MEDICINE

## 2021-04-11 PROCEDURE — 2580000003 HC RX 258: Performed by: NURSE PRACTITIONER

## 2021-04-11 PROCEDURE — 1200000000 HC SEMI PRIVATE

## 2021-04-11 RX ADMIN — ENOXAPARIN SODIUM 40 MG: 40 INJECTION SUBCUTANEOUS at 08:52

## 2021-04-11 RX ADMIN — SODIUM CHLORIDE, PRESERVATIVE FREE 10 ML: 5 INJECTION INTRAVENOUS at 20:52

## 2021-04-11 RX ADMIN — SODIUM CHLORIDE, PRESERVATIVE FREE 10 ML: 5 INJECTION INTRAVENOUS at 08:53

## 2021-04-11 RX ADMIN — AMLODIPINE BESYLATE 5 MG: 5 TABLET ORAL at 08:52

## 2021-04-11 RX ADMIN — HYDROCODONE BITARTRATE AND ACETAMINOPHEN 1 TABLET: 5; 325 TABLET ORAL at 19:14

## 2021-04-11 RX ADMIN — METOPROLOL TARTRATE 25 MG: 25 TABLET, FILM COATED ORAL at 08:52

## 2021-04-11 RX ADMIN — CIPROFLOXACIN 400 MG: 2 INJECTION, SOLUTION INTRAVENOUS at 14:49

## 2021-04-11 RX ADMIN — CIPROFLOXACIN 400 MG: 2 INJECTION, SOLUTION INTRAVENOUS at 03:36

## 2021-04-11 RX ADMIN — PANTOPRAZOLE SODIUM 40 MG: 40 TABLET, DELAYED RELEASE ORAL at 06:42

## 2021-04-11 RX ADMIN — METOPROLOL TARTRATE 25 MG: 25 TABLET, FILM COATED ORAL at 20:52

## 2021-04-11 RX ADMIN — HYDROCODONE BITARTRATE AND ACETAMINOPHEN 1 TABLET: 5; 325 TABLET ORAL at 08:59

## 2021-04-11 ASSESSMENT — PAIN DESCRIPTION - LOCATION: LOCATION: FOOT

## 2021-04-11 ASSESSMENT — PAIN SCALES - GENERAL
PAINLEVEL_OUTOF10: 6
PAINLEVEL_OUTOF10: 7

## 2021-04-11 ASSESSMENT — PAIN DESCRIPTION - PAIN TYPE: TYPE: SURGICAL PAIN

## 2021-04-11 ASSESSMENT — PAIN DESCRIPTION - FREQUENCY: FREQUENCY: INTERMITTENT

## 2021-04-11 ASSESSMENT — PAIN DESCRIPTION - ORIENTATION: ORIENTATION: LEFT;RIGHT

## 2021-04-11 NOTE — PROGRESS NOTES
[address]    Hospitalist Progress Note      Name:  Tova Ellis /Age/Sex: 1975  (50 y.o. male)   MRN & CSN:  [id number] & [id number] Admission Date/Time: 2021 12:37 AM   Location:  [address] [place] [address]-A PCP: No primary care provider on file. Hospital Day: 4    Assessment and Plan:   Tova Ellis is a 50 y.o.  male  who presents with     Cellulitis and wound infection after surgery -both feet    Frostbite s/p amputation of toes -left and right feet -21-   Local wound care, pain control  Wound culture growing MSSA - sensitive to all  Continue with IV antibiotics DC cefepime and vancomycin and started Cipro  General surgery following    Hypertension -continue with amlodipine and metoprolol    Issue with placement -recently discharged from [address] [place] [address];  Dietary Nutrition Supplements: Wound Healing Oral Supplement   DVT Prophylaxis [] Lovenox, []  Heparin, [] SCDs, []No VTE prophylaxis, patient ambulating   GI Prophylaxis [] PPI, [] H2 Blocker, [] No GI prophylaxis, patient is receiving diet/Tube Feeds   Code Status Full Code   Disposition Patient requires continued admission due to wound infection   MDM [] Low, [] Moderate,[]  High     History of Present Illness: Subjective     Patient Seen & Examined at the bedside      Patient feels that his pain was well controlled today -denies any fever -     Ten point ROS reviewed negative, unless as noted above    Objective: Intake/Output Summary (Last 24 hours) at 2021 1516  Last data filed at 2021 1457  Gross per 24 hour   Intake 370 ml   Output 2875 ml   Net -2505 ml      Vitals:   Vitals:    21 1429   BP: (!) 134/94   Pulse: 85   Resp: 16   Temp: 98 °F (36.7 °C)   SpO2: 97%     Physical Exam:    GEN Awake male, resting in bed in no apparent distress. Appears given age. RESP Clear to auscultation, no wheezes, rales or rhonchi. CARDIO/VASC -S1/S2 auscultated. Regular rate without appreciable murmurs, rubs, or gallops. Peripheral pulses equal bilaterally and palpable. No peripheral edema. GI Abdomen is soft without significant tenderness, masses, or guarding. Bowel sounds are normoactive. Rectal exam deferred.  Underwood catheter is not present.   MSK both feet dressed-not opened    Medications:   Medications:    ciprofloxacin  400 mg Intravenous Q12H    amLODIPine  5 mg Oral Daily    sodium chloride flush  5-40 mL Intravenous 2 times per day    enoxaparin  40 mg Subcutaneous Daily    pantoprazole  40 mg Oral QAM AC    metoprolol tartrate  25 mg Oral BID      Infusions:    sodium chloride       PRN Meds: sodium chloride flush, 5-40 mL, PRN  sodium chloride flush, 5-40 mL, PRN  sodium chloride, 25 mL, PRN  promethazine, 12.5 mg, Q6H PRN    Or  ondansetron, 4 mg, Q6H PRN  polyethylene glycol, 17 g, Daily PRN  acetaminophen, 650 mg, Q6H PRN    Or  acetaminophen, 650 mg, Q6H PRN  HYDROcodone 5 mg - acetaminophen, 1 tablet, Q6H PRN          Electronically signed by Maribell Hardy MD on 4/11/2021 at 3:16 PM

## 2021-04-11 NOTE — PROGRESS NOTES
Changed dressing on bilateral feet. Barely any drainage on padding and ABD. Rt on pad foot had minimal bleeding from anny up walking. Cleaned with NS, applied optifoam, ABD pad and wrapped with Kerlix.

## 2021-04-11 NOTE — CARE COORDINATION
CM in to see Pt to follow up on discharge planning. PT/OT recommend home with outpatient therapy. Pt states he has been living with a friend but is unable to stay there currently due to the stairs. Pt states he has been working with a [employer] [phone] with the [facility], CM left VM for return call. and a [employer] from [place] for [facility] [phone] CM left VM for return call. Along with [facility] [phone], left VM.      CM following

## 2021-04-12 VITALS
TEMPERATURE: 97.9 F | BODY MASS INDEX: 23.7 KG/M2 | DIASTOLIC BLOOD PRESSURE: 88 MMHG | RESPIRATION RATE: 14 BRPM | OXYGEN SATURATION: 97 % | WEIGHT: 175 LBS | SYSTOLIC BLOOD PRESSURE: 132 MMHG | HEART RATE: 78 BPM | HEIGHT: 72 IN

## 2021-04-12 PROCEDURE — 6360000002 HC RX W HCPCS: Performed by: NURSE PRACTITIONER

## 2021-04-12 PROCEDURE — 2580000003 HC RX 258: Performed by: NURSE PRACTITIONER

## 2021-04-12 PROCEDURE — 6370000000 HC RX 637 (ALT 250 FOR IP): Performed by: INTERNAL MEDICINE

## 2021-04-12 PROCEDURE — 99024 POSTOP FOLLOW-UP VISIT: CPT | Performed by: SURGERY

## 2021-04-12 PROCEDURE — 6360000002 HC RX W HCPCS: Performed by: INTERNAL MEDICINE

## 2021-04-12 PROCEDURE — 94761 N-INVAS EAR/PLS OXIMETRY MLT: CPT

## 2021-04-12 PROCEDURE — 6370000000 HC RX 637 (ALT 250 FOR IP): Performed by: NURSE PRACTITIONER

## 2021-04-12 RX ORDER — HYDROCODONE BITARTRATE AND ACETAMINOPHEN 5; 325 MG/1; MG/1
1 TABLET ORAL EVERY 6 HOURS PRN
Qty: 15 TABLET | Refills: 0 | Status: SHIPPED | OUTPATIENT
Start: 2021-04-12 | End: 2021-04-16

## 2021-04-12 RX ORDER — CIPROFLOXACIN 500 MG/1
500 TABLET, FILM COATED ORAL 2 TIMES DAILY
Qty: 20 TABLET | Refills: 0 | Status: SHIPPED | OUTPATIENT
Start: 2021-04-12 | End: 2021-04-22

## 2021-04-12 RX ADMIN — SODIUM CHLORIDE, PRESERVATIVE FREE 10 ML: 5 INJECTION INTRAVENOUS at 09:25

## 2021-04-12 RX ADMIN — AMLODIPINE BESYLATE 5 MG: 5 TABLET ORAL at 09:25

## 2021-04-12 RX ADMIN — METOPROLOL TARTRATE 25 MG: 25 TABLET, FILM COATED ORAL at 09:25

## 2021-04-12 RX ADMIN — CIPROFLOXACIN 400 MG: 2 INJECTION, SOLUTION INTRAVENOUS at 03:00

## 2021-04-12 RX ADMIN — PANTOPRAZOLE SODIUM 40 MG: 40 TABLET, DELAYED RELEASE ORAL at 05:53

## 2021-04-12 RX ADMIN — ENOXAPARIN SODIUM 40 MG: 40 INJECTION SUBCUTANEOUS at 09:25

## 2021-04-12 RX ADMIN — CIPROFLOXACIN 400 MG: 2 INJECTION, SOLUTION INTRAVENOUS at 15:37

## 2021-04-12 ASSESSMENT — PAIN SCALES - GENERAL
PAINLEVEL_OUTOF10: 0
PAINLEVEL_OUTOF10: 0

## 2021-04-12 NOTE — PROGRESS NOTES
GENERAL SURGERY PROGRESS NOTE    Eliseo Espinoza is a 50 y.o. male s/p toe amputations for frostbite, admitted with cellulitis and blisters. Subjective:  Doing well this morning. Still with intermittent pain in his feet. Has been a little bit hypertensive, no longer having chills. Has been up with post op shoes on--awaiting Orthotics input for forefoot offloading shoes. Objective:    Vitals: VITALS:  BP (!) 140/98   Pulse 90   Temp 98.4 °F (36.9 °C) (Oral)   Resp 18   Ht 6' 0.01\" (1.829 m)   Wt 175 lb (79.4 kg)   SpO2 98%   BMI 23.73 kg/m²     I/O: 04/11 0701 - 04/12 0700  In: 370 [P.O.:360; I.V.:10]  Out: 2000 [Urine:2000]    Labs/Imaging Results:   Lab Results   Component Value Date     04/10/2021    K 4.2 04/10/2021     04/10/2021    CO2 30 04/10/2021    BUN 17 04/10/2021    CREATININE 0.8 04/10/2021    GLUCOSE 104 04/10/2021    CALCIUM 9.3 04/10/2021      Lab Results   Component Value Date    WBC 4.8 04/10/2021    HGB 11.8 (L) 04/10/2021    HCT 37.1 (L) 04/10/2021    MCV 85.9 04/10/2021     04/10/2021       IV Fluids: sodium chloride    Scheduled Meds:   ciprofloxacin, 400 mg, Intravenous, Q12H    amLODIPine, 5 mg, Oral, Daily    sodium chloride flush, 5-40 mL, Intravenous, 2 times per day    enoxaparin, 40 mg, Subcutaneous, Daily    pantoprazole, 40 mg, Oral, QAM AC    metoprolol tartrate, 25 mg, Oral, BID    Physical Exam:  General: A&O x 3, no distress. HEENT: Anicteric sclerae, MMM. Extremities: Unroofed blister over the plantar pad of the right foot and smaller dried out blister on the plantar pad of the left foot. Several toe tips with small lacerations. No further erythema, purulence or edema. Serosanguinous drainage drainage. Abdomen: Soft, nontender, nondistended. Assessment and Plan:   50 y.o. male s/p toe amputations for frostbite now with blistering and cellulitis after increasing ambulation recently. Feet much improved.     Patient Active Problem List:     Rhabdomyolysis     Frostbite     Wound infection after surgery      - PT/OT can be up in post op shoes    - Orthotics referral for front offloading shoes. - Continue dressing changes per wound care recommendations  - Can work on placement, ok for discharge once assistance with wound cares is arranged.     Mark Cooper MD

## 2021-04-12 NOTE — CARE COORDINATION
Chart reviewed and met with pt to follow up on discharge plan. CM introduced self and explained role. CM advised pt that after reviewing chart, pt does not have a skillable need for a SNF stay. Pt stated understanding. Pt stated he was staying at the [facility]. Pt stated that [facility], Mitch Nye, was helping him PTA. Pt states he has left multiple message for Jennifer Chowdary with no response yet. Pt states he thinks Jennifer Chowdary is ill currently and heard that COVID was going around the [facility]. Pt then stated he has left multiple messages for Jeffrey Velasquez at [facility] with no response from them either. Pt states he knew she was at work and in the office because he \"know her car since we go to the same [employer]. \" Pt stated he was looking at renting an apartment on [address] [place] [address] but the landlord is not ready yet and there are too many stairs so that will not be an option at this time for him. CM advised pt that CM would attempt to reach [facility] and/or Jennifer Chowdary to see if arrangements can be made for him but if not, he would need to find a friend or family to stay with at discharge. CM called [facility] who stated that they are unable to assist pt with housing. CM asked if pt had been made aware of this and Kehinde Dunlap stated that pt has been told multiple times previously that he could not stay there. Kehinde Dunlap suggested pt check with [employer] or [address] [place] [address] for assistance. Voicemail left for Mitch Nye. 12:22 PM CM went back to pt room to discuss information from [employer]. Pt states he was unaware they would not be willing to help him. Pt was given \"Where to [facility]" information with numbers for [employer] and [address] [place] [address].

## 2021-04-12 NOTE — DISCHARGE SUMMARY
[address] Hospitalist     Discharge Summary    Name:  Maris Phoenix /Age/Sex: 1975  (50 y.o. male)   MRN & CSN:  [id number] & [id number] Admission Date/Time: 2021 12:37 AM   Attending:  William Maddox MD Discharging Physician: William Maddox MD     HPI:     Please, see admission HPI in [address] and patient's hospital course below    Hospital Course:   Maris Phoenix is a 50 y.o.  male  who presents with wound infection and the following assessments below, reflect the patient's hospital course     Cellulitis and wound infection after surgery -both feet     Frostbite s/p amputation of toes -left and right feet -21-   Local wound care, pain control  Wound culture growing MSSA - sensitive to all  Continue with Cipro for 10 more days   General surgery following     Hypertension -continue with amlodipine and metoprolol     Issue with placement -recently discharged from [place]    Patient is hemodynamically stable for DC to home    The patient expressed appropriate understanding of and agreement with the discharge recommendations, medications, and plan.      Consults this admission:  [address] TO HOSPITALIST  IP CONSULT TO GENERAL SURGERY  INPATIENT CONSULT TO ORTHOTIST/PROSTHETIST  IP CONSULT TO CASE MANAGEMENT  IP CONSULT TO IV TEAM    Discharge Instruction:   Follow up appointments: Surgery, wound care  Primary care physician:  within 1 to 2 weeks    Diet:  regular diet   Activity: activity as tolerated  Disposition: Discharged to:   [x]Home, []C, []SNF, []Acute Rehab, []Hospice   Condition on discharge: Stable    Discharge Medications:      Justa Iqbal   Home Medication Instructions DAVIZI:[id number]    Printed on:21 1616   Medication Information                      amLODIPine (NORVASC) 5 MG tablet  Take 1 tablet by mouth daily             ciprofloxacin (CIPRO) 500 MG tablet  Take 1 tablet by mouth 2 times daily for 10 days             HYDROcodone-acetaminophen

## 2021-04-13 LAB
CULTURE: ABNORMAL
EKG ATRIAL RATE: 95 BPM
EKG DIAGNOSIS: NORMAL
EKG P AXIS: 64 DEGREES
EKG P-R INTERVAL: 152 MS
EKG Q-T INTERVAL: 374 MS
EKG QRS DURATION: 96 MS
EKG QTC CALCULATION (BAZETT): 469 MS
EKG R AXIS: -7 DEGREES
EKG T AXIS: 54 DEGREES
EKG VENTRICULAR RATE: 95 BPM
Lab: ABNORMAL
SPECIMEN: ABNORMAL

## 2021-04-28 ENCOUNTER — APPOINTMENT (OUTPATIENT)
Dept: GENERAL RADIOLOGY | Age: 46
DRG: 383 | End: 2021-04-28
Payer: MEDICARE

## 2021-04-28 ENCOUNTER — HOSPITAL ENCOUNTER (INPATIENT)
Age: 46
LOS: 5 days | Discharge: HOME | DRG: 383 | End: 2021-05-03
Attending: EMERGENCY MEDICINE | Admitting: INTERNAL MEDICINE
Payer: MEDICARE

## 2021-04-28 DIAGNOSIS — M79.671 RIGHT FOOT PAIN: ICD-10-CM

## 2021-04-28 DIAGNOSIS — L08.9 FOOT INFECTION: Primary | ICD-10-CM

## 2021-04-28 LAB
ALBUMIN SERPL-MCNC: 3.8 GM/DL (ref 3.4–5)
ALP BLD-CCNC: 105 IU/L (ref 40–128)
ALT SERPL-CCNC: 17 U/L (ref 10–40)
ANION GAP SERPL CALCULATED.3IONS-SCNC: 13 MMOL/L (ref 4–16)
AST SERPL-CCNC: 26 IU/L (ref 15–37)
BASOPHILS ABSOLUTE: 0 K/CU MM
BASOPHILS RELATIVE PERCENT: 0.3 % (ref 0–1)
BILIRUB SERPL-MCNC: 0.3 MG/DL (ref 0–1)
BUN BLDV-MCNC: 8 MG/DL (ref 6–23)
CALCIUM SERPL-MCNC: 8.9 MG/DL (ref 8.3–10.6)
CHLORIDE BLD-SCNC: 100 MMOL/L (ref 99–110)
CO2: 24 MMOL/L (ref 21–32)
CREAT SERPL-MCNC: 0.7 MG/DL (ref 0.9–1.3)
DIFFERENTIAL TYPE: ABNORMAL
EOSINOPHILS ABSOLUTE: 0.1 K/CU MM
EOSINOPHILS RELATIVE PERCENT: 1.1 % (ref 0–3)
GFR AFRICAN AMERICAN: >60 ML/MIN/1.73M2
GFR NON-AFRICAN AMERICAN: >60 ML/MIN/1.73M2
GLUCOSE BLD-MCNC: 102 MG/DL (ref 70–99)
HCT VFR BLD CALC: 37.9 % (ref 42–52)
HEMOGLOBIN: 12.3 GM/DL (ref 13.5–18)
IMMATURE NEUTROPHIL %: 0.3 % (ref 0–0.43)
LACTATE: 2.2 MMOL/L (ref 0.4–2)
LYMPHOCYTES ABSOLUTE: 2.1 K/CU MM
LYMPHOCYTES RELATIVE PERCENT: 31.9 % (ref 24–44)
MCH RBC QN AUTO: 27.7 PG (ref 27–31)
MCHC RBC AUTO-ENTMCNC: 32.5 % (ref 32–36)
MCV RBC AUTO: 85.4 FL (ref 78–100)
MONOCYTES ABSOLUTE: 0.6 K/CU MM
MONOCYTES RELATIVE PERCENT: 8.8 % (ref 0–4)
NUCLEATED RBC %: 0 %
PDW BLD-RTO: 15.4 % (ref 11.7–14.9)
PLATELET # BLD: 335 K/CU MM (ref 140–440)
PMV BLD AUTO: 8.3 FL (ref 7.5–11.1)
POTASSIUM SERPL-SCNC: 3.8 MMOL/L (ref 3.5–5.1)
RBC # BLD: 4.44 M/CU MM (ref 4.6–6.2)
SEGMENTED NEUTROPHILS ABSOLUTE COUNT: 3.8 K/CU MM
SEGMENTED NEUTROPHILS RELATIVE PERCENT: 57.6 % (ref 36–66)
SODIUM BLD-SCNC: 137 MMOL/L (ref 135–145)
TOTAL IMMATURE NEUTOROPHIL: 0.02 K/CU MM
TOTAL NUCLEATED RBC: 0 K/CU MM
TOTAL PROTEIN: 7.9 GM/DL (ref 6.4–8.2)
WBC # BLD: 6.6 K/CU MM (ref 4–10.5)

## 2021-04-28 PROCEDURE — 73630 X-RAY EXAM OF FOOT: CPT

## 2021-04-28 PROCEDURE — 87040 BLOOD CULTURE FOR BACTERIA: CPT

## 2021-04-28 PROCEDURE — 83605 ASSAY OF LACTIC ACID: CPT

## 2021-04-28 PROCEDURE — 6360000002 HC RX W HCPCS: Performed by: EMERGENCY MEDICINE

## 2021-04-28 PROCEDURE — 2580000003 HC RX 258: Performed by: EMERGENCY MEDICINE

## 2021-04-28 PROCEDURE — 1200000000 HC SEMI PRIVATE

## 2021-04-28 PROCEDURE — 36415 COLL VENOUS BLD VENIPUNCTURE: CPT

## 2021-04-28 PROCEDURE — 99284 EMERGENCY DEPT VISIT MOD MDM: CPT

## 2021-04-28 PROCEDURE — 80053 COMPREHEN METABOLIC PANEL: CPT

## 2021-04-28 PROCEDURE — 85025 COMPLETE CBC W/AUTO DIFF WBC: CPT

## 2021-04-28 RX ORDER — 0.9 % SODIUM CHLORIDE 0.9 %
1000 INTRAVENOUS SOLUTION INTRAVENOUS ONCE
Status: COMPLETED | OUTPATIENT
Start: 2021-04-28 | End: 2021-04-29

## 2021-04-28 RX ORDER — SODIUM CHLORIDE 9 MG/ML
INJECTION, SOLUTION INTRAVENOUS CONTINUOUS
Status: DISCONTINUED | OUTPATIENT
Start: 2021-04-28 | End: 2021-04-29

## 2021-04-28 RX ADMIN — SODIUM CHLORIDE 1000 ML: 9 INJECTION, SOLUTION INTRAVENOUS at 22:24

## 2021-04-28 RX ADMIN — SODIUM CHLORIDE: 9 INJECTION, SOLUTION INTRAVENOUS at 23:45

## 2021-04-28 RX ADMIN — CEFEPIME HYDROCHLORIDE 2000 MG: 2 INJECTION, POWDER, FOR SOLUTION INTRAVENOUS at 22:24

## 2021-04-28 RX ADMIN — VANCOMYCIN HYDROCHLORIDE 1750 MG: 5 INJECTION, POWDER, LYOPHILIZED, FOR SOLUTION INTRAVENOUS at 23:42

## 2021-04-28 ASSESSMENT — PAIN DESCRIPTION - LOCATION: LOCATION: FOOT

## 2021-04-28 ASSESSMENT — PAIN SCALES - GENERAL: PAINLEVEL_OUTOF10: 8

## 2021-04-28 NOTE — ED TRIAGE NOTES
Pt presents to the ER with concerns for a wound opening up on bilateral feet; pt had toes amputated and feels like the wounds may have opened up;

## 2021-04-29 LAB
ALBUMIN SERPL-MCNC: 3.6 GM/DL (ref 3.4–5)
ALP BLD-CCNC: 100 IU/L (ref 40–129)
ALT SERPL-CCNC: 16 U/L (ref 10–40)
ANION GAP SERPL CALCULATED.3IONS-SCNC: 7 MMOL/L (ref 4–16)
AST SERPL-CCNC: 25 IU/L (ref 15–37)
BASOPHILS ABSOLUTE: 0 K/CU MM
BASOPHILS RELATIVE PERCENT: 0.7 % (ref 0–1)
BILIRUB SERPL-MCNC: 0.5 MG/DL (ref 0–1)
BUN BLDV-MCNC: 8 MG/DL (ref 6–23)
CALCIUM SERPL-MCNC: 8.9 MG/DL (ref 8.3–10.6)
CHLORIDE BLD-SCNC: 102 MMOL/L (ref 99–110)
CO2: 25 MMOL/L (ref 21–32)
CREAT SERPL-MCNC: 0.7 MG/DL (ref 0.9–1.3)
DIFFERENTIAL TYPE: ABNORMAL
EOSINOPHILS ABSOLUTE: 0.2 K/CU MM
EOSINOPHILS RELATIVE PERCENT: 3.8 % (ref 0–3)
ERYTHROCYTE SEDIMENTATION RATE: 40 MM/HR (ref 0–15)
GFR AFRICAN AMERICAN: >60 ML/MIN/1.73M2
GFR NON-AFRICAN AMERICAN: >60 ML/MIN/1.73M2
GLUCOSE BLD-MCNC: 97 MG/DL (ref 70–99)
HCT VFR BLD CALC: 35.8 % (ref 42–52)
HEMOGLOBIN: 11.4 GM/DL (ref 13.5–18)
HIGH SENSITIVE C-REACTIVE PROTEIN: 69.3 MG/L
IMMATURE NEUTROPHIL %: 0.2 % (ref 0–0.43)
LACTATE: 0.8 MMOL/L (ref 0.4–2)
LYMPHOCYTES ABSOLUTE: 1.7 K/CU MM
LYMPHOCYTES RELATIVE PERCENT: 29.8 % (ref 24–44)
MCH RBC QN AUTO: 27.3 PG (ref 27–31)
MCHC RBC AUTO-ENTMCNC: 31.8 % (ref 32–36)
MCV RBC AUTO: 85.6 FL (ref 78–100)
MONOCYTES ABSOLUTE: 0.6 K/CU MM
MONOCYTES RELATIVE PERCENT: 11.6 % (ref 0–4)
NUCLEATED RBC %: 0 %
PDW BLD-RTO: 15.4 % (ref 11.7–14.9)
PLATELET # BLD: 320 K/CU MM (ref 140–440)
PMV BLD AUTO: 8.3 FL (ref 7.5–11.1)
POTASSIUM SERPL-SCNC: 4.3 MMOL/L (ref 3.5–5.1)
RBC # BLD: 4.18 M/CU MM (ref 4.6–6.2)
SEGMENTED NEUTROPHILS ABSOLUTE COUNT: 3 K/CU MM
SEGMENTED NEUTROPHILS RELATIVE PERCENT: 53.9 % (ref 36–66)
SODIUM BLD-SCNC: 134 MMOL/L (ref 135–145)
TOTAL IMMATURE NEUTOROPHIL: 0.01 K/CU MM
TOTAL NUCLEATED RBC: 0 K/CU MM
TOTAL PROTEIN: 6.6 GM/DL (ref 6.4–8.2)
WBC # BLD: 5.5 K/CU MM (ref 4–10.5)

## 2021-04-29 PROCEDURE — 86141 C-REACTIVE PROTEIN HS: CPT

## 2021-04-29 PROCEDURE — 2580000003 HC RX 258: Performed by: EMERGENCY MEDICINE

## 2021-04-29 PROCEDURE — 99255 IP/OBS CONSLTJ NEW/EST HI 80: CPT | Performed by: INTERNAL MEDICINE

## 2021-04-29 PROCEDURE — 2580000003 HC RX 258: Performed by: INTERNAL MEDICINE

## 2021-04-29 PROCEDURE — 87081 CULTURE SCREEN ONLY: CPT

## 2021-04-29 PROCEDURE — 99024 POSTOP FOLLOW-UP VISIT: CPT | Performed by: SURGERY

## 2021-04-29 PROCEDURE — 85652 RBC SED RATE AUTOMATED: CPT

## 2021-04-29 PROCEDURE — 1200000000 HC SEMI PRIVATE

## 2021-04-29 PROCEDURE — 87070 CULTURE OTHR SPECIMN AEROBIC: CPT

## 2021-04-29 PROCEDURE — 6370000000 HC RX 637 (ALT 250 FOR IP): Performed by: INTERNAL MEDICINE

## 2021-04-29 PROCEDURE — APPSS60 APP SPLIT SHARED TIME 46-60 MINUTES: Performed by: NURSE PRACTITIONER

## 2021-04-29 PROCEDURE — 6370000000 HC RX 637 (ALT 250 FOR IP): Performed by: SURGERY

## 2021-04-29 PROCEDURE — 94761 N-INVAS EAR/PLS OXIMETRY MLT: CPT

## 2021-04-29 PROCEDURE — 6370000000 HC RX 637 (ALT 250 FOR IP): Performed by: PHYSICIAN ASSISTANT

## 2021-04-29 PROCEDURE — 36415 COLL VENOUS BLD VENIPUNCTURE: CPT

## 2021-04-29 PROCEDURE — 83605 ASSAY OF LACTIC ACID: CPT

## 2021-04-29 PROCEDURE — 6360000002 HC RX W HCPCS: Performed by: INTERNAL MEDICINE

## 2021-04-29 PROCEDURE — 99211 OFF/OP EST MAY X REQ PHY/QHP: CPT

## 2021-04-29 PROCEDURE — 85025 COMPLETE CBC W/AUTO DIFF WBC: CPT

## 2021-04-29 PROCEDURE — 80053 COMPREHEN METABOLIC PANEL: CPT

## 2021-04-29 RX ORDER — PANTOPRAZOLE SODIUM 40 MG/1
40 TABLET, DELAYED RELEASE ORAL
Status: DISCONTINUED | OUTPATIENT
Start: 2021-04-29 | End: 2021-05-03 | Stop reason: HOSPADM

## 2021-04-29 RX ORDER — LISINOPRIL 20 MG/1
20 TABLET ORAL DAILY
Status: DISCONTINUED | OUTPATIENT
Start: 2021-04-29 | End: 2021-05-03 | Stop reason: HOSPADM

## 2021-04-29 RX ORDER — SODIUM CHLORIDE 0.9 % (FLUSH) 0.9 %
10 SYRINGE (ML) INJECTION PRN
Status: DISCONTINUED | OUTPATIENT
Start: 2021-04-29 | End: 2021-05-03 | Stop reason: HOSPADM

## 2021-04-29 RX ORDER — HYDROCODONE BITARTRATE AND ACETAMINOPHEN 5; 325 MG/1; MG/1
1 TABLET ORAL EVERY 6 HOURS PRN
Status: DISCONTINUED | OUTPATIENT
Start: 2021-04-29 | End: 2021-05-03 | Stop reason: HOSPADM

## 2021-04-29 RX ORDER — ONDANSETRON 2 MG/ML
4 INJECTION INTRAMUSCULAR; INTRAVENOUS EVERY 6 HOURS PRN
Status: DISCONTINUED | OUTPATIENT
Start: 2021-04-29 | End: 2021-05-03 | Stop reason: HOSPADM

## 2021-04-29 RX ORDER — SODIUM CHLORIDE 9 MG/ML
25 INJECTION, SOLUTION INTRAVENOUS PRN
Status: DISCONTINUED | OUTPATIENT
Start: 2021-04-29 | End: 2021-05-03 | Stop reason: HOSPADM

## 2021-04-29 RX ORDER — SODIUM CHLORIDE 0.9 % (FLUSH) 0.9 %
10 SYRINGE (ML) INJECTION EVERY 12 HOURS SCHEDULED
Status: DISCONTINUED | OUTPATIENT
Start: 2021-04-29 | End: 2021-05-03 | Stop reason: HOSPADM

## 2021-04-29 RX ORDER — SODIUM CHLORIDE 9 MG/ML
INJECTION, SOLUTION INTRAVENOUS CONTINUOUS
Status: DISCONTINUED | OUTPATIENT
Start: 2021-04-29 | End: 2021-04-30

## 2021-04-29 RX ORDER — PROMETHAZINE HYDROCHLORIDE 25 MG/1
12.5 TABLET ORAL EVERY 6 HOURS PRN
Status: DISCONTINUED | OUTPATIENT
Start: 2021-04-29 | End: 2021-05-03 | Stop reason: HOSPADM

## 2021-04-29 RX ORDER — AMLODIPINE BESYLATE 5 MG/1
5 TABLET ORAL DAILY
Status: DISCONTINUED | OUTPATIENT
Start: 2021-04-29 | End: 2021-05-03 | Stop reason: HOSPADM

## 2021-04-29 RX ORDER — ACETAMINOPHEN 325 MG/1
650 TABLET ORAL EVERY 6 HOURS PRN
Status: DISCONTINUED | OUTPATIENT
Start: 2021-04-29 | End: 2021-05-03 | Stop reason: HOSPADM

## 2021-04-29 RX ORDER — ACETAMINOPHEN 650 MG/1
650 SUPPOSITORY RECTAL EVERY 6 HOURS PRN
Status: DISCONTINUED | OUTPATIENT
Start: 2021-04-29 | End: 2021-05-03 | Stop reason: HOSPADM

## 2021-04-29 RX ADMIN — LISINOPRIL 20 MG: 20 TABLET ORAL at 08:39

## 2021-04-29 RX ADMIN — HYDROCODONE BITARTRATE AND ACETAMINOPHEN 1 TABLET: 5; 325 TABLET ORAL at 01:37

## 2021-04-29 RX ADMIN — COLLAGENASE SANTYL: 250 OINTMENT TOPICAL at 13:14

## 2021-04-29 RX ADMIN — CEFEPIME HYDROCHLORIDE 2000 MG: 2 INJECTION, POWDER, FOR SOLUTION INTRAVENOUS at 21:37

## 2021-04-29 RX ADMIN — VANCOMYCIN HYDROCHLORIDE 1500 MG: 5 INJECTION, POWDER, LYOPHILIZED, FOR SOLUTION INTRAVENOUS at 09:18

## 2021-04-29 RX ADMIN — METOPROLOL TARTRATE 25 MG: 25 TABLET, FILM COATED ORAL at 01:23

## 2021-04-29 RX ADMIN — SODIUM CHLORIDE: 9 INJECTION, SOLUTION INTRAVENOUS at 20:20

## 2021-04-29 RX ADMIN — METOPROLOL TARTRATE 25 MG: 25 TABLET, FILM COATED ORAL at 08:39

## 2021-04-29 RX ADMIN — VANCOMYCIN HYDROCHLORIDE 1500 MG: 5 INJECTION, POWDER, LYOPHILIZED, FOR SOLUTION INTRAVENOUS at 23:25

## 2021-04-29 RX ADMIN — PANTOPRAZOLE SODIUM 40 MG: 40 TABLET, DELAYED RELEASE ORAL at 05:33

## 2021-04-29 RX ADMIN — SODIUM CHLORIDE: 9 INJECTION, SOLUTION INTRAVENOUS at 05:33

## 2021-04-29 RX ADMIN — CEFEPIME HYDROCHLORIDE 2000 MG: 2 INJECTION, POWDER, FOR SOLUTION INTRAVENOUS at 05:33

## 2021-04-29 RX ADMIN — HYDROCODONE BITARTRATE AND ACETAMINOPHEN 1 TABLET: 5; 325 TABLET ORAL at 14:26

## 2021-04-29 RX ADMIN — SODIUM CHLORIDE: 9 INJECTION, SOLUTION INTRAVENOUS at 21:37

## 2021-04-29 RX ADMIN — ENOXAPARIN SODIUM 40 MG: 40 INJECTION SUBCUTANEOUS at 08:39

## 2021-04-29 RX ADMIN — CEFEPIME HYDROCHLORIDE 2000 MG: 2 INJECTION, POWDER, FOR SOLUTION INTRAVENOUS at 14:26

## 2021-04-29 RX ADMIN — HYDROCODONE BITARTRATE AND ACETAMINOPHEN 1 TABLET: 5; 325 TABLET ORAL at 20:20

## 2021-04-29 RX ADMIN — AMLODIPINE BESYLATE 5 MG: 5 TABLET ORAL at 08:39

## 2021-04-29 RX ADMIN — HYDROCODONE BITARTRATE AND ACETAMINOPHEN 1 TABLET: 5; 325 TABLET ORAL at 08:39

## 2021-04-29 RX ADMIN — METOPROLOL TARTRATE 25 MG: 25 TABLET, FILM COATED ORAL at 20:20

## 2021-04-29 ASSESSMENT — PAIN SCALES - GENERAL
PAINLEVEL_OUTOF10: 0
PAINLEVEL_OUTOF10: 6
PAINLEVEL_OUTOF10: 8
PAINLEVEL_OUTOF10: 6
PAINLEVEL_OUTOF10: 7
PAINLEVEL_OUTOF10: 7
PAINLEVEL_OUTOF10: 8
PAINLEVEL_OUTOF10: 7
PAINLEVEL_OUTOF10: 8

## 2021-04-29 ASSESSMENT — PAIN DESCRIPTION - ORIENTATION
ORIENTATION: RIGHT;LEFT
ORIENTATION: LEFT;RIGHT

## 2021-04-29 ASSESSMENT — PAIN DESCRIPTION - LOCATION: LOCATION: FOOT

## 2021-04-29 ASSESSMENT — PAIN DESCRIPTION - PROGRESSION: CLINICAL_PROGRESSION: GRADUALLY IMPROVING

## 2021-04-29 ASSESSMENT — PAIN DESCRIPTION - PAIN TYPE: TYPE: ACUTE PAIN

## 2021-04-29 NOTE — PLAN OF CARE
Problem: Pain:  Goal: Pain level will decrease  Description: Pain level will decrease  Outcome: Ongoing  Goal: Control of acute pain  Description: Control of acute pain  Outcome: Ongoing  Goal: Control of chronic pain  Description: Control of chronic pain  Outcome: Ongoing  Goal: Patient's pain/discomfort is manageable  Description: Patient's pain/discomfort is manageable  Outcome: Ongoing     Problem: Falls - Risk of:  Goal: Will remain free from falls  Description: Will remain free from falls  Outcome: Ongoing  Goal: Absence of physical injury  Description: Absence of physical injury  Outcome: Ongoing     Problem: Infection:  Goal: Will remain free from infection  Description: Will remain free from infection  Outcome: Ongoing     Problem: Safety:  Goal: Free from accidental physical injury  Description: Free from accidental physical injury  Outcome: Ongoing  Goal: Free from intentional harm  Description: Free from intentional harm  Outcome: Ongoing     Problem: Daily Care:  Goal: Daily care needs are met  Description: Daily care needs are met  Outcome: Ongoing     Problem: Skin Integrity:  Goal: Skin integrity will stabilize  Description: Skin integrity will stabilize  Outcome: Ongoing     Problem: Discharge Planning:  Goal: Patients continuum of care needs are met  Description: Patients continuum of care needs are met  Outcome: Ongoing

## 2021-04-29 NOTE — ED PROVIDER NOTES
eMERGENCY dEPARTMENT eNCOUnter      PCP: No primary care provider on file. CHIEF COMPLAINT    Chief Complaint   Patient presents with    Wound Check         HPI    Bridgette Mcmillan is a 50 y.o. male who presents with concerns for foot infection. He states he had an amputation of toes on both feet back in December by Dr. Bharati Griffith. States that he was told he should stay off of his feet but really has not been doing that. He states he is not in a situation in which he can be off of his feet. He reports that since then he has developed a foul odor to the feet, he thinks they are infected. Reports that he is had some intermittent fevers. Reports nausea and vomiting at times. He states he thought the nausea and vomiting was related to the Percocet he had been taking for the postoperative pain. States has not followed up recently.       REVIEW OF SYSTEMS    Constitutional: + fever, chills  Respiratory:  No cough or shortness of breath   Cardiovascular:  No chest pain  GI: + nausea, vomiting  Musculoskeletal:  See HPI   Skin:  See HPI      All other review of systems are negative  See HPI and nursing notes for additional information     PAST MEDICAL HISTORY/SURGICAL HISTORY     Past Medical History:   Diagnosis Date    Hypertension     does not take medications     Past Surgical History:   Procedure Laterality Date    FOOT DEBRIDEMENT Bilateral 2/19/2021    BILATERAL TOE  DEBRIDEMENT INCISION AND DRAINAGE, AMPUTATION OF LEFT 1ST, 2ND, 3RD AND TIP OF 4TH TOE AND RIGHT GREAT TOE performed by Rigo Hoffmann MD at [address] [place] [address]    Current Outpatient Rx   Medication Sig Dispense Refill    metoprolol tartrate (LOPRESSOR) 25 MG tablet Take 1 tablet by mouth 2 times daily 60 tablet 3    omeprazole (PRILOSEC) 20 MG delayed release capsule Take 20 mg by mouth daily      lisinopril (PRINIVIL;ZESTRIL) 20 MG tablet Take 20 mg by mouth daily      amLODIPine (NORVASC) 5 MG tablet Take 1 tablet by mouth daily 30 tablet 3       ALLERGIES    No Known Allergies    FAMILY HISTORY/SOCIAL HISTORY    History reviewed. No pertinent family history. Social History     Socioeconomic History    Marital status: Single     Spouse name: None    Number of children: None    Years of education: None    Highest education level: None   Occupational History    None   Social Needs    Financial resource strain: None    Food insecurity     Worry: None     Inability: None    Transportation needs     Medical: None     Non-medical: None   Tobacco Use    Smoking status: Former Smoker     Quit date: 2021     Years since quittin.3    Smokeless tobacco: Never Used   Substance and Sexual Activity    Alcohol use: Yes     Comment: most days    Drug use: No    Sexual activity: None   Lifestyle    Physical activity     Days per week: None     Minutes per session: None    Stress: None   Relationships    Social connections     Talks on phone: None     Gets together: None     Attends Sabianism service: None     Active member of club or organization: None     Attends meetings of clubs or organizations: None     Relationship status: None    Intimate partner violence     Fear of current or ex partner: None     Emotionally abused: None     Physically abused: None     Forced sexual activity: None   Other Topics Concern    None   Social History Narrative    None       PHYSICAL EXAM    VITAL SIGNS: BP (!) 144/114   Pulse 127   Temp 98.3 °F (36.8 °C) (Oral)   Resp 18   Ht 6' 1\" (1.854 m)   Wt 181 lb (82.1 kg)   SpO2 94%   BMI 23.88 kg/m²    Constitutional:  Well developed, Well nourished  HENT:  Atraumatic, Normocephalic, PERRL, moist oral mucosa. NECK: Neck supple. No adenopathy. Respiratory:  Normal effort, CTAB   Cardiovascular:  Regular rate and rhythm. No murmurs. GI:  Soft, No tenderness. Extremities:  Edema of the right foot, cyanosis  Integument:  There is a large ulceration on the ball of the right foot. This is weeping a foul smelling odor. The great toe amputation on the right foot is healing well. The foot is generally swollen. The left foot amputations are well healing with mild erythema and swelling. Vascular: Distal pulses are intact. Neurologic:  Alert & oriented, no focal deficits. IMAGING:  Xr Foot Left (min 3 Views)    Result Date: 4/28/2021  EXAMINATION: THREE XRAY VIEWS OF THE LEFT FOOT 4/28/2021 5:39 pm COMPARISON: 04/08/2021 HISTORY: ORDERING SYSTEM PROVIDED HISTORY: post op infection TECHNOLOGIST PROVIDED HISTORY: Reason for exam:->post op infection Reason for Exam: pain Acuity: Acute Type of Exam: Initial Mechanism of Injury: pain Relevant Medical/Surgical History: pain FINDINGS: Disarticulation of the great toe distal phalanx noted. Partial amputations of the 2nd and 3rd toe are noted as well. Soft tissue swelling of the forefoot is seen. No soft tissue gas is detected. Mature periosteal new bone formation is seen adjacent to the stump of the 2nd toe proximal phalanx. No immature periosteal reaction or osteolysis is identified to suggest osteomyelitis. Degenerative changes of the midfoot are again seen. Lisfranc joint remains congruent. Soft tissue swelling. No radiographic evidence of osteomyelitis. Xr Foot Left (min 3 Views)    Result Date: 4/9/2021  EXAMINATION: 3 XRAY VIEWS OF THE LEFT FOOT 4/8/2021 1:04 am COMPARISON: Left foot radiographs dated 12/26/2020 HISTORY: ORDERING SYSTEM PROVIDED HISTORY: toe pain TECHNOLOGIST PROVIDED HISTORY: PORTABLE Reason for exam:->toe pain Reason for Exam: toe pain Acuity: Chronic Type of Exam: Initial FINDINGS: There has been amputation of the left 1st, 2nd and 3rd toes. There is no definite acute fracture or dislocation. There is no soft tissue gas. There is no definite evidence of osteomyelitis. Postsurgical changes are seen to the left 1st through 3rd toes. There is no definite evidence of osteomyelitis.      Xr Foot Right (min 3 for the following components:    Lactate 2.2 (*)     All other components within normal limits   CULTURE, BLOOD 1   CULTURE, BLOOD 2   LACTIC ACID, PLASMA             ED COURSE & MEDICAL DECISION MAKING      Vital signs and nursing notes reviewed during ED course. All pertinent Lab data and radiographic results reviewed with patient at bedside. The patient and/or the family were informed of the results of any tests/labs/imaging, the treatment plan, and time was allotted to answer questions. This is a 64-year-old male who presented due to concern for infection of his feet. Had surgery with amputation of some toes in February due to frostbite injury that he sustained in December. He has a large ulcerating wound on the right foot which is very foul-smelling, has weeping drainage present. The left foot also appears infected, however to a lesser degree. X-ray shows no obvious evidence of osteomyelitis. Labs revealed a lactate of 2.2. He is given IV fluids as his heart rate was initially elevated at 127, is now in the low 100s. Antibiotics were started. We will plan for admission to the hospital for further evaluation and care. Clinical  IMPRESSION    Foot infection        Comment: Please note this report has been produced using speech recognition software and may contain errors related to that system including errors in grammar, punctuation, and spelling, as well as words and phrases that may be inappropriate. If there are any questions or concerns please feel free to contact the dictating provider for clarification.       Anabel Coleman DO  04/28/21 1793

## 2021-04-29 NOTE — CARE COORDINATION
LSW spoke with pt about discharge plans. Pt has been staing off and one at the [facility]. Pt stated that Tyra Junior at the [facility] department has been helping pt with the cost for the [facility]. Pt stated that he has a PCP at the [address] [facility]. Pt stated he called [facility] to make an appt after discharge and they told him that they would call him to make an appt. Pt stated that he has not heard back form the [facility]. Pt stated he was discharged with anti-bio and pain med.s Pt stated he took his Rx to [facility] and they would not fill them due to pt's 's license had . Pt stated he did not call the doctor to inform them of this and ripped up there Rx and threw them away. LSW will ask the CM on this unit and the RN to have pt get meds to beds for his discharging Rx. Pt stated he is not sure why he did nto make an appt with Dr. Cristina Remy and the wound clinic. Pt started to blame the SNF that he was discharged to a few admission ago. LSW asked CM Victorina Cowart to talk with the RN to possibly make an appt with  at discharge along with the [address] [place] [address]. Pt is on the do not help list at [facility]. LSW asked pt why he was on this list. Pt stated they will not take him since he was charged with a sex offense 7 yrs ago,. Pt is hoping to return to [facility] at discharge. He waiting for a call from Tyra Junior regarding pt returning. CM is available if needed.

## 2021-04-29 NOTE — PROGRESS NOTES
stay       Nutrition Monitoring and Evaluation:   Behavioral-Environmental Outcomes:  None Identified   Food/Nutrient Intake Outcomes:  Diet Advancement/Tolerance, Food and Nutrient Intake, Supplement Intake  Physical Signs/Symptoms Outcomes:  Biochemical Data, Meal Time Behavior, Skin, Weight     Discharge Planning:    Continue current diet, Continue Oral Nutrition Supplement     Electronically signed by Jordyn Cardenas RD, LD on 4/29/21 at 2:06 PM EDT    Contact: [phone]

## 2021-04-29 NOTE — CONSULTS
[facility] Contin[facility] Nurse  Consult Note       Eliseo Espinoza  AGE: 50 y.o. GENDER: male  : 1975  TODAY'S DATE:  2021    Subjective:     Reason for  Evaluation and Assessment: wound care assessment. Eliseo Espinoza is a 50 y.o. male referred by:   [x] Physician  [] Nursing  [] Other:     Wound Identification:  Wound Type: pressure, traumatic and neuropathic  Contributing Factors: chronic pressure and non-adherence        PAST MEDICAL HISTORY        Diagnosis Date    Hypertension     does not take medications       PAST SURGICAL HISTORY    Past Surgical History:   Procedure Laterality Date    FOOT DEBRIDEMENT Bilateral 2021    BILATERAL TOE  DEBRIDEMENT INCISION AND DRAINAGE, AMPUTATION OF LEFT 1ST, 2ND, 3RD AND TIP OF 4TH TOE AND RIGHT GREAT TOE performed by Dannie Sepulveda MD at [address] [place] [address]    History reviewed. No pertinent family history. SOCIAL HISTORY    Social History     Tobacco Use    Smoking status: Former Smoker     Quit date: 2021     Years since quittin.3    Smokeless tobacco: Never Used   Substance Use Topics    Alcohol use: Yes     Comment: most days    Drug use: No       ALLERGIES    No Known Allergies    MEDICATIONS    No current facility-administered medications on file prior to encounter.       Current Outpatient Medications on File Prior to Encounter   Medication Sig Dispense Refill    metoprolol tartrate (LOPRESSOR) 25 MG tablet Take 1 tablet by mouth 2 times daily 60 tablet 3    omeprazole (PRILOSEC) 20 MG delayed release capsule Take 20 mg by mouth daily      lisinopril (PRINIVIL;ZESTRIL) 20 MG tablet Take 20 mg by mouth daily      amLODIPine (NORVASC) 5 MG tablet Take 1 tablet by mouth daily 30 tablet 3         Objective:      BP (!) 160/92   Pulse 84   Temp 98.2 °F (36.8 °C) (Oral)   Resp 18   Ht 6' 1\" (1.854 m)   Wt 176 lb 3 oz (79.9 kg)   SpO2 99%   BMI 23.25 kg/m²   Naveen Risk Score: Naveen Scale Score: 19    LABS    CBC:   Lab Results   Component Value Date    WBC 5.5 04/29/2021    RBC 4.18 04/29/2021    HGB 11.4 04/29/2021    HCT 35.8 04/29/2021    MCV 85.6 04/29/2021    MCH 27.3 04/29/2021    MCHC 31.8 04/29/2021    RDW 15.4 04/29/2021     04/29/2021    MPV 8.3 04/29/2021     CMP:    Lab Results   Component Value Date     04/29/2021    K 4.3 04/29/2021     04/29/2021    CO2 25 04/29/2021    BUN 8 04/29/2021    CREATININE 0.7 04/29/2021    GFRAA >60 04/29/2021    LABGLOM >60 04/29/2021    GLUCOSE 97 04/29/2021    PROT 6.6 04/29/2021    PROT 8.0 10/14/2010    LABALBU 3.6 04/29/2021    CALCIUM 8.9 04/29/2021    BILITOT 0.5 04/29/2021    ALKPHOS 100 04/29/2021    AST 25 04/29/2021    ALT 16 04/29/2021     Albumin:    Lab Results   Component Value Date    LABALBU 3.6 04/29/2021     PT/INR:  No results found for: PROTIME, INR  HgBA1c:  No results found for: LABA1C      Assessment:     Patient Active Problem List   Diagnosis    Rhabdomyolysis    Frostbite    Wound infection after surgery    Right foot pain    Foot infection       Measurements:  Wound 04/08/21 Right;Plantar (Active)   Wound Etiology Other 04/29/21 1145   Dressing Status New dressing applied 04/29/21 1145   Wound Cleansed Cleansed with saline 04/29/21 1145   Dressing/Treatment Collagen 04/29/21 1145   Wound Length (cm) 3.7 cm 04/29/21 1145   Wound Width (cm) 5.3 cm 04/29/21 1145   Wound Depth (cm) 0.4 cm 04/29/21 1145   Wound Surface Area (cm^2) 19.61 cm^2 04/29/21 1145   Change in Wound Size % (l*w) 6.62 04/29/21 1145   Wound Volume (cm^3) 7.84 cm^3 04/29/21 1145   Wound Healing % -273 04/29/21 1145   Distance Tunneling (cm) 0 cm 04/29/21 1145   Tunneling Position ___ O'Clock 0 04/29/21 1145   Undermining Starts ___ O'Clock 0 04/29/21 1145   Undermining Ends___ O'Clock 0 04/29/21 1145   Undermining Maxium Distance (cm) 0 04/29/21 1145   Drainage Amount Moderate 04/29/21 1145   Drainage Description Serosanguinous 04/29/21 1145 Odor None 04/29/21 1145   Magi-wound Assessment Intact 04/29/21 0100   Margins Defined edges 04/29/21 1145   Wound Thickness Description not for Pressure Injury Full thickness 04/29/21 1145   Number of days: 21       Wound 04/08/21 Toe (Comment  which one) Right great toe (Active)   Wound Etiology Non-Healing Surgical 04/11/21 2048   Dressing Status New dressing applied 04/11/21 2048   Wound Cleansed Cleansed with saline 04/11/21 2048   Wound Length (cm) 0 cm 04/29/21 1145   Wound Width (cm) 0 cm 04/29/21 1145   Wound Depth (cm) 0 cm 04/29/21 1145   Wound Surface Area (cm^2) 0 cm^2 04/29/21 1145   Change in Wound Size % (l*w) 100 04/29/21 1145   Wound Volume (cm^3) 0 cm^3 04/29/21 1145   Wound Healing % 100 04/29/21 1145   Distance Tunneling (cm) 0 cm 04/11/21 2048   Tunneling Position ___ O'Clock 0 04/11/21 2048   Undermining Starts ___ O'Clock 0 04/11/21 2048   Undermining Ends___ O'Clock 0 04/11/21 2048   Undermining Maxium Distance (cm) 0 04/11/21 2048   Drainage Amount None 04/29/21 1145   Drainage Description Purulent;Serosanguinous; Yellow 04/11/21 2048   Odor None 04/29/21 1145   Magi-wound Assessment Dry/flaky 04/11/21 2048   Margins Attached edges 04/29/21 1145   Wound Thickness Description not for Pressure Injury Full thickness 04/11/21 2048   Number of days: 21       Wound 04/08/21 Toe (Comment  which one) Right 2nd toe (Active)   Wound Etiology Other 04/29/21 1145   Dressing Status New dressing applied 04/11/21 2048   Wound Cleansed Cleansed with saline 04/11/21 2048   Wound Length (cm) 0 cm 04/29/21 1145   Wound Width (cm) 0 cm 04/29/21 1145   Wound Depth (cm) 0 cm 04/29/21 1145   Wound Surface Area (cm^2) 0 cm^2 04/29/21 1145   Change in Wound Size % (l*w) 100 04/29/21 1145   Wound Volume (cm^3) 0 cm^3 04/29/21 1145   Wound Healing % 100 04/29/21 1145   Distance Tunneling (cm) 0 cm 04/29/21 1145   Tunneling Position ___ O'Clock 0 04/29/21 1145   Undermining Starts ___ O'Clock 0 04/29/21 1145 Undermining Ends___ O'Clock 0 04/29/21 1145   Undermining Maxium Distance (cm) 0 04/29/21 1145   Drainage Amount None 04/29/21 1145   Drainage Description Serosanguinous 04/11/21 2048   Odor None 04/11/21 2048   Magi-wound Assessment Dry/flaky 04/11/21 2048   Margins Attached edges 04/11/21 2048   Number of days: 21       Wound 04/08/21 Foot Right;Lateral cluster (Active)   Wound Etiology Other 04/29/21 1145   Dressing Status New dressing applied 04/29/21 1145   Wound Cleansed Cleansed with saline 04/29/21 1145   Dressing/Treatment Collagen 04/29/21 1145   Wound Length (cm) 1.3 cm 04/29/21 1145   Wound Width (cm) 2.2 cm 04/29/21 1145   Wound Depth (cm) 0.2 cm 04/29/21 1145   Wound Surface Area (cm^2) 2.86 cm^2 04/29/21 1145   Change in Wound Size % (l*w) 68.22 04/29/21 1145   Wound Volume (cm^3) 0.57 cm^3 04/29/21 1145   Wound Healing % 37 04/29/21 1145   Distance Tunneling (cm) 0 cm 04/29/21 1145   Tunneling Position ___ O'Clock 0 04/29/21 1145   Undermining Starts ___ O'Clock 0 04/29/21 1145   Undermining Ends___ O'Clock 0 04/29/21 1145   Undermining Maxium Distance (cm) 0 04/29/21 1145   Drainage Amount Moderate 04/29/21 1145   Drainage Description Serosanguinous; Yellow 04/29/21 1145   Odor None 04/29/21 1145   Magi-wound Assessment Dry/flaky 04/29/21 1145   Margins Defined edges 04/29/21 1145   Wound Thickness Description not for Pressure Injury Full thickness 04/29/21 1145   Number of days: 21       Wound 04/08/21 Toe (Comment  which one) Left great blister (Active)   Wound Etiology Non-Healing Surgical 04/11/21 2048   Dressing Status New dressing applied 04/11/21 2048   Wound Cleansed Cleansed with saline 04/11/21 2048   Dressing/Treatment Open to air 04/29/21 1145   Wound Length (cm) 0 cm 04/29/21 1145   Wound Width (cm) 0 cm 04/29/21 1145   Wound Depth (cm) 0 cm 04/29/21 1145   Wound Surface Area (cm^2) 0 cm^2 04/29/21 1145   Change in Wound Size % (l*w) 100 04/29/21 1145   Wound Volume (cm^3) 0 cm^3 04/29/21 1145   Distance Tunneling (cm) 0 cm 04/11/21 2048   Tunneling Position ___ O'Clock 0 04/11/21 2048   Undermining Starts ___ O'Clock 0 04/11/21 2048   Undermining Ends___ O'Clock 0 04/11/21 2048   Undermining Maxium Distance (cm) 0 04/11/21 2048   Drainage Amount None 04/11/21 2048   Odor None 04/11/21 2048   Magi-wound Assessment Dry/flaky 04/11/21 2048   Margins Attached edges 04/11/21 2048   Number of days: 21       Wound 04/08/21 Toe (Comment  which one) Left 3rd toe (Active)   Dressing Status New dressing applied 04/11/21 2048   Wound Cleansed Cleansed with saline 04/11/21 2048   Wound Length (cm) 0 cm 04/29/21 1145   Wound Width (cm) 0 cm 04/29/21 1145   Wound Depth (cm) 0 cm 04/29/21 1145   Wound Surface Area (cm^2) 0 cm^2 04/29/21 1145   Change in Wound Size % (l*w) 100 04/29/21 1145   Wound Volume (cm^3) 0 cm^3 04/29/21 1145   Wound Healing % 100 04/29/21 1145   Distance Tunneling (cm) 0 cm 04/11/21 2048   Tunneling Position ___ O'Clock 0 04/11/21 2048   Undermining Starts ___ O'Clock 0 04/11/21 2048   Undermining Ends___ O'Clock 0 04/11/21 2048   Undermining Maxium Distance (cm) 0 04/11/21 2048   Drainage Amount None 04/29/21 1145   Odor None 04/11/21 2048   Magi-wound Assessment Dry/flaky 04/11/21 2048   Margins Attached edges 04/11/21 2048   Number of days: 21       Wound 04/08/21 Toe (Comment  which one) Left 4th toe (Active)   Dressing Status New dressing applied 04/11/21 2048   Wound Cleansed Cleansed with saline 04/11/21 2048   Wound Length (cm) 0 cm 04/29/21 1145   Wound Width (cm) 0 cm 04/29/21 1145   Wound Depth (cm) 0 cm 04/29/21 1145   Wound Surface Area (cm^2) 0 cm^2 04/29/21 1145   Change in Wound Size % (l*w) 100 04/29/21 1145   Wound Volume (cm^3) 0 cm^3 04/29/21 1145   Wound Healing % 100 04/29/21 1145   Distance Tunneling (cm) 0 cm 04/11/21 2048   Tunneling Position ___ O'Clock 0 04/11/21 2048   Undermining Starts ___ O'Clock 0 04/11/21 2048   Undermining Ends___ O'Clock 0 04/11/21 2048   Undermining Maxium Distance (cm) 0 04/11/21 2048   Drainage Amount None 04/29/21 1145   Odor None 04/11/21 2048   Magi-wound Assessment Dry/flaky 04/11/21 2048   Margins Defined edges 04/11/21 2048   Wound Thickness Description not for Pressure Injury Full thickness 04/11/21 2048   Number of days: 21       Wound 04/08/21 Toe (Comment  which one) Left 5th toe (Active)   Wound Etiology Other 04/11/21 2048   Dressing Status New dressing applied 04/11/21 2048   Wound Cleansed Cleansed with saline 04/11/21 2048   Dressing/Treatment Open to air 04/29/21 1145   Wound Length (cm) 0.5 cm 04/29/21 1145   Wound Width (cm) 0.7 cm 04/29/21 1145   Wound Depth (cm) 0.1 cm 04/29/21 1145   Wound Surface Area (cm^2) 0.35 cm^2 04/29/21 1145   Change in Wound Size % (l*w) 80.56 04/29/21 1145   Wound Volume (cm^3) 0.04 cm^3 04/29/21 1145   Wound Healing % 78 04/29/21 1145   Distance Tunneling (cm) 0 cm 04/29/21 1145   Tunneling Position ___ O'Clock 0 04/29/21 1145   Undermining Starts ___ O'Clock 0 04/29/21 1145   Undermining Ends___ O'Clock 0 04/29/21 1145   Undermining Maxium Distance (cm) 0 04/29/21 1145   Drainage Amount None 04/29/21 1145   Drainage Description Serosanguinous 04/11/21 2048   Odor None 04/11/21 2048   Magi-wound Assessment Dry/flaky 04/29/21 1145   Margins Attached edges 04/29/21 1145   Wound Thickness Description not for Pressure Injury Full thickness 04/11/21 2048   Number of days: 21       Response to treatment:  Well tolerated by patient.      Pain Assessment:  Severity:  0  Quality of pain: none  Wound Pain Timing/Severity:   Premedicated:     Plan:     Plan of Care: Wound 04/08/21 Right;Plantar-Dressing/Treatment: Collagen(abd kerlix tape )  Wound 04/08/21 Foot Right;Lateral cluster-Dressing/Treatment: Collagen(abd kerlix tape )  Wound 04/08/21 Toe (Comment  which one) Left great blister-Dressing/Treatment: Open to air  Wound 04/08/21 Toe (Comment  which one) Left 5th toe-Dressing/Treatment: Open to air

## 2021-04-29 NOTE — PROGRESS NOTES
Hospitalist Progress Note      :  Sherren Husbands /Age/Sex: 1975  (50 y.o. male)   MRN & CSN:  [id number] & [id number] Admission Date/Time: 2021  8:16 PM   [facility]:  [facility] PCP: No primary care provider on file. Hospital Day: 2    Assessment and Plan:   Sherren Husbands is a 50 y.o.  male  who presents with right foot wound with puslike drainage.    -Infected right foot wound with cellulitis  Right foot x-ray-soft tissue swelling without evidence of OM  Plan  Continue Vanco and cefepime  Wound culture. MRI right foot ordered by ID  ID input appreciated. Wound care. No surgical intervention planned by general surgeon. Forefoot offloading shoes.    -Hypertension: Continue amlodipine 5 mg, metoprolol 25 mg twice daily and lisinopril 20 mg.  --s/p amputation right big toe and first 3 toes due to frostbite. Diet DIET GENERAL;  Dietary Nutrition Supplements: Wound Healing Oral Supplement   DVT Prophylaxis [x] Lovenox, []  Heparin, [] SCDs, [] Ambulation   GI Prophylaxis [] PPI,  [] H2 Blocker,  [] Carafate,  [] Diet/Tube Feeds   Code Status Full Code   Disposition  to be determined   MDM [] Low, [x] Moderate,[]  High     History of Present Illness:   Patient seen and examined. No significant change in his feet wounds. Ten point ROS reviewed negative, unless as noted above    Objective: Intake/Output Summary (Last 24 hours) at 2021 1443  Last data filed at 2021 1427  Gross per 24 hour   Intake 1302.5 ml   Output 1450 ml   Net -147.5 ml      Vitals:   Vitals:    21 1430   BP: 124/81   Pulse: 82   Resp: 19   Temp: 97.7 °F (36.5 °C)   SpO2: 99%     Physical Exam:   GEN Awake male, sitting upright in bed. RESP Comfortable on room air. CARDIO/VASC S1/S2 auscultated. Regular rate without appreciable murmurs. No peripheral edema. GI Abdomen is soft without significant tenderness, masses, or guarding. Bowel sounds are normoactive.    MSK No gross joint deformities. SKIN Normal coloration, warm, dry. NEURO  normal speech, no lateralizing weakness. PSYCH Awake, alert, oriented x 3.      Medications:   Medications:    amLODIPine  5 mg Oral Daily    lisinopril  20 mg Oral Daily    metoprolol tartrate  25 mg Oral BID    pantoprazole  40 mg Oral QAM AC    sodium chloride flush  10 mL Intravenous 2 times per day    enoxaparin  40 mg Subcutaneous Daily    collagenase   Topical Daily    cefepime  2,000 mg Intravenous Q8H    vancomycin  1,500 mg Intravenous Q12H      Infusions:    sodium chloride      sodium chloride 75 mL/hr at 04/29/21 0839     PRN Meds: sodium chloride flush, 10 mL, PRN  sodium chloride, 25 mL, PRN  promethazine, 12.5 mg, Q6H PRN    Or  ondansetron, 4 mg, Q6H PRN  acetaminophen, 650 mg, Q6H PRN    Or  acetaminophen, 650 mg, Q6H PRN  HYDROcodone-acetaminophen, 1 tablet, Q6H PRN        CBC   Recent Labs     04/28/21 2122 04/29/21  0940   WBC 6.6 5.5   HGB 12.3* 11.4*   HCT 37.9* 35.8*    320      BMP   Recent Labs     04/28/21 2122 04/29/21  0940    134*   K 3.8 4.3    102   CO2 24 25   BUN 8 8   CREATININE 0.7* 0.7*       Radiology report reviewed     Electronically signed by Jay Bridges MD on 4/29/2021 at 2:43 PM

## 2021-04-29 NOTE — PROGRESS NOTES
[address] [facility]  consulted by Dr. Jennifer Anderson for monitoring and adjustment. Indication for treatment: Right foot infection  Goal trough: 10 - 15 mcg/mL    Pertinent Laboratory Values:   Temp Readings from Last 3 Encounters:   04/29/21 98.2 °F (36.8 °C) (Oral)   04/12/21 97.9 °F (36.6 °C) (Oral)   03/18/21 98.2 °F (36.8 °C) (Infrared)     Recent Labs     04/28/21 2122   WBC 6.6   LACTATE 2.2*     Recent Labs     04/28/21 2122   BUN 8   CREATININE 0.7*     Estimated Creatinine Clearance: 147 mL/min (A) (based on SCr of 0.7 mg/dL (L)). Intake/Output Summary (Last 24 hours) at 4/29/2021 0846  Last data filed at 4/29/2021 0426  Gross per 24 hour   Intake 1302.5 ml   Output 750 ml   Net 552.5 ml     Pertinent Cultures:  Date    Source    Results  04/28   Blood    Pending           Assessment:  · WBC WNL at 6.6; Afebrile  · Renal function appears stable: SCr = 0.7, BUN = 8, No I/O data  · Day(s) of therapy: 2  · Vancomycin concentration:   · 04/30 - To be drawn    Plan:  · Continue Vancomycin 1,500 mg IV Q 12 Hours  · Check Vancomycin trough prior to fourth dose  · Pharmacy will continue to monitor patient and adjust therapy as indicated    VANCOMYCIN CONCENTRATION SCHEDULED FOR 04/30/2021 @ 9 AM    Thank you for the consult.   Verena Herrera RPh   4/29/2021 8:46 AM

## 2021-04-29 NOTE — H&P
HISTORY AND PHYSICAL  (Hospitalist, Internal Medicine)  IDENTIFYING INFORMATION   PATIENT:  Beau Marcus  MRN:  [id number]  ADMIT DATE: 2021  TIME OF EVALUATION: 2021 10:12 PM    CHIEF COMPLAINT     Right foot wound  HISTORY OF PRESENT ILLNESS   Beau Marcus is a 50 y.o. male admitted for right foot wound, presents with pain that is been going on for over a couple days, states that he has also drainage has puslike. Had a foul-smelling odor, states that he has not been going to wound care as frequent as he needs to, however states that he does need care and is willing to get help. Patient denies fevers, however has had hot and cold sensation. Patient did have recent amputations in December by Dr. Cristina Remy. Pt otherwise has no complaints of CP, SOB, dizziness, N/V/C/D, abdominal pain, dysuria, or cough. PMH listed below:    PAST MEDICAL, SURGICAL, FAMILY, and SOCIAL HISTORY     Past Medical History:   Diagnosis Date    Hypertension     does not take medications     Past Surgical History:   Procedure Laterality Date    FOOT DEBRIDEMENT Bilateral 2021    BILATERAL TOE  DEBRIDEMENT INCISION AND DRAINAGE, AMPUTATION OF LEFT 1ST, 2ND, 3RD AND TIP OF 4TH TOE AND RIGHT GREAT TOE performed by Srikanth Norwood MD at [place] [address] reviewed. No pertinent family history.   Family Hx of HTN  Family Hx as reviewed above, otherwise non-contributory  Social History     Socioeconomic History    Marital status: Single     Spouse name: None    Number of children: None    Years of education: None    Highest education level: None   Occupational History    None   Social Needs    Financial resource strain: None    Food insecurity     Worry: None     Inability: None    Transportation needs     Medical: None     Non-medical: None   Tobacco Use    Smoking status: Former Smoker     Quit date: 2021     Years since quittin.3    Smokeless tobacco: Never Used   Substance and Sexual Activity    Alcohol use: Yes     Comment: most days    Drug use: No    Sexual activity: None   Lifestyle    Physical activity     Days per week: None     Minutes per session: None    Stress: None   Relationships    Social connections     Talks on phone: None     Gets together: None     Attends Restorationism service: None     Active member of club or organization: None     Attends meetings of clubs or organizations: None     Relationship status: None    Intimate partner violence     Fear of current or ex partner: None     Emotionally abused: None     Physically abused: None     Forced sexual activity: None   Other Topics Concern    None   Social History Narrative    None       MEDICATIONS   Medications Prior to Admission  Not in a hospital admission. Current Medications  Current Facility-Administered Medications   Medication Dose Route Frequency Provider Last Rate Last Admin    cefepime (MAXIPIME) 2000 mg IVPB minibag  2,000 mg Intravenous Once Shikha Pen, DO        vancomycin (VANCOCIN) 1,750 mg in dextrose 5 % 500 mL IVPB  20 mg/kg Intravenous Once Shikha Pen, DO        0.9 % sodium chloride bolus  1,000 mL Intravenous Once Shikha Pen, DO        0.9 % sodium chloride infusion   Intravenous Continuous Shikha Pen, DO         Current Outpatient Medications   Medication Sig Dispense Refill    metoprolol tartrate (LOPRESSOR) 25 MG tablet Take 1 tablet by mouth 2 times daily 60 tablet 3    omeprazole (PRILOSEC) 20 MG delayed release capsule Take 20 mg by mouth daily      lisinopril (PRINIVIL;ZESTRIL) 20 MG tablet Take 20 mg by mouth daily      amLODIPine (NORVASC) 5 MG tablet Take 1 tablet by mouth daily 30 tablet 3         Allergies  No Known Allergies    REVIEW OF SYSTEMS   Within above limitations. 14 point review of systems reviewed. Pertinent positive or negative as per HPI or otherwise negative per 14 point systems review.       PHYSICAL EXAM     Wt Readings from Last 3 Encounters:   04/28/21 181 lb (82.1 norco.  -Surgery consult for possible debridement  -Wound consult    Tachycardia. HR >110. Likely d/t above. -May improve with iv fluids and antibiotics.     History of hypertension-  - c/w metoprolol  - lisinopril  - norvasc      Case d/w ED provider    DVT ppx: lovenox  Code status: full    [facility], Internal Medicine  4/28/2021 at 10:12 PM

## 2021-04-29 NOTE — PROGRESS NOTES
[address] [facility]  consulted by Dr. Curtis Halsted for monitoring and adjustment. Indication for treatment: Right foot infection  Goal trough: 10 - 15 mcg/mL    Pertinent Laboratory Values:   Temp Readings from Last 3 Encounters:   04/28/21 98.3 °F (36.8 °C) (Oral)   04/12/21 97.9 °F (36.6 °C) (Oral)   03/18/21 98.2 °F (36.8 °C) (Infrared)     Recent Labs     04/28/21 2122   WBC 6.6   LACTATE 2.2*     Recent Labs     04/28/21 2122   BUN 8   CREATININE 0.7*     Estimated Creatinine Clearance: 147 mL/min (A) (based on SCr of 0.7 mg/dL (L)). No intake or output data in the 24 hours ending 04/28/21 4233    Pertinent Cultures:  Date    Source    Results  04/28   Blood    Pending           Assessment:  WBC WNL at 6.6; Afebrile  Renal function appears stable: SCr = 0.7, BUN = 8, No I/O data  Day(s) of therapy: 1  Vancomycin concentration:   04/30 - To be drawn    Plan:  Vancomycin 1,750 mg IV given in ED; Follow with Vancomycin 1,500 mg IV Q 12 Hours  Check Vancomycin trough prior to fourth dose  Pharmacy will continue to monitor patient and adjust therapy as indicated    Devin 3 04/30/2021 @ 9 AM    Thank you for the consult.   Maurice Shafer, [address]   4/28/2021 11:13 PM

## 2021-04-29 NOTE — PLAN OF CARE
Nutrition Problem #1: Increased nutrient needs  Intervention: Food and/or Nutrient Delivery: Continue Current Diet, Snacks (Comment), Start Oral Nutrition Supplement  Nutritional Goals: Patient will consume at least 75% of meals and supplements during stay

## 2021-04-29 NOTE — CONSULTS
RRR and no MRG. Abd: soft, non-distended, no tenderness, no hepatomegaly. Normoactive bowel sounds. Ext: no clubbing, cyanosis, or edema. Left foot with prior amputations of 1st, 2nd, 3rd and tip of 4th toe, well approximated. See wounds above. Neuro: Mental status intact. CN 2-12 intact and no focal sensory or motor deficits     Diagnostic Studies: reviewed  4/28/21 XR Foot Right:  Impression   Soft tissue swelling and ulceration.  No radiographic evidence of   osteomyelitis is seen. 4/28/21 XR Foot Left:  Impression   Soft tissue swelling.  No radiographic evidence of osteomyelitis. I have examined this patient and available medical records on this date and have made the above observations, conclusions and recommendations. Electronically signed by: Electronically signed by Darryle Pare.  GEN Katz CNP on 4/29/2021 at 9:19 AM

## 2021-04-29 NOTE — CONSULTS
Department of General Surgery   Surgical Service Dr. Stephen Anaya   Consult Note    Date of Consult: 4/29/21    Reason for Consult:  Right foot infection  Requesting Physician:  Dr. Ellie Mueller:  Right foot pain, swelling    History Obtained From:  patient    HISTORY OF PRESENT ILLNESS:    The patient is a 50 y.o. male known to me with previous bilateral toe amputations due to frostbite last winter. He was admitted last month with blisters on his feet and cellulitis. He was unable to follow up with wound clinic or myself after discharge. He returned and was admitted with infection in his right foot. He reports pain and swelling in the right foot for 2-3 days. He reports chills. Afebrile. Denies other complaints. He has been walking on his feet. He was not able to acquire toe offloading shoes and has been wearing flat post op shoes.        Past Medical History:    Past Medical History:   Diagnosis Date    Hypertension     does not take medications       Past Surgical History:    Past Surgical History:   Procedure Laterality Date    FOOT DEBRIDEMENT Bilateral 2/19/2021    BILATERAL TOE  DEBRIDEMENT INCISION AND DRAINAGE, AMPUTATION OF LEFT 1ST, 2ND, 3RD AND TIP OF 4TH TOE AND RIGHT GREAT TOE performed by Andressa Henry MD at [facility] OR       Current Medications:   Current Facility-Administered Medications   Medication Dose Route Frequency Provider Last Rate Last Admin    amLODIPine (NORVASC) tablet 5 mg  5 mg Oral Daily Jerson Anderson MD   5 mg at 04/29/21 0839    lisinopril (PRINIVIL;ZESTRIL) tablet 20 mg  20 mg Oral Daily Susi MORENO MD   20 mg at 04/29/21 0839    metoprolol tartrate (LOPRESSOR) tablet 25 mg  25 mg Oral BID Susi MORENO MD   25 mg at 04/29/21 0839    pantoprazole (PROTONIX) tablet 40 mg  40 mg Oral QAM AC Jerson Anderson MD   40 mg at 04/29/21 0533    sodium chloride flush 0.9 % injection 10 mL  10 mL Intravenous 2 times per day Alycia Rodríguez MD        sodium chloride flush 0.9 % injection 10 mL  10 mL Intravenous PRN Jerson James MD        0.9 % sodium chloride infusion  25 mL Intravenous PRN Jerson James MD        enoxaparin (LOVENOX) injection 40 mg  40 mg Subcutaneous Daily Jerson Terrie James MD   40 mg at 21 0839    promethazine (PHENERGAN) tablet 12.5 mg  12.5 mg Oral Q6H PRN Jerson James MD        Or    ondansetron (ZOFRAN) injection 4 mg  4 mg Intravenous Q6H PRN Jerson James MD        acetaminophen (TYLENOL) tablet 650 mg  650 mg Oral Q6H PRN Jerson James MD        Or    acetaminophen (TYLENOL) suppository 650 mg  650 mg Rectal Q6H PRN Jerson James MD        HYDROcodone-acetaminophen (NORCO) 5-325 MG per tablet 1 tablet  1 tablet Oral Q6H PRN Alan Mcburney, PA-C   1 tablet at 21 0839    0.9 % sodium chloride infusion   Intravenous Continuous Chace aMriano MD 75 mL/hr at 21 0839 Rate Change at 21 0839    cefepime (MAXIPIME) 2000 mg IVPB minibag  2,000 mg Intravenous Q8H Alec Mays MD   Stopped at 21 0634    vancomycin (VANCOCIN) 1,500 mg in dextrose 5 % 500 mL IVPB  1,500 mg Intravenous Q12H Jerson James  mL/hr at 21 0918 1,500 mg at 21 5209       Allergies:  Patient has no known allergies.     Social History:   Social History     Socioeconomic History    Marital status: Single     Spouse name: None    Number of children: None    Years of education: None    Highest education level: None   Occupational History    None   Social Needs    Financial resource strain: None    Food insecurity     Worry: None     Inability: None    Transportation needs     Medical: None     Non-medical: None   Tobacco Use    Smoking status: Former Smoker     Quit date: 2021     Years since quittin.3    Smokeless tobacco: Never Used   Substance and Sexual Activity    Alcohol use: Yes     Comment: most days    Drug use: No    Sexual activity: None   Lifestyle    Physical activity Days per week: None     Minutes per session: None    Stress: None   Relationships    Social connections     Talks on phone: None     Gets together: None     Attends Muslim service: None     Active member of club or organization: None     Attends meetings of clubs or organizations: None     Relationship status: None    Intimate partner violence     Fear of current or ex partner: None     Emotionally abused: None     Physically abused: None     Forced sexual activity: None   Other Topics Concern    None   Social History Narrative    None       Family History:   History reviewed. No pertinent family history. REVIEW OFSYSTEMS:    Review of Systems   Constitutional: Negative for chills. Negative for fever. HENT: Negative for congestion. Negative for rhinorrhea. Respiratory: Negative for cough. Negative for shortness of breath. Negative for wheezing. Cardiovascular: Negative for chest pain. Gastrointestinal:  Negative for constipation. Negative for diarrhea. Negative for nausea and vomiting. Genitourinary: Negative for difficulty urinating. Neurological: Negative for dizziness, syncope and numbness. Hematological: Does not bruise/bleed easily. PHYSICAL EXAM:  Vitals:    04/29/21 0030 04/29/21 0415 04/29/21 0720 04/29/21 0730   BP: (!) 165/86 (!) 146/88  (!) 160/92   Pulse: 102 94  84   Resp: 18 16  18   Temp: 97.8 °F (36.6 °C) 97.9 °F (36.6 °C)  98.2 °F (36.8 °C)   TempSrc: Oral Oral  Oral   SpO2: 100% 96% 99% 99%   Weight: 176 lb 3 oz (79.9 kg)      Height:           Physical Exam  General: awake, alert, in no acute distress  HEENT: mucous membranes moist  Respiratory: normal effort, no wheezes appreciated  CV: appears well perfused, regular rate and rhythm  Abdomen: Soft, non-tender, non-distended. No guarding or rebound tenderness.   Skin: warm and dry    Extremities: Right foot with ulceration over the plantar pad ~7x5cm in size and 0.5cm deep, full thickness through the skin, mild erythema and moderate edema extending across the foot and to the mid calf. No purulence or crepitus. Neuro: no focal deficits noted  Psych: mood normal        DATA:    Lab Results   Component Value Date    WBC 5.5 04/29/2021    HGB 11.4 (L) 04/29/2021    HCT 35.8 (L) 04/29/2021    MCV 85.6 04/29/2021     04/29/2021     Lab Results   Component Value Date     04/29/2021    K 4.3 04/29/2021     04/29/2021    CO2 25 04/29/2021    BUN 8 04/29/2021    CREATININE 0.7 04/29/2021    GLUCOSE 97 04/29/2021    CALCIUM 8.9 04/29/2021      Xray- no evidence of osteo    IMPRESSION:    50 y.o. male with history of toe amputations for frostbite. He is readmitted with worsening ulceration and cellulitis of the right foot. No evidence of undrained infection, I feel this is most likely just cellulitis. Patient Active Problem List:     Rhabdomyolysis     Frostbite     Wound infection after surgery     Right foot pain     Foot infection        PLAN:  - agree with IV antibiotics  - local wound cares, appreciate Wound Care assistance with dressings/recommendations  - will work on getting forefoot offloading shoes to prevent ongoing damage to Nehemiah's feet from walking.          Electronically signed by Francella Kocher, MD on 4/29/2021 at 10:51 AM

## 2021-04-30 ENCOUNTER — APPOINTMENT (OUTPATIENT)
Dept: MRI IMAGING | Age: 46
DRG: 383 | End: 2021-04-30
Payer: MEDICARE

## 2021-04-30 LAB
DOSE AMOUNT: NORMAL
DOSE TIME: NORMAL
HIGH SENSITIVE C-REACTIVE PROTEIN: 42.9 MG/L
VANCOMYCIN TROUGH: 15.1 UG/ML (ref 10–20)

## 2021-04-30 PROCEDURE — 99232 SBSQ HOSP IP/OBS MODERATE 35: CPT | Performed by: NURSE PRACTITIONER

## 2021-04-30 PROCEDURE — 2580000003 HC RX 258: Performed by: INTERNAL MEDICINE

## 2021-04-30 PROCEDURE — 1200000000 HC SEMI PRIVATE

## 2021-04-30 PROCEDURE — 87070 CULTURE OTHR SPECIMN AEROBIC: CPT

## 2021-04-30 PROCEDURE — 97530 THERAPEUTIC ACTIVITIES: CPT

## 2021-04-30 PROCEDURE — 86141 C-REACTIVE PROTEIN HS: CPT

## 2021-04-30 PROCEDURE — 87081 CULTURE SCREEN ONLY: CPT

## 2021-04-30 PROCEDURE — 76937 US GUIDE VASCULAR ACCESS: CPT

## 2021-04-30 PROCEDURE — 6370000000 HC RX 637 (ALT 250 FOR IP): Performed by: INTERNAL MEDICINE

## 2021-04-30 PROCEDURE — 86403 PARTICLE AGGLUT ANTBDY SCRN: CPT

## 2021-04-30 PROCEDURE — 94761 N-INVAS EAR/PLS OXIMETRY MLT: CPT

## 2021-04-30 PROCEDURE — 6370000000 HC RX 637 (ALT 250 FOR IP): Performed by: PHYSICIAN ASSISTANT

## 2021-04-30 PROCEDURE — 87186 SC STD MICRODIL/AGAR DIL: CPT

## 2021-04-30 PROCEDURE — 36415 COLL VENOUS BLD VENIPUNCTURE: CPT

## 2021-04-30 PROCEDURE — 97162 PT EVAL MOD COMPLEX 30 MIN: CPT

## 2021-04-30 PROCEDURE — 97116 GAIT TRAINING THERAPY: CPT

## 2021-04-30 PROCEDURE — 6360000004 HC RX CONTRAST MEDICATION: Performed by: NURSE PRACTITIONER

## 2021-04-30 PROCEDURE — 87077 CULTURE AEROBIC IDENTIFY: CPT

## 2021-04-30 PROCEDURE — 87075 CULTR BACTERIA EXCEPT BLOOD: CPT

## 2021-04-30 PROCEDURE — 80202 ASSAY OF VANCOMYCIN: CPT

## 2021-04-30 PROCEDURE — A9579 GAD-BASE MR CONTRAST NOS,1ML: HCPCS | Performed by: NURSE PRACTITIONER

## 2021-04-30 PROCEDURE — 6360000002 HC RX W HCPCS: Performed by: INTERNAL MEDICINE

## 2021-04-30 PROCEDURE — 99024 POSTOP FOLLOW-UP VISIT: CPT | Performed by: SURGERY

## 2021-04-30 PROCEDURE — 73720 MRI LWR EXTREMITY W/O&W/DYE: CPT

## 2021-04-30 RX ORDER — CHLORTHALIDONE 25 MG/1
25 TABLET ORAL DAILY
Status: DISCONTINUED | OUTPATIENT
Start: 2021-04-30 | End: 2021-05-03 | Stop reason: HOSPADM

## 2021-04-30 RX ORDER — OXYCODONE HYDROCHLORIDE AND ACETAMINOPHEN 5; 325 MG/1; MG/1
1 TABLET ORAL EVERY 4 HOURS PRN
Status: DISCONTINUED | OUTPATIENT
Start: 2021-04-30 | End: 2021-05-03 | Stop reason: HOSPADM

## 2021-04-30 RX ADMIN — GADOTERIDOL 15 ML: 279.3 INJECTION, SOLUTION INTRAVENOUS at 15:57

## 2021-04-30 RX ADMIN — AMLODIPINE BESYLATE 5 MG: 5 TABLET ORAL at 10:39

## 2021-04-30 RX ADMIN — OXYCODONE HYDROCHLORIDE AND ACETAMINOPHEN 1 TABLET: 5; 325 TABLET ORAL at 21:29

## 2021-04-30 RX ADMIN — COLLAGENASE SANTYL: 250 OINTMENT TOPICAL at 10:41

## 2021-04-30 RX ADMIN — HYDROCODONE BITARTRATE AND ACETAMINOPHEN 1 TABLET: 5; 325 TABLET ORAL at 06:19

## 2021-04-30 RX ADMIN — METOPROLOL TARTRATE 25 MG: 25 TABLET, FILM COATED ORAL at 10:39

## 2021-04-30 RX ADMIN — OXYCODONE HYDROCHLORIDE AND ACETAMINOPHEN 1 TABLET: 5; 325 TABLET ORAL at 16:03

## 2021-04-30 RX ADMIN — ENOXAPARIN SODIUM 40 MG: 40 INJECTION SUBCUTANEOUS at 10:39

## 2021-04-30 RX ADMIN — CEFEPIME HYDROCHLORIDE 2000 MG: 2 INJECTION, POWDER, FOR SOLUTION INTRAVENOUS at 13:51

## 2021-04-30 RX ADMIN — CEFEPIME HYDROCHLORIDE 2000 MG: 2 INJECTION, POWDER, FOR SOLUTION INTRAVENOUS at 21:29

## 2021-04-30 RX ADMIN — CHLORTHALIDONE 25 MG: 25 TABLET ORAL at 15:58

## 2021-04-30 RX ADMIN — CEFEPIME HYDROCHLORIDE 2000 MG: 2 INJECTION, POWDER, FOR SOLUTION INTRAVENOUS at 06:19

## 2021-04-30 RX ADMIN — PANTOPRAZOLE SODIUM 40 MG: 40 TABLET, DELAYED RELEASE ORAL at 06:19

## 2021-04-30 RX ADMIN — SODIUM CHLORIDE, PRESERVATIVE FREE 10 ML: 5 INJECTION INTRAVENOUS at 21:29

## 2021-04-30 RX ADMIN — VANCOMYCIN HYDROCHLORIDE 1500 MG: 5 INJECTION, POWDER, LYOPHILIZED, FOR SOLUTION INTRAVENOUS at 22:12

## 2021-04-30 RX ADMIN — LISINOPRIL 20 MG: 20 TABLET ORAL at 10:39

## 2021-04-30 RX ADMIN — VANCOMYCIN HYDROCHLORIDE 1500 MG: 5 INJECTION, POWDER, LYOPHILIZED, FOR SOLUTION INTRAVENOUS at 10:39

## 2021-04-30 ASSESSMENT — PAIN SCALES - GENERAL
PAINLEVEL_OUTOF10: 8
PAINLEVEL_OUTOF10: 7

## 2021-04-30 ASSESSMENT — PAIN DESCRIPTION - ORIENTATION: ORIENTATION: RIGHT;LEFT

## 2021-04-30 NOTE — PROGRESS NOTES
Infectious Disease Progress Note  2021   Patient Name: Jeffrie Sacks : 1975   Impression  · Right Foot Wound Infection with Cellulitis:  § Afebrile, no leukocytosis  § Blood cultures -pending  § -MRSA/MSSA screen pending  § -Right foot wound culture pending  § -XR Foot Right: soft tissue swelling and ulcerations. No radiographic evidence of OM  § -XR Foot Left:  Soft tissue swelling no radiographic evidence of OM. § Dr. Ayad Aldrich, general surgery, onboard, rec local wound care, imp of cellulitis.      · HTN: Uncontrolled, non-compliant, takes no meds     ? Prior Amputations 2020 due to Frostbite     ? Homelessness     · Multi-morbidity: per PMHx:  HTN, amputations of left 1st, 2nd, 3rd, and tip of 4th toe and right great toe 2020,   Plan:  · Continue IV cefepime 2 gm q8h  · Continue IV vancomycin per pharmacy dosing  · Trend CRP, trending down  ? Await blood cultures and wound culture  ? MRI right foot to r/o OM   ? Check MRSA nares     Ongoing Antimicrobial Therapy  Cefepime -  Vancomycin -? Completed Antimicrobial Therapy  ? History:? Interval history noted. Chief complaint: right foot infection with cellulitis. Denies n/v/d/f or untoward effects of antibiotics. Resting quietly. Physical Exam:  Vital Signs: BP (!) 159/88   Pulse 74   Temp 97.4 °F (36.3 °C) (Oral)   Resp 16   Ht 6' 1\" (1.854 m)   Wt 176 lb 3 oz (79.9 kg)   SpO2 100%   BMI 23.25 kg/m²      Gen: alert and oriented X3, no distress  Skin: no stigmata of endocarditis  Wounds: C/D/I right plantar foot, lateral foot open wounds with surrounding erythema and edema. HEMT: AT/NC Oropharynx pink, moist, and without lesions or exudates; dentition in good state of repair  Eyes: PERRLA, EOMI, conjunctiva pink, sclera anicteric. Neck: Supple. Trachea midline. No LAD. Chest: no distress and CTA. Good air movement. Heart: RRR and no MRG.    Abd: soft, non-distended, no tenderness, no hepatomegaly. Normoactive bowel sounds. Ext: no clubbing, cyanosis, or edema. Left foot with prior amputations of 1st, 2nd, 3rd and tip of 4th toe, well approximated. See wounds above. Neuro: Mental status intact. CN 2-12 intact and no focal sensory or motor deficits     Radiologic / Imaging / TESTING  4/28/21 XR Foot Right:  Impression   Soft tissue swelling and ulceration.  No radiographic evidence of   osteomyelitis is seen.      4/28/21 XR Foot Left:  Impression   Soft tissue swelling.  No radiographic evidence of osteomyelitis.      4/29/21 MRI Right Foot:      Labs:    Recent Results (from the past 24 hour(s))   Comprehensive Metabolic Panel w/ Reflex to MG    Collection Time: 04/29/21  9:40 AM   Result Value Ref Range    Sodium 134 (L) 135 - 145 MMOL/L    Potassium 4.3 3.5 - 5.1 MMOL/L    Chloride 102 99 - 110 mMol/L    CO2 25 21 - 32 MMOL/L    BUN 8 6 - 23 MG/DL    CREATININE 0.7 (L) 0.9 - 1.3 MG/DL    Glucose 97 70 - 99 MG/DL    Calcium 8.9 8.3 - 10.6 MG/DL    Albumin 3.6 3.4 - 5.0 GM/DL    Total Protein 6.6 6.4 - 8.2 GM/DL    Total Bilirubin 0.5 0.0 - 1.0 MG/DL    ALT 16 10 - 40 U/L    AST 25 15 - 37 IU/L    Alkaline Phosphatase 100 40 - 129 IU/L    GFR Non-African American >60 >60 mL/min/1.73m2    GFR African American >60 >60 mL/min/1.73m2    Anion Gap 7 4 - 16   CBC auto differential    Collection Time: 04/29/21  9:40 AM   Result Value Ref Range    WBC 5.5 4.0 - 10.5 K/CU MM    RBC 4.18 (L) 4.6 - 6.2 M/CU MM    Hemoglobin 11.4 (L) 13.5 - 18.0 GM/DL    Hematocrit 35.8 (L) 42 - 52 %    MCV 85.6 78 - 100 FL    MCH 27.3 27 - 31 PG    MCHC 31.8 (L) 32.0 - 36.0 %    RDW 15.4 (H) 11.7 - 14.9 %    Platelets 784 593 - 224 K/CU MM    MPV 8.3 7.5 - 11.1 FL    Differential Type AUTOMATED DIFFERENTIAL     Segs Relative 53.9 36 - 66 %    Lymphocytes % 29.8 24 - 44 %    Monocytes % 11.6 (H) 0 - 4 %    Eosinophils % 3.8 (H) 0 - 3 %    Basophils % 0.7 0 - 1 %    Segs Absolute 3.0 K/CU MM    Lymphocytes Absolute 1.7 K/CU MM    Monocytes Absolute 0.6 K/CU MM    Eosinophils Absolute 0.2 K/CU MM    Basophils Absolute 0.0 K/CU MM    Nucleated RBC % 0.0 %    Total Nucleated RBC 0.0 K/CU MM    Total Immature Neutrophil 0.01 K/CU MM    Immature Neutrophil % 0.2 0 - 0.43 %   Lactic Acid, Plasma    Collection Time: 04/29/21  9:40 AM   Result Value Ref Range    Lactate 0.8 0.4 - 2.0 mMOL/L   C-Reactive Protein    Collection Time: 04/29/21  9:40 AM   Result Value Ref Range    CRP, High Sensitivity 69.3 mg/L   Sedimentation Rate    Collection Time: 04/29/21  9:40 AM   Result Value Ref Range    Sed Rate 40 (H) 0 - 15 MM/HR     CULTURE results: Invalid input(s): BLOOD CULTURE,  URINE CULTURE, SURGICAL CULTURE    Diagnosis:  Patient Active Problem List   Diagnosis    Rhabdomyolysis    Frostbite    Wound infection after surgery    Right foot pain    Foot infection    Cellulitis of right lower extremity    Homelessness       Active Problems  Active Problems:    Right foot pain    Foot infection    Cellulitis of right lower extremity    Homelessness  Resolved Problems:    * No resolved hospital problems. *    Electronically signed by: Electronically signed by Emigdio Catherine.  GEN Katz CNP on 4/30/2021 at 7:54 AM

## 2021-04-30 NOTE — CONSULTS
Consult completed. PIV Nexiva 20g 1.75 inch catheter placed in right forearm via ultrasound without difficulty. Patient tolerated well. Primary nurse notified.

## 2021-04-30 NOTE — PROGRESS NOTES
Pt ambulated in [place] with therapy and tolerated well. Had complaints of pain and treated with oral pain medication. Wound cultures of foot were sent, dressing changed by Dr. Emil Lizarraga. Tolerating fluids and diet.

## 2021-04-30 NOTE — PROGRESS NOTES
Physical Therapy    Facility/Department: [facility]  Initial Assessment    NAME: Mauricio Lizarraga  : 1975  MRN: [id number]    Date of Service: 2021    Discharge Recommendations:  24 hour supervision or assist, Home with Home health PT   PT Equipment Recommendations  Equipment Needed: Yes  Mobility Devices: Romie Knee: Rolling    Functional Outcome Measure:    Acute Care Index of Function (ACIF):    Score: 0.82 (0.71 or greater = appropriate for home with home PT and 24 hour supervision/assist)    Assessment   Assessment: Pt is a 50 y.o. male with medical history, surgical history, co-morbidities, and personal factors including Hypertension and Foot Debridement (Bilateral, 2021) with admission for R foot infection. Prior to admission, pt was independent with functional mobility and ADLs. Examination of body systems reveals decreased strength and decreased independence with functional mobility.         Prognosis: Good  Decision Making: Medium Complexity  Clinical Presentation: evolving  PT Education: Goals;PT Role;Plan of Care;Equipment;Transfer Training;Functional Mobility Training;General Safety;Gait Training  REQUIRES PT FOLLOW UP: Yes  Activity Tolerance  Activity Tolerance: Patient Tolerated treatment well         Restrictions  Restrictions/Precautions  Restrictions/Precautions: General Precautions, Fall Risk  Position Activity Restriction  Other position/activity restrictions: forefoot offloading shoe on RLE  Vision/Hearing  Vision: Within Functional Limits  Hearing: Within functional limits     Subjective  General  Chart Reviewed: Yes  Patient assessed for rehabilitation services?: Yes  Family / Caregiver Present: No  Follows Commands: Within Functional Limits  Pain Screening  Patient Currently in Pain: Denies  Pain Assessment  Pain Assessment: 0-10  Pain Level: 0  Vital Signs  Patient Currently in Pain: Denies       Orientation  Orientation  Overall Orientation Status: Within Normal Transfer Training, ADL/Self-care Training, IADL Training, Safety Education & Training, Neuromuscular Re-education, Endurance Training, Patient/Caregiver Education & Training, Equipment Evaluation, Education, & procurement, Gait Training, Pain Management, Positioning, Stair training  Safety Devices  Type of devices: All fall risk precautions in place, Call light within reach, Nurse notified, Bed alarm in place, Gait belt, Patient at risk for falls, Left in bed      AM-PAC Score  AM-PAC Inpatient Mobility Raw Score : 22 (04/30/21 [facility])  AM-PAC Inpatient T-Scale Score : 53.28 (04/30/21 [facility])  Mobility Inpatient CMS 0-100% Score: 20.91 (04/30/21 [facility])  Mobility Inpatient CMS G-Code Modifier : Rondall Forward (04/30/21 [facility])          Goals  Long term goals  Time Frame for Long term goals :  In one week:  Long term goal 1: Pt will ambulate 400 feet with modified independence with LRAD  Long term goal 2: Pt will independently complete 3 sets of 10 reps of BLE AROM exercises in available and allowed ROM  Long term goal 3: Pt will independently ascend/descend 2 steps with a handrail       Time In: 1146  Time Out: 1240  Total Treatment Time: 54  Timed Code Treatment Minutes: [facility] [facility] Lucia Duane, PT, DPT  License #: [id number]

## 2021-04-30 NOTE — PROGRESS NOTES
air.  CARDIO/VASC S1/S2 auscultated. Regular rate without appreciable murmurs. No peripheral edema. GI Abdomen is soft without significant tenderness, masses, or guarding. Bowel sounds are normoactive. MSK No gross joint deformities. SKIN Normal coloration, warm, dry. NEURO  normal speech, no lateralizing weakness. PSYCH Awake, alert, oriented x 3.      Medications:   Medications:    amLODIPine  5 mg Oral Daily    lisinopril  20 mg Oral Daily    metoprolol tartrate  25 mg Oral BID    pantoprazole  40 mg Oral QAM AC    sodium chloride flush  10 mL Intravenous 2 times per day    enoxaparin  40 mg Subcutaneous Daily    collagenase   Topical Daily    cefepime  2,000 mg Intravenous Q8H    vancomycin  1,500 mg Intravenous Q12H      Infusions:    sodium chloride       PRN Meds: oxyCODONE-acetaminophen, 1 tablet, Q4H PRN  sodium chloride flush, 10 mL, PRN  sodium chloride, 25 mL, PRN  promethazine, 12.5 mg, Q6H PRN    Or  ondansetron, 4 mg, Q6H PRN  acetaminophen, 650 mg, Q6H PRN    Or  acetaminophen, 650 mg, Q6H PRN  HYDROcodone-acetaminophen, 1 tablet, Q6H PRN        CBC   Recent Labs     04/28/21 2122 04/29/21  0940   WBC 6.6 5.5   HGB 12.3* 11.4*   HCT 37.9* 35.8*    320      BMP   Recent Labs     04/28/21 2122 04/29/21  0940    134*   K 3.8 4.3    102   CO2 24 25   BUN 8 8   CREATININE 0.7* 0.7*       Radiology report reviewed     Electronically signed by Charles Perez MD on 4/30/2021 at 1:30 PM

## 2021-04-30 NOTE — PROGRESS NOTES
[address] [facility]  consulted by Dr. Luanne Haynes for monitoring and adjustment. Indication for treatment: R foot cellulitis, concern for OM  Goal trough: 15 mcg/mL    Pertinent Laboratory Values:   Temp Readings from Last 3 Encounters:   04/30/21 98 °F (36.7 °C) (Oral)   04/12/21 97.9 °F (36.6 °C) (Oral)   03/18/21 98.2 °F (36.8 °C) (Infrared)     Recent Labs     04/28/21 2122 04/29/21  0940   WBC 6.6 5.5   LACTATE 2.2* 0.8     Recent Labs     04/28/21 2122 04/29/21  0940   BUN 8 8   CREATININE 0.7* 0.7*     Estimated Creatinine Clearance: 147 mL/min (A) (based on SCr of 0.7 mg/dL (L)). Intake/Output Summary (Last 24 hours) at 4/30/2021 1337  Last data filed at 4/29/2021 1427  Gross per 24 hour   Intake --   Output 700 ml   Net -700 ml     Pertinent Cultures:  Date    Source    Results  04/28   Blood    Pending  04/30   MRSA screen   Sent            Assessment:  · WBC WNL; Afebrile  · Renal function appears stable: stable  · Day(s) of therapy: 3  · Vancomycin concentration:   · 04/30 - 15.1, therapeutic on 1500 mg q12h     Plan:  · Continue Vancomycin 1,500 mg q12h  · Pharmacy will continue to monitor patient and adjust therapy as indicated    Devin 3 5/3 @ 0861     Thank you for the consult.   Hermilovani Ag, 91Katina Guerrier   4/30/2021 1:37 PM

## 2021-04-30 NOTE — CARE COORDINATION
DAMIENW received a call from Crystal Back [phone]. He has been working with the Pt to help find him shelter. Waylon Jones inquired if Pt could discharge to SNF. LSW explained the process and would get therapy in to warner Pt and provide recommendations. Waylon Jones thinks the Pt will be agreeable for SNF placement. Waylon Jones will continue to work on finding a shelter in case Pt is not able to go to a SNF. Whiteboard placed asking for therapy to warner Pt.

## 2021-04-30 NOTE — PROGRESS NOTES
GENERAL SURGERY PROGRESS NOTE    Indra Beck is a 50 y.o. male s/p toe amputations for frostbite, admitted with cellulitis and right foot wound                 Subjective:  Doing well this morning. Still with pain in the right foot. Edema and erythema improving. MRI ordered by ID. Objective:    Vitals: VITALS:  BP (!) 170/114   Pulse 75   Temp 98 °F (36.7 °C) (Oral)   Resp 18   Ht 6' 1\" (1.854 m)   Wt 176 lb 3 oz (79.9 kg)   SpO2 92%   BMI 23.25 kg/m²     I/O: 04/29 0701 - 04/30 0700  In: -   Out: 700 [Urine:700]    Labs/Imaging Results:   Lab Results   Component Value Date     04/29/2021    K 4.3 04/29/2021     04/29/2021    CO2 25 04/29/2021    BUN 8 04/29/2021    CREATININE 0.7 04/29/2021    GLUCOSE 97 04/29/2021    CALCIUM 8.9 04/29/2021      Lab Results   Component Value Date    WBC 5.5 04/29/2021    HGB 11.4 (L) 04/29/2021    HCT 35.8 (L) 04/29/2021    MCV 85.6 04/29/2021     04/29/2021       IV Fluids: sodium chloride    Scheduled Meds:   amLODIPine, 5 mg, Oral, Daily    lisinopril, 20 mg, Oral, Daily    metoprolol tartrate, 25 mg, Oral, BID    pantoprazole, 40 mg, Oral, QAM AC    sodium chloride flush, 10 mL, Intravenous, 2 times per day    enoxaparin, 40 mg, Subcutaneous, Daily    collagenase, , Topical, Daily    cefepime, 2,000 mg, Intravenous, Q8H    vancomycin, 1,500 mg, Intravenous, Q12H    Physical Exam:  General: A&O x 3, no distress. HEENT: Anicteric sclerae, MMM. Extremities: Ulcer present over the distal plantar surface of the right foot ~5x7cm in size. Clean base, improving edema in the foot and ankle. Less odor and drainage today      Assessment and Plan:   50 y.o. male s/p toe amputations for frostbite now with blistering and cellulitis after increasing ambulation recently. Right foot improving. I acquired a fore-foot offloading shoe for Nehemiah and gave this to him today. He should wear it any time he is up and out of bed.     Patient Active Problem List:     Rhabdomyolysis     Frostbite     Wound infection after surgery      - PT/OT ok in forefoot offloading shoe on the right  - Continue dressing changes per wound care recommendations  - Continue IV antibiotics, will follow results of MRI  - Can work on placement,may benefit from disposition to SNF  - will sign out to Dr. Taiwo Lucas over the weekend and plan on seeing him myself again on Monday    Lindsay Weeks MD

## 2021-05-01 LAB
ANION GAP SERPL CALCULATED.3IONS-SCNC: 7 MMOL/L (ref 4–16)
BUN BLDV-MCNC: 13 MG/DL (ref 6–23)
CALCIUM SERPL-MCNC: 9.4 MG/DL (ref 8.3–10.6)
CHLORIDE BLD-SCNC: 99 MMOL/L (ref 99–110)
CO2: 30 MMOL/L (ref 21–32)
CREAT SERPL-MCNC: 0.8 MG/DL (ref 0.9–1.3)
GFR AFRICAN AMERICAN: >60 ML/MIN/1.73M2
GFR NON-AFRICAN AMERICAN: >60 ML/MIN/1.73M2
GLUCOSE BLD-MCNC: 110 MG/DL (ref 70–99)
HIGH SENSITIVE C-REACTIVE PROTEIN: 17.7 MG/L
POTASSIUM SERPL-SCNC: 4.3 MMOL/L (ref 3.5–5.1)
SODIUM BLD-SCNC: 136 MMOL/L (ref 135–145)

## 2021-05-01 PROCEDURE — 6370000000 HC RX 637 (ALT 250 FOR IP): Performed by: INTERNAL MEDICINE

## 2021-05-01 PROCEDURE — 94761 N-INVAS EAR/PLS OXIMETRY MLT: CPT

## 2021-05-01 PROCEDURE — 80048 BASIC METABOLIC PNL TOTAL CA: CPT

## 2021-05-01 PROCEDURE — 2580000003 HC RX 258: Performed by: INTERNAL MEDICINE

## 2021-05-01 PROCEDURE — 86141 C-REACTIVE PROTEIN HS: CPT

## 2021-05-01 PROCEDURE — 99232 SBSQ HOSP IP/OBS MODERATE 35: CPT | Performed by: INTERNAL MEDICINE

## 2021-05-01 PROCEDURE — 6360000002 HC RX W HCPCS: Performed by: INTERNAL MEDICINE

## 2021-05-01 PROCEDURE — 1200000000 HC SEMI PRIVATE

## 2021-05-01 PROCEDURE — 36415 COLL VENOUS BLD VENIPUNCTURE: CPT

## 2021-05-01 RX ADMIN — VANCOMYCIN HYDROCHLORIDE 1500 MG: 5 INJECTION, POWDER, LYOPHILIZED, FOR SOLUTION INTRAVENOUS at 22:51

## 2021-05-01 RX ADMIN — SODIUM CHLORIDE, PRESERVATIVE FREE 10 ML: 5 INJECTION INTRAVENOUS at 10:14

## 2021-05-01 RX ADMIN — AMLODIPINE BESYLATE 5 MG: 5 TABLET ORAL at 10:14

## 2021-05-01 RX ADMIN — OXYCODONE HYDROCHLORIDE AND ACETAMINOPHEN 1 TABLET: 5; 325 TABLET ORAL at 20:05

## 2021-05-01 RX ADMIN — AMPICILLIN SODIUM 1000 MG: 1 INJECTION, POWDER, FOR SOLUTION INTRAMUSCULAR; INTRAVENOUS at 14:02

## 2021-05-01 RX ADMIN — OXYCODONE HYDROCHLORIDE AND ACETAMINOPHEN 1 TABLET: 5; 325 TABLET ORAL at 10:14

## 2021-05-01 RX ADMIN — CHLORTHALIDONE 25 MG: 25 TABLET ORAL at 10:14

## 2021-05-01 RX ADMIN — VANCOMYCIN HYDROCHLORIDE 1500 MG: 5 INJECTION, POWDER, LYOPHILIZED, FOR SOLUTION INTRAVENOUS at 10:14

## 2021-05-01 RX ADMIN — AMPICILLIN SODIUM 1000 MG: 1 INJECTION, POWDER, FOR SOLUTION INTRAMUSCULAR; INTRAVENOUS at 20:06

## 2021-05-01 RX ADMIN — COLLAGENASE SANTYL: 250 OINTMENT TOPICAL at 10:15

## 2021-05-01 RX ADMIN — SODIUM CHLORIDE, PRESERVATIVE FREE 10 ML: 5 INJECTION INTRAVENOUS at 20:06

## 2021-05-01 RX ADMIN — PANTOPRAZOLE SODIUM 40 MG: 40 TABLET, DELAYED RELEASE ORAL at 05:54

## 2021-05-01 RX ADMIN — ENOXAPARIN SODIUM 40 MG: 40 INJECTION SUBCUTANEOUS at 10:14

## 2021-05-01 RX ADMIN — CEFEPIME HYDROCHLORIDE 2000 MG: 2 INJECTION, POWDER, FOR SOLUTION INTRAVENOUS at 05:54

## 2021-05-01 RX ADMIN — OXYCODONE HYDROCHLORIDE AND ACETAMINOPHEN 1 TABLET: 5; 325 TABLET ORAL at 04:36

## 2021-05-01 RX ADMIN — LISINOPRIL 20 MG: 20 TABLET ORAL at 10:14

## 2021-05-01 ASSESSMENT — PAIN DESCRIPTION - PAIN TYPE
TYPE: ACUTE PAIN;CHRONIC PAIN
TYPE: ACUTE PAIN

## 2021-05-01 ASSESSMENT — PAIN DESCRIPTION - LOCATION: LOCATION: FOOT

## 2021-05-01 ASSESSMENT — PAIN SCALES - GENERAL
PAINLEVEL_OUTOF10: 6
PAINLEVEL_OUTOF10: 7

## 2021-05-01 ASSESSMENT — PAIN DESCRIPTION - FREQUENCY
FREQUENCY: INTERMITTENT
FREQUENCY: INTERMITTENT

## 2021-05-01 ASSESSMENT — PAIN DESCRIPTION - PROGRESSION: CLINICAL_PROGRESSION: GRADUALLY IMPROVING

## 2021-05-01 ASSESSMENT — PAIN DESCRIPTION - ORIENTATION: ORIENTATION: RIGHT;LEFT

## 2021-05-01 ASSESSMENT — PAIN - FUNCTIONAL ASSESSMENT: PAIN_FUNCTIONAL_ASSESSMENT: PREVENTS OR INTERFERES SOME ACTIVE ACTIVITIES AND ADLS

## 2021-05-01 NOTE — PROGRESS NOTES
Hospitalist Progress Note      :  Brock Stanford /Age/Sex: 1975  (50 y.o. male)   MRN & CSN:  [id number] & [id number] Admission Date/Time: 2021  8:16 PM   [facility] No primary care provider on file. Hospital Day: 4    Assessment and Plan:   Brock Stanford is a 50 y.o.  male  who presents with right foot wound with puslike drainage.    -Infected right foot wound with cellulitis  Right foot x-ray-soft tissue swelling without evidence of OM. MRI right foot-consistent with cellulitis. Plan  Continue Vanco and cefepime  F/u Wound culture. ID on board. Wound care. No surgical intervention planned by general surgeon. Forefoot offloading shoes.    -Hypertension: Poorly controlled. Continue amlodipine 5 mg, and lisinopril 20 mg daily. Start chlorthalidone 25 mg daily. Stop metoprolol. --s/p amputation right big toe and first 3 toes due to frostbite.  -Homelessness:  working on possible ECF placement. Diet DIET GENERAL;  Dietary Nutrition Supplements: Wound Healing Oral Supplement   DVT Prophylaxis [x] Lovenox, []  Heparin, [] SCDs, [] Ambulation   GI Prophylaxis [] PPI,  [] H2 Blocker,  [] Carafate,  [] Diet/Tube Feeds   Code Status Full Code   Disposition  SNF placement. MDM [] Low, [x] Moderate,[]  High     History of Present Illness:   Patient seen and examined. No acute issues overnight. Rt foot pain is well controlled. No fever. MRI right foot shows diffuse edema compatible with cellulitis but no soft tissue ulceration or abscess. Ten point ROS reviewed negative, unless as noted above    Objective:        Intake/Output Summary (Last 24 hours) at 2021 1127  Last data filed at 2021 1018  Gross per 24 hour   Intake 1160 ml   Output 2100 ml   Net -940 ml      Vitals:   Vitals:    21 1000   BP: (!) 159/98   Pulse: 68   Resp: 18   Temp: 98.2 °F (36.8 °C)   SpO2: 100%     Physical Exam:   GEN Awake male, sitting upright in bed.  RESP Comfortable on room air. CARDIO/VASC S1/S2 auscultated. Regular rate without appreciable murmurs. No peripheral edema. GI Abdomen is soft without significant tenderness, masses, or guarding. Bowel sounds are normoactive. MSK No gross joint deformities. SKIN Rt foot wrapped. NEURO  normal speech, no lateralizing weakness. PSYCH Awake, alert, oriented x 3.      Medications:   Medications:    chlorthalidone  25 mg Oral Daily    amLODIPine  5 mg Oral Daily    lisinopril  20 mg Oral Daily    pantoprazole  40 mg Oral QAM AC    sodium chloride flush  10 mL Intravenous 2 times per day    enoxaparin  40 mg Subcutaneous Daily    collagenase   Topical Daily    cefepime  2,000 mg Intravenous Q8H    vancomycin  1,500 mg Intravenous Q12H      Infusions:    sodium chloride       PRN Meds: oxyCODONE-acetaminophen, 1 tablet, Q4H PRN  sodium chloride flush, 10 mL, PRN  sodium chloride, 25 mL, PRN  promethazine, 12.5 mg, Q6H PRN    Or  ondansetron, 4 mg, Q6H PRN  acetaminophen, 650 mg, Q6H PRN    Or  acetaminophen, 650 mg, Q6H PRN  HYDROcodone-acetaminophen, 1 tablet, Q6H PRN        CBC   Recent Labs     04/28/21 2122 04/29/21  0940   WBC 6.6 5.5   HGB 12.3* 11.4*   HCT 37.9* 35.8*    320      BMP   Recent Labs     04/28/21 2122 04/29/21  0940 05/01/21  0531    134* 136   K 3.8 4.3 4.3    102 99   CO2 24 25 30   BUN 8 8 13   CREATININE 0.7* 0.7* 0.8*       Radiology report reviewed     Electronically signed by Raysa Melvin MD on 5/1/2021 at 11:27 AM

## 2021-05-01 NOTE — PROGRESS NOTES
Infectious Disease Progress Note  2021   Patient Name: Sherren Husbands : 1975   Impression  · Right Foot Wound Infection with Cellulitis:  § Afebrile, no leukocytosis  § Blood cultures -pending  § -MRSA/MSSA screen pending  § -Right foot wound culture Group C streptococcus  § -XR Foot Right: soft tissue swelling and ulcerations. No radiographic evidence of OM  § -XR Foot Left:  Soft tissue swelling no radiographic evidence of OM. § Dr. Ml Chew, general surgery, onboard, rec local wound care, imp of cellulitis.      · HTN: Uncontrolled, non-compliant, takes no meds     ? Prior Amputations 2020 due to Frostbite     ? Homelessness     · Multi-morbidity: per PMHx:  HTN, amputations of left 1st, 2nd, 3rd, and tip of 4th toe and right great toe 2020,   Plan:  · D/c IV cefepime 2 gm q8h; start ampicillin, if he tolerates it the amoxicillin may be used at discharge. Await MRSA nares, if negative then d/c vancomycin . · Continue IV vancomycin per pharmacy dosing  · Trend CRP, trending down  ? Await blood cultures and wound culture  ? MRI right foot to r/o OM   ? Check MRSA nares     Ongoing Antimicrobial Therapy  Ampicillin -  Vancomycin -? Completed Antimicrobial Therapy  Cefepime - ? History:? Interval history noted. Chief complaint: right foot infection with cellulitis. Denies n/v/d/f or untoward effects of antibiotics. Resting quietly. Physical Exam:  Vital Signs: BP (!) 159/98   Pulse 68   Temp 98.2 °F (36.8 °C) (Oral)   Resp 18   Ht 6' 1\" (1.854 m)   Wt 176 lb 3 oz (79.9 kg)   SpO2 100%   BMI 23.25 kg/m²      Gen: alert and oriented X3, no distress  Skin: no stigmata of endocarditis  Wounds: C/D/I right plantar foot, lateral foot open wounds with surrounding erythema and edema. HEMT: AT/NC Oropharynx pink, moist, and without lesions or exudates; dentition in good state of repair  Eyes: PERRLA, EOMI, conjunctiva pink, sclera anicteric. Neck: Supple. Trachea midline. No LAD. Chest: no distress and CTA. Good air movement. Heart: RRR and no MRG. Abd: soft, non-distended, no tenderness, no hepatomegaly. Normoactive bowel sounds. Ext: no clubbing, cyanosis, or edema. Left foot with prior amputations of 1st, 2nd, 3rd and tip of 4th toe, well approximated. See wounds above. Neuro: Mental status intact. CN 2-12 intact and no focal sensory or motor deficits     Radiologic / Imaging / TESTING  4/28/21 XR Foot Right:  Impression   Soft tissue swelling and ulceration.  No radiographic evidence of   osteomyelitis is seen.      4/28/21 XR Foot Left:  Impression   Soft tissue swelling.  No radiographic evidence of osteomyelitis. 4/29/21 MRI Right Foot:      Labs:    Recent Results (from the past 24 hour(s))   C-Reactive Protein    Collection Time: 05/01/21  5:31 AM   Result Value Ref Range    CRP, High Sensitivity 17.7 mg/L   Basic Metabolic Panel    Collection Time: 05/01/21  5:31 AM   Result Value Ref Range    Sodium 136 135 - 145 MMOL/L    Potassium 4.3 3.5 - 5.1 MMOL/L    Chloride 99 99 - 110 mMol/L    CO2 30 21 - 32 MMOL/L    Anion Gap 7 4 - 16    BUN 13 6 - 23 MG/DL    CREATININE 0.8 (L) 0.9 - 1.3 MG/DL    Glucose 110 (H) 70 - 99 MG/DL    Calcium 9.4 8.3 - 10.6 MG/DL    GFR Non-African American >60 >60 mL/min/1.73m2    GFR African American >60 >60 mL/min/1.73m2     CULTURE results: Invalid input(s): BLOOD CULTURE,  URINE CULTURE, SURGICAL CULTURE    Diagnosis:  Patient Active Problem List   Diagnosis    Rhabdomyolysis    Frostbite    Wound infection after surgery    Right foot pain    Foot infection    Cellulitis of right lower extremity    Homelessness       Active Problems  Active Problems:    Right foot pain    Foot infection    Cellulitis of right lower extremity    Homelessness  Resolved Problems:    * No resolved hospital problems.  *    Electronically signed by: Electronically signed by Hoda Donis MD on 5/1/2021 at 1:23 PM

## 2021-05-01 NOTE — PROGRESS NOTES
[facility] [facility]  consulted by Dr. Moses Landrum for monitoring and adjustment. Indication for treatment: R foot cellulitis, concern for OM  Goal trough: 15 mcg/mL    Pertinent Laboratory Values:   Temp Readings from Last 3 Encounters:   05/01/21 98 °F (36.7 °C) (Oral)   04/12/21 97.9 °F (36.6 °C) (Oral)   03/18/21 98.2 °F (36.8 °C) (Infrared)     Recent Labs     04/28/21 2122 04/29/21  0940   WBC 6.6 5.5   LACTATE 2.2* 0.8     Recent Labs     04/28/21 2122 04/29/21  0940 05/01/21  0531   BUN 8 8 13   CREATININE 0.7* 0.7* 0.8*     Estimated Creatinine Clearance: 129 mL/min (A) (based on SCr of 0.8 mg/dL (L)). Intake/Output Summary (Last 24 hours) at 5/1/2021 1533  Last data filed at 5/1/2021 1250  Gross per 24 hour   Intake 1160 ml   Output 1900 ml   Net -740 ml     Pertinent Cultures:  Date    Source    Results  04/28   Blood    Pending  04/30   MRSA screen   Sent            Assessment:  · WBC WNL; Afebrile  · Renal function appears stable: stable, UOP ok  · Day(s) of therapy: 4  · Vancomycin concentration:   · 04/30 - 15.1, therapeutic on 1500 mg q12h     Plan:  · Renal trends remain normal  · Continue Vancomycin 1,500 mg q12h with a therapeutic trough  · Recheck the vanco level in 2 days to monitor for accumulation. · Pharmacy will continue to monitor patient and adjust therapy as indicated    Devin 3 5/3 @ 8106     Thank you for the consult. Elona Ley Cheryle Prude   5/1/2021 3:33 PM

## 2021-05-01 NOTE — PLAN OF CARE
Problem: Pain:  Goal: Pain level will decrease  Description: Pain level will decrease  Outcome: Ongoing  Goal: Control of acute pain  Description: Control of acute pain  Outcome: Ongoing  Goal: Control of chronic pain  Description: Control of chronic pain  Outcome: Ongoing  Goal: Patient's pain/discomfort is manageable  Description: Patient's pain/discomfort is manageable  Outcome: Ongoing     Problem: Falls - Risk of:  Goal: Will remain free from falls  Description: Will remain free from falls  Outcome: Ongoing  Goal: Absence of physical injury  Description: Absence of physical injury  Outcome: Ongoing     Problem: Infection:  Goal: Will remain free from infection  Description: Will remain free from infection  Outcome: Ongoing     Problem: Safety:  Goal: Free from accidental physical injury  Description: Free from accidental physical injury  Outcome: Ongoing  Goal: Free from intentional harm  Description: Free from intentional harm  Outcome: Ongoing     Problem: Daily Care:  Goal: Daily care needs are met  Description: Daily care needs are met  Outcome: Ongoing     Problem: Skin Integrity:  Goal: Skin integrity will stabilize  Description: Skin integrity will stabilize  Outcome: Ongoing     Problem: Discharge Planning:  Goal: Patients continuum of care needs are met  Description: Patients continuum of care needs are met  Outcome: Ongoing     Problem: Nutrition  Goal: Optimal nutrition therapy  Outcome: Ongoing

## 2021-05-02 LAB
CULTURE: NORMAL
HIGH SENSITIVE C-REACTIVE PROTEIN: 11 MG/L
Lab: NORMAL
SPECIMEN: NORMAL

## 2021-05-02 PROCEDURE — 1200000000 HC SEMI PRIVATE

## 2021-05-02 PROCEDURE — 2580000003 HC RX 258: Performed by: INTERNAL MEDICINE

## 2021-05-02 PROCEDURE — 6370000000 HC RX 637 (ALT 250 FOR IP): Performed by: INTERNAL MEDICINE

## 2021-05-02 PROCEDURE — 6360000002 HC RX W HCPCS: Performed by: INTERNAL MEDICINE

## 2021-05-02 PROCEDURE — 94761 N-INVAS EAR/PLS OXIMETRY MLT: CPT

## 2021-05-02 PROCEDURE — 86141 C-REACTIVE PROTEIN HS: CPT

## 2021-05-02 PROCEDURE — 36415 COLL VENOUS BLD VENIPUNCTURE: CPT

## 2021-05-02 RX ADMIN — AMPICILLIN SODIUM 1000 MG: 1 INJECTION, POWDER, FOR SOLUTION INTRAMUSCULAR; INTRAVENOUS at 05:01

## 2021-05-02 RX ADMIN — VANCOMYCIN HYDROCHLORIDE 1500 MG: 5 INJECTION, POWDER, LYOPHILIZED, FOR SOLUTION INTRAVENOUS at 21:50

## 2021-05-02 RX ADMIN — SODIUM CHLORIDE, PRESERVATIVE FREE 10 ML: 5 INJECTION INTRAVENOUS at 21:01

## 2021-05-02 RX ADMIN — LISINOPRIL 20 MG: 20 TABLET ORAL at 10:11

## 2021-05-02 RX ADMIN — ENOXAPARIN SODIUM 40 MG: 40 INJECTION SUBCUTANEOUS at 10:12

## 2021-05-02 RX ADMIN — COLLAGENASE SANTYL: 250 OINTMENT TOPICAL at 14:54

## 2021-05-02 RX ADMIN — AMPICILLIN SODIUM 1000 MG: 1 INJECTION, POWDER, FOR SOLUTION INTRAMUSCULAR; INTRAVENOUS at 01:10

## 2021-05-02 RX ADMIN — OXYCODONE HYDROCHLORIDE AND ACETAMINOPHEN 1 TABLET: 5; 325 TABLET ORAL at 21:01

## 2021-05-02 RX ADMIN — SODIUM CHLORIDE, PRESERVATIVE FREE 10 ML: 5 INJECTION INTRAVENOUS at 10:13

## 2021-05-02 RX ADMIN — CHLORTHALIDONE 25 MG: 25 TABLET ORAL at 10:11

## 2021-05-02 RX ADMIN — OXYCODONE HYDROCHLORIDE AND ACETAMINOPHEN 1 TABLET: 5; 325 TABLET ORAL at 05:00

## 2021-05-02 RX ADMIN — PANTOPRAZOLE SODIUM 40 MG: 40 TABLET, DELAYED RELEASE ORAL at 05:01

## 2021-05-02 RX ADMIN — OXYCODONE HYDROCHLORIDE AND ACETAMINOPHEN 1 TABLET: 5; 325 TABLET ORAL at 10:11

## 2021-05-02 RX ADMIN — AMPICILLIN SODIUM 1000 MG: 1 INJECTION, POWDER, FOR SOLUTION INTRAMUSCULAR; INTRAVENOUS at 21:01

## 2021-05-02 RX ADMIN — VANCOMYCIN HYDROCHLORIDE 1500 MG: 5 INJECTION, POWDER, LYOPHILIZED, FOR SOLUTION INTRAVENOUS at 11:27

## 2021-05-02 RX ADMIN — AMPICILLIN SODIUM 1000 MG: 1 INJECTION, POWDER, FOR SOLUTION INTRAMUSCULAR; INTRAVENOUS at 10:12

## 2021-05-02 RX ADMIN — AMPICILLIN SODIUM 1000 MG: 1 INJECTION, POWDER, FOR SOLUTION INTRAMUSCULAR; INTRAVENOUS at 18:06

## 2021-05-02 RX ADMIN — AMLODIPINE BESYLATE 5 MG: 5 TABLET ORAL at 10:11

## 2021-05-02 RX ADMIN — AMPICILLIN SODIUM 1000 MG: 1 INJECTION, POWDER, FOR SOLUTION INTRAMUSCULAR; INTRAVENOUS at 14:50

## 2021-05-02 ASSESSMENT — PAIN DESCRIPTION - ORIENTATION
ORIENTATION: RIGHT
ORIENTATION: RIGHT

## 2021-05-02 ASSESSMENT — PAIN DESCRIPTION - LOCATION: LOCATION: FOOT

## 2021-05-02 ASSESSMENT — PAIN SCALES - GENERAL: PAINLEVEL_OUTOF10: 8

## 2021-05-02 ASSESSMENT — PAIN DESCRIPTION - PAIN TYPE
TYPE: ACUTE PAIN
TYPE: ACUTE PAIN

## 2021-05-02 ASSESSMENT — PAIN DESCRIPTION - PROGRESSION: CLINICAL_PROGRESSION: GRADUALLY IMPROVING

## 2021-05-02 ASSESSMENT — PAIN - FUNCTIONAL ASSESSMENT: PAIN_FUNCTIONAL_ASSESSMENT: PREVENTS OR INTERFERES SOME ACTIVE ACTIVITIES AND ADLS

## 2021-05-02 NOTE — PROGRESS NOTES
[facility]  consulted by Dr. Leda Mcardle for monitoring and adjustment. Indication for treatment: R foot cellulitis, concern for OM  Goal trough: 15 mcg/mL    Pertinent Laboratory Values:   Temp Readings from Last 3 Encounters:   05/02/21 97.6 °F (36.4 °C) (Oral)   04/12/21 97.9 °F (36.6 °C) (Oral)   03/18/21 98.2 °F (36.8 °C) (Infrared)     No results for input(s): WBC, LACTATE in the last 72 hours. Recent Labs     05/01/21  0531   BUN 13   CREATININE 0.8*     Estimated Creatinine Clearance: 127 mL/min (A) (based on SCr of 0.8 mg/dL (L)). Intake/Output Summary (Last 24 hours) at 5/2/2021 1548  Last data filed at 5/2/2021 1314  Gross per 24 hour   Intake 1290 ml   Output 3750 ml   Net -2460 ml     Pertinent Cultures:  Date    Source    Results  04/28   Blood    Pending  04/30   MRSA screen - MRSA negative, MSSA positive   4/30                             Foot Wound                          Beta strep           Assessment:  · WBC normal, pt remains afebrile  · Renal function appears stable: SCR normal, UOP good  · Day(s) of therapy: 5  · Vancomycin concentration:   · 04/30 - 15.1, therapeutic on 1500 mg q12h     Plan:  · Renal trends remain normal  · Continue Vancomycin 1,500 mg q12h with a therapeutic trough  · Recheck the vanco level tomorrow am to monitor for accumulation. · Pharmacy will continue to monitor patient and adjust therapy as indicated    Devin 3 5/3 @ 6114     Thank you for the consult. Edgar Bill   5/2/2021 3:48 PM

## 2021-05-02 NOTE — PROGRESS NOTES
Hospitalist Progress Note      :  Jeffrie Sacks /Age/Sex: 1975  (50 y.o. male)   MRN & CSN:  [id number] & [id number] Admission Date/Time: 2021  8:16 PM   [facility]  [facility] PCP: No primary care provider on file. Hospital Day: 5    Assessment and Plan:   Jeffrie Sacks is a 50 y.o.  male  who presents with right foot wound with puslike drainage.    -Infected right foot wound with cellulitis. Right foot x-ray-soft tissue swelling without evidence of OM. MRI right foot-consistent with cellulitis. Wound cx - group C streptococci and Staph aureus. Plan  Continue Vanco and Ampicillin. Cefepime dced. ID on board. Wound care. No surgical intervention planned by general surgeon. Forefoot offloading shoes.    -Hypertension: well controlled. Continue amlodipine 5 mg, and lisinopril 20 mg daily and chlorthalidone 25 mg daily. --s/p amputation right big toe and first 3 toes due to frostbite.  -Homelessness:  working on possible ECF placement. Diet DIET GENERAL;  Dietary Nutrition Supplements: Wound Healing Oral Supplement   DVT Prophylaxis [x] Lovenox, []  Heparin, [] SCDs, [] Ambulation   GI Prophylaxis [] PPI,  [] H2 Blocker,  [] Carafate,  [] Diet/Tube Feeds   Code Status Full Code   Disposition  SNF placement. MDM [] Low, [x] Moderate,[]  High     History of Present Illness:   Patient seen and examined. No acute issues overnight. Rt foot pain is well controlled. No fever. MRI right foot shows diffuse edema compatible with cellulitis but no soft tissue ulceration or abscess. Ten point ROS reviewed negative, unless as noted above    Objective:        Intake/Output Summary (Last 24 hours) at 2021 1116  Last data filed at 2021 0603  Gross per 24 hour   Intake 1290 ml   Output 3750 ml   Net -2460 ml      Vitals:   Vitals:    21 1005   BP: 107/64   Pulse: 78   Resp: 15   Temp: 97.7 °F (36.5 °C)   SpO2: 98%     Physical Exam:   GEN Awake male, sitting upright in bed. RESP Comfortable on room air. CARDIO/VASC S1/S2 auscultated. Regular rate without appreciable murmurs. No peripheral edema. GI Abdomen is soft without significant tenderness, masses, or guarding. Bowel sounds are normoactive. MSK No gross joint deformities. SKIN Rt foot wrapped. NEURO  normal speech, no lateralizing weakness. PSYCH Awake, alert, oriented x 3. Medications:   Medications:    ampicillin IV  1,000 mg Intravenous 6 times per day    chlorthalidone  25 mg Oral Daily    amLODIPine  5 mg Oral Daily    lisinopril  20 mg Oral Daily    pantoprazole  40 mg Oral QAM AC    sodium chloride flush  10 mL Intravenous 2 times per day    enoxaparin  40 mg Subcutaneous Daily    collagenase   Topical Daily    vancomycin  1,500 mg Intravenous Q12H      Infusions:    sodium chloride       PRN Meds: oxyCODONE-acetaminophen, 1 tablet, Q4H PRN  sodium chloride flush, 10 mL, PRN  sodium chloride, 25 mL, PRN  promethazine, 12.5 mg, Q6H PRN    Or  ondansetron, 4 mg, Q6H PRN  acetaminophen, 650 mg, Q6H PRN    Or  acetaminophen, 650 mg, Q6H PRN  HYDROcodone-acetaminophen, 1 tablet, Q6H PRN        CBC   No results for input(s): WBC, HGB, HCT, PLT in the last 72 hours.    BMP   Recent Labs     05/01/21  0531      K 4.3   CL 99   CO2 30   BUN 13   CREATININE 0.8*       Radiology report reviewed     Electronically signed by Chace Mariano MD on 5/2/2021 at 11:16 AM

## 2021-05-02 NOTE — PLAN OF CARE
Ongoing  5/1/2021 1741 by August Dunlap LPN  Outcome: Ongoing     Problem: Skin Integrity:  Goal: Skin integrity will stabilize  Description: Skin integrity will stabilize  5/2/2021 0101 by Pb Diego RN  Outcome: Ongoing  5/1/2021 1741 by August Dunlap LPN  Outcome: Ongoing     Problem: Discharge Planning:  Goal: Patients continuum of care needs are met  Description: Patients continuum of care needs are met  5/2/2021 0101 by Pb Diego RN  Outcome: Ongoing  5/1/2021 1741 by August Dunlap LPN  Outcome: Ongoing     Problem: Nutrition  Goal: Optimal nutrition therapy  5/2/2021 0101 by Pb Diego RN  Outcome: Ongoing  5/1/2021 1741 by August Dunlap LPN  Outcome: Ongoing

## 2021-05-03 VITALS
RESPIRATION RATE: 16 BRPM | SYSTOLIC BLOOD PRESSURE: 125 MMHG | HEIGHT: 73 IN | TEMPERATURE: 97.6 F | HEART RATE: 96 BPM | OXYGEN SATURATION: 100 % | BODY MASS INDEX: 22.98 KG/M2 | WEIGHT: 173.4 LBS | DIASTOLIC BLOOD PRESSURE: 72 MMHG

## 2021-05-03 LAB
ANION GAP SERPL CALCULATED.3IONS-SCNC: 11 MMOL/L (ref 4–16)
BUN BLDV-MCNC: 25 MG/DL (ref 6–23)
CALCIUM SERPL-MCNC: 9.9 MG/DL (ref 8.3–10.6)
CHLORIDE BLD-SCNC: 97 MMOL/L (ref 99–110)
CO2: 28 MMOL/L (ref 21–32)
CREAT SERPL-MCNC: 0.9 MG/DL (ref 0.9–1.3)
CULTURE: NORMAL
CULTURE: NORMAL
DOSE AMOUNT: NORMAL
DOSE TIME: NORMAL
GFR AFRICAN AMERICAN: >60 ML/MIN/1.73M2
GFR NON-AFRICAN AMERICAN: >60 ML/MIN/1.73M2
GLUCOSE BLD-MCNC: 111 MG/DL (ref 70–99)
HIGH SENSITIVE C-REACTIVE PROTEIN: 7.2 MG/L
Lab: NORMAL
Lab: NORMAL
POTASSIUM SERPL-SCNC: 4 MMOL/L (ref 3.5–5.1)
SODIUM BLD-SCNC: 136 MMOL/L (ref 135–145)
SPECIMEN: NORMAL
SPECIMEN: NORMAL
VANCOMYCIN TROUGH: 10.3 UG/ML (ref 10–20)

## 2021-05-03 PROCEDURE — 36415 COLL VENOUS BLD VENIPUNCTURE: CPT

## 2021-05-03 PROCEDURE — 86141 C-REACTIVE PROTEIN HS: CPT

## 2021-05-03 PROCEDURE — 2580000003 HC RX 258: Performed by: INTERNAL MEDICINE

## 2021-05-03 PROCEDURE — 6370000000 HC RX 637 (ALT 250 FOR IP): Performed by: INTERNAL MEDICINE

## 2021-05-03 PROCEDURE — 80202 ASSAY OF VANCOMYCIN: CPT

## 2021-05-03 PROCEDURE — 99233 SBSQ HOSP IP/OBS HIGH 50: CPT | Performed by: NURSE PRACTITIONER

## 2021-05-03 PROCEDURE — 94761 N-INVAS EAR/PLS OXIMETRY MLT: CPT

## 2021-05-03 PROCEDURE — 6360000002 HC RX W HCPCS: Performed by: INTERNAL MEDICINE

## 2021-05-03 PROCEDURE — 80048 BASIC METABOLIC PNL TOTAL CA: CPT

## 2021-05-03 RX ORDER — CHLORTHALIDONE 25 MG/1
25 TABLET ORAL DAILY
Qty: 30 TABLET | Refills: 3 | Status: SHIPPED | OUTPATIENT
Start: 2021-05-04 | End: 2021-06-18

## 2021-05-03 RX ORDER — AMOXICILLIN 500 MG/1
1000 CAPSULE ORAL 3 TIMES DAILY
Qty: 84 CAPSULE | Refills: 0 | Status: SHIPPED | OUTPATIENT
Start: 2021-05-03 | End: 2021-05-03

## 2021-05-03 RX ORDER — AMOXICILLIN 500 MG/1
1000 CAPSULE ORAL 3 TIMES DAILY
Qty: 84 CAPSULE | Refills: 0 | Status: SHIPPED | OUTPATIENT
Start: 2021-05-03 | End: 2021-05-17

## 2021-05-03 RX ORDER — OXYCODONE HYDROCHLORIDE AND ACETAMINOPHEN 5; 325 MG/1; MG/1
1 TABLET ORAL EVERY 6 HOURS PRN
Qty: 12 TABLET | Refills: 0 | Status: SHIPPED | OUTPATIENT
Start: 2021-05-03 | End: 2021-05-06

## 2021-05-03 RX ORDER — AMOXICILLIN 500 MG/1
1000 CAPSULE ORAL EVERY 8 HOURS SCHEDULED
Status: DISCONTINUED | OUTPATIENT
Start: 2021-05-03 | End: 2021-05-03 | Stop reason: HOSPADM

## 2021-05-03 RX ORDER — CHLORTHALIDONE 25 MG/1
25 TABLET ORAL DAILY
Qty: 30 TABLET | Refills: 3 | Status: SHIPPED | OUTPATIENT
Start: 2021-05-04 | End: 2021-05-03

## 2021-05-03 RX ADMIN — ENOXAPARIN SODIUM 40 MG: 40 INJECTION SUBCUTANEOUS at 09:32

## 2021-05-03 RX ADMIN — PANTOPRAZOLE SODIUM 40 MG: 40 TABLET, DELAYED RELEASE ORAL at 05:34

## 2021-05-03 RX ADMIN — LISINOPRIL 20 MG: 20 TABLET ORAL at 09:33

## 2021-05-03 RX ADMIN — CHLORTHALIDONE 25 MG: 25 TABLET ORAL at 09:33

## 2021-05-03 RX ADMIN — COLLAGENASE SANTYL: 250 OINTMENT TOPICAL at 09:34

## 2021-05-03 RX ADMIN — AMLODIPINE BESYLATE 5 MG: 5 TABLET ORAL at 09:33

## 2021-05-03 RX ADMIN — AMPICILLIN SODIUM 1000 MG: 1 INJECTION, POWDER, FOR SOLUTION INTRAMUSCULAR; INTRAVENOUS at 00:57

## 2021-05-03 NOTE — PROGRESS NOTES
Hospitalist Progress Note      :  Vesta Oneill /Age/Sex: 1975  (50 y.o. male)   MRN & CSN:  [id number] & [id number] Admission Date/Time: 2021  8:16 PM   [facility]  [facility] PCP: No primary care provider on file. Hospital Day: 6    Assessment and Plan:   Vesta Oneill is a 50 y.o.  male  who presents with right foot wound with puslike drainage.    -Infected right foot wound with cellulitis. Right foot x-ray-soft tissue swelling without evidence of OM. MRI right foot-consistent with cellulitis. Wound cx - group C streptococci and MSSA. Plan  Continue Vanco and Ampicillin. Cefepime dced. ID on board to adjust antibiotic sensitivity. Wound care. No surgical intervention planned by general surgeon. Forefoot offloading shoes.    -Hypertension: well controlled. Continue amlodipine 5 mg, and lisinopril 20 mg daily and chlorthalidone 25 mg daily. --s/p amputation right big toe and first 3 toes due to frostbite.  -Homelessness:  working on possible ECF placement. DME for cheryl Oneill requires a cane due to impaired ambulation and for increased stability in order to participate in or complete daily living tasks of: eating, bathing, toileting, personal cares, ambulating, grooming and hygiene.  The patient is able to safely use the cane and the functional mobility deficit can be sufficiently resolved. Diet DIET GENERAL;  Dietary Nutrition Supplements: Wound Healing Oral Supplement   DVT Prophylaxis [x] Lovenox, []  Heparin, [] SCDs, [] Ambulation   GI Prophylaxis [] PPI,  [] H2 Blocker,  [] Carafate,  [] Diet/Tube Feeds   Code Status Full Code   Disposition  SNF placement. MDM [] Low, [x] Moderate,[]  High     History of Present Illness:   Patient seen and examined. No acute issues overnight. Rt foot pain is well controlled. No fever. He lost IV access this am.     Ten point ROS reviewed negative, unless as noted above    Objective:        Intake/Output Summary (Last 24 hours) at 5/3/2021 1032  Last data filed at 5/3/2021 0754  Gross per 24 hour   Intake 490 ml   Output 3000 ml   Net -2510 ml      Vitals:   Vitals:    05/03/21 0930   BP: 127/78   Pulse: 96   Resp: 16   Temp: 97.5 °F (36.4 °C)   SpO2: 100%     Physical Exam:   GEN Awake male, sitting upright in bed. RESP Comfortable on room air. CARDIO/VASC S1/S2 auscultated. Regular rate without appreciable murmurs. No peripheral edema. GI Abdomen is soft without significant tenderness, masses, or guarding. Bowel sounds are normoactive. MSK No gross joint deformities. SKIN Rt foot wrapped. NEURO  normal speech, no lateralizing weakness. PSYCH Awake, alert, oriented x 3. Medications:   Medications:    ampicillin IV  1,000 mg Intravenous 6 times per day    chlorthalidone  25 mg Oral Daily    amLODIPine  5 mg Oral Daily    lisinopril  20 mg Oral Daily    pantoprazole  40 mg Oral QAM AC    sodium chloride flush  10 mL Intravenous 2 times per day    enoxaparin  40 mg Subcutaneous Daily    collagenase   Topical Daily    vancomycin  1,500 mg Intravenous Q12H      Infusions:    sodium chloride       PRN Meds: oxyCODONE-acetaminophen, 1 tablet, Q4H PRN  sodium chloride flush, 10 mL, PRN  sodium chloride, 25 mL, PRN  promethazine, 12.5 mg, Q6H PRN    Or  ondansetron, 4 mg, Q6H PRN  acetaminophen, 650 mg, Q6H PRN    Or  acetaminophen, 650 mg, Q6H PRN  HYDROcodone-acetaminophen, 1 tablet, Q6H PRN        CBC   No results for input(s): WBC, HGB, HCT, PLT in the last 72 hours.    BMP   Recent Labs     05/01/21  0531 05/03/21  0632    136   K 4.3 4.0   CL 99 97*   CO2 30 28   BUN 13 25*   CREATININE 0.8* 0.9       Radiology report reviewed     Electronically signed by Connor Cobb MD on 5/3/2021 at 10:32 AM

## 2021-05-03 NOTE — DISCHARGE SUMMARY
Discharge Summary    Name:  Mauricio Lizarraga /Age/Sex: 1975  (50 y.o. male)   MRN & CSN:  [id number] & [id number] Admission Date/Time: 2021  8:16 PM   Attending:  Franck Iqbal MD Discharging Physician: Franck Iqbal MD     Hospital Course:   Mauricio Lizarraga is a 50 y.o.  male  who presents with right foot wound with puslike drainage.     -Infected right foot wound with cellulitis. Right foot x-ray-soft tissue swelling without evidence of OM. MRI right foot-consistent with cellulitis. Wound cx - group C streptococci and MSSA. He was initially managed with empiric Vancomycin and cefepime earlier on admission and later transitioned to IV ampicillin. He has done well and ID recommends 14 days of PO amoxicillin at discharge. He was seen by general surgeon and surgical debridement was not advised. He will f/u with ID in 1 week as well as with general surgery for wound care. [employer] was prescribed to help offload the Rt foot.      -Hypertension: well controlled. Continue amlodipine 5 mg, and lisinopril 20 mg daily and chlorthalidone 25 mg daily. --s/p amputation right big toe and first 3 toes due to frostbite. The patient expressed appropriate understanding of and agreement with the discharge recommendations, medications, and plan. Consults this admission:  PHARMACY TO DOSE VANCOMYCIN  IP CONSULT TO HOSPITALIST  PHARMACY TO DOSE VANCOMYCIN  IP CONSULT TO GENERAL SURGERY  IP CONSULT TO INFECTIOUS DISEASES  IP CONSULT TO IV TEAM  IP CONSULT TO IV TEAM    Discharge Instruction:   Crystal Melendez MD In 1 week. Specialty: Infectious Diseases  Contact information:  [address]  [address]  [phone]             Schedule an appointment as soon as possible for a visit with Maximo Hallman MD.    Specialty: General Surgery  Why: wound care f/u  Contact information:  [address] [phone] edema. No deep soft tissue ulceration or abscess. 2. Edema and patchy decreased T1 signal in the head and shaft of the 1st   proximal phalanx compatible with noninfectious reactive osteitis versus early   osteomyelitis. 3. Mild marrow edema with normal T1 signal in the 1st and 2nd metatarsals,   medial cuneiform, and 2nd through 5th toes likely representing noninfectious   edema.  Osteomyelitis is unlikely.      Discharge Time of 35 minutes    Electronically signed by Crystal Gutierrez MD on 5/3/2021 at 11:33 AM

## 2021-05-03 NOTE — PROGRESS NOTES
CLINICAL PHARMACY NOTE: MEDS TO [address] Select Patient?: Yes  Total # of Prescriptions Filled: 3   The following medications were delivered to the patient:  · Percoccet 5/325 mg  12  · Chlorthalidone 25mg 30  · Amoxicillin 500mg 84 caps  Total # of Interventions Completed: 3  Time Spent (min): 30    Additional Documentation:

## 2021-05-03 NOTE — PLAN OF CARE
Problem: Pain:  Goal: Pain level will decrease  Description: Pain level will decrease  5/2/2021 2317 by Steffany Joshua RN  Outcome: Ongoing  5/2/2021 1141 by Carey Fontaine LPN  Outcome: Ongoing  Goal: Control of acute pain  Description: Control of acute pain  5/2/2021 2317 by Steffany Joshua RN  Outcome: Ongoing  5/2/2021 1141 by Carey Fontaine LPN  Outcome: Ongoing  Goal: Control of chronic pain  Description: Control of chronic pain  5/2/2021 2317 by Steffany Joshua RN  Outcome: Ongoing  5/2/2021 1141 by Carey Fontaine LPN  Outcome: Ongoing  Goal: Patient's pain/discomfort is manageable  Description: Patient's pain/discomfort is manageable  5/2/2021 2317 by Steffany Joshua RN  Outcome: Ongoing  5/2/2021 1141 by Carey Fontaine LPN  Outcome: Ongoing     Problem: Falls - Risk of:  Goal: Will remain free from falls  Description: Will remain free from falls  5/2/2021 2317 by Steffany Joshua RN  Outcome: Ongoing  5/2/2021 1141 by Carey Fontaine LPN  Outcome: Ongoing  Goal: Absence of physical injury  Description: Absence of physical injury  5/2/2021 2317 by Steffany Joshua RN  Outcome: Ongoing  5/2/2021 1141 by Carey Fontaine LPN  Outcome: Ongoing     Problem: Infection:  Goal: Will remain free from infection  Description: Will remain free from infection  5/2/2021 2317 by Steffany Joshua RN  Outcome: Ongoing  5/2/2021 1141 by Carey Fontaine LPN  Outcome: Ongoing     Problem: Safety:  Goal: Free from accidental physical injury  Description: Free from accidental physical injury  5/2/2021 2317 by Steffany Joshua RN  Outcome: Ongoing  5/2/2021 1141 by Carey Fontaine LPN  Outcome: Ongoing  Goal: Free from intentional harm  Description: Free from intentional harm  5/2/2021 2317 by Steffany Joshua RN  Outcome: Ongoing  5/2/2021 1141 by Carey Fontaine LPN  Outcome: Ongoing     Problem: Skin Integrity:  Goal: Skin integrity will stabilize  Description: Skin integrity will stabilize  5/2/2021 2317 by Kelvin Mcpherson RN  Outcome: Ongoing  5/2/2021 1141 by Charity Arellano LPN  Outcome: Ongoing     Problem: Discharge Planning:  Goal: Patients continuum of care needs are met  Description: Patients continuum of care needs are met  5/2/2021 2317 by Kelvin Mcpherson RN  Outcome: Ongoing  5/2/2021 1141 by Charity Arellano LPN  Outcome: Ongoing     Problem: Nutrition  Goal: Optimal nutrition therapy  5/2/2021 2317 by Kelvin Mcpherson RN  Outcome: Ongoing  5/2/2021 1141 by Charity Arellano LPN  Outcome: Ongoing     Electronically signed by Kelvin Mcpherson RN on 5/2/21 at 11:17 PM EDT

## 2021-05-03 NOTE — PLAN OF CARE
Problem: Pain:  Goal: Pain level will decrease  Description: Pain level will decrease  5/3/2021 1005 by Yenny Hawkins RN  Outcome: Ongoing  5/2/2021 2317 by Kelvin Mcpherson RN  Outcome: Ongoing  Goal: Control of acute pain  Description: Control of acute pain  5/3/2021 1005 by Yenny Hawkins RN  Outcome: Ongoing  5/2/2021 2317 by Kelvin Mcpherson RN  Outcome: Ongoing  Goal: Control of chronic pain  Description: Control of chronic pain  5/3/2021 1005 by Yenny Hawkins RN  Outcome: Ongoing  5/2/2021 2317 by Kelvin Mcpherson RN  Outcome: Ongoing  Goal: Patient's pain/discomfort is manageable  Description: Patient's pain/discomfort is manageable  5/3/2021 1005 by Yenny Hawkins RN  Outcome: Ongoing  5/2/2021 2317 by Kelvin Mcpherson RN  Outcome: Ongoing     Problem: Falls - Risk of:  Goal: Will remain free from falls  Description: Will remain free from falls  5/3/2021 1005 by Yenny Hawkins RN  Outcome: Ongoing  5/2/2021 2317 by Kelvin Mcpherson RN  Outcome: Ongoing  Goal: Absence of physical injury  Description: Absence of physical injury  5/3/2021 1005 by Yenny Hawkins RN  Outcome: Ongoing  5/2/2021 2317 by Kelvin Mcpherson RN  Outcome: Ongoing     Problem: Infection:  Goal: Will remain free from infection  Description: Will remain free from infection  5/3/2021 1005 by Yenny Hawkins RN  Outcome: Ongoing  5/2/2021 2317 by Kelvin Mcpherson RN  Outcome: Ongoing     Problem: Safety:  Goal: Free from accidental physical injury  Description: Free from accidental physical injury  5/3/2021 1005 by Yenny Hawkins RN  Outcome: Ongoing  5/2/2021 2317 by Kelvin Mcpherson RN  Outcome: Ongoing  Goal: Free from intentional harm  Description: Free from intentional harm  5/3/2021 1005 by Yenny Hawkins RN  Outcome: Ongoing  5/2/2021 2317 by Kelvin Mcpherson RN  Outcome: Ongoing     Problem: Daily Care:  Goal: Daily care needs are met  Description: Daily care needs are met  5/3/2021 1005 by Yenny Hawkins RN  Outcome: Ongoing  5/2/2021 2317 by Coleen Sanders RN  Outcome: Ongoing     Problem: Skin Integrity:  Goal: Skin integrity will stabilize  Description: Skin integrity will stabilize  5/3/2021 1005 by Irish Priest RN  Outcome: Ongoing  5/2/2021 2317 by Coleen Sanders RN  Outcome: Ongoing     Problem: Discharge Planning:  Goal: Patients continuum of care needs are met  Description: Patients continuum of care needs are met  5/3/2021 1005 by Irish Priest RN  Outcome: Ongoing  5/2/2021 2317 by Coleen Sanders RN  Outcome: Ongoing     Problem: Discharge Planning:  Goal: Patients continuum of care needs are met  Description: Patients continuum of care needs are met  5/3/2021 1005 by Irish Priest RN  Outcome: Ongoing  5/2/2021 2317 by Coleen Sanders RN  Outcome: Ongoing     Problem: Nutrition  Goal: Optimal nutrition therapy  5/3/2021 1005 by Irish Priest RN  Outcome: Ongoing  5/2/2021 2317 by Coleen Sanders RN  Outcome: Ongoing

## 2021-05-03 NOTE — PROGRESS NOTES
Infectious Disease Progress Note  5/3/2021   Patient Name: Loni Shetty : 1975   Impression  · Right Foot Wound Infection with Cellulitis:  § Afebrile, no leukocytosis  § Blood cultures -pending  § -MRSA screen negative  § -Right foot wound culture Beta Strep Group C and Staphylococcus Aureus  § -XR Foot Right: soft tissue swelling and ulcerations. No radiographic evidence of OM  § -XR Foot Left:  Soft tissue swelling no radiographic evidence of OM. § -MRI right foot wo and w contrast: reactive osteitis vs early OM 1st proximal phalanx. 2nd thru 5th toes OM is unlikely. § Dr. Brandi Lambert, general surgery, onboard, rec local wound care, imp of cellulitis.      · HTN: Uncontrolled, non-compliant, takes no meds     ? Prior Amputations 2020 due to Frostbite     ? Homelessness     · Multi-morbidity: per PMHx:  HTN, amputations of left 1st, 2nd, 3rd, and tip of 4th toe and right great toe 2020    Plan:  · MN ampicillin and vancomycin  · Start amoxicillin 1 gm po tid x 2 weeks (end date 21)  · Follow up with ID in 1 week  Weekly labs drawn on Monday during the course of treatment  CBC with differential, CMP, ESR, CRP  [facility]  · # [phone]  ? OK from ID standpoint to MN when ready    Ongoing Antimicrobial Therapy  Amoxicillin 5/3-    Completed Antimicrobial Therapy  Cefepime - ? Vancomycin -5/3? Ampicillin -3    History:? Interval history noted. Chief complaint: right foot infection with cellulitis. Denies n/v/d/f or untoward effects of antibiotics. Resting quietly.      Physical Exam:  Vital Signs: /78   Pulse 96   Temp 97.5 °F (36.4 °C) (Oral)   Resp 16   Ht 6' 1\" (1.854 m)   Wt 173 lb 6.4 oz (78.7 kg)   SpO2 100%   BMI 22.88 kg/m²      Gen: alert and oriented X3, no distress  Skin: no stigmata of endocarditis  Wounds: C/D/I right plantar foot, lateral foot open wounds with surrounding

## 2021-05-03 NOTE — CARE COORDINATION
F/u with Pt in the room. LSW informed Pt that therapy is not recommending SNF. Pt is independent in the room. Pt has has his offloading shoe. Pt stated that he will call Yelena Childs to help him with housing again. LSW talked to Pt about barriers for going to the wound care clinic. LSW placed the [facility] transport number in the discharge instructions. Pt also would like a cane. LSW contacted [facility] and they will deliver a cane this afternoon. Sherren Husbands requires a cane due to impaired ambulation and for increased stability in order to participate in or complete daily living tasks of: eating, bathing, toileting, personal cares, ambulating, grooming and hygiene. The patient is able to safely use the cane and the functional mobility deficit can be sufficiently resolved.

## 2021-05-05 LAB
CULTURE: ABNORMAL
Lab: ABNORMAL
SPECIMEN: ABNORMAL

## 2021-05-11 ENCOUNTER — HOSPITAL ENCOUNTER (EMERGENCY)
Age: 46
Discharge: HOME | End: 2021-05-12
Attending: EMERGENCY MEDICINE
Payer: MEDICARE

## 2021-05-11 DIAGNOSIS — S91.301A OPEN WOUND OF RIGHT FOOT, INITIAL ENCOUNTER: Primary | ICD-10-CM

## 2021-05-11 PROCEDURE — 99283 EMERGENCY DEPT VISIT LOW MDM: CPT

## 2021-05-11 ASSESSMENT — PAIN DESCRIPTION - LOCATION: LOCATION: FOOT

## 2021-05-11 ASSESSMENT — PAIN DESCRIPTION - ORIENTATION: ORIENTATION: RIGHT;LEFT

## 2021-05-11 ASSESSMENT — PAIN DESCRIPTION - PAIN TYPE: TYPE: ACUTE PAIN

## 2021-05-11 ASSESSMENT — PAIN SCALES - GENERAL: PAINLEVEL_OUTOF10: 10

## 2021-05-12 ENCOUNTER — APPOINTMENT (OUTPATIENT)
Dept: GENERAL RADIOLOGY | Age: 46
End: 2021-05-12
Payer: MEDICARE

## 2021-05-12 VITALS
OXYGEN SATURATION: 100 % | BODY MASS INDEX: 25.73 KG/M2 | WEIGHT: 190 LBS | HEART RATE: 98 BPM | TEMPERATURE: 98 F | HEIGHT: 72 IN | DIASTOLIC BLOOD PRESSURE: 82 MMHG | SYSTOLIC BLOOD PRESSURE: 126 MMHG | RESPIRATION RATE: 18 BRPM

## 2021-05-12 LAB
ANION GAP SERPL CALCULATED.3IONS-SCNC: 15 MMOL/L (ref 4–16)
BASOPHILS ABSOLUTE: 0 K/CU MM
BASOPHILS RELATIVE PERCENT: 0.3 % (ref 0–1)
BUN BLDV-MCNC: 13 MG/DL (ref 6–23)
CALCIUM SERPL-MCNC: 8.8 MG/DL (ref 8.3–10.6)
CHLORIDE BLD-SCNC: 96 MMOL/L (ref 99–110)
CO2: 21 MMOL/L (ref 21–32)
CREAT SERPL-MCNC: 0.7 MG/DL (ref 0.9–1.3)
DIFFERENTIAL TYPE: ABNORMAL
EOSINOPHILS ABSOLUTE: 0 K/CU MM
EOSINOPHILS RELATIVE PERCENT: 0.3 % (ref 0–3)
GFR AFRICAN AMERICAN: >60 ML/MIN/1.73M2
GFR NON-AFRICAN AMERICAN: >60 ML/MIN/1.73M2
GLUCOSE BLD-MCNC: 101 MG/DL (ref 70–99)
HCT VFR BLD CALC: 37.9 % (ref 42–52)
HEMOGLOBIN: 12.1 GM/DL (ref 13.5–18)
IMMATURE NEUTROPHIL %: 0.3 % (ref 0–0.43)
LACTATE: 1.4 MMOL/L (ref 0.4–2)
LYMPHOCYTES ABSOLUTE: 2.5 K/CU MM
LYMPHOCYTES RELATIVE PERCENT: 33.5 % (ref 24–44)
MCH RBC QN AUTO: 27.1 PG (ref 27–31)
MCHC RBC AUTO-ENTMCNC: 31.9 % (ref 32–36)
MCV RBC AUTO: 85 FL (ref 78–100)
MONOCYTES ABSOLUTE: 0.5 K/CU MM
MONOCYTES RELATIVE PERCENT: 6.3 % (ref 0–4)
NUCLEATED RBC %: 0 %
PDW BLD-RTO: 14.6 % (ref 11.7–14.9)
PLATELET # BLD: 416 K/CU MM (ref 140–440)
PMV BLD AUTO: 8.8 FL (ref 7.5–11.1)
POTASSIUM SERPL-SCNC: 3.8 MMOL/L (ref 3.5–5.1)
RBC # BLD: 4.46 M/CU MM (ref 4.6–6.2)
SEGMENTED NEUTROPHILS ABSOLUTE COUNT: 4.5 K/CU MM
SEGMENTED NEUTROPHILS RELATIVE PERCENT: 59.3 % (ref 36–66)
SODIUM BLD-SCNC: 132 MMOL/L (ref 135–145)
TOTAL IMMATURE NEUTOROPHIL: 0.02 K/CU MM
TOTAL NUCLEATED RBC: 0 K/CU MM
WBC # BLD: 7.5 K/CU MM (ref 4–10.5)

## 2021-05-12 PROCEDURE — 83605 ASSAY OF LACTIC ACID: CPT

## 2021-05-12 PROCEDURE — 80048 BASIC METABOLIC PNL TOTAL CA: CPT

## 2021-05-12 PROCEDURE — 73630 X-RAY EXAM OF FOOT: CPT

## 2021-05-12 PROCEDURE — 6370000000 HC RX 637 (ALT 250 FOR IP): Performed by: EMERGENCY MEDICINE

## 2021-05-12 PROCEDURE — 85025 COMPLETE CBC W/AUTO DIFF WBC: CPT

## 2021-05-12 PROCEDURE — 36415 COLL VENOUS BLD VENIPUNCTURE: CPT

## 2021-05-12 RX ORDER — HYDROCODONE BITARTRATE AND ACETAMINOPHEN 5; 325 MG/1; MG/1
1 TABLET ORAL ONCE
Status: COMPLETED | OUTPATIENT
Start: 2021-05-12 | End: 2021-05-12

## 2021-05-12 RX ORDER — HYDROCODONE BITARTRATE AND ACETAMINOPHEN 5; 325 MG/1; MG/1
1-2 TABLET ORAL EVERY 6 HOURS PRN
Qty: 6 TABLET | Refills: 0 | Status: SHIPPED | OUTPATIENT
Start: 2021-05-12 | End: 2021-05-15

## 2021-05-12 RX ADMIN — HYDROCODONE BITARTRATE AND ACETAMINOPHEN 1 TABLET: 5; 325 TABLET ORAL at 00:08

## 2021-05-12 NOTE — ED NOTES
Discharge instructions reviewed with patient. Patient verbalized understanding.  All questions answered     Shakir Smith RN  05/12/21 [id number]

## 2021-05-12 NOTE — ED PROVIDER NOTES
Triage Chief Complaint:   Wound Check (right, )    [place]:  Mine Leon is a 50 y.o. male that presents with increasing pain and drainage from his right foot over the past 1 to 2 days. States that he has been keeping on 5 socks and using his specialized shoe. He was recently admitted for infected ulceration of his right foot status post amputation of right great toe over the winter for frostbite. Patient is currently on amoxicillin and has been compliant with his medications. States that he ran out of Percocet 2 days ago. States the pain is aching, worsening with ambulation. States that he has had some pink drainage from his wounds. Denies any fevers, nausea, vomiting, motor or sensory deficits. ROS:  At least 10 systems reviewed and otherwise acutely negative except as in the [address] Past Medical History:   Diagnosis Date    Hypertension     does not take medications     Past Surgical History:   Procedure Laterality Date    FOOT DEBRIDEMENT Bilateral 2021    BILATERAL TOE  DEBRIDEMENT INCISION AND DRAINAGE, AMPUTATION OF LEFT 1ST, 2ND, 3RD AND TIP OF 4TH TOE AND RIGHT GREAT TOE performed by Jj Vergara MD at [place] [address] reviewed. No pertinent family history.   Social History     Socioeconomic History    Marital status: Single     Spouse name: Not on file    Number of children: Not on file    Years of education: Not on file    Highest education level: Not on file   Occupational History    Not on file   Social Needs    Financial resource strain: Not on file    Food insecurity     Worry: Not on file     Inability: Not on file    Transportation needs     Medical: Not on file     Non-medical: Not on file   Tobacco Use    Smoking status: Former Smoker     Quit date: 2021     Years since quittin.3    Smokeless tobacco: Never Used   Substance and Sexual Activity    Alcohol use: Yes     Comment: most days    Drug use: No    Sexual activity: Not on file   Lifestyle    Physical activity     Days per week: Not on file     Minutes per session: Not on file    Stress: Not on file   Relationships    Social connections     Talks on phone: Not on file     Gets together: Not on file     Attends [employer] service: Not on file     Active member of club or organization: Not on file     Attends meetings of clubs or organizations: Not on file     Relationship status: Not on file    Intimate partner violence     Fear of current or ex partner: Not on file     Emotionally abused: Not on file     Physically abused: Not on file     Forced sexual activity: Not on file   Other Topics Concern    Not on file   Social History Narrative    Not on file     No current facility-administered medications for this encounter. Current Outpatient Medications   Medication Sig Dispense Refill    HYDROcodone-acetaminophen (NORCO) 5-325 MG per tablet Take 1-2 tablets by mouth every 6 hours as needed for Pain for up to 3 days.  6 tablet 0    amoxicillin (AMOXIL) 500 MG capsule Take 2 capsules by mouth 3 times daily for 14 days 84 capsule 0    chlorthalidone (HYGROTON) 25 MG tablet Take 1 tablet by mouth daily 30 tablet 3    metoprolol tartrate (LOPRESSOR) 25 MG tablet Take 1 tablet by mouth 2 times daily 60 tablet 3    omeprazole (PRILOSEC) 20 MG delayed release capsule Take 20 mg by mouth daily      lisinopril (PRINIVIL;ZESTRIL) 20 MG tablet Take 20 mg by mouth daily      amLODIPine (NORVASC) 5 MG tablet Take 1 tablet by mouth daily 30 tablet 3     No Known Allergies    Nursing Notes Reviewed    Physical Exam:  ED Triage Vitals   Enc Vitals Group      BP 05/12/21 0008 (!) 146/100      Pulse 05/12/21 0008 103      Resp 05/12/21 0008 18      Temp 05/12/21 0008 97.5 °F (36.4 °C)      Temp Source 05/12/21 0008 Oral      SpO2 05/12/21 0008 100 %      Weight 05/11/21 2351 190 lb (86.2 kg)      Height 05/11/21 2351 6' (1.829 m)      Head Circumference --       Peak Flow --       Pain Score --       Pain Loc --       Pain Edu? --       Excl. in [address]? --    GENERAL APPEARANCE: Awake and alert. Cooperative. No acute distress. HEAD: Normocephalic. Atraumatic. EYES: EOM's grossly intact. Sclera anicteric. ENT: Mucous membranes are moist. Tolerates saliva. No trismus. NECK: No meningismus. HEART:  Extremities pink  LUNGS: Respirations unlabored. Even chest rise bilaterally  ABDOMEN: Non distended. EXTREMITIES: RLE: Partial amputation of right great toe. Ulceration with sharp demarcation of the amputated great toe and also of the plantar aspect of the forefoot. No active drainage or surrounding erythema. No induration or crepitus. Pink granulation tissue in the ulcers. Palpable DP pulse. Sensory distribution of sural/saphenous/tibial/peroneal nerves intact  SKIN: Dry  NEUROLOGICAL: No gross facial drooping. Moves all 4 extremities spontaneously. PSYCHIATRIC: Normal mood.     I have reviewed and interpreted all of the currently available lab results from this visit (if applicable):  Results for orders placed or performed during the hospital encounter of 05/11/21   CBC Auto Differential   Result Value Ref Range    WBC 7.5 4.0 - 10.5 K/CU MM    RBC 4.46 (L) 4.6 - 6.2 M/CU MM    Hemoglobin 12.1 (L) 13.5 - 18.0 GM/DL    Hematocrit 37.9 (L) 42 - 52 %    MCV 85.0 78 - 100 FL    MCH 27.1 27 - 31 PG    MCHC 31.9 (L) 32.0 - 36.0 %    RDW 14.6 11.7 - 14.9 %    Platelets 522 487 - 142 K/CU MM    MPV 8.8 7.5 - 11.1 FL    Differential Type AUTOMATED DIFFERENTIAL     Segs Relative 59.3 36 - 66 %    Lymphocytes % 33.5 24 - 44 %    Monocytes % 6.3 (H) 0 - 4 %    Eosinophils % 0.3 0 - 3 %    Basophils % 0.3 0 - 1 %    Segs Absolute 4.5 K/CU MM    Lymphocytes Absolute 2.5 K/CU MM    Monocytes Absolute 0.5 K/CU MM    Eosinophils Absolute 0.0 K/CU MM    Basophils Absolute 0.0 K/CU MM    Nucleated RBC % 0.0 %    Total Nucleated RBC 0.0 K/CU MM    Total Immature Neutrophil 0.02 K/CU MM    Immature Neutrophil % 0.3 0 - 0.43 %   BMP Result Value Ref Range    Sodium 132 (L) 135 - 145 MMOL/L    Potassium 3.8 3.5 - 5.1 MMOL/L    Chloride 96 (L) 99 - 110 mMol/L    CO2 21 21 - 32 MMOL/L    Anion Gap 15 4 - 16    BUN 13 6 - 23 MG/DL    CREATININE 0.7 (L) 0.9 - 1.3 MG/DL    Glucose 101 (H) 70 - 99 MG/DL    Calcium 8.8 8.3 - 10.6 MG/DL    GFR Non-African American >60 >60 mL/min/1.73m2    GFR African American >60 >60 mL/min/1.73m2   Lactic Acid, Plasma   Result Value Ref Range    Lactate 1.4 0.4 - 2.0 mMOL/L      Radiographs (if obtained):  [] The following radiograph was interpreted by myself in the absence of a radiologist:  [x] Radiologist's Report Reviewed:    EKG (if obtained): (All EKG's are interpreted by myself in the absence of a cardiologist)    MDM:  Plan of care is discussed thoroughly with the patient and family if present. If performed, all imaging and lab work also discussed with patient. All relevant prior results and chart reviewed if available. Patient presents as above. He is initially tachycardic but this resolves. Ulcerated wounds of the patient's right foot do not appear infected at this time. There is no induration, fluctuance, crepitus. Ulcerated wounds have pink granulation tissue. He is neurovascularly intact. X-ray does not show any evidence of bony involvement. Lab work is within acceptable ranges. Plan for continued amoxicillin, outpatient follow-up with wound care and general surgery. Discharged in good condition. Clinical Impression:  1.  Open wound of right foot, initial encounter      (Please note that portions of this note may have been completed with a voice recognition program. Efforts were made to edit the dictations but occasionally words are mis-transcribed.)    MD Saroj Weiner MD  05/12/21 5882

## 2021-05-13 ENCOUNTER — HOSPITAL ENCOUNTER (EMERGENCY)
Age: 46
Discharge: HOME | End: 2021-05-13
Attending: EMERGENCY MEDICINE
Payer: MEDICARE

## 2021-05-13 ENCOUNTER — APPOINTMENT (OUTPATIENT)
Dept: GENERAL RADIOLOGY | Age: 46
End: 2021-05-13
Payer: MEDICARE

## 2021-05-13 VITALS
WEIGHT: 187 LBS | HEIGHT: 72 IN | HEART RATE: 116 BPM | DIASTOLIC BLOOD PRESSURE: 98 MMHG | RESPIRATION RATE: 18 BRPM | SYSTOLIC BLOOD PRESSURE: 152 MMHG | OXYGEN SATURATION: 100 % | TEMPERATURE: 97.7 F | BODY MASS INDEX: 25.33 KG/M2

## 2021-05-13 DIAGNOSIS — S91.301D OPEN WOUND OF RIGHT FOOT, SUBSEQUENT ENCOUNTER: Primary | ICD-10-CM

## 2021-05-13 LAB
ALBUMIN SERPL-MCNC: 4.5 GM/DL (ref 3.4–5)
ALP BLD-CCNC: 122 IU/L (ref 40–128)
ALT SERPL-CCNC: 21 U/L (ref 10–40)
ANION GAP SERPL CALCULATED.3IONS-SCNC: 11 MMOL/L (ref 4–16)
AST SERPL-CCNC: 30 IU/L (ref 15–37)
BASOPHILS ABSOLUTE: 0 K/CU MM
BASOPHILS RELATIVE PERCENT: 0.3 % (ref 0–1)
BILIRUB SERPL-MCNC: 0.3 MG/DL (ref 0–1)
BUN BLDV-MCNC: 9 MG/DL (ref 6–23)
CALCIUM SERPL-MCNC: 9.4 MG/DL (ref 8.3–10.6)
CHLORIDE BLD-SCNC: 97 MMOL/L (ref 99–110)
CO2: 26 MMOL/L (ref 21–32)
CREAT SERPL-MCNC: 0.7 MG/DL (ref 0.9–1.3)
DIFFERENTIAL TYPE: ABNORMAL
EOSINOPHILS ABSOLUTE: 0 K/CU MM
EOSINOPHILS RELATIVE PERCENT: 0.7 % (ref 0–3)
ERYTHROCYTE SEDIMENTATION RATE: 44 MM/HR (ref 0–15)
GFR AFRICAN AMERICAN: >60 ML/MIN/1.73M2
GFR NON-AFRICAN AMERICAN: >60 ML/MIN/1.73M2
GLUCOSE BLD-MCNC: 90 MG/DL (ref 70–99)
HCT VFR BLD CALC: 41.3 % (ref 42–52)
HEMOGLOBIN: 13.3 GM/DL (ref 13.5–18)
HIGH SENSITIVE C-REACTIVE PROTEIN: 53.2 MG/L
IMMATURE NEUTROPHIL %: 0.2 % (ref 0–0.43)
LACTATE: 1.6 MMOL/L (ref 0.4–2)
LYMPHOCYTES ABSOLUTE: 2 K/CU MM
LYMPHOCYTES RELATIVE PERCENT: 32.1 % (ref 24–44)
MCH RBC QN AUTO: 27.9 PG (ref 27–31)
MCHC RBC AUTO-ENTMCNC: 32.2 % (ref 32–36)
MCV RBC AUTO: 86.6 FL (ref 78–100)
MONOCYTES ABSOLUTE: 0.4 K/CU MM
MONOCYTES RELATIVE PERCENT: 6.7 % (ref 0–4)
NUCLEATED RBC %: 0 %
PDW BLD-RTO: 14.6 % (ref 11.7–14.9)
PLATELET # BLD: 399 K/CU MM (ref 140–440)
PMV BLD AUTO: 8.5 FL (ref 7.5–11.1)
POTASSIUM SERPL-SCNC: 4.4 MMOL/L (ref 3.5–5.1)
RBC # BLD: 4.77 M/CU MM (ref 4.6–6.2)
SEGMENTED NEUTROPHILS ABSOLUTE COUNT: 3.7 K/CU MM
SEGMENTED NEUTROPHILS RELATIVE PERCENT: 60 % (ref 36–66)
SODIUM BLD-SCNC: 134 MMOL/L (ref 135–145)
TOTAL CK: 246 IU/L (ref 38–174)
TOTAL IMMATURE NEUTOROPHIL: 0.01 K/CU MM
TOTAL NUCLEATED RBC: 0 K/CU MM
TOTAL PROTEIN: 8.3 GM/DL (ref 6.4–8.2)
WBC # BLD: 6.1 K/CU MM (ref 4–10.5)

## 2021-05-13 PROCEDURE — 82550 ASSAY OF CK (CPK): CPT

## 2021-05-13 PROCEDURE — 87186 SC STD MICRODIL/AGAR DIL: CPT

## 2021-05-13 PROCEDURE — 87040 BLOOD CULTURE FOR BACTERIA: CPT

## 2021-05-13 PROCEDURE — 36415 COLL VENOUS BLD VENIPUNCTURE: CPT

## 2021-05-13 PROCEDURE — 87070 CULTURE OTHR SPECIMN AEROBIC: CPT

## 2021-05-13 PROCEDURE — 85652 RBC SED RATE AUTOMATED: CPT

## 2021-05-13 PROCEDURE — 99284 EMERGENCY DEPT VISIT MOD MDM: CPT

## 2021-05-13 PROCEDURE — 85025 COMPLETE CBC W/AUTO DIFF WBC: CPT

## 2021-05-13 PROCEDURE — 86141 C-REACTIVE PROTEIN HS: CPT

## 2021-05-13 PROCEDURE — 80053 COMPREHEN METABOLIC PANEL: CPT

## 2021-05-13 PROCEDURE — 87075 CULTR BACTERIA EXCEPT BLOOD: CPT

## 2021-05-13 PROCEDURE — 83605 ASSAY OF LACTIC ACID: CPT

## 2021-05-13 PROCEDURE — 6370000000 HC RX 637 (ALT 250 FOR IP): Performed by: PHYSICIAN ASSISTANT

## 2021-05-13 PROCEDURE — 87077 CULTURE AEROBIC IDENTIFY: CPT

## 2021-05-13 PROCEDURE — 73630 X-RAY EXAM OF FOOT: CPT

## 2021-05-13 RX ORDER — GABAPENTIN 300 MG/1
300 CAPSULE ORAL ONCE
Status: COMPLETED | OUTPATIENT
Start: 2021-05-13 | End: 2021-05-13

## 2021-05-13 RX ORDER — 0.9 % SODIUM CHLORIDE 0.9 %
1000 INTRAVENOUS SOLUTION INTRAVENOUS ONCE
Status: DISCONTINUED | OUTPATIENT
Start: 2021-05-13 | End: 2021-05-13

## 2021-05-13 RX ORDER — GABAPENTIN 100 MG/1
100 CAPSULE ORAL 3 TIMES DAILY
Qty: 60 CAPSULE | Refills: 0 | Status: SHIPPED | OUTPATIENT
Start: 2021-05-13 | End: 2021-06-02

## 2021-05-13 RX ADMIN — GABAPENTIN 300 MG: 300 CAPSULE ORAL at 19:15

## 2021-05-13 ASSESSMENT — PAIN SCALES - GENERAL: PAINLEVEL_OUTOF10: 9

## 2021-05-13 NOTE — CARE COORDINATION
- Room # 15 --Chivo Patterson PA-C ---t in ER [facility] --sent by suggestion per Dr Simona Tidwell for evaluation . Pt is currently on oral antibiotics --was see in ER yesterday and given  Vicodin-(says he didn't fill yet due  To side affect) mentioned taking percocet. A Medical evaluation was done pertaining to his right foot  With his on going issues of chronic wound care . Pt has had ( in the recent past) other social issues -especially housing which are not spot lighted now . Pt stated he has an appt with Dr Simona Tidwell -Tuesday May 18 --and with the [facility] Wednesday May 19th It appears that pain medication was the issue today -- pt given Neurontin and discharged without  Further CM involvement . Judy Hassan / PATRICK .

## 2021-05-13 NOTE — ED PROVIDER NOTES
ATTENDING NOTE:    I discussed this patient's history and physical findings and reviewed the PA's findings with them, as well as performed an independent assessment and coordinated care with them. My additional findings are: Patient is resting comfortably, he states that he has not yet seen his wound care doctor. He has well-healed amputations of the left foot as well as an ulceration at the plantar region of the distal right foot. Denies any fevers or vomiting. HISTORY OF PRESENT ILLNESS:  Chief Complaint   Patient presents with    Wound Check     right foot   . Sherren Husbands is a 50 y.o. male who presents with for wound recheck. He denies any fevers, chills, nausea, vomiting or other changes. PHYSICAL EXAM:  VITAL SIGNS:   ED Triage Vitals [05/13/21 1623]   Enc Vitals Group      BP (!) 137/97      Pulse 116      Resp 18      Temp 97.7 °F (36.5 °C)      Temp Source Oral      SpO2 99 %      Weight 187 lb (84.8 kg)      Height 6' (1.829 m)      Head Circumference       Peak Flow       Pain Score       Pain Loc       Pain Edu? Excl. in [address]? Vitals during ED course were reviewed and are as charted. Key Physical Exam Findings:    Constitutional: Chronically unwell, unkempt    Eyes:  Conjunctiva normal, No discharge    HENT: Normocephalic, Atraumatic, Bilateral external ears normal, posterior oropharynx is nonerythematous and without exudate, uvula is midline, no trismus, no \"hot potato voice\" or dysphonia, oropharynx moist    Neck: Supple, no stridor, no grossly visible or palpable masses    Cardiovascular: Regular rate and rhythm, No murmurs, No rubs, No gallops    Pulmonary/Chest:  Normal breath sounds, No respiratory distress or accessory muscle use, No wheezing, crackles or rhonchi. Abdomen:  Soft, nondistended and nonrigid, No tenderness or peritoneal signs, No masses, normal bowel sounds    Back:  No midline point tenderness, No paraspinous muscle tenderness.   No CVA tenderness    Extremities:  No gross deformities, no edema, no tenderness    Feet: Bilateral skin changes, right distal plantar region with an ulceration, no surrounding erythema or drainage. Multiple stages of healing bilateral feet, no drainage, erythema, pain out of proportion.     Neurologic:  Normal motor function, Normal sensory function, No focal deficits    Skin:  Warm, Dry, No erythema, No rash, No cyanosis, No mottling    Lymphatic:  No lymphadenopathy in the following location(s): cervical    Psychiatric:  Alert and oriented x3, Affect normal          RADIOLOGY/PROCEDURES/LABS/MEDICATIONS ADMINISTERED:    I have reviewed and interpreted all of the currently available lab results from this visit (if applicable):  Results for orders placed or performed during the hospital encounter of 05/13/21   CBC auto diff   Result Value Ref Range    WBC 6.1 4.0 - 10.5 K/CU MM    RBC 4.77 4.6 - 6.2 M/CU MM    Hemoglobin 13.3 (L) 13.5 - 18.0 GM/DL    Hematocrit 41.3 (L) 42 - 52 %    MCV 86.6 78 - 100 FL    MCH 27.9 27 - 31 PG    MCHC 32.2 32.0 - 36.0 %    RDW 14.6 11.7 - 14.9 %    Platelets 042 348 - 438 K/CU MM    MPV 8.5 7.5 - 11.1 FL    Differential Type AUTOMATED DIFFERENTIAL     Segs Relative 60.0 36 - 66 %    Lymphocytes % 32.1 24 - 44 %    Monocytes % 6.7 (H) 0 - 4 %    Eosinophils % 0.7 0 - 3 %    Basophils % 0.3 0 - 1 %    Segs Absolute 3.7 K/CU MM    Lymphocytes Absolute 2.0 K/CU MM    Monocytes Absolute 0.4 K/CU MM    Eosinophils Absolute 0.0 K/CU MM    Basophils Absolute 0.0 K/CU MM    Nucleated RBC % 0.0 %    Total Nucleated RBC 0.0 K/CU MM    Total Immature Neutrophil 0.01 K/CU MM    Immature Neutrophil % 0.2 0 - 0.43 %   CMP   Result Value Ref Range    Sodium 134 (L) 135 - 145 MMOL/L    Potassium 4.4 3.5 - 5.1 MMOL/L    Chloride 97 (L) 99 - 110 mMol/L    CO2 26 21 - 32 MMOL/L    BUN 9 6 - 23 MG/DL    CREATININE 0.7 (L) 0.9 - 1.3 MG/DL    Glucose 90 70 - 99 MG/DL    Calcium 9.4 8.3 - 10.6 MG/DL    Albumin 4.5 3.4 - 5.0 GM/DL    Total Protein 8.3 (H) 6.4 - 8.2 GM/DL    Total Bilirubin 0.3 0.0 - 1.0 MG/DL    ALT 21 10 - 40 U/L    AST 30 15 - 37 IU/L    Alkaline Phosphatase 122 40 - 128 IU/L    GFR Non-African American >60 >60 mL/min/1.73m2    GFR African American >60 >60 mL/min/1.73m2    Anion Gap 11 4 - 16   Sedimentation Rate   Result Value Ref Range    Sed Rate 44 (H) 0 - 15 MM/HR   C-reactive protein   Result Value Ref Range    CRP, High Sensitivity 53.2 mg/L   Lactic Acid, Plasma   Result Value Ref Range    Lactate 1.6 0.4 - 2.0 mMOL/L   CK   Result Value Ref Range    Total  (H) 38 - 174 IU/L          ABNORMAL LABS:  Labs Reviewed   CBC WITH AUTO DIFFERENTIAL - Abnormal; Notable for the following components:       Result Value    Hemoglobin 13.3 (*)     Hematocrit 41.3 (*)     Monocytes % 6.7 (*)     All other components within normal limits   COMPREHENSIVE METABOLIC PANEL - Abnormal; Notable for the following components:    Sodium 134 (*)     Chloride 97 (*)     CREATININE 0.7 (*)     Total Protein 8.3 (*)     All other components within normal limits   SEDIMENTATION RATE - Abnormal; Notable for the following components:    Sed Rate 44 (*)     All other components within normal limits   CK - Abnormal; Notable for the following components: Total  (*)     All other components within normal limits   CULTURE, BLOOD 1   CULTURE, BLOOD 2   CULTURE, WOUND   C-REACTIVE PROTEIN   LACTIC ACID, PLASMA         IMAGING STUDIES ORDERED:  SALINE LOCK IV  IP CONSULT TO CASE MANAGEMENT  XR FOOT LEFT (MIN 3 VIEWS)  ED NURSING COMMUNICATION  WOUND CARE    I have personally viewed the imaging studies.  The radiologist interpretation is:   XR FOOT LEFT (MIN 3 VIEWS)   Final Result   No erosive changes identified               MEDICATIONS ADMINISTERED:  Medications   gabapentin (NEURONTIN) capsule 300 mg (300 mg Oral Given 5/13/21 1915)         PLAN/ED COURSE:  Last Vitals: BP (!) 152/98   Pulse 116   Temp 97.7 °F (36.5 °C) (Oral)   Resp 18   Ht 6' (1.829 m)   Wt 187 lb (84.8 kg)   SpO2 100%   BMI 25.36 kg/m²       49-year-old male presents for wound rechecks. His wounds appear chronic, he was not tachycardic at my time of evaluation in the department, he is not having leukocytosis suggestive of sepsis. He is otherwise nontoxic without other complaints. His labs are reassuring and he does have chronic changes for which she is advised to follow back up with his wound care. We did consult case management so that they could help arrange home wound care. His main concern here seem to be that he would prefer Percocet over Norco, he is discharged in stable condition. Clinical Impression:  1. Open wound of right foot, subsequent encounter        Disposition referral (if applicable):  Jj Vergara MD  [address] [place] [address]  [phone]    Schedule an appointment as soon as possible for a visit       Keep your scheduled appointment at the [facility] as we discussed during the encounter. Disposition medications (if applicable):  Discharge Medication List as of 5/13/2021  7:48 PM      START taking these medications    Details   gabapentin (NEURONTIN) 100 MG capsule Take 1 capsule by mouth 3 times daily for 20 days. , Disp-60 capsule, R-0Normal             ED Provider Disposition Time  DISPOSITION              Electronically signed by Skyla Mcclain MD on 5/13/2021 at 9:34 PM        Skyla Mcclain MD  05/13/21 4621

## 2021-05-16 NOTE — ED PROVIDER NOTES
EMERGENCY DEPARTMENT ENCOUNTER      PCP: No primary care provider on file. CHIEF COMPLAINT    Chief Complaint   Patient presents with    Wound Check     right foot         This patient was evaluated by the attending physician, Dr. Patrice Queen    HPI    Nneka Franks is a 50 y.o. male who presents back to the emergency department today with concern of worsening right foot infection. Patient has a plantar ulcer to the foot secondary to frostbite several months ago, has had poor wound healing. Has had a recent admission, on antibiotics. Has outpatient follow-up pending. Patient is homeless, not receiving home health nursing. Does have outpatient follow-up with general surgery as well as primary care upcoming. He states that he feels like his foot is smelling like it is infected. He states he has been ambulatory, feels like there is more swelling and increasing infection. He states that his pain is not well controlled, he states that the [address] he has been recently prescribed is not helping. Patient is also complaining of now of left-sided foot pain as well, which she has history    REVIEW OF SYSTEMS    Constitutional: NO fever, chills  HENT:  Denies upper respiratory symptoms  Respiratory:  No cough or shortness of breath   Cardiovascular:  No chest pain  GI:  Denies abdominal pain, nausea, vomiting, or diarrhea  :  Denies any urinary symptoms    Musculoskeletal:  See HPI. Skin:  See HPI. Lymphatic:  Denies swollen glands or streaks.    Endocrine:  Denies polyuria or polydypsia   Neurologic:  Denies headache, focal weakness or sensory changes  All other review of systems are negative  See HPI and nursing notes for additional information     PAST MEDICAL HISTORY/SURGICAL HISTORY     Past Medical History:   Diagnosis Date    Hypertension     does not take medications     Past Surgical History:   Procedure Laterality Date    FOOT DEBRIDEMENT Bilateral 2/19/2021    BILATERAL TOE  DEBRIDEMENT INCISION AND DRAINAGE, AMPUTATION OF LEFT 1ST, 2ND, 3RD AND TIP OF 4TH TOE AND RIGHT GREAT TOE performed by Fartun Curiel MD at [facility]    Current Outpatient Rx   Medication Sig Dispense Refill    gabapentin (NEURONTIN) 100 MG capsule Take 1 capsule by mouth 3 times daily for 20 days. 60 capsule 0    amoxicillin (AMOXIL) 500 MG capsule Take 2 capsules by mouth 3 times daily for 14 days 84 capsule 0    chlorthalidone (HYGROTON) 25 MG tablet Take 1 tablet by mouth daily 30 tablet 3    metoprolol tartrate (LOPRESSOR) 25 MG tablet Take 1 tablet by mouth 2 times daily 60 tablet 3    omeprazole (PRILOSEC) 20 MG delayed release capsule Take 20 mg by mouth daily      lisinopril (PRINIVIL;ZESTRIL) 20 MG tablet Take 20 mg by mouth daily      amLODIPine (NORVASC) 5 MG tablet Take 1 tablet by mouth daily 30 tablet 3       ALLERGIES    No Known Allergies    FAMILY HISTORY/SOCIAL HISTORY    History reviewed. No pertinent family history. Social History     Socioeconomic History    Marital status: Single     Spouse name: None    Number of children: None    Years of education: None    Highest education level: None   Occupational History    None   Tobacco Use    Smoking status: Former Smoker     Quit date: 2021     Years since quittin.3    Smokeless tobacco: Never Used   Vaping Use    Vaping Use: Never used   Substance and Sexual Activity    Alcohol use: Yes     Comment: most days    Drug use: No    Sexual activity: None   Other Topics Concern    None   Social History Narrative    None     Social Determinants of Health     Financial Resource Strain:     Difficulty of Paying Living Expenses:    Food Insecurity:     Worried About Running Out of Food in the Last Year:     Ran Out of Food in the Last Year:    Transportation Needs:     Lack of Transportation (Medical):      Lack of Transportation (Non-Medical):    Physical Activity:     Days of Exercise per Week:     Minutes of Exercise per Session:    Stress:     Feeling of Stress :    Social Connections:     Frequency of Communication with Friends and Family:     Frequency of Social Gatherings with Friends and Family:     Attends Buddhist Services:     Active Member of Clubs or Organizations:     Attends Club or Organization Meetings:     Marital Status:    Intimate Partner Violence:     Fear of Current or Ex-Partner:     Emotionally Abused:     Physically Abused:     Sexually Abused:      PHYSICAL EXAM    VITAL SIGNS: BP (!) 152/98   Pulse 116   Temp 97.7 °F (36.5 °C) (Oral)   Resp 18   Ht 6' (1.829 m)   Wt 187 lb (84.8 kg)   SpO2 100%   BMI 25.36 kg/m²    Constitutional:  Well developed, Well nourished  HENT:  Atraumatic, Normocephalic, PERRL, no eye drainage noted, bilateral external ears normal, no sinus tenderness, nose normal, oropharynx moist, no pharyngeal exudates. Neck- supple. No adenopathy. Respiratory:  No retractions, lungs are clear   Cardiovascular:  Heart rate regular, regular rhythm, no JVD  GI:  Soft, No tenderness   Musculoskeletal:  No acute bony deformity, no obvious joint effusion   Integument:  No cyanosis, there is a well appearing healing plantar foot ulcer to the right foot with mild erythema, no significant edema, crepitus. No significant signs of infection to the plantar aspect of the foot. Vascular: Dorsalis pedis pulses 2+ equal bilaterally  Neurologic:  Alert & oriented, no slurred speech.        LABS:  Results for orders placed or performed during the hospital encounter of 05/13/21   Culture, Blood 1    Specimen: Blood gases   Result Value Ref Range    Specimen BLOOD     Special Requests NONE     Culture NO GROWTH AT 72 HOURS    Culture, Blood 2    Specimen: Blood gases   Result Value Ref Range    Specimen BLOOD     Special Requests NONE     Culture NO GROWTH AT 72 HOURS    Culture, Wound    Specimen: Ulcer   Result Value Ref Range    Specimen ULCER     Special Requests NONE     Culture       Prelim Report Anaerobic culture further report to follow No anaerobes isolated so far, Further report to follow    Culture (A)      BHS GROUP A (STREP PYOGENES) Heavy growth Susceptibility testing of penicillin and other beta lactams is not necessary for beta hemolytic Streptococci since resistant strains have not been identified.  (CLSI M100)    Culture (A)      STAPHYLOCOCCUS AUREUS Light growth Sensitivity to follow   CBC auto diff   Result Value Ref Range    WBC 6.1 4.0 - 10.5 K/CU MM    RBC 4.77 4.6 - 6.2 M/CU MM    Hemoglobin 13.3 (L) 13.5 - 18.0 GM/DL    Hematocrit 41.3 (L) 42 - 52 %    MCV 86.6 78 - 100 FL    MCH 27.9 27 - 31 PG    MCHC 32.2 32.0 - 36.0 %    RDW 14.6 11.7 - 14.9 %    Platelets 466 707 - 529 K/CU MM    MPV 8.5 7.5 - 11.1 FL    Differential Type AUTOMATED DIFFERENTIAL     Segs Relative 60.0 36 - 66 %    Lymphocytes % 32.1 24 - 44 %    Monocytes % 6.7 (H) 0 - 4 %    Eosinophils % 0.7 0 - 3 %    Basophils % 0.3 0 - 1 %    Segs Absolute 3.7 K/CU MM    Lymphocytes Absolute 2.0 K/CU MM    Monocytes Absolute 0.4 K/CU MM    Eosinophils Absolute 0.0 K/CU MM    Basophils Absolute 0.0 K/CU MM    Nucleated RBC % 0.0 %    Total Nucleated RBC 0.0 K/CU MM    Total Immature Neutrophil 0.01 K/CU MM    Immature Neutrophil % 0.2 0 - 0.43 %   CMP   Result Value Ref Range    Sodium 134 (L) 135 - 145 MMOL/L    Potassium 4.4 3.5 - 5.1 MMOL/L    Chloride 97 (L) 99 - 110 mMol/L    CO2 26 21 - 32 MMOL/L    BUN 9 6 - 23 MG/DL    CREATININE 0.7 (L) 0.9 - 1.3 MG/DL    Glucose 90 70 - 99 MG/DL    Calcium 9.4 8.3 - 10.6 MG/DL    Albumin 4.5 3.4 - 5.0 GM/DL    Total Protein 8.3 (H) 6.4 - 8.2 GM/DL    Total Bilirubin 0.3 0.0 - 1.0 MG/DL    ALT 21 10 - 40 U/L    AST 30 15 - 37 IU/L    Alkaline Phosphatase 122 40 - 128 IU/L    GFR Non-African American >60 >60 mL/min/1.73m2    GFR African American >60 >60 mL/min/1.73m2    Anion Gap 11 4 - 16   Sedimentation Rate   Result Value Ref Range    Sed Rate 44 (H) 0 - 15 MM/HR   C-reactive protein   Result Value Ref Range    CRP, High Sensitivity 53.2 mg/L   Lactic Acid, Plasma   Result Value Ref Range    Lactate 1.6 0.4 - 2.0 mMOL/L   CK   Result Value Ref Range    Total  (H) 38 - 174 IU/L     IMAGING:  XR FOOT LEFT (MIN 3 VIEWS)    Result Date: 5/13/2021  EXAMINATION: THREE XRAY VIEWS OF THE LEFT FOOT 5/13/2021 6:29 pm COMPARISON: 04/28/2021 HISTORY: ORDERING SYSTEM PROVIDED HISTORY: left foot ulcer and pain TECHNOLOGIST PROVIDED HISTORY: Reason for exam:->left foot ulcer and pain Reason for Exam: left foot ulcer and pain Acuity: Acute Type of Exam: Initial Additional signs and symptoms: na Relevant Medical/Surgical History: hypertension FINDINGS: Partial amputations of the 2nd and 3rd toes noted. 1st distal phalanx amputated. Stable alignment. No erosive changes identified. No erosive changes identified     XR FOOT LEFT (MIN 3 VIEWS)    Result Date: 4/28/2021  EXAMINATION: THREE XRAY VIEWS OF THE LEFT FOOT 4/28/2021 5:39 pm COMPARISON: 04/08/2021 HISTORY: ORDERING SYSTEM PROVIDED HISTORY: post op infection TECHNOLOGIST PROVIDED HISTORY: Reason for exam:->post op infection Reason for Exam: pain Acuity: Acute Type of Exam: Initial Mechanism of Injury: pain Relevant Medical/Surgical History: pain FINDINGS: Disarticulation of the great toe distal phalanx noted. Partial amputations of the 2nd and 3rd toe are noted as well. Soft tissue swelling of the forefoot is seen. No soft tissue gas is detected. Mature periosteal new bone formation is seen adjacent to the stump of the 2nd toe proximal phalanx. No immature periosteal reaction or osteolysis is identified to suggest osteomyelitis. Degenerative changes of the midfoot are again seen. Lisfranc joint remains congruent. Soft tissue swelling. No radiographic evidence of osteomyelitis.      XR FOOT RIGHT (MIN 3 VIEWS)    Result Date: 5/12/2021  EXAMINATION: THREE XRAY VIEWS OF THE RIGHT FOOT 5/12/2021 12:43 am COMPARISON: MRI on 04/30/2021, radiographs on 04/28/2021 HISTORY: Increased pain and drainage from right foot for 1-2 days. FINDINGS: Status post amputation of the distal phalanx of the great toe. No new erosive changes seen. Diffuse soft tissue swelling has decreased since 04/28/2021. On the lateral view, ulceration is again noted distally. No radiographic evidence for osteomyelitis. Soft tissue swelling has decreased from 04/28/2021. XR FOOT RIGHT (MIN 3 VIEWS)    Result Date: 4/28/2021  EXAMINATION: THREE XRAY VIEWS OF THE RIGHT FOOT 4/28/2021 5:39 pm COMPARISON: 04/08/2021 HISTORY: ORDERING SYSTEM PROVIDED HISTORY: post op infection TECHNOLOGIST PROVIDED HISTORY: Reason for exam:->post op infection Reason for Exam: post op infection Acuity: Acute Type of Exam: Initial Mechanism of Injury: post op infection Relevant Medical/Surgical History: post op infection FINDINGS: Patient is again noted to be status post disarticulation of the great toe distal phalanx. Soft tissue swelling is seen circumferentially. There is evidence of an ulceration along the lateral aspect of the foot near the ball of the foot. In any event, no osteolysis or periosteal reaction is identified to suggest osteomyelitis. Lisfranc joint remains congruent. .     Soft tissue swelling and ulceration. No radiographic evidence of osteomyelitis is seen. MRI FOOT RIGHT W WO CONTRAST    Result Date: 4/30/2021  EXAMINATION: MRI OF THE RIGHT FOOT WITHOUT AND WITH CONTRAST, 4/30/2021 2:37 pm TECHNIQUE: Multiplanar multisequence MRI of the right foot was performed without and with the administration of intravenous contrast. COMPARISON: Right foot radiographs 04/28/2021.  HISTORY: ORDERING SYSTEM PROVIDED HISTORY: assess for OM and abscess TECHNOLOGIST PROVIDED HISTORY: Reason for exam:->assess for OM and abscess What is the area of interest?->Forefoot Reason for Exam: 1st and 2nd toes amputated dec 2020-ulcer on bottom or foot x couple days gfr >60 15ml prohance FINDINGS: LISFRANC JOINT:  Lisfranc ligament is visualized and is normal.  The alignment of the tarsal-metatarsal joint is anatomic. BONE MARROW: Status post amputation of the 1st distal phalanx. Edema in the head and shaft of the 1st proximal phalanx with patchy decreased T1 signal. Mild edema throughout the 1st and 2nd metatarsals, medial cuneiform, and 2nd through 5th toes with normal T1 signal.  No osseous erosion or confluent decreased T1 signal.  No suspicious marrow space-occupying lesion. GREATER AND LESSER MTP JOINTS:  The greater and lesser MTP joints are unremarkable. The tibial and fibular sesamoids are normal in position and signal. SOFT TISSUES: Diffuse subcutaneous edema. No deep soft tissue ulceration, sinus tract, or drainable fluid collection. Diffuse intramuscular edema. TENDONS: Retracted postoperative appearance of the flexor hallucis longus tendon. Clear main visualized tendons are within normal limits. No tenosynovitis. 1. Diffuse subcutaneous edema compatible with cellulitis or reactive edema. No deep soft tissue ulceration or abscess. 2. Edema and patchy decreased T1 signal in the head and shaft of the 1st proximal phalanx compatible with noninfectious reactive osteitis versus early osteomyelitis. 3. Mild marrow edema with normal T1 signal in the 1st and 2nd metatarsals, medial cuneiform, and 2nd through 5th toes likely representing noninfectious edema. Osteomyelitis is unlikely. ED COURSE & MEDICAL DECISION MAKING      Patient presents as above. Emergent etiologies considered. Patient returning back to the emergency department today with continued right foot swelling, foul-smelling open wound to the plantar aspect of the right foot. Patient was seen yesterday, had basic labs lactic acid and x-ray which appear to be on his baseline. Is on antibiotics was given pain control advised to follow-up as an outpatient.   Is returning back today concerned about worsening condition. Overall of the foot appears to be at its baseline, no significant worsening infection, the wound appears stable, no worsening infection. No proximal lymphangitis, compartments are soft. Patient has outpatient follow-up with general surgery as well as primary care. Is on antibiotics. Secondary to his recent evaluation, we will get supervising physician involved. Broaden his work-up here today we will also get a left foot x-ray because he is complaining of his left foot. Patient's lab work all appears to be at his baseline. Left foot x-ray was acutely negative. We will get case management involved, he has appropriate outpatient follow-up but it is difficult secondary to his homelessness. He has access to his antibiotics. We will also initiate gabapentin for his neuropathic pain. He will have his wound cleaned and dressed here today we will provide him more socks. He is to follow-up with his general surgeon, primary care as an outpatient. He otherwise is at his baseline state of health. He will be discharged with normal vital signs, of note his pulse was not updated but he was not tachycardic at discharge he had a rate of 85-90 bpm.  For any further details please see supervising physician's note. Patient agrees to return emergency department if symptoms worsen or any new symptoms develop. All pertinent Lab data and radiographic results reviewed with patient at bedside. The patient and/or the family were informed of the results of any tests/labs/imaging, the treatment plan, and time was allotted to answer questions. Clinical  IMPRESSION    1. Open wound of right foot, subsequent encounter        Comment: Please note this report has been produced using speech recognition software and may contain errors related to that system including errors in grammar, punctuation, and spelling, as well as words and phrases that may be inappropriate.  If there are any questions or concerns please feel free to contact the dictating provider for clarification.      Radha Hi [address], PA  05/16/21 [id number]

## 2021-05-18 LAB
CULTURE: NORMAL
CULTURE: NORMAL
Lab: NORMAL
Lab: NORMAL
SPECIMEN: NORMAL
SPECIMEN: NORMAL

## 2021-05-19 LAB
CULTURE: ABNORMAL
Lab: ABNORMAL
SPECIMEN: ABNORMAL

## 2021-05-30 ENCOUNTER — HOSPITAL ENCOUNTER (EMERGENCY)
Age: 46
Discharge: HOME | End: 2021-05-30
Payer: MEDICARE

## 2021-05-30 ENCOUNTER — APPOINTMENT (OUTPATIENT)
Dept: GENERAL RADIOLOGY | Age: 46
End: 2021-05-30
Payer: MEDICARE

## 2021-05-30 VITALS
OXYGEN SATURATION: 99 % | HEART RATE: 95 BPM | WEIGHT: 180 LBS | HEIGHT: 72 IN | RESPIRATION RATE: 16 BRPM | DIASTOLIC BLOOD PRESSURE: 115 MMHG | TEMPERATURE: 98.3 F | BODY MASS INDEX: 24.38 KG/M2 | SYSTOLIC BLOOD PRESSURE: 148 MMHG

## 2021-05-30 DIAGNOSIS — S91.301D OPEN WOUND OF RIGHT FOOT, SUBSEQUENT ENCOUNTER: Primary | ICD-10-CM

## 2021-05-30 LAB
ALBUMIN SERPL-MCNC: 4.2 GM/DL (ref 3.4–5)
ALP BLD-CCNC: 125 IU/L (ref 40–129)
ALT SERPL-CCNC: 13 U/L (ref 10–40)
ANION GAP SERPL CALCULATED.3IONS-SCNC: 14 MMOL/L (ref 4–16)
AST SERPL-CCNC: 21 IU/L (ref 15–37)
BANDED NEUTROPHILS ABSOLUTE COUNT: 0.07 K/CU MM
BANDED NEUTROPHILS RELATIVE PERCENT: 2 % (ref 5–11)
BILIRUB SERPL-MCNC: 0.2 MG/DL (ref 0–1)
BUN BLDV-MCNC: 6 MG/DL (ref 6–23)
CALCIUM SERPL-MCNC: 9 MG/DL (ref 8.3–10.6)
CHLORIDE BLD-SCNC: 100 MMOL/L (ref 99–110)
CO2: 21 MMOL/L (ref 21–32)
CREAT SERPL-MCNC: 0.6 MG/DL (ref 0.9–1.3)
DIFFERENTIAL TYPE: ABNORMAL
GFR AFRICAN AMERICAN: >60 ML/MIN/1.73M2
GFR NON-AFRICAN AMERICAN: >60 ML/MIN/1.73M2
GLUCOSE BLD-MCNC: 101 MG/DL (ref 70–99)
HCT VFR BLD CALC: 43.3 % (ref 42–52)
HEMOGLOBIN: 13.6 GM/DL (ref 13.5–18)
LACTATE: 1.7 MMOL/L (ref 0.4–2)
LYMPHOCYTES ABSOLUTE: 2.5 K/CU MM
LYMPHOCYTES RELATIVE PERCENT: 71 % (ref 24–44)
MCH RBC QN AUTO: 27.9 PG (ref 27–31)
MCHC RBC AUTO-ENTMCNC: 31.4 % (ref 32–36)
MCV RBC AUTO: 88.9 FL (ref 78–100)
MONOCYTES ABSOLUTE: 0 K/CU MM
MONOCYTES RELATIVE PERCENT: 1 % (ref 0–4)
PDW BLD-RTO: 15.1 % (ref 11.7–14.9)
PLATELET # BLD: 354 K/CU MM (ref 140–440)
PLT MORPHOLOGY: ABNORMAL
PMV BLD AUTO: 9.9 FL (ref 7.5–11.1)
POTASSIUM SERPL-SCNC: 3.8 MMOL/L (ref 3.5–5.1)
RBC # BLD: 4.87 M/CU MM (ref 4.6–6.2)
SEGMENTED NEUTROPHILS ABSOLUTE COUNT: 0.9 K/CU MM
SEGMENTED NEUTROPHILS RELATIVE PERCENT: 26 % (ref 36–66)
SODIUM BLD-SCNC: 135 MMOL/L (ref 135–145)
TOTAL PROTEIN: 8 GM/DL (ref 6.4–8.2)
WBC # BLD: 3.5 K/CU MM (ref 4–10.5)
WBC # BLD: ABNORMAL 10*3/UL

## 2021-05-30 PROCEDURE — 83605 ASSAY OF LACTIC ACID: CPT

## 2021-05-30 PROCEDURE — 99285 EMERGENCY DEPT VISIT HI MDM: CPT

## 2021-05-30 PROCEDURE — 36415 COLL VENOUS BLD VENIPUNCTURE: CPT

## 2021-05-30 PROCEDURE — 6370000000 HC RX 637 (ALT 250 FOR IP): Performed by: PHYSICIAN ASSISTANT

## 2021-05-30 PROCEDURE — 73630 X-RAY EXAM OF FOOT: CPT

## 2021-05-30 PROCEDURE — 80053 COMPREHEN METABOLIC PANEL: CPT

## 2021-05-30 PROCEDURE — 85027 COMPLETE CBC AUTOMATED: CPT

## 2021-05-30 PROCEDURE — 85007 BL SMEAR W/DIFF WBC COUNT: CPT

## 2021-05-30 RX ORDER — CEPHALEXIN 250 MG/1
500 CAPSULE ORAL ONCE
Status: COMPLETED | OUTPATIENT
Start: 2021-05-30 | End: 2021-05-30

## 2021-05-30 RX ORDER — CEPHALEXIN 500 MG/1
500 CAPSULE ORAL 4 TIMES DAILY
Qty: 40 CAPSULE | Refills: 0 | Status: SHIPPED | OUTPATIENT
Start: 2021-05-30 | End: 2021-06-09

## 2021-05-30 RX ORDER — HYDROCODONE BITARTRATE AND ACETAMINOPHEN 5; 325 MG/1; MG/1
1 TABLET ORAL ONCE
Status: COMPLETED | OUTPATIENT
Start: 2021-05-30 | End: 2021-05-30

## 2021-05-30 RX ORDER — NAPROXEN 500 MG/1
500 TABLET ORAL 2 TIMES DAILY
Qty: 15 TABLET | Refills: 0 | Status: SHIPPED | OUTPATIENT
Start: 2021-05-30 | End: 2021-06-18

## 2021-05-30 RX ORDER — SULFAMETHOXAZOLE AND TRIMETHOPRIM 800; 160 MG/1; MG/1
1 TABLET ORAL 2 TIMES DAILY
Qty: 20 TABLET | Refills: 0 | Status: SHIPPED | OUTPATIENT
Start: 2021-05-30 | End: 2021-06-09

## 2021-05-30 RX ORDER — SULFAMETHOXAZOLE AND TRIMETHOPRIM 800; 160 MG/1; MG/1
1 TABLET ORAL ONCE
Status: COMPLETED | OUTPATIENT
Start: 2021-05-30 | End: 2021-05-30

## 2021-05-30 RX ADMIN — HYDROCODONE BITARTRATE AND ACETAMINOPHEN 1 TABLET: 5; 325 TABLET ORAL at 12:38

## 2021-05-30 RX ADMIN — CEPHALEXIN 500 MG: 250 CAPSULE ORAL at 14:20

## 2021-05-30 RX ADMIN — SULFAMETHOXAZOLE AND TRIMETHOPRIM 1 TABLET: 800; 160 TABLET ORAL at 14:20

## 2021-05-30 ASSESSMENT — PAIN SCALES - GENERAL
PAINLEVEL_OUTOF10: 8
PAINLEVEL_OUTOF10: 10
PAINLEVEL_OUTOF10: 10

## 2021-05-30 ASSESSMENT — PAIN DESCRIPTION - ORIENTATION: ORIENTATION: LEFT;RIGHT

## 2021-05-30 ASSESSMENT — PAIN DESCRIPTION - DESCRIPTORS: DESCRIPTORS: ACHING

## 2021-05-30 ASSESSMENT — PAIN DESCRIPTION - PAIN TYPE: TYPE: CHRONIC PAIN

## 2021-05-30 ASSESSMENT — PAIN DESCRIPTION - LOCATION: LOCATION: FOOT

## 2021-05-30 NOTE — ED PROVIDER NOTES
EMERGENCY DEPARTMENT ENCOUNTER      PCP: No primary care provider on file. CHIEF COMPLAINT    Chief Complaint   Patient presents with    Wound Check     bilateral feet       This patient was not evaluated by the attending physician. I have independently evaluated this patient. HPI    Nhung Webb is a 50 y.o. male who presents with worsening pain to right foot. Patient states he has wound to right foot from previous frostbite injury. Patient states he has had toes removed from bilateral feet from frostbite. Patient denies history of diabetes. Patient follows with general surgeon Dr. Chey Ruggiero. Patient states it has been approximately 3 weeks since he seen Dr. Chey Ruggiero. Patient states he has had associated fever. Patient denies any new injury. Patient is at increased pain and swelling to right foot. Pain worsens with direct palpation and ambulation. No known alleviating factors. Patient does not currently on antibiotics. REVIEW OF SYSTEMS    Constitutional: See HPI  Respiratory:  No cough or shortness of breath   Cardiovascular:  No chest pain  GI: No nausea, vomiting  Musculoskeletal:  See HPI   Skin:  See HPI      All other review of systems are negative  See HPI and nursing notes for additional information     PAST MEDICAL HISTORY/SURGICAL HISTORY     Past Medical History:   Diagnosis Date    Hypertension     does not take medications     Past Surgical History:   Procedure Laterality Date    FOOT DEBRIDEMENT Bilateral 2/19/2021    BILATERAL TOE  DEBRIDEMENT INCISION AND DRAINAGE, AMPUTATION OF LEFT 1ST, 2ND, 3RD AND TIP OF 4TH TOE AND RIGHT GREAT TOE performed by Newton Singer MD at [address] [place] [address]    Current Outpatient Rx   Medication Sig Dispense Refill    gabapentin (NEURONTIN) 100 MG capsule Take 1 capsule by mouth 3 times daily for 20 days.  60 capsule 0    chlorthalidone (HYGROTON) 25 MG tablet Take 1 tablet by mouth daily 30 tablet 3    metoprolol 148/115   Pulse 95   Temp 98.3 °F (36.8 °C) (Oral)   Resp 16   Ht 6' (1.829 m)   Wt 180 lb (81.6 kg)   SpO2 99%   BMI 24.41 kg/m²    Constitutional:  Well developed, Well nourished  HENT:  Atraumatic, Normocephalic, PERRL  Respiratory:  No retractions, lungs are clear   Cardiovascular: Normal rate and rhythm  GI:  Soft, No tenderness   Musculoskeletal: Left foot with no overlying erythema, ecchymosis or swelling. No open wounds to left foot. Right foot with open wound to remainder of great toe and along plantar aspect. No purulent drainage. Right foot is generally swollen with some mild erythema. Capillary refill and sensation intact. Integument:  Right foot with open wound to remainder of great toe and along plantar aspect. No purulent drainage. Right foot is generally swollen with some mild erythema. Vascular: Capillary refill and pulses intact  Neurologic:  Alert & oriented, no slurred speech. IMAGING:  XR FOOT RIGHT (MIN 3 VIEWS)   Preliminary Result   Suspected plantar forefoot wound. No soft tissue gas or bony destruction. Prior great toe amputation at the IP joint.            Labs:    Results for orders placed or performed during the hospital encounter of 05/30/21   CBC with Auto Diff   Result Value Ref Range    WBC 3.5 (L) 4.0 - 10.5 K/CU MM    RBC 4.87 4.6 - 6.2 M/CU MM    Hemoglobin 13.6 13.5 - 18.0 GM/DL    Hematocrit 43.3 42 - 52 %    MCV 88.9 78 - 100 FL    MCH 27.9 27 - 31 PG    MCHC 31.4 (L) 32.0 - 36.0 %    RDW 15.1 (H) 11.7 - 14.9 %    Platelets 055 344 - 848 K/CU MM    MPV 9.9 7.5 - 11.1 FL    Bands Relative 2 (L) 5 - 11 %    Segs Relative 26.0 (L) 36 - 66 %    Lymphocytes % 71.0 (H) 24 - 44 %    Monocytes % 1.0 0 - 4 %    Bands Absolute 0.07 K/CU MM    Segs Absolute 0.9 K/CU MM    Lymphocytes Absolute 2.5 K/CU MM    Monocytes Absolute 0.0 K/CU MM    Differential Type MANUAL DIFFERENTIAL     WBC Morphology FEW     PLT Morphology FEW    Comprehensive Metabolic Panel Result Value Ref Range    Sodium 135 135 - 145 MMOL/L    Potassium 3.8 3.5 - 5.1 MMOL/L    Chloride 100 99 - 110 mMol/L    CO2 21 21 - 32 MMOL/L    BUN 6 6 - 23 MG/DL    CREATININE 0.6 (L) 0.9 - 1.3 MG/DL    Glucose 101 (H) 70 - 99 MG/DL    Calcium 9.0 8.3 - 10.6 MG/DL    Albumin 4.2 3.4 - 5.0 GM/DL    Total Protein 8.0 6.4 - 8.2 GM/DL    Total Bilirubin 0.2 0.0 - 1.0 MG/DL    ALT 13 10 - 40 U/L    AST 21 15 - 37 IU/L    Alkaline Phosphatase 125 40 - 129 IU/L    GFR Non-African American >60 >60 mL/min/1.73m2    GFR African American >60 >60 mL/min/1.73m2    Anion Gap 14 4 - 16   Lactic Acid, Plasma   Result Value Ref Range    Lactate 1.7 0.4 - 2.0 mMOL/L         ED COURSE & MEDICAL DECISION MAKING    Patient presents as above. Patient provided [facility] for pain. Right foot x-ray shows suspected plantar forefoot wound with no soft tissue gas or bony destruction. Lactic acid is 1.7. CBC grossly within normal limits. CMP within normal limits. I discussed labs and imaging with patient today. Patient does have some mild swelling and erythema. I consulted patient's general surgeon Dr. Kaylee Smith who agrees with oral antibiotics and outpatient follow-up. Patient will be started Bactrim DS and Keflex. Patient provide prescription for naproxen for pain. Case management consulted while patient is in the emergency department. Wounds cleaned and bandaged. Patient provided new postop shoe as his previous was breaking. Clinical  IMPRESSION    Open wound of right foot, subsequent encounter      Diagnosis and plan discussed in detail with patient who understands and agrees. Patient agrees to return emergency department if symptoms worsen or any new symptoms develop. Comment: Please note this report has been produced using speech recognition software and may contain errors related to that system including errors in grammar, punctuation, and spelling, as well as words and phrases that may be inappropriate.  If there are any questions or concerns please feel free to contact the dictating provider for clarification.              Ruth Vickers PA-C  05/30/21 [id number]
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
## 2021-06-09 ENCOUNTER — HOSPITAL ENCOUNTER (EMERGENCY)
Age: 46
Discharge: HOME | End: 2021-06-09
Payer: MEDICARE

## 2021-06-09 VITALS
HEART RATE: 86 BPM | WEIGHT: 178 LBS | BODY MASS INDEX: 24.11 KG/M2 | TEMPERATURE: 97.6 F | HEIGHT: 72 IN | SYSTOLIC BLOOD PRESSURE: 126 MMHG | DIASTOLIC BLOOD PRESSURE: 88 MMHG | RESPIRATION RATE: 20 BRPM | OXYGEN SATURATION: 99 %

## 2021-06-09 DIAGNOSIS — L89.892 PRESSURE INJURY OF DORSUM OF RIGHT FOOT, STAGE 2 (HCC): Primary | ICD-10-CM

## 2021-06-09 PROCEDURE — 99285 EMERGENCY DEPT VISIT HI MDM: CPT

## 2021-06-09 ASSESSMENT — PAIN SCALES - GENERAL: PAINLEVEL_OUTOF10: 10

## 2021-06-09 NOTE — ED PROVIDER NOTES
Triage Chief Complaint:   Wound Check (bilateral toe amputation)    [place]:  Today in the ED I had the pleasure of caring for Elba Amezquita who is a 50 y.o. male that presents to the emergency department. Will for wound check. Patient has a history of bilateral toe debridement and drainage and amputation of left first second third and tip of the fourth toe and right great toe amputation performed on 2021. Secondary to trench foot. Patient is homeless. States that he feels like his feet are getting worse. So he came here for evaluation. He endorses nausea vomiting no fever chills. No generalized weakness. No trauma to his feet. States he has been dressing his feet on his own. Is not on any antibiotics. Endorses minimal pain. ROS:  REVIEW OF SYSTEMS    At least 10 systems reviewed      All other review of systems are negative  See HPI and nursing notes for additional information       Past Medical History:   Diagnosis Date    Hypertension     does not take medications     Past Surgical History:   Procedure Laterality Date    FOOT DEBRIDEMENT Bilateral 2021    BILATERAL TOE  DEBRIDEMENT INCISION AND DRAINAGE, AMPUTATION OF LEFT 1ST, 2ND, 3RD AND TIP OF 4TH TOE AND RIGHT GREAT TOE performed by Viky Serrano MD at [place] [address] reviewed. No pertinent family history.   Social History     Socioeconomic History    Marital status: Single     Spouse name: Not on file    Number of children: Not on file    Years of education: Not on file    Highest education level: Not on file   Occupational History    Not on file   Tobacco Use    Smoking status: Former Smoker     Quit date: 2021     Years since quittin.4    Smokeless tobacco: Never Used   Vaping Use    Vaping Use: Never used   Substance and Sexual Activity    Alcohol use: Yes     Comment: most days    Drug use: No    Sexual activity: Not on file   Other Topics Concern    Not on file   Social History Narrative    Not on file     Social Determinants of Health     Financial Resource Strain:     Difficulty of Paying Living Expenses:    Food Insecurity:     Worried About Running Out of Food in the Last Year:     [address] in the Last Year:    Transportation Needs:     Lack of Transportation (Medical):  Lack of Transportation (Non-Medical):    Physical Activity:     Days of Exercise per Week:     Minutes of Exercise per Session:    Stress:     Feeling of Stress :    Social Connections:     Frequency of Communication with Friends and Family:     Frequency of Social Gatherings with Friends and Family:     Attends [employer] Services:     Active Member of Clubs or Organizations:     Attends Club or Organization Meetings:     Marital Status:    Intimate Partner Violence:     Fear of Current or Ex-Partner:     Emotionally Abused:     Physically Abused:     Sexually Abused:      No current facility-administered medications for this encounter. Current Outpatient Medications   Medication Sig Dispense Refill    sulfamethoxazole-trimethoprim (BACTRIM DS) 800-160 MG per tablet Take 1 tablet by mouth 2 times daily for 10 days 20 tablet 0    cephALEXin (KEFLEX) 500 MG capsule Take 1 capsule by mouth 4 times daily for 10 days 40 capsule 0    naproxen (NAPROSYN) 500 MG tablet Take 1 tablet by mouth 2 times daily 15 tablet 0    gabapentin (NEURONTIN) 100 MG capsule Take 1 capsule by mouth 3 times daily for 20 days.  60 capsule 0    chlorthalidone (HYGROTON) 25 MG tablet Take 1 tablet by mouth daily 30 tablet 3    metoprolol tartrate (LOPRESSOR) 25 MG tablet Take 1 tablet by mouth 2 times daily 60 tablet 3    omeprazole (PRILOSEC) 20 MG delayed release capsule Take 20 mg by mouth daily      lisinopril (PRINIVIL;ZESTRIL) 20 MG tablet Take 20 mg by mouth daily      amLODIPine (NORVASC) 5 MG tablet Take 1 tablet by mouth daily 30 tablet 3     No Known Allergies    Nursing Notes Reviewed    Physical Exam:  ED Triage Vitals [06/09/21 0157]   Enc Vitals Group      BP (!) 150/107      Pulse 109      Resp 18      Temp 97.6 °F (36.4 °C)      Temp Source Oral      SpO2 97 %      Weight 178 lb (80.7 kg)      Height 6' (1.829 m)      Head Circumference       Peak Flow       Pain Score       Pain Loc       Pain Edu? Excl. in [address]? General :Patient is awake alert oriented person place and time no acute distress nontoxic appearing  HEENT: Pupils are equally round and reactive to light extraocular motors are intact conjunctivae clear sclerae white there is no injection no icterus. Nose without any rhinorrhea or epistaxis. Oral mucosa is moist no exudate buccal mucosa shows no ulcerations. Uvula is midline    Neck: Neck is supple full range of motion trachea midline thyroid nonpalpable  Cardiac: Heart regular rate rhythm no murmurs rubs clicks or gallops  Lungs: Lungs are clear to auscultation there is no wheezing rhonchi or rales. There is no use of accessory muscles no nasal flaring identified. Chest wall: There is no tenderness to palpation over the chest wall or over ribs  Abdomen: Abdomen is soft nontender nondistended. There is no firm or pulsatile masses no rebound rigidity or guarding negative Mckeon's negative McBurney, no peritoneal signs  Suprapubic:  there is no tenderness to palpation over the external bladder   Musculoskeletal: 5 out of 5 strength in all 4 extremities full flexion extension abduction and adduction supination pronation of all extremities and all digits. No obvious muscle atrophy is noted. No focal muscle deficits are appreciated  Integument:  No cyanosis, there is a well appearing healing plantar foot ulcer to the right foot with mild erythema, no significant edema, crepitus. No significant signs of infection to the plantar aspect of the foot.   Vascular: Dorsalis pedis pulses 2+ equal bilaterally    Dermatology: Skin is warm and dry there is no obvious abscesses lacerations or lesions noted  Psych: Mentation is grossly normal cognition is grossly normal. Affect is appropriate  Neuro: Motor intact sensory intact cranial nerves II through XII are intact level of consciousness is normal cerebellar function is normal reflexes are grossly normal. No evidence of incontinence or loss of bowel or bladder no saddle anesthesia noted Lymphatic: There is no submandibular or cervical adenopathy appreciated. I have reviewed and interpreted all of the currently available lab results from this visit (if applicable):  No results found for this visit on 06/09/21. Radiographs (if obtained):  [] The following radiograph was interpreted by myself in the absence of a radiologist:   [] Radiologist's Report Reviewed:  No orders to display       EKG (if obtained):   Please See Note of attending physician for EKG interpretation. Chart review shows recent radiograph(s):  XR FOOT LEFT (MIN 3 VIEWS)    Result Date: 5/13/2021  EXAMINATION: THREE XRAY VIEWS OF THE LEFT FOOT 5/13/2021 6:29 pm COMPARISON: 04/28/2021 HISTORY: ORDERING SYSTEM PROVIDED HISTORY: left foot ulcer and pain TECHNOLOGIST PROVIDED HISTORY: Reason for exam:->left foot ulcer and pain Reason for Exam: left foot ulcer and pain Acuity: Acute Type of Exam: Initial Additional signs and symptoms: na Relevant Medical/Surgical History: hypertension FINDINGS: Partial amputations of the 2nd and 3rd toes noted. 1st distal phalanx amputated. Stable alignment. No erosive changes identified.      No erosive changes identified     XR FOOT RIGHT (MIN 3 VIEWS)    Result Date: 6/1/2021  EXAMINATION: THREE XRAY VIEWS OF THE RIGHT FOOT 5/30/2021 12:28 pm COMPARISON: 05/12/2021 HISTORY: ORDERING SYSTEM PROVIDED HISTORY: Chronic wound to right foot, worsening pain TECHNOLOGIST PROVIDED HISTORY: Reason for exam:->Chronic wound to right foot, worsening pain Reason for Exam: Chronic wound to right foot, worsening pain Acuity: Chronic Type of Exam: Initial FINDINGS: Midfoot alignment is within normal limits. There has been prior amputation at the 1st IP joint. A plantar wound is suspect at the forefoot. No bony destruction or soft tissue gas is identified. Suspected plantar forefoot wound. No soft tissue gas or bony destruction. Prior great toe amputation at the IP joint. XR FOOT RIGHT (MIN 3 VIEWS)    Result Date: 5/12/2021  EXAMINATION: THREE XRAY VIEWS OF THE RIGHT FOOT 5/12/2021 12:43 am COMPARISON: MRI on 04/30/2021, radiographs on 04/28/2021 HISTORY: Increased pain and drainage from right foot for 1-2 days. FINDINGS: Status post amputation of the distal phalanx of the great toe. No new erosive changes seen. Diffuse soft tissue swelling has decreased since 04/28/2021. On the lateral view, ulceration is again noted distally. No radiographic evidence for osteomyelitis. Soft tissue swelling has decreased from 04/28/2021. MDM:     Interventions given this visit: No orders of the defined types were placed in this encounter. A well-appearing young man nontoxic appearing. Presents today with chronic wound of the foot. There is no superimposed infection noted. He does appear to have a bit of hyper saturation of the foot on the right. Left foot is unremarkable chronic wound noted. However once again no superimposed infection. Wound care is performed here. Patient did have wet socks on. We did dry his feet. Advised him with new socks. He is educated to come here in 2 days for wound check. He is also educated to avoid moisture on his feet. As he is high risk for reoccurrence of trench foot. Educated that even if his toes get wet and he has no further socks he cannot drive socks to come here for wound check. I have discussed the findings of today's workup with the patient and present family members and have addressed their questions and concerns.  Important warning signs as well as new or worsening symptoms which would necessitate immediate return to the ED were discussed. The plan is to discharge from the ED at this time, and the patient is in stable condition. The patient acknowledged understanding is agreeable with this plan. The patient and/or family and I have discussed the diagnosis and risks, and we agree with discharging home to follow-up with their primary care, specialist or referral doctor. Questions addressed. Disposition and follow-up agreed upon. Specific discharge instructions explained. We have discussed the symptoms which are most concerning that necessitate immediate return. We also discussed returning to the Emergency Department immediately if new or worsening symptoms occur. I independently managed patient today in the ED    /85   Pulse 84   Temp 97.6 °F (36.4 °C) (Oral)   Resp 18   Ht 6' (1.829 m)   Wt 178 lb (80.7 kg)   SpO2 96%   BMI 24.14 kg/m²       Clinical Impression:  1. Pressure injury of dorsum of right foot, stage 2 ([address]        Disposition referral (if applicable):  [facility] Emergency Department  [address] [place] [address]  [phone]  In 2 days      Disposition medications (if applicable):  New Prescriptions    No medications on file         Comment: Please note this report has been produced using speech recognition software and may contain errors related to that system including errors in grammar, punctuation, and spelling, as well as words and phrases that may be inappropriate. If there are any questions or concerns please feel free to contact the dictating provider for clarification.       Jas Jameson, [address] [place] [address]  06/09/21 0576

## 2021-06-09 NOTE — ED NOTES
Pt spo2 89@ at rest. Pt placed on 2L o2 via NC. spo2 back up to 97%.  RABIA Lombardo at bedside     Chepe Abbasi RN  06/09/21 0787

## 2021-06-09 NOTE — ED NOTES
Reviewed discharge information. Patient verbalized understanding of paperwork, medication, and care instructions. Patient denied any questions.      Adrian Wood RN  06/09/21 [id number]

## 2021-06-09 NOTE — ED TRIAGE NOTES
Pt presents to the ED via EMS for wound check. Has 3 toes on both feet amputated, been walking around in the rain and wants them checked. Is homeless with no place to go. Rates pain 10/10.

## 2021-06-16 ENCOUNTER — TELEPHONE (OUTPATIENT)
Dept: BARIATRICS/WEIGHT MGMT | Age: 46
End: 2021-06-16

## 2021-06-16 NOTE — TELEPHONE ENCOUNTER
[facility] called to inform us that the pt is homeless and they are working on trying to place the pt in  A ecf for wound care - pt keeps cx'ing wound care appt's and is removing the wound  Bandage after the appt.      [facility] is afar[facility] the pt will end back in the e.d. - just wanted to inform the

## 2021-06-18 ENCOUNTER — HOSPITAL ENCOUNTER (OUTPATIENT)
Dept: WOUND CARE | Age: 46
Discharge: HOME | End: 2021-06-18
Payer: MEDICARE

## 2021-06-18 VITALS
DIASTOLIC BLOOD PRESSURE: 113 MMHG | HEART RATE: 93 BPM | RESPIRATION RATE: 20 BRPM | TEMPERATURE: 97.3 F | SYSTOLIC BLOOD PRESSURE: 179 MMHG

## 2021-06-18 DIAGNOSIS — L89.893 PRESSURE ULCER OF RIGHT FOOT, STAGE 3 (HCC): Primary | ICD-10-CM

## 2021-06-18 DIAGNOSIS — L89.893 PRESSURE ULCER OF TOE OF RIGHT FOOT, STAGE 3 (HCC): ICD-10-CM

## 2021-06-18 PROCEDURE — 99203 OFFICE O/P NEW LOW 30 MIN: CPT | Performed by: NURSE PRACTITIONER

## 2021-06-18 PROCEDURE — 87075 CULTR BACTERIA EXCEPT BLOOD: CPT

## 2021-06-18 PROCEDURE — 87077 CULTURE AEROBIC IDENTIFY: CPT

## 2021-06-18 PROCEDURE — 11042 DBRDMT SUBQ TIS 1ST 20SQCM/<: CPT

## 2021-06-18 PROCEDURE — 87070 CULTURE OTHR SPECIMN AEROBIC: CPT

## 2021-06-18 PROCEDURE — 11042 DBRDMT SUBQ TIS 1ST 20SQCM/<: CPT | Performed by: NURSE PRACTITIONER

## 2021-06-18 PROCEDURE — 99213 OFFICE O/P EST LOW 20 MIN: CPT

## 2021-06-18 RX ORDER — GENTAMICIN SULFATE 1 MG/G
OINTMENT TOPICAL ONCE
Status: CANCELLED | OUTPATIENT
Start: 2021-06-18 | End: 2021-06-18

## 2021-06-18 RX ORDER — LIDOCAINE HYDROCHLORIDE 20 MG/ML
JELLY TOPICAL ONCE
Status: CANCELLED | OUTPATIENT
Start: 2021-06-18 | End: 2021-06-18

## 2021-06-18 RX ORDER — BACITRACIN, NEOMYCIN, POLYMYXIN B 400; 3.5; 5 [USP'U]/G; MG/G; [USP'U]/G
OINTMENT TOPICAL ONCE
Status: CANCELLED | OUTPATIENT
Start: 2021-06-18 | End: 2021-06-18

## 2021-06-18 RX ORDER — CLOBETASOL PROPIONATE 0.5 MG/G
OINTMENT TOPICAL ONCE
Status: CANCELLED | OUTPATIENT
Start: 2021-06-18 | End: 2021-06-18

## 2021-06-18 RX ORDER — BACITRACIN ZINC AND POLYMYXIN B SULFATE 500; 1000 [USP'U]/G; [USP'U]/G
OINTMENT TOPICAL ONCE
Status: CANCELLED | OUTPATIENT
Start: 2021-06-18 | End: 2021-06-18

## 2021-06-18 RX ORDER — BETAMETHASONE DIPROPIONATE 0.05 %
OINTMENT (GRAM) TOPICAL ONCE
Status: CANCELLED | OUTPATIENT
Start: 2021-06-18 | End: 2021-06-18

## 2021-06-18 RX ORDER — GENTAMICIN SULFATE 1 MG/G
OINTMENT TOPICAL
Qty: 30 G | Refills: 1 | Status: SHIPPED | OUTPATIENT
Start: 2021-06-18 | End: 2021-06-25

## 2021-06-18 RX ORDER — GINSENG 100 MG
CAPSULE ORAL ONCE
Status: CANCELLED | OUTPATIENT
Start: 2021-06-18 | End: 2021-06-18

## 2021-06-18 RX ORDER — OXYCODONE HYDROCHLORIDE AND ACETAMINOPHEN 5; 325 MG/1; MG/1
1 TABLET ORAL EVERY 8 HOURS PRN
COMMUNITY
End: 2021-09-14

## 2021-06-18 RX ORDER — LIDOCAINE 40 MG/G
CREAM TOPICAL ONCE
Status: CANCELLED | OUTPATIENT
Start: 2021-06-18 | End: 2021-06-18

## 2021-06-18 RX ORDER — LIDOCAINE HYDROCHLORIDE 40 MG/ML
SOLUTION TOPICAL ONCE
Status: CANCELLED | OUTPATIENT
Start: 2021-06-18 | End: 2021-06-18

## 2021-06-18 RX ORDER — LIDOCAINE 50 MG/G
OINTMENT TOPICAL ONCE
Status: CANCELLED | OUTPATIENT
Start: 2021-06-18 | End: 2021-06-18

## 2021-06-18 ASSESSMENT — PAIN DESCRIPTION - DESCRIPTORS: DESCRIPTORS: SHARP

## 2021-06-18 ASSESSMENT — PAIN SCALES - GENERAL: PAINLEVEL_OUTOF10: 8

## 2021-06-18 ASSESSMENT — PAIN DESCRIPTION - ORIENTATION: ORIENTATION: RIGHT

## 2021-06-18 ASSESSMENT — PAIN DESCRIPTION - LOCATION: LOCATION: FOOT

## 2021-06-18 ASSESSMENT — PAIN DESCRIPTION - PAIN TYPE: TYPE: CHRONIC PAIN

## 2021-06-18 NOTE — PROGRESS NOTES
[facility] Initial Visit      Ever Bhatt  AGE: 50 y.o. GENDER: male  : 1975  EPISODE DATE:  2021   Referred by: ER     Subjective:     CHIEF COMPLAINT: Pressure ulcer to right plantar surface and right great toe     HISTORY of PRESENT ILLNESS      Ever Bhatt is a 50 y.o. male who presents to the [address] [place] [address] for an initial visit for evaluation and treatment of Chronic pressure and non-healing surgical  ulcer(s) of  Rt. foot plantar surface and right great toe. The condition is of moderate severity. The ulcer has been present since last December, when he had amputations to the right and left toes due to frost bite. The underlying cause is thought to be pressure, along with poor hygiene and moisture control. The patients care to date has included frequent trips to the ER, and follow up with surgery ([facility]). The patient has significant underlying medical conditions as below. Patient is not a smoker or a diabetic. He is not on blood thinners. Patient is homeless but bounces between shelters and [facility]. It is unclear how compliant he can be with getting supplies, picking up meds, and changing dressing     Wound Pain Timing/Severity: none  Quality of pain: N/A  Severity of pain:  0 / 10   Modifying Factors: chronic pressure and poor hygiene  Associated Signs/Symptoms: edema, erythema and drainage        PAST MEDICAL HISTORY        Diagnosis Date    Hypertension     does not take medications       PAST SURGICAL HISTORY    Past Surgical History:   Procedure Laterality Date    FOOT DEBRIDEMENT Bilateral 2021    BILATERAL TOE  DEBRIDEMENT INCISION AND DRAINAGE, AMPUTATION OF LEFT 1ST, 2ND, 3RD AND TIP OF 4TH TOE AND RIGHT GREAT TOE performed by Sonal Sylvester MD at [address] [place] [address]    History reviewed. No pertinent family history.     SOCIAL HISTORY    Social History     Tobacco Use    Smoking status: Former Smoker     Quit date: 2021     Years since quittin.4    Smokeless tobacco: Never Used   Vaping Use    Vaping Use: Never used   Substance Use Topics    Alcohol use: Yes     Comment: most days    Drug use: No       ALLERGIES    No Known Allergies    MEDICATIONS    Current Outpatient Medications on File Prior to Encounter   Medication Sig Dispense Refill    oxyCODONE-acetaminophen (PERCOCET) 5-325 MG per tablet Take 1 tablet by mouth every 8 hours as needed for Pain.  metoprolol tartrate (LOPRESSOR) 25 MG tablet Take 1 tablet by mouth 2 times daily 60 tablet 3    omeprazole (PRILOSEC) 20 MG delayed release capsule Take 20 mg by mouth daily      lisinopril (PRINIVIL;ZESTRIL) 20 MG tablet Take 20 mg by mouth daily      amLODIPine (NORVASC) 5 MG tablet Take 1 tablet by mouth daily 30 tablet 3    gabapentin (NEURONTIN) 100 MG capsule Take 1 capsule by mouth 3 times daily for 20 days. 60 capsule 0     No current facility-administered medications on file prior to encounter.        PROBLEM LIST    Patient Active Problem List   Diagnosis    Rhabdomyolysis    Frostbite    Wound infection after surgery    Right foot pain    Foot infection    Cellulitis of right lower extremity    Homelessness    WD-Pressure ulcer of right foot, stage 3 (HCC)    WD-Pressure ulcer of toe of right foot, stage 3 (HCC)       REVIEW OF SYSTEMS    Constitutional: negative for anorexia, chills, fatigue, fevers, malaise, sweats and weight loss  Respiratory: negative for pleurisy/chest pain, pneumonia, shortness of breath, sputum, stridor and wheezing  Cardiovascular: negative for near-syncope, orthopnea, palpitations, paroxysmal nocturnal dyspnea, syncope and tachypnea  Integument/breast: positive for skin lesion(s)      Objective:      BP (!) 179/113   Pulse 93   Temp 97.3 °F (36.3 °C) (Temporal)   Resp 20     PHYSICAL EXAM  General Appearance: alert and oriented to person, place and time, well-developed and well-nourished, in no acute distress and alert and oriented to person, place and time  Pulmonary/Chest: clear to auscultation bilaterally- no wheezes, rales or rhonchi, normal air movement, no respiratory distress and no chest wall tenderness  Cardiovascular: normal rate, normal S1 and S2, no gallops, intact distal pulses and no carotid bruits  Dermatologic exam: Visual inspection of the periwound reveals the skin to be moist, clammy and scaly  Wound exam: see wound description below in procedure note      Assessment:       Mine Leon  appears to have a non-healing wound of the right planatar surface and right great toe. The etiology of the wound is felt to be pressure. There are multiple complicating factors including chronic pressure and poor hygiene. A comprehensive wound management program would be helpful to heal this wound. Assessments completed include fall risk and nutritional, functional,and psychological status. At this time appropriate care would include: periodic debridement and wound care as below. Problem List Items Addressed This Visit     WD-Pressure ulcer of right foot, stage 3 ([address] - Primary    Relevant Orders    Culture, Wound    WD-Pressure ulcer of toe of right foot, stage 3 ([facility])    Relevant Orders    Culture, Wound            Procedure Note    Indications:  Based on my examination of this patient's wound(s) today, sharp excision into necrotic subcutaneous tissue is required to promote healing and evaluate the extent of previous healing. Performed by: GEN Bonilla - GONZALEZ    Consent obtained: Yes    Time out taken:  Yes    Pain Control: N/A       Debridement:Excisional Debridement    Using curette the wound(s) was/were sharply debrided down through and including the removal of subcutaneous tissue.         Devitalized Tissue Debrided:  fibrin, biofilm, slough, exudate and callus    Pre Debridement Measurements:  Are located in the Wound Documentation Flow Sheet    All active wounds listed below with today's date are evaluated  Wound(s) debrided this date include # : 1 and 3     Post  Debridement Measurements:  Wound 06/18/21 Right;Plantar foot (Active)   Wound Cleansed Soap and water 06/18/21 1128   Wound Length (cm) 3.5 cm 06/18/21 1128   Wound Width (cm) 5 cm 06/18/21 1128   Wound Depth (cm) 0.1 cm 06/18/21 1128   Wound Surface Area (cm^2) 17.5 cm^2 06/18/21 1128   Wound Volume (cm^3) 1.75 cm^3 06/18/21 1128   Distance Tunneling (cm) 0 cm 06/18/21 1128   Tunneling Position ___ O'Clock 0 06/18/21 1128   Undermining Starts ___ O'Clock 0 06/18/21 1128   Undermining Ends___ O'Clock 0 06/18/21 1128   Undermining Maxium Distance (cm) 0 06/18/21 1128   Wound Assessment Pink/red 06/18/21 1128   Drainage Amount Moderate 06/18/21 1128   Drainage Description Serosanguinous 06/18/21 1128   Odor Mild 06/18/21 1128   Magi-wound Assessment Maceration 06/18/21 1128   Margins Defined edges 06/18/21 1128   Wound Thickness Description not for Pressure Injury Full thickness 06/18/21 1128   Number of days: 0       Wound 06/18/21 Ankle Right;Medial cluster  (Active)   Wound Image   06/18/21 1128   Wound Cleansed Soap and water 06/18/21 1128   Wound Length (cm) 1.7 cm 06/18/21 1128   Wound Width (cm) 0.4 cm 06/18/21 1128   Wound Depth (cm) 0.1 cm 06/18/21 1128   Wound Surface Area (cm^2) 0.68 cm^2 06/18/21 1128   Wound Volume (cm^3) 0.07 cm^3 06/18/21 1128   Distance Tunneling (cm) 0 cm 06/18/21 1128   Tunneling Position ___ O'Clock 0 06/18/21 1128   Undermining Starts ___ O'Clock 0 06/18/21 1128   Undermining Ends___ O'Clock 0 06/18/21 1128   Undermining Maxium Distance (cm) 0 06/18/21 1128   Wound Assessment Pink/red 06/18/21 1128   Drainage Amount Moderate 06/18/21 1128   Drainage Description Serosanguinous 06/18/21 1128   Odor Moderate 06/18/21 1128   Magi-wound Assessment Fragile 06/18/21 1128   Margins Defined edges 06/18/21 1128   Wound Thickness Description not for Pressure Injury Full thickness 06/18/21 1128   Number of days: 0       Wound 06/18/21 Toe (Comment  which one) Right right great toe (Active)   Wound Image   06/18/21 1128   Wound Cleansed Soap and water 06/18/21 1128   Wound Length (cm) 0.4 cm 06/18/21 1128   Wound Width (cm) 0.3 cm 06/18/21 1128   Wound Depth (cm) 0.1 cm 06/18/21 1128   Wound Surface Area (cm^2) 0.12 cm^2 06/18/21 1128   Wound Volume (cm^3) 0.01 cm^3 06/18/21 1128   Tunneling Position ___ O'Clock 0 06/18/21 1128   Undermining Starts ___ O'Clock 0 06/18/21 1128   Undermining Ends___ O'Clock 0 06/18/21 1128   Undermining Maxium Distance (cm) 0 06/18/21 1128   Wound Assessment Pink/red 06/18/21 1128   Drainage Amount Moderate 06/18/21 1128   Drainage Description Serosanguinous 06/18/21 1128   Odor Moderate 06/18/21 1128   Magi-wound Assessment Hyperpigmented 06/18/21 1128   Margins Defined edges 06/18/21 1128   Wound Thickness Description not for Pressure Injury Full thickness 06/18/21 1128   Number of days: 0       Percent of Wound(s) Debrided: 100%    Total  Area  Debrided:  17.62 sq cm     Bleeding:  Minimal    Hemostasis Achieved:  by pressure    Procedural Pain:  0  / 10     Post Procedural Pain:  0 / 10     Response to treatment:  Well tolerated by patient. We will do our best to help patient be successful with healing. He does have an address at a shelter, so we will try to order supplies. Patient also told us what pharmacy is the closest to his shelter, and we sent his rxs there at this time . He was given instruction on dressing changes. We anticipate a problem compliance and general hygiene, as patient is homeless. We will evaluate next week and see how everything went and we will weigh our options going forward at that time. We will wrap patient with a coban to help stay In place and keep clean while patient gathers supplies and medications   Culture drawn and sent, we will review when resulted.        Plan:     Discharge Instructions       PHYSICIAN ORDERS AND DISCHARGE INSTRUCTIONS    NOTE: Upon discharge from the Wound Center, you will receive a patient experience survey. We would be grateful if you would take the time to fill this survey out. Wound care order history:     [facility]   Arterial studies on 12/26/2021   Cultures: Obtained 6/18/2021     Grafts:     Antibiotics:        Continuing wound care orders and information:                Residence:                Continue home health care with:    Your wound-care supplies will be provided by: Wound cleansing:     Do not scrub or use excessive force. Wash hands with soap and water before and after dressing changes. Prior to applying a clean dressing, cleanse wound with normal saline, wound cleanser, or mild soap and water. Ask the physician or nurse before getting the wound(s) wet in a shower    Daily Wound management:   Keep weight off wounds and reposition every 2 hours. Avoid standing for long periods of time. Apply wraps/stockings in AM and remove at bedtime. If swelling is present, elevate legs to the level of the heart or above for 30 minutes 4-5 times a day and/or when sitting. When taking antibiotics take entire prescription as ordered by physician do not stop taking until medicine is all gone. Orders for this week: 6/18/21       Right Foot - wash with soap and water, pat dry. Apply Gentamycin  and Bactroban Cover with lb and sofsorb . Wrap with coban 2 lite  . Change with next visit    Auth for grafts on 6/18/2021    Follow up with Dr Maryann Joshi in 1 week in the wound care center  Call [phone] for any questions or concerns.   Date__________   Time____________          Treatment Note      Written Patient Dismissal Instructions Given          Electronically signed by GEN Avilez CNP on 6/18/2021 at 12:10 PM

## 2021-06-23 LAB
CULTURE: ABNORMAL
CULTURE: ABNORMAL
Lab: ABNORMAL
SPECIMEN: ABNORMAL

## 2021-06-25 ENCOUNTER — HOSPITAL ENCOUNTER (OUTPATIENT)
Dept: WOUND CARE | Age: 46
Discharge: HOME | End: 2021-06-25
Payer: MEDICARE

## 2021-06-25 VITALS
HEART RATE: 97 BPM | RESPIRATION RATE: 16 BRPM | DIASTOLIC BLOOD PRESSURE: 77 MMHG | TEMPERATURE: 97.7 F | SYSTOLIC BLOOD PRESSURE: 120 MMHG

## 2021-06-25 DIAGNOSIS — L89.893 PRESSURE ULCER OF RIGHT FOOT, STAGE 3 (HCC): Primary | ICD-10-CM

## 2021-06-25 DIAGNOSIS — L89.893 PRESSURE ULCER OF TOE OF RIGHT FOOT, STAGE 3 (HCC): ICD-10-CM

## 2021-06-25 PROCEDURE — 11042 DBRDMT SUBQ TIS 1ST 20SQCM/<: CPT

## 2021-06-25 PROCEDURE — 11042 DBRDMT SUBQ TIS 1ST 20SQCM/<: CPT | Performed by: NURSE PRACTITIONER

## 2021-06-25 RX ORDER — LIDOCAINE 50 MG/G
OINTMENT TOPICAL ONCE
Status: DISCONTINUED | OUTPATIENT
Start: 2021-06-25 | End: 2021-06-26 | Stop reason: HOSPADM

## 2021-06-25 RX ORDER — CLOBETASOL PROPIONATE 0.5 MG/G
OINTMENT TOPICAL ONCE
Status: CANCELLED | OUTPATIENT
Start: 2021-06-25 | End: 2021-06-25

## 2021-06-25 RX ORDER — LIDOCAINE HYDROCHLORIDE 20 MG/ML
JELLY TOPICAL ONCE
Status: CANCELLED | OUTPATIENT
Start: 2021-06-25 | End: 2021-06-25

## 2021-06-25 RX ORDER — LIDOCAINE 50 MG/G
OINTMENT TOPICAL ONCE
Status: CANCELLED | OUTPATIENT
Start: 2021-06-25 | End: 2021-06-25

## 2021-06-25 RX ORDER — GINSENG 100 MG
CAPSULE ORAL ONCE
Status: CANCELLED | OUTPATIENT
Start: 2021-06-25 | End: 2021-06-25

## 2021-06-25 RX ORDER — GENTAMICIN SULFATE 1 MG/G
OINTMENT TOPICAL ONCE
Status: CANCELLED | OUTPATIENT
Start: 2021-06-25 | End: 2021-06-25

## 2021-06-25 RX ORDER — BETAMETHASONE DIPROPIONATE 0.05 %
OINTMENT (GRAM) TOPICAL ONCE
Status: CANCELLED | OUTPATIENT
Start: 2021-06-25 | End: 2021-06-25

## 2021-06-25 RX ORDER — BACITRACIN, NEOMYCIN, POLYMYXIN B 400; 3.5; 5 [USP'U]/G; MG/G; [USP'U]/G
OINTMENT TOPICAL ONCE
Status: CANCELLED | OUTPATIENT
Start: 2021-06-25 | End: 2021-06-25

## 2021-06-25 RX ORDER — GENTAMICIN SULFATE 1 MG/G
OINTMENT TOPICAL ONCE
Status: DISCONTINUED | OUTPATIENT
Start: 2021-06-25 | End: 2021-06-26 | Stop reason: HOSPADM

## 2021-06-25 RX ORDER — LIDOCAINE 40 MG/G
CREAM TOPICAL ONCE
Status: CANCELLED | OUTPATIENT
Start: 2021-06-25 | End: 2021-06-25

## 2021-06-25 RX ORDER — BACITRACIN ZINC AND POLYMYXIN B SULFATE 500; 1000 [USP'U]/G; [USP'U]/G
OINTMENT TOPICAL ONCE
Status: CANCELLED | OUTPATIENT
Start: 2021-06-25 | End: 2021-06-25

## 2021-06-25 RX ORDER — LIDOCAINE HYDROCHLORIDE 40 MG/ML
SOLUTION TOPICAL ONCE
Status: CANCELLED | OUTPATIENT
Start: 2021-06-25 | End: 2021-06-25

## 2021-06-25 ASSESSMENT — PAIN DESCRIPTION - PAIN TYPE: TYPE: CHRONIC PAIN

## 2021-06-25 ASSESSMENT — PAIN DESCRIPTION - FREQUENCY: FREQUENCY: CONTINUOUS

## 2021-06-25 ASSESSMENT — PAIN DESCRIPTION - DESCRIPTORS: DESCRIPTORS: SHARP;SHOOTING

## 2021-06-25 ASSESSMENT — PAIN DESCRIPTION - LOCATION: LOCATION: LEG

## 2021-06-25 ASSESSMENT — PAIN - FUNCTIONAL ASSESSMENT: PAIN_FUNCTIONAL_ASSESSMENT: PREVENTS OR INTERFERES SOME ACTIVE ACTIVITIES AND ADLS

## 2021-06-25 ASSESSMENT — PAIN DESCRIPTION - ORIENTATION: ORIENTATION: RIGHT

## 2021-06-25 ASSESSMENT — PAIN SCALES - GENERAL: PAINLEVEL_OUTOF10: 10

## 2021-06-25 ASSESSMENT — PAIN DESCRIPTION - ONSET: ONSET: ON-GOING

## 2021-06-25 ASSESSMENT — PAIN DESCRIPTION - PROGRESSION: CLINICAL_PROGRESSION: GRADUALLY WORSENING

## 2021-06-25 NOTE — PROGRESS NOTES
Wound Care Center Progress Note With Procedure    Seymour Saenz  AGE: 50 y.o. GENDER: male  : 1975  EPISODE DATE:  2021     Subjective:     Chief Complaint   Patient presents with    Wound Check     BLE         HISTORY of PRESENT ILLNESS      Seymour Saenz is a 50 y.o. male who presents to the [address] [place] [address] for an initial visit for evaluation and treatment of Chronic pressure and non-healing surgical  ulcer(s) of  Rt. foot plantar surface and right great toe. The condition is of moderate severity. The ulcer has been present since last December, when he had amputations to the right and left toes due to frost bite. The underlying cause is thought to be pressure, along with poor hygiene and moisture control. The patients care to date has included frequent trips to the ER, and follow up with surgery ([facility]). The patient has significant underlying medical conditions as below. Patient states that he received wound supplies and picked up his antibiotics ointment. We states he has been changing dressing on his own. He states he was written an antibiotic by [facility] [place], and that he is going to pick this up today.      Wound Pain Timing/Severity: none  Quality of pain: N/A  Severity of pain:  0 / 10   Modifying Factors: chronic pressure and poor hygiene  Associated Signs/Symptoms: edema, erythema and drainage        PAST MEDICAL HISTORY        Diagnosis Date    Hypertension     does not take medications       PAST SURGICAL HISTORY    Past Surgical History:   Procedure Laterality Date    FOOT DEBRIDEMENT Bilateral 2021    BILATERAL TOE  DEBRIDEMENT INCISION AND DRAINAGE, AMPUTATION OF LEFT 1ST, 2ND, 3RD AND TIP OF 4TH TOE AND RIGHT GREAT TOE performed by Renetta Joe MD at [address] [place] [address]    History reviewed. No pertinent family history.     SOCIAL HISTORY    Social History     Tobacco Use    Smoking status: Former Smoker     Quit date: 2021     Years since quittin.4    Smokeless tobacco: Never Used   Vaping Use    Vaping Use: Never used   Substance Use Topics    Alcohol use: Yes     Comment: most days    Drug use: No       ALLERGIES    No Known Allergies    MEDICATIONS    Current Outpatient Medications on File Prior to Encounter   Medication Sig Dispense Refill    oxyCODONE-acetaminophen (PERCOCET) 5-325 MG per tablet Take 1 tablet by mouth every 8 hours as needed for Pain.  mupirocin (BACTROBAN) 2 % ointment Apply to wound bed with dressing changes 30 g 1    gentamicin (GARAMYCIN) 0.1 % ointment Apply to wound bed with dressing changes 30 g 1    metoprolol tartrate (LOPRESSOR) 25 MG tablet Take 1 tablet by mouth 2 times daily 60 tablet 3    omeprazole (PRILOSEC) 20 MG delayed release capsule Take 20 mg by mouth daily      lisinopril (PRINIVIL;ZESTRIL) 20 MG tablet Take 20 mg by mouth daily      amLODIPine (NORVASC) 5 MG tablet Take 1 tablet by mouth daily 30 tablet 3    gabapentin (NEURONTIN) 100 MG capsule Take 1 capsule by mouth 3 times daily for 20 days. 60 capsule 0     No current facility-administered medications on file prior to encounter. REVIEW OF SYSTEMS    Pertinent items are noted in HPI. Constitutional: Negative for systemic symptoms including fever, chills and malaise. Objective:      /77   Pulse 97   Temp 97.7 °F (36.5 °C) (Temporal)   Resp 16     PHYSICAL EXAM      General: The patient is in no acute distress. Mental status:  Patient is appropriate, is  oriented to place and plan of care.   Dermatologic exam: Visual inspection of the periwound reveals the skin to be moist and clammy  Wound exam: see wound description below in procedure note      Assessment:     Problem List Items Addressed This Visit     WD-Pressure ulcer of right foot, stage 3 (HCC) - Primary    Relevant Medications    lidocaine (XYLOCAINE) 5 % ointment    gentamicin (GARAMYCIN) 0.1 % ointment (Start on 2021 11:00 AM)    mupirocin (BACTROBAN) 2 % ointment (Start on 6/25/2021 11:00 AM)    Other Relevant Orders    Initiate Outpatient Wound Care Protocol    WD-Pressure ulcer of toe of right foot, stage 3 (HCC)    Relevant Medications    lidocaine (XYLOCAINE) 5 % ointment    gentamicin (GARAMYCIN) 0.1 % ointment (Start on 6/25/2021 11:00 AM)    mupirocin (BACTROBAN) 2 % ointment (Start on 6/25/2021 11:00 AM)    Other Relevant Orders    Initiate Outpatient Wound Care Protocol        Procedure Note    Indications:  Based on my examination of this patient's wound(s) today, sharp excision into necrotic subcutaneous tissue is required to promote healing and evaluate the extent of previous healing. Performed by: GEN Simpson CNP    Consent obtained: Yes    Time out taken:  Yes    Pain Control: Anesthetic  Anesthetic: 5% Lidocaine Ointment Topical     Debridement:Excisional Debridement    Using curette the wound(s) was/were sharply debrided down through and including the removal of subcutaneous tissue.         Devitalized Tissue Debrided:  fibrin, biofilm, slough, necrotic/eschar, exudate and callus    Pre Debridement Measurements:  Are located in the Wound Documentation Flow Sheet    All active wounds listed below with today's date are evaluated  Wound(s)    debrided this date include # : 1 and 2     Post  Debridement Measurements:  Wound 06/18/21 Right;Plantar Right Plantar (Active)   Wound Image   06/25/21 1004   Wound Cleansed Soap and water;Irrigated with saline 06/25/21 1004   Offloading for Diabetic Foot Ulcers Forefoot offloading shoe 06/25/21 1004   Wound Length (cm) 3.5 cm 06/25/21 1004   Wound Width (cm) 4.9 cm 06/25/21 1004   Wound Depth (cm) 0.1 cm 06/25/21 1004   Wound Surface Area (cm^2) 17.15 cm^2 06/25/21 1004   Change in Wound Size % (l*w) 2 06/25/21 1004   Wound Volume (cm^3) 1.72 cm^3 06/25/21 1004   Wound Healing % 2 06/25/21 1004   Post-Procedure Length (cm) 3.5 cm 06/25/21 1034   Post-Procedure Width (cm) 4.9 cm 06/25/21 1034   Post-Procedure Depth (cm) 0.1 cm 06/25/21 1034   Post-Procedure Surface Area (cm^2) 17.15 cm^2 06/25/21 1034   Post-Procedure Volume (cm^3) 1.72 cm^3 06/25/21 1034   Distance Tunneling (cm) 0 cm 06/25/21 1004   Tunneling Position ___ O'Clock 0 06/25/21 1004   Undermining Starts ___ O'Clock 0 06/25/21 1004   Undermining Ends___ O'Clock 0 06/25/21 1004   Undermining Maxium Distance (cm) 0 06/25/21 1004   Wound Assessment Granulation tissue 06/25/21 1004   Drainage Amount Copious 06/25/21 1004   Drainage Description Serosanguinous 06/25/21 1004   Odor Moderate 06/25/21 1004   Magi-wound Assessment Hyperkeratosis (callous); Maceration 06/25/21 1004   Margins Defined edges; Unattached edges 06/25/21 1004   Wound Thickness Description not for Pressure Injury Full thickness 06/25/21 1004   Number of days: 6       Wound 06/18/21 Toe (Comment  which one) Right Right Great Toe (Active)   Wound Image   06/25/21 1004   Wound Cleansed Soap and water;Irrigated with saline 06/25/21 1004   Offloading for Diabetic Foot Ulcers No 06/25/21 1004   Wound Length (cm) 1 cm 06/25/21 1004   Wound Width (cm) 1 cm 06/25/21 1004   Wound Depth (cm) 0.1 cm 06/25/21 1004   Wound Surface Area (cm^2) 1 cm^2 06/25/21 1004   Change in Wound Size % (l*w) -733.33 06/25/21 1004   Wound Volume (cm^3) 0.1 cm^3 06/25/21 1004   Wound Healing % -900 06/25/21 1004   Post-Procedure Length (cm) 1 cm 06/25/21 1034   Post-Procedure Width (cm) 1 cm 06/25/21 1034   Post-Procedure Depth (cm) 0.1 cm 06/25/21 1034   Post-Procedure Surface Area (cm^2) 1 cm^2 06/25/21 1034   Post-Procedure Volume (cm^3) 0.1 cm^3 06/25/21 1034   Distance Tunneling (cm) 0 cm 06/25/21 1004   Tunneling Position ___ O'Clock 0 06/25/21 1004   Undermining Starts ___ O'Clock 0 06/25/21 1004   Undermining Ends___ O'Clock 0 06/25/21 1004   Undermining Maxium Distance (cm) 0 06/25/21 1004   Wound Assessment Granulation tissue 06/25/21 1004   Drainage Amount Large 06/25/21 1004 Drainage Description Serosanguinous 06/25/21 1004   Odor Moderate 06/25/21 1004   Magi-wound Assessment Hyperkeratosis (callous); Maceration 06/25/21 1004   Margins Defined edges; Unattached edges 06/25/21 1004   Wound Thickness Description not for Pressure Injury Full thickness 06/25/21 1004   Number of days: 6       Percent of Wound(s) Debrided: approximately 100%    Total  Area  Debrided:  18.15 sq cm     Bleeding:  Minimal    Hemostasis Achieved:  by pressure    Procedural Pain:  3  / 10     Post Procedural Pain:  1 / 10     Response to treatment:  Well tolerated by patient. Status of wound progress and description from last visit:   Stable. Patient is homeless, and it is unclear where he goes or how he takes care of wound when he is not in clinic. There was a bad odor this week, and RN states that his dressing and socks were saturated and soaked with drainage and dirt. We will monitor closely for infection and try to make sure patient is keeping wound clean and dry as well as he can. We will provide as much resources as we can, and provide education at each visit      Plan:       Discharge Instructions       [address]     NOTE: Upon discharge from the [address], you will receive a patient experience survey. We would be grateful if you would take the time to fill this survey out.     Wound care order history:                 [facility]   Arterial studies on 12/26/2021              Cultures: Obtained 6/18/2021                Grafts:                Antibiotics:           Continuing wound care orders and information:                 Residence:                Continue home health care with:               Your wound-care supplies will be provided by:      Wound cleansing:               Do not scrub or use excessive force. Wash hands with soap and water before and after dressing changes.               Prior to applying a clean dressing, cleanse wound with normal saline, wound cleanser, or mild soap and water. Ask the physician or nurse before getting the wound(s) wet in a shower     Daily Wound management:              Keep weight off wounds and reposition every 2 hours. Avoid standing for long periods of time. Apply wraps/stockings in AM and remove at bedtime. If swelling is present, elevate legs to the level of the heart or above for 30 minutes 4-5 times a day and/or when sitting. When taking antibiotics take entire prescription as ordered by physician do not stop taking until medicine is all gone.                                                           Orders for this week: 6/25/21    Rx: [facility]                  Right Foot - wash with soap and water, pat dry. Apply Gentamicin and Bactroban, cover with lb and sofsorb. Wrap with coban 2 lite. Change with next visit.     Auth for grafts on 6/18/2021     Follow up with Dr Madison Bourne in 1 week in the wound care center  Call [phone] for any questions or concerns.   Date__________   Time____________        Treatment Note Wound 06/18/21 Right;Plantar Right Plantar-Dressing/Treatment:  (gentamicin bactroban lb sofsorb coban 2 lite)  Wound 06/18/21 Toe (Comment  which one) Right Right Great Toe-Dressing/Treatment:  (gentamicin bactroban lb sofsorb coban 2 lite)    Written Patient Dismissal Instructions Given            Electronically signed by GEN Alcala CNP on 6/25/2021 at 10:57 AM

## 2021-06-25 NOTE — PROGRESS NOTES
Multilayer Compression Wrap   (Not Unna) Below the Knee    NAME:  Amie Still  YOB: 1975  MEDICAL RECORD NUMBER:  [id number]  DATE:  6/25/2021    Multilayer compression wrap: Removed old Multilayer wrap if indicated and wash leg with mild soap/water. Applied moisturizing agent to dry skin as needed. Applied primary and secondary dressing as ordered. Applied multilayered dressing below the knee to right lower leg. Instructed patient/caregiver not to remove dressing and to keep it clean and dry. Instructed patient/caregiver on complications to report to provider, such as pain, numbness in toes, heavy drainage, and slippage of dressing. Instructed patient on purpose of compression dressing and on activity and exercise recommendations.       Electronically signed by Randal Stiles LPN on 8/14/6801 at 49:87 AM

## 2021-07-02 ENCOUNTER — HOSPITAL ENCOUNTER (OUTPATIENT)
Dept: WOUND CARE | Age: 46
Discharge: HOME | End: 2021-07-02
Payer: MEDICARE

## 2021-07-02 VITALS
DIASTOLIC BLOOD PRESSURE: 91 MMHG | SYSTOLIC BLOOD PRESSURE: 130 MMHG | TEMPERATURE: 98.7 F | HEART RATE: 105 BPM | RESPIRATION RATE: 16 BRPM

## 2021-07-02 DIAGNOSIS — L89.893 PRESSURE ULCER OF TOE OF RIGHT FOOT, STAGE 3 (HCC): ICD-10-CM

## 2021-07-02 DIAGNOSIS — L89.893 PRESSURE ULCER OF RIGHT FOOT, STAGE 3 (HCC): Primary | ICD-10-CM

## 2021-07-02 PROCEDURE — 29581 APPL MULTLAYER CMPRN SYS LEG: CPT

## 2021-07-02 PROCEDURE — 11042 DBRDMT SUBQ TIS 1ST 20SQCM/<: CPT | Performed by: NURSE PRACTITIONER

## 2021-07-02 PROCEDURE — 11042 DBRDMT SUBQ TIS 1ST 20SQCM/<: CPT

## 2021-07-02 RX ORDER — GENTAMICIN SULFATE 1 MG/G
OINTMENT TOPICAL ONCE
Status: DISCONTINUED | OUTPATIENT
Start: 2021-07-02 | End: 2021-07-03 | Stop reason: HOSPADM

## 2021-07-02 RX ORDER — CLOBETASOL PROPIONATE 0.5 MG/G
OINTMENT TOPICAL ONCE
Status: CANCELLED | OUTPATIENT
Start: 2021-07-02 | End: 2021-07-02

## 2021-07-02 RX ORDER — LIDOCAINE 50 MG/G
OINTMENT TOPICAL ONCE
Status: CANCELLED | OUTPATIENT
Start: 2021-07-02 | End: 2021-07-02

## 2021-07-02 RX ORDER — LIDOCAINE HYDROCHLORIDE 20 MG/ML
JELLY TOPICAL ONCE
Status: CANCELLED | OUTPATIENT
Start: 2021-07-02 | End: 2021-07-02

## 2021-07-02 RX ORDER — GENTAMICIN SULFATE 1 MG/G
OINTMENT TOPICAL ONCE
Status: CANCELLED | OUTPATIENT
Start: 2021-07-02 | End: 2021-07-02

## 2021-07-02 RX ORDER — LIDOCAINE 40 MG/G
CREAM TOPICAL ONCE
Status: CANCELLED | OUTPATIENT
Start: 2021-07-02 | End: 2021-07-02

## 2021-07-02 RX ORDER — LIDOCAINE HYDROCHLORIDE 40 MG/ML
SOLUTION TOPICAL ONCE
Status: CANCELLED | OUTPATIENT
Start: 2021-07-02 | End: 2021-07-02

## 2021-07-02 RX ORDER — LIDOCAINE HYDROCHLORIDE 40 MG/ML
SOLUTION TOPICAL ONCE
Status: DISCONTINUED | OUTPATIENT
Start: 2021-07-02 | End: 2021-07-03 | Stop reason: HOSPADM

## 2021-07-02 RX ORDER — BETAMETHASONE DIPROPIONATE 0.05 %
OINTMENT (GRAM) TOPICAL ONCE
Status: CANCELLED | OUTPATIENT
Start: 2021-07-02 | End: 2021-07-02

## 2021-07-02 RX ORDER — BACITRACIN ZINC AND POLYMYXIN B SULFATE 500; 1000 [USP'U]/G; [USP'U]/G
OINTMENT TOPICAL ONCE
Status: CANCELLED | OUTPATIENT
Start: 2021-07-02 | End: 2021-07-02

## 2021-07-02 RX ORDER — GINSENG 100 MG
CAPSULE ORAL ONCE
Status: CANCELLED | OUTPATIENT
Start: 2021-07-02 | End: 2021-07-02

## 2021-07-02 RX ORDER — BACITRACIN, NEOMYCIN, POLYMYXIN B 400; 3.5; 5 [USP'U]/G; MG/G; [USP'U]/G
OINTMENT TOPICAL ONCE
Status: CANCELLED | OUTPATIENT
Start: 2021-07-02 | End: 2021-07-02

## 2021-07-02 ASSESSMENT — PAIN - FUNCTIONAL ASSESSMENT: PAIN_FUNCTIONAL_ASSESSMENT: PREVENTS OR INTERFERES SOME ACTIVE ACTIVITIES AND ADLS

## 2021-07-02 ASSESSMENT — PAIN DESCRIPTION - LOCATION: LOCATION: FOOT

## 2021-07-02 ASSESSMENT — PAIN DESCRIPTION - FREQUENCY: FREQUENCY: CONTINUOUS

## 2021-07-02 ASSESSMENT — PAIN DESCRIPTION - DESCRIPTORS: DESCRIPTORS: SHARP;SHOOTING

## 2021-07-02 ASSESSMENT — PAIN DESCRIPTION - ORIENTATION: ORIENTATION: RIGHT

## 2021-07-02 ASSESSMENT — PAIN DESCRIPTION - PAIN TYPE: TYPE: ACUTE PAIN

## 2021-07-02 ASSESSMENT — PAIN DESCRIPTION - PROGRESSION: CLINICAL_PROGRESSION: GRADUALLY IMPROVING

## 2021-07-02 ASSESSMENT — PAIN SCALES - GENERAL: PAINLEVEL_OUTOF10: 6

## 2021-07-02 ASSESSMENT — PAIN DESCRIPTION - ONSET: ONSET: ON-GOING

## 2021-07-02 NOTE — PROGRESS NOTES
Wound Care Center Progress Note With Procedure    Fabienne Ritchie  AGE: 50 y.o. GENDER: male  : 1975  EPISODE DATE:  2021     Subjective:     Chief Complaint   Patient presents with    Wound Check     bilateral feet          HISTORY of PRESENT ILLNESS     Kelvin Zhang is a 50 y. o. male who presents to the Wound Clinic for evaluation and treatment of Chronic pressure and non-healing surgical  ulcer(s) of  Rt. foot plantar surface and right great toe.  The condition is of moderate severity. The ulcer has been present since last December, when he had amputations to the right and left toes due to frost bite.  The underlying cause is thought to be pressure, along with poor hygiene and moisture control.  The patients care to date has included frequent trips to the ER, and follow up with surgery ([facility]). The patient has significant underlying medical conditions as below.   Patient states that he received wound supplies and picked up his antibiotics ointment. We states he has been changing dressing on his own. He states he was written an antibiotic by [facility], and that he is going to pick this up today. The patient is staying at the [employer] and gets his mail at the [facility]. [employer] is trying to get him into the wound clinic.     Wound Pain Timing/Severity: none  Quality of pain: N/A  Severity of pain:  0 / 10   Modifying Factors: chronic pressure and poor hygiene  Associated Signs/Symptoms: edema, erythema and drainage        PAST MEDICAL HISTORY        Diagnosis Date    Hypertension     does not take medications       PAST SURGICAL HISTORY    Past Surgical History:   Procedure Laterality Date    FOOT DEBRIDEMENT Bilateral 2021    BILATERAL TOE  DEBRIDEMENT INCISION AND DRAINAGE, AMPUTATION OF LEFT 1ST, 2ND, 3RD AND TIP OF 4TH TOE AND RIGHT GREAT TOE performed by Mireya Higgins MD at [address] [place] [address]    History reviewed.  No pertinent family history. SOCIAL HISTORY    Social History     Tobacco Use    Smoking status: Former Smoker     Quit date: 2021     Years since quittin.4    Smokeless tobacco: Never Used   Vaping Use    Vaping Use: Never used   Substance Use Topics    Alcohol use: Yes     Comment: most days    Drug use: No       ALLERGIES    No Known Allergies    MEDICATIONS    Current Outpatient Medications on File Prior to Encounter   Medication Sig Dispense Refill    oxyCODONE-acetaminophen (PERCOCET) 5-325 MG per tablet Take 1 tablet by mouth every 8 hours as needed for Pain.  metoprolol tartrate (LOPRESSOR) 25 MG tablet Take 1 tablet by mouth 2 times daily 60 tablet 3    omeprazole (PRILOSEC) 20 MG delayed release capsule Take 20 mg by mouth daily      lisinopril (PRINIVIL;ZESTRIL) 20 MG tablet Take 20 mg by mouth daily      amLODIPine (NORVASC) 5 MG tablet Take 1 tablet by mouth daily 30 tablet 3    gabapentin (NEURONTIN) 100 MG capsule Take 1 capsule by mouth 3 times daily for 20 days. 60 capsule 0     No current facility-administered medications on file prior to encounter. REVIEW OF SYSTEMS    Pertinent items are noted in HPI. Constitutional: Negative for systemic symptoms including fever, chills and malaise. Objective:      BP (!) 130/91   Pulse 105   Temp 98.7 °F (37.1 °C) (Temporal)   Resp 16     PHYSICAL EXAM    General: The patient is in no acute distress. Mental status:  Patient is appropriate, is  oriented to place and plan of care. Dermatologic exam: Visual inspection of the periwound reveals the skin to be moist and heavy callus around plantar wound.   Wound exam: see wound description below in procedure note      Assessment:     Problem List Items Addressed This Visit     WD-Pressure ulcer of right foot, stage 3 (HCC) - Primary    Relevant Medications    lidocaine (XYLOCAINE) 4 % external solution    gentamicin (GARAMYCIN) 0.1 % ointment    mupirocin (BACTROBAN) 2 % ointment    Other Relevant Orders    Initiate Outpatient Wound Care Protocol    WD-Pressure ulcer of toe of right foot, stage 3 (HCC)    Relevant Medications    lidocaine (XYLOCAINE) 4 % external solution    gentamicin (GARAMYCIN) 0.1 % ointment    mupirocin (BACTROBAN) 2 % ointment    Other Relevant Orders    Initiate Outpatient Wound Care Protocol        Procedure Note    Indications:  Based on my examination of this patient's wound(s) today, sharp excision into necrotic subcutaneous tissue is required to promote healing and evaluate the extent of previous healing. Performed by: GEN Orozco CNP    Consent obtained: Yes    Time out taken:  Yes    Pain Control: Anesthetic  Anesthetic: 4% Lidocaine Liquid Topical        Debridement:Excisional Debridement    Using curette the wound(s) was/were sharply debrided down through and including the removal of subcutaneous tissue.         Devitalized Tissue Debrided:  slough and exudate    Pre Debridement Measurements:  Are located in the Wound Documentation Flow Sheet    All active wounds listed below with today's date are evaluated  Wound(s)    debrided this date include # : 1 and 2     Post  Debridement Measurements:  Wound 06/18/21, #1 Right;Plantar Right Plantar (Active)   Wound Image   06/25/21 1004   Dressing Status New dressing applied 07/02/21 1040   Wound Cleansed Soap and water 07/02/21 0947   Offloading for Diabetic Foot Ulcers Forefoot offloading shoe 06/25/21 1004   Wound Length (cm) 3.4 cm 07/02/21 0947   Wound Width (cm) 4 cm 07/02/21 0947   Wound Depth (cm) 0.1 cm 07/02/21 0947   Wound Surface Area (cm^2) 13.6 cm^2 07/02/21 0947   Change in Wound Size % (l*w) 22.29 07/02/21 0947   Wound Volume (cm^3) 1.36 cm^3 07/02/21 0947   Wound Healing % 22 07/02/21 0947   Post-Procedure Length (cm) 3.4 cm 07/02/21 1009   Post-Procedure Width (cm) 4 cm 07/02/21 1009   Post-Procedure Depth (cm) 0.1 cm 07/02/21 1009   Post-Procedure Surface Area (cm^2) 13.6 cm^2 07/02/21 1009 Post-Procedure Volume (cm^3) 1.36 cm^3 07/02/21 1009   Distance Tunneling (cm) 0 cm 07/02/21 0947   Tunneling Position ___ O'Clock 0 07/02/21 0947   Undermining Starts ___ O'Clock 0 07/02/21 0947   Undermining Ends___ O'Clock 0 07/02/21 0947   Undermining Maxium Distance (cm) 0 07/02/21 0947   Wound Assessment Granulation tissue 07/02/21 0947   Drainage Amount Large 07/02/21 0947   Drainage Description Serosanguinous 07/02/21 0947   Odor Moderate 07/02/21 0947   Magi-wound Assessment Hyperkeratosis (callous); Maceration 07/02/21 0947   Margins Defined edges; Unattached edges 07/02/21 0947   Wound Thickness Description not for Pressure Injury Full thickness 07/02/21 0947   Number of days: 14       Wound 06/18/21, #2 Toe (Comment  which one) Right Right Great Toe (Active)   Wound Image   06/25/21 1004   Wound Cleansed Soap and water 07/02/21 0947   Offloading for Diabetic Foot Ulcers No 06/25/21 1004   Wound Length (cm) 0.1 cm 07/02/21 0947   Wound Width (cm) 0.1 cm 07/02/21 0947   Wound Depth (cm) 0.1 cm 07/02/21 0947   Wound Surface Area (cm^2) 0.01 cm^2 07/02/21 0947   Change in Wound Size % (l*w) 91.67 07/02/21 0947   Wound Volume (cm^3) 0.001 cm^3 07/02/21 0947   Wound Healing % 90 07/02/21 0947   Post-Procedure Length (cm) 0.1 cm 07/02/21 1009   Post-Procedure Width (cm) 0.1 cm 07/02/21 1009   Post-Procedure Depth (cm) 0.1 cm 07/02/21 1009   Post-Procedure Surface Area (cm^2) 0.01 cm^2 07/02/21 1009   Post-Procedure Volume (cm^3) 0.001 cm^3 07/02/21 1009   Distance Tunneling (cm) 0 cm 07/02/21 0947   Tunneling Position ___ O'Clock 0 07/02/21 0947   Undermining Starts ___ O'Clock 0 07/02/21 0947   Undermining Ends___ O'Clock 0 07/02/21 0947   Undermining Maxium Distance (cm) 0 07/02/21 0947   Wound Assessment Granulation tissue 07/02/21 0947   Drainage Amount Small 07/02/21 0947   Drainage Description Serosanguinous 07/02/21 0947   Odor Moderate 07/02/21 0947   Magi-wound Assessment Hyperkeratosis bedtime.              If swelling is present, elevate legs to the level of the heart or above for 30 minutes 4-5 times a day and/or when sitting.                                      When taking antibiotics take entire prescription as ordered by physician do not stop taking until medicine is all gone.                                                           Orders for this week: 7/2/21     Rx: [facility] on [address]                  A&D and Coban 2 to bilateral legs     Right Foot - wash with soap and water, pat dry. Apply Gentamicin and Bactroban, cover with lb and sofsorb. Wrap with coban 2 (enclose toes with second layer). Change with next visit.     Auth for grafts on 6/18/2021     Follow up with Dr Marquita Kasper in 1 week in the wound care center  Call [phone] for any questions or concerns.   Date__________   Time____________        Treatment Note Wound 06/18/21 Toe (Comment  which one) Right Right Great Toe-Dressing/Treatment:  (gentamicin bactroban lb sofsorb coban 2 lite)  Wound 06/18/21 Right;Plantar Right Plantar-Dressing/Treatment:  (gentamicin bactroban lb sofsorb coban 2 lite)    Written Patient Dismissal Instructions Given            Electronically signed by GEN Hernandez CNP on 7/2/2021 at 1:03 PM ADMIT

## 2021-07-09 ENCOUNTER — HOSPITAL ENCOUNTER (OUTPATIENT)
Dept: WOUND CARE | Age: 46
Discharge: HOME | End: 2021-07-09

## 2021-07-11 ENCOUNTER — HOSPITAL ENCOUNTER (EMERGENCY)
Age: 46
Discharge: HOME | End: 2021-07-11
Attending: EMERGENCY MEDICINE
Payer: MEDICARE

## 2021-07-11 VITALS
TEMPERATURE: 98.1 F | HEIGHT: 71 IN | BODY MASS INDEX: 24.36 KG/M2 | RESPIRATION RATE: 18 BRPM | SYSTOLIC BLOOD PRESSURE: 140 MMHG | DIASTOLIC BLOOD PRESSURE: 99 MMHG | OXYGEN SATURATION: 100 % | HEART RATE: 74 BPM | WEIGHT: 174 LBS

## 2021-07-11 DIAGNOSIS — Z51.89 VISIT FOR WOUND CHECK: Primary | ICD-10-CM

## 2021-07-11 PROCEDURE — 99284 EMERGENCY DEPT VISIT MOD MDM: CPT

## 2021-07-11 PROCEDURE — 6370000000 HC RX 637 (ALT 250 FOR IP): Performed by: EMERGENCY MEDICINE

## 2021-07-11 RX ORDER — ONDANSETRON 4 MG/1
4 TABLET, ORALLY DISINTEGRATING ORAL ONCE
Status: COMPLETED | OUTPATIENT
Start: 2021-07-11 | End: 2021-07-11

## 2021-07-11 RX ADMIN — ONDANSETRON 4 MG: 4 TABLET, ORALLY DISINTEGRATING ORAL at 19:50

## 2021-07-11 ASSESSMENT — PAIN DESCRIPTION - DESCRIPTORS: DESCRIPTORS: STABBING;SQUEEZING;SPASM

## 2021-07-11 ASSESSMENT — PAIN DESCRIPTION - ORIENTATION: ORIENTATION: RIGHT

## 2021-07-11 ASSESSMENT — PAIN SCALES - GENERAL: PAINLEVEL_OUTOF10: 10

## 2021-07-11 ASSESSMENT — PAIN DESCRIPTION - FREQUENCY: FREQUENCY: CONTINUOUS

## 2021-07-11 ASSESSMENT — PAIN DESCRIPTION - LOCATION: LOCATION: FOOT

## 2021-07-11 ASSESSMENT — PAIN DESCRIPTION - PAIN TYPE: TYPE: ACUTE PAIN

## 2021-07-11 NOTE — CARE COORDINATION
PATRICK - room # 32 -Dr Page Brito -pt in for right foot infection, he has been seen on an outgoing / on going basis with a Hx of wound care and amputation of toes in the past 6 months He has kept up with his appointments for the most part -to the wound care clinic and the [facility] . The last 2-3 visits , PATRICK has been involved with attempting to help or guide pt with getting his ID , the proper medications , his insurance card and attending medical aftercare . Pt does have an appointment the 22 nd of July for Disability . He does have a [employer] insurance card , still no ID and apparently still Homeless -staying at various places Pt hygiene is suspect , sox are dirty (gave new ones) , bandages are slightly soiled, and his wounds are odorous .  Pt will eventually be released with a friend picking him up     Cassi Mosley / PATRICK

## 2021-07-11 NOTE — ED PROVIDER NOTES
Triage Chief Complaint:   Wound Check    [place]:  Betty Whitt is a 50 y.o. male that presents with concern regarding possible wound infection. Patient has been dealing with a right foot ulcer for quite some time. Patient is following with wound care for this and this had some debridements and evaluation through them. Patient reports that there is been a mistake with some of his medications and is not recently been getting his antibiotic ointments or performing wound care. Patient reports \"there is all sorts of pus coming out of it\". Patient has not changed his socks or checked his foot however for the past 2 days. No fevers. Of note, patient is status post remote amputation of multiple toes of the foot in February of this year secondary to gangrene. ROS:  General:  No fevers, no chills  Respiratory:  No shortness of breath  Neurologic:  No numbness, no weakness  Extremities:  No edema, no pain  Skin:  No rash, + wound to foot (right)  Psych: No axienty    Past Medical History:   Diagnosis Date    Hypertension     does not take medications     Past Surgical History:   Procedure Laterality Date    FOOT DEBRIDEMENT Bilateral 2021    BILATERAL TOE  DEBRIDEMENT INCISION AND DRAINAGE, AMPUTATION OF LEFT 1ST, 2ND, 3RD AND TIP OF 4TH TOE AND RIGHT GREAT TOE performed by Kimberlee Rick MD at [place] [address] reviewed. No pertinent family history.   Social History     Socioeconomic History    Marital status: Single     Spouse name: Not on file    Number of children: Not on file    Years of education: Not on file    Highest education level: Not on file   Occupational History    Not on file   Tobacco Use    Smoking status: Former Smoker     Quit date: 2021     Years since quittin.5    Smokeless tobacco: Never Used   Vaping Use    Vaping Use: Never used   Substance and Sexual Activity    Alcohol use: Yes     Comment: most days    Drug use: No    Sexual activity: Not on file   Other Topics Pain Score       Pain Loc       Pain Edu? Excl. in [address]? My pulse ox interpretation is - normal    General appearance:  No acute distress. Sitting comfortably in bed. Skin:  Warm. Dry. Patient has a shallow decubitus ulcer to the plantar aspect of the right forefoot with pink base and some surrounding callused skin. There is no evidence of any erythema or induration. There is no pustular discharge. There is no lymphangitic streaking or increased warmth. Eye:  Extraocular movements intact. Ears, nose, mouth and throat:  Oral mucosa moist   Extremity:  No swelling. Normal ROM. Right foot ulcer as above. Multiple previously amputated toes. Heart:  Strong and symmetric peripheral pulses. Extremities are well perfused. Abdomen:  Non-distended. Respiratory:  Respirations nonlabored. Neurological:  Alert and oriented times 3. No focal neuro deficits. Sensation intact to light touch to distal upper/lower extremities; 5/5 and symmetric  and dorsi/plantar flexion. I have reviewed and interpreted all of the currently available lab results from this visit (if applicable):  No results found for this visit on 07/11/21. Radiographs (if obtained):  [] The following radiograph was interpreted by myself in the absence of a radiologist:   [] Radiologist's Report Reviewed:  No orders to display         EKG (if obtained): (All EKG's are interpreted by myself in the absence of a cardiologist)    Chart review shows recent radiographs:  No results found. MDM:  Pt presents as above. Emergent conditions considered. Presentation prompted initial history and physical.  Presentation consistent with mostly social issues as patient reports has not been getting his medications and is not performing dressing changes himself. Patient's ulcer does not appear infected on exam at all and patient is with stable vital signs. I did consult case management.     Case management did evaluate patient and patient is actually squared away from his wound care perspective. Patient's ulcer/foot is cleansed and new dressing is applied here in the emergency department. Patient is appropriate for the outpatient follow-up with his wound care team.    I discussed specific signs and symptoms and when to return to the emergency department as well as need for close outpatient follow-up. Questions sought and answered with the patient. They voice understanding and agree with plan. Clinical Impression:  1. Visit for wound check      Disposition referral (if applicable): [facility]  [phone]  Schedule an appointment as soon as possible for a visit   [id number] [address] PRIMARY CARE PROVIDER (SEE BELOW)    [facility]        Disposition medications (if applicable):  Discharge Medication List as of 7/11/2021  8:44 PM          Comment: Please note this report has been produced using speech recognition software and may contain errors related to that system including errors in grammar, punctuation, and spelling, as well as words and phrases that may be inappropriate. If there are any questions or concerns please feel free to contact the dictating provider for clarification.          Deep Estrella MD  07/11/21 0260

## 2021-07-16 ENCOUNTER — HOSPITAL ENCOUNTER (OUTPATIENT)
Dept: WOUND CARE | Age: 46
Discharge: HOME | End: 2021-07-16
Payer: MEDICARE

## 2021-07-16 VITALS
HEART RATE: 94 BPM | SYSTOLIC BLOOD PRESSURE: 126 MMHG | RESPIRATION RATE: 16 BRPM | DIASTOLIC BLOOD PRESSURE: 86 MMHG | TEMPERATURE: 98 F

## 2021-07-16 DIAGNOSIS — L89.893 PRESSURE ULCER OF RIGHT FOOT, STAGE 3 (HCC): Primary | ICD-10-CM

## 2021-07-16 DIAGNOSIS — L89.893 PRESSURE ULCER OF TOE OF RIGHT FOOT, STAGE 3 (HCC): ICD-10-CM

## 2021-07-16 PROCEDURE — 11042 DBRDMT SUBQ TIS 1ST 20SQCM/<: CPT

## 2021-07-16 RX ORDER — LIDOCAINE HYDROCHLORIDE 40 MG/ML
SOLUTION TOPICAL ONCE
Status: CANCELLED | OUTPATIENT
Start: 2021-07-16 | End: 2021-07-16

## 2021-07-16 RX ORDER — BETAMETHASONE DIPROPIONATE 0.05 %
OINTMENT (GRAM) TOPICAL ONCE
Status: CANCELLED | OUTPATIENT
Start: 2021-07-16 | End: 2021-07-16

## 2021-07-16 RX ORDER — LIDOCAINE HYDROCHLORIDE 20 MG/ML
JELLY TOPICAL ONCE
Status: CANCELLED | OUTPATIENT
Start: 2021-07-16 | End: 2021-07-16

## 2021-07-16 RX ORDER — LIDOCAINE 40 MG/G
CREAM TOPICAL ONCE
Status: CANCELLED | OUTPATIENT
Start: 2021-07-16 | End: 2021-07-16

## 2021-07-16 RX ORDER — CLOBETASOL PROPIONATE 0.5 MG/G
OINTMENT TOPICAL ONCE
Status: CANCELLED | OUTPATIENT
Start: 2021-07-16 | End: 2021-07-16

## 2021-07-16 RX ORDER — BACITRACIN ZINC AND POLYMYXIN B SULFATE 500; 1000 [USP'U]/G; [USP'U]/G
OINTMENT TOPICAL ONCE
Status: CANCELLED | OUTPATIENT
Start: 2021-07-16 | End: 2021-07-16

## 2021-07-16 RX ORDER — BACITRACIN, NEOMYCIN, POLYMYXIN B 400; 3.5; 5 [USP'U]/G; MG/G; [USP'U]/G
OINTMENT TOPICAL ONCE
Status: CANCELLED | OUTPATIENT
Start: 2021-07-16 | End: 2021-07-16

## 2021-07-16 RX ORDER — GENTAMICIN SULFATE 1 MG/G
OINTMENT TOPICAL ONCE
Status: CANCELLED | OUTPATIENT
Start: 2021-07-16 | End: 2021-07-16

## 2021-07-16 RX ORDER — GINSENG 100 MG
CAPSULE ORAL ONCE
Status: CANCELLED | OUTPATIENT
Start: 2021-07-16 | End: 2021-07-16

## 2021-07-16 RX ORDER — LIDOCAINE 50 MG/G
OINTMENT TOPICAL ONCE
Status: CANCELLED | OUTPATIENT
Start: 2021-07-16 | End: 2021-07-16

## 2021-07-16 RX ORDER — LIDOCAINE HYDROCHLORIDE 40 MG/ML
SOLUTION TOPICAL ONCE
Status: DISCONTINUED | OUTPATIENT
Start: 2021-07-16 | End: 2021-07-17 | Stop reason: HOSPADM

## 2021-07-16 ASSESSMENT — PAIN DESCRIPTION - FREQUENCY: FREQUENCY: CONTINUOUS

## 2021-07-16 ASSESSMENT — PAIN DESCRIPTION - LOCATION: LOCATION: FOOT

## 2021-07-16 ASSESSMENT — PAIN DESCRIPTION - PAIN TYPE: TYPE: CHRONIC PAIN

## 2021-07-16 ASSESSMENT — PAIN - FUNCTIONAL ASSESSMENT: PAIN_FUNCTIONAL_ASSESSMENT: PREVENTS OR INTERFERES SOME ACTIVE ACTIVITIES AND ADLS

## 2021-07-16 ASSESSMENT — PAIN SCALES - GENERAL: PAINLEVEL_OUTOF10: 8

## 2021-07-16 ASSESSMENT — PAIN DESCRIPTION - ONSET: ONSET: AWAKENED FROM SLEEP

## 2021-07-16 ASSESSMENT — PAIN DESCRIPTION - PROGRESSION: CLINICAL_PROGRESSION: NOT CHANGED

## 2021-07-16 ASSESSMENT — PAIN DESCRIPTION - DESCRIPTORS: DESCRIPTORS: SHARP;STABBING

## 2021-07-16 ASSESSMENT — PAIN DESCRIPTION - ORIENTATION: ORIENTATION: RIGHT

## 2021-07-16 NOTE — PLAN OF CARE
Problem: Pain:  Goal: Pain level will decrease  Description: Pain level will decrease  Outcome: Ongoing  Goal: Control of acute pain  Description: Control of acute pain  Outcome: Ongoing  Goal: Control of chronic pain  Description: Control of chronic pain  Outcome: Ongoing     Problem: Wound:  Goal: Will show signs of wound healing; wound closure and no evidence of infection  Description: Will show signs of wound healing; wound closure and no evidence of infection  Outcome: Ongoing     Problem: Wound:  Intervention: Assess ankle, calf, or foot circumference blilaterally  Note: See flowsheet  Intervention: Assess pain status  Note: See flowsheet  Intervention: Assess wound size, appearance and drainage  Note: See flowsheet  Intervention: Assess pedal pulses bilaterally if patient has a foot or leg ulcer  Note: See flowsheet

## 2021-07-16 NOTE — PROGRESS NOTES
Wound Care Center Progress Note With Procedure    Nicci Romero  AGE: 50 y.o. GENDER: male  : 1975  EPISODE DATE:  2021     Subjective:     Chief Complaint   Patient presents with    Wound Check     LLE         HISTORY of PRESENT ILLNESS      Nicci Romero is a 50 y.o. male who presents today for wound evaluation of Chronic pressure ulcer(s) of the left foot. The ulcer is of moderate severity. The underlying cause of the wound is pressure over an area of frost-bite. He has slight improvement this week, but the wound bed is too wet. Will eliminate the added moisture and switch over to alginate. Will have a silver nitrate treatment for his hypergranulation. Wound Pain Timing/Severity: mild  Quality of pain: tender  Severity of pain:  3 / 10   Modifying Factors: edema  Associated Signs/Symptoms: none        PAST MEDICAL HISTORY        Diagnosis Date    Hypertension     does not take medications       PAST SURGICAL HISTORY    Past Surgical History:   Procedure Laterality Date    FOOT DEBRIDEMENT Bilateral 2021    BILATERAL TOE  DEBRIDEMENT INCISION AND DRAINAGE, AMPUTATION OF LEFT 1ST, 2ND, 3RD AND TIP OF 4TH TOE AND RIGHT GREAT TOE performed by Gerri Patel MD at [address] [place] [address]    History reviewed. No pertinent family history. SOCIAL HISTORY    Social History     Tobacco Use    Smoking status: Former Smoker     Quit date: 2021     Years since quittin.5    Smokeless tobacco: Never Used   Vaping Use    Vaping Use: Never used   Substance Use Topics    Alcohol use: Yes     Comment: most days    Drug use: No       ALLERGIES    No Known Allergies    MEDICATIONS    Current Outpatient Medications on File Prior to Encounter   Medication Sig Dispense Refill    oxyCODONE-acetaminophen (PERCOCET) 5-325 MG per tablet Take 1 tablet by mouth every 8 hours as needed for Pain.       metoprolol tartrate (LOPRESSOR) 25 MG tablet Take 1 tablet by mouth 2 times daily 60 tablet 3    omeprazole (PRILOSEC) 20 MG delayed release capsule Take 20 mg by mouth daily      lisinopril (PRINIVIL;ZESTRIL) 20 MG tablet Take 20 mg by mouth daily      amLODIPine (NORVASC) 5 MG tablet Take 1 tablet by mouth daily 30 tablet 3    gabapentin (NEURONTIN) 100 MG capsule Take 1 capsule by mouth 3 times daily for 20 days. 60 capsule 0     No current facility-administered medications on file prior to encounter. REVIEW OF SYSTEMS    Pertinent items are noted in HPI. Constitutional: Negative for systemic symptoms including fever, chills and malaise. Objective:      /86   Pulse 94   Temp 98 °F (36.7 °C) (Temporal)   Resp 16     PHYSICAL EXAM       General: The patient is in no acute distress. Mental status:  Patient is appropriate, is  oriented to place and plan of care. Dermatologic exam: Visual inspection of the periwound reveals the skin to be moist  Wound exam: see wound description below in procedure note      Assessment:     Problem List Items Addressed This Visit     WD-Pressure ulcer of right foot, stage 3 (HCC) - Primary    Relevant Medications    lidocaine (XYLOCAINE) 4 % external solution    Other Relevant Orders    Initiate Outpatient Wound Care Protocol    WD-Pressure ulcer of toe of right foot, stage 3 (HCC)    Relevant Medications    lidocaine (XYLOCAINE) 4 % external solution    Other Relevant Orders    Initiate Outpatient Wound Care Protocol        Procedure Note    Indications:  Based on my examination of this patient's wound(s) today, sharp excision into necrotic epidermis, dermis and subcutaneous tissue is required to promote healing and evaluate the extent of previous healing.     Performed by: Jh Welch MD    Consent obtained: Yes    Time out taken:  Yes    Pain Control: lidocaine       Debridement:Excisional Debridement    Using curette the wound(s) was/were sharply debrided down through and including the removal of epidermis, dermis and subcutaneous tissue. Devitalized Tissue Debrided:  fibrin, biofilm, slough and callus    Pre Debridement Measurements:  Are located in the Wound Documentation Flow Sheet    All active wounds listed below with today's date are evaluated  Wound(s)    debrided this date include # : 8     Post  Debridement Measurements:      Wound 06/18/21 Right Plantar (Active)   Wound Image   06/25/21 1004   Dressing Status Clean;Dry; Intact; New dressing applied 07/16/21 1714   Wound Cleansed Soap and water; Wound cleanser 07/16/21 1431   Dressing/Treatment Silver dressing;Alginate;ABD 07/16/21 1714   Offloading for Diabetic Foot Ulcers Forefoot offloading shoe 07/16/21 1431   Wound Length (cm) 2.5 cm 07/16/21 1431   Wound Width (cm) 3.2 cm 07/16/21 1431   Wound Depth (cm) 0.2 cm 07/16/21 1431   Wound Surface Area (cm^2) 8 cm^2 07/16/21 1431   Change in Wound Size % (l*w) 54.29 07/16/21 1431   Wound Volume (cm^3) 1.6 cm^3 07/16/21 1431   Wound Healing % 9 07/16/21 1431   Post-Procedure Length (cm) 2.5 cm 07/16/21 1436   Post-Procedure Width (cm) 3.2 cm 07/16/21 1436   Post-Procedure Depth (cm) 0.2 cm 07/16/21 1436   Post-Procedure Surface Area (cm^2) 8 cm^2 07/16/21 1436   Post-Procedure Volume (cm^3) 1.6 cm^3 07/16/21 1436   Distance Tunneling (cm) 0 cm 07/16/21 1431   Tunneling Position ___ O'Clock 0 07/16/21 1431   Undermining Starts ___ O'Clock 0100 07/16/21 1431   Undermining Ends___ O'Clock 0300 07/16/21 1431   Undermining Maxium Distance (cm) 0.3 07/16/21 1431   Wound Assessment Granulation tissue 07/16/21 1431   Drainage Amount Large 07/16/21 1431   Drainage Description Serosanguinous 07/16/21 1431   Odor None 07/16/21 1431   Magi-wound Assessment Maceration; Hyperkeratosis (callous) 07/16/21 1431   Margins Defined edges; Unattached edges 07/16/21 1431   Wound Thickness Description not for Pressure Injury Full thickness 07/16/21 1431   Number of days: 28       Percent of Wound(s) Debrided: approximately 100%    Total  Area Debrided:  8 sq cm     Bleeding:  Minimal    Hemostasis Achieved:  by pressure and silver nitrate    Procedural Pain:  5  / 10     Post Procedural Pain:  3 / 10     Response to treatment:  Well tolerated by patient. Status of wound progress and description from last visit:   Slightly better, but too wet today. See above in HPI. Plan:       Discharge Instructions       PHYSICIAN ORDERS AND DISCHARGE INSTRUCTIONS     NOTE: Upon discharge from the [address], you will receive a patient experience survey. We would be grateful if you would take the time to fill this survey out.     Wound care order history:                 [facility]'s   Arterial studies on 12/26/2021              Cultures: Obtained 6/18/2021                Grafts:                Antibiotics:           Continuing wound care orders and information:                 Residence:                Continue home health care with:               Your wound-care supplies will be provided by:      Wound cleansing:               BF not scrub or use excessive force.              Wash hands with soap and water before and after dressing changes.               QYEAS to applying a clean dressing, cleanse wound with normal saline, wound cleanser, or mild soap and water.               Ask the physician or nurse before getting the wound(s) wet in a shower     Daily Wound management:              Keep weight off wounds and reposition every 2 hours.              Avoid standing for long periods of time.              Apply wraps/stockings in AM and remove at bedtime.              If swelling is present, elevate legs to the level of the heart or above for 30 minutes 4-5 times a day and/or when sitting.                                      When taking antibiotics take entire prescription as ordered by physician do not stop taking until medicine is all gone.                                                           Orders for this week: 7/16/21     Rx: [facility] on [address]                     Right Plantar Foot - wash with soap and water, pat dry. Apply Liquid Silver Nitrate damp gauze to wound bed for 3 days. Cover with ca alginate and sofsorb or ABD pad wrap with kerlix AMD and secure with tape (Leave these layers in place for 3 days, until Monday 7/19/21). After 3 days, apply only ca alginate and ABD pad. Secure with Kerlix and tape. Change daily. Right Lateral Foot Blister - Cover with ca alginate, wrap with kerlix, secure with tape. Change on same schedule as plantar wound.     Auth for grafts on 6/18/2021     Follow up with Dr Leni Chawla in 1 week in the wound care center  Call [phone] for any questions or concerns.   Date__________   Time____________        Treatment Note Wound 06/18/21 Right Plantar-Dressing/Treatment: Silver dressing, Alginate, ABD (Ag nitrate moist 4x4, russ alg, ABD, kerlix, tape,)    Written Patient Dismissal Instructions Given            Electronically signed by Laisha Rooney MD on 7/16/2021 at 5:48 PM

## 2021-07-23 ENCOUNTER — HOSPITAL ENCOUNTER (OUTPATIENT)
Dept: WOUND CARE | Age: 46
Discharge: HOME | End: 2021-07-23
Payer: MEDICARE

## 2021-07-23 DIAGNOSIS — L89.893 PRESSURE ULCER OF RIGHT FOOT, STAGE 3 (HCC): Primary | ICD-10-CM

## 2021-07-23 DIAGNOSIS — L89.893 PRESSURE ULCER OF TOE OF RIGHT FOOT, STAGE 3 (HCC): ICD-10-CM

## 2021-07-23 PROCEDURE — 11042 DBRDMT SUBQ TIS 1ST 20SQCM/<: CPT

## 2021-07-23 RX ORDER — BETAMETHASONE DIPROPIONATE 0.05 %
OINTMENT (GRAM) TOPICAL ONCE
Status: CANCELLED | OUTPATIENT
Start: 2021-07-23 | End: 2021-07-23

## 2021-07-23 RX ORDER — LIDOCAINE HYDROCHLORIDE 40 MG/ML
SOLUTION TOPICAL ONCE
Status: DISCONTINUED | OUTPATIENT
Start: 2021-07-23 | End: 2021-07-24 | Stop reason: HOSPADM

## 2021-07-23 RX ORDER — GINSENG 100 MG
CAPSULE ORAL ONCE
Status: CANCELLED | OUTPATIENT
Start: 2021-07-23 | End: 2021-07-23

## 2021-07-23 RX ORDER — LIDOCAINE HYDROCHLORIDE 20 MG/ML
JELLY TOPICAL ONCE
Status: CANCELLED | OUTPATIENT
Start: 2021-07-23 | End: 2021-07-23

## 2021-07-23 RX ORDER — LIDOCAINE 50 MG/G
OINTMENT TOPICAL ONCE
Status: CANCELLED | OUTPATIENT
Start: 2021-07-23 | End: 2021-07-23

## 2021-07-23 RX ORDER — BACITRACIN ZINC AND POLYMYXIN B SULFATE 500; 1000 [USP'U]/G; [USP'U]/G
OINTMENT TOPICAL ONCE
Status: CANCELLED | OUTPATIENT
Start: 2021-07-23 | End: 2021-07-23

## 2021-07-23 RX ORDER — LIDOCAINE HYDROCHLORIDE 40 MG/ML
SOLUTION TOPICAL ONCE
Status: CANCELLED | OUTPATIENT
Start: 2021-07-23 | End: 2021-07-23

## 2021-07-23 RX ORDER — BACITRACIN, NEOMYCIN, POLYMYXIN B 400; 3.5; 5 [USP'U]/G; MG/G; [USP'U]/G
OINTMENT TOPICAL ONCE
Status: CANCELLED | OUTPATIENT
Start: 2021-07-23 | End: 2021-07-23

## 2021-07-23 RX ORDER — LIDOCAINE 40 MG/G
CREAM TOPICAL ONCE
Status: CANCELLED | OUTPATIENT
Start: 2021-07-23 | End: 2021-07-23

## 2021-07-23 RX ORDER — CLOBETASOL PROPIONATE 0.5 MG/G
OINTMENT TOPICAL ONCE
Status: CANCELLED | OUTPATIENT
Start: 2021-07-23 | End: 2021-07-23

## 2021-07-23 RX ORDER — GENTAMICIN SULFATE 1 MG/G
OINTMENT TOPICAL ONCE
Status: CANCELLED | OUTPATIENT
Start: 2021-07-23 | End: 2021-07-23

## 2021-07-23 ASSESSMENT — PAIN DESCRIPTION - PROGRESSION: CLINICAL_PROGRESSION: NOT CHANGED

## 2021-07-23 ASSESSMENT — PAIN DESCRIPTION - ORIENTATION: ORIENTATION: RIGHT

## 2021-07-23 ASSESSMENT — PAIN DESCRIPTION - LOCATION: LOCATION: FOOT

## 2021-07-23 ASSESSMENT — PAIN - FUNCTIONAL ASSESSMENT: PAIN_FUNCTIONAL_ASSESSMENT: PREVENTS OR INTERFERES SOME ACTIVE ACTIVITIES AND ADLS

## 2021-07-23 ASSESSMENT — PAIN DESCRIPTION - ONSET: ONSET: ON-GOING

## 2021-07-23 ASSESSMENT — PAIN DESCRIPTION - PAIN TYPE: TYPE: CHRONIC PAIN

## 2021-07-23 ASSESSMENT — PAIN SCALES - GENERAL: PAINLEVEL_OUTOF10: 8

## 2021-07-23 ASSESSMENT — PAIN DESCRIPTION - FREQUENCY: FREQUENCY: INTERMITTENT

## 2021-07-23 NOTE — PROGRESS NOTES
Vaping Use    Vaping Use: Never used   Substance Use Topics    Alcohol use: Yes     Comment: most days    Drug use: No       ALLERGIES    No Known Allergies    MEDICATIONS    Current Outpatient Medications on File Prior to Encounter   Medication Sig Dispense Refill    oxyCODONE-acetaminophen (PERCOCET) 5-325 MG per tablet Take 1 tablet by mouth every 8 hours as needed for Pain.  metoprolol tartrate (LOPRESSOR) 25 MG tablet Take 1 tablet by mouth 2 times daily 60 tablet 3    omeprazole (PRILOSEC) 20 MG delayed release capsule Take 20 mg by mouth daily      lisinopril (PRINIVIL;ZESTRIL) 20 MG tablet Take 20 mg by mouth daily      amLODIPine (NORVASC) 5 MG tablet Take 1 tablet by mouth daily 30 tablet 3    gabapentin (NEURONTIN) 100 MG capsule Take 1 capsule by mouth 3 times daily for 20 days. 60 capsule 0     No current facility-administered medications on file prior to encounter. REVIEW OF SYSTEMS    Pertinent items are noted in HPI. Constitutional: Negative for systemic symptoms including fever, chills and malaise. Objective: There were no vitals taken for this visit. PHYSICAL EXAM       General: The patient is in no acute distress. Mental status:  Patient is appropriate, is  oriented to place and plan of care.   Dermatologic exam: Visual inspection of the periwound reveals the skin to be macerated  Wound exam: see wound description below in procedure note      Assessment:     Problem List Items Addressed This Visit     WD-Pressure ulcer of right foot, stage 3 (HCC) - Primary    Relevant Medications    lidocaine (XYLOCAINE) 4 % external solution    Other Relevant Orders    Initiate Outpatient Wound Care Protocol    WD-Pressure ulcer of toe of right foot, stage 3 (HCC)    Relevant Medications    lidocaine (XYLOCAINE) 4 % external solution    Other Relevant Orders    Initiate Outpatient Wound Care Protocol        Procedure Note    Indications:  Based on my examination of this patient's wound(s) today, sharp excision into necrotic subcutaneous tissue is required to promote healing and evaluate the extent of previous healing. Performed by: Lita Vallejo MD    Consent obtained: Yes    Time out taken:  Yes    Pain Control: lidocaine liquid       Debridement:Excisional Debridement    Using curette the wound(s) was/were sharply debrided down through and including the removal of epidermis, dermis and subcutaneous tissue. Devitalized Tissue Debrided:  fibrin, biofilm, slough and exudate and callous    Pre Debridement Measurements:  Are located in the Wound Documentation Flow Sheet    All active wounds listed below with today's date are evaluated  Wound(s)    debrided this date include # : 1     Post  Debridement Measurements:  Wound 04/08/21 Right;Plantar (Active)   Number of days: 106       Wound 04/08/21 Toe (Comment  which one) Right great toe (Active)   Number of days: 106       Wound 04/08/21 Toe (Comment  which one) Right 2nd toe (Active)   Number of days: 106       Wound 04/08/21 Foot Right;Lateral cluster (Active)   Number of days: 106       Wound 04/08/21 Toe (Comment  which one) Left great blister (Active)   Number of days: 106       Wound 04/08/21 Toe (Comment  which one) Left 3rd toe (Active)   Number of days: 106       Wound 04/08/21 Toe (Comment  which one) Left 4th toe (Active)   Number of days: 106       Wound 04/08/21 Toe (Comment  which one) Left 5th toe (Active)   Number of days: 106       Wound 06/18/21 Right Plantar # 1 (Active)   Wound Image   07/23/21 1415   Wound Etiology Pressure Stage  3 07/23/21 1415   Dressing Status New dressing applied 07/23/21 1457   Wound Cleansed Soap and water; Wound cleanser 07/16/21 1431   Dressing/Treatment Silver dressing;Alginate;ABD 07/16/21 1714   Offloading for Diabetic Foot Ulcers Forefoot offloading shoe 07/16/21 1431   Wound Length (cm) 2 cm 07/23/21 1415   Wound Width (cm) 2.5 cm 07/23/21 1415   Wound Depth (cm) 0.1 cm 07/23/21 1415   Wound Surface Area (cm^2) 5 cm^2 07/23/21 1415   Change in Wound Size % (l*w) 71.43 07/23/21 1415   Wound Volume (cm^3) 0.5 cm^3 07/23/21 1415   Wound Healing % 71 07/23/21 1415   Post-Procedure Length (cm) 2 cm 07/23/21 1453   Post-Procedure Width (cm) 2.5 cm 07/23/21 1453   Post-Procedure Depth (cm) 0.1 cm 07/23/21 1453   Post-Procedure Surface Area (cm^2) 5 cm^2 07/23/21 1453   Post-Procedure Volume (cm^3) 0.5 cm^3 07/23/21 1453   Distance Tunneling (cm) 0 cm 07/23/21 1415   Tunneling Position ___ O'Clock 00 07/23/21 1415   Undermining Starts ___ O'Clock 0 07/23/21 1415   Undermining Ends___ O'Clock 0 07/23/21 1415   Undermining Maxium Distance (cm) 0 07/23/21 1415   Wound Assessment Pink/red 07/23/21 1415   Drainage Amount Moderate 07/23/21 1415   Drainage Description Serosanguinous 07/23/21 1415   Odor None 07/23/21 1415   Magi-wound Assessment Maceration 07/23/21 1415   Margins Defined edges 07/23/21 1415   Wound Thickness Description not for Pressure Injury Full thickness 07/23/21 1415   Number of days: 35       Percent of Wound(s) Debrided: approximately 100%    Total  Area  Debrided:  5 sq cm     Bleeding:  Minimal    Hemostasis Achieved:  by pressure    Procedural Pain:  5  / 10     Post Procedural Pain:  5 / 10     Response to treatment:  Well tolerated by patient. Status of wound progress and description from last visit:   Wound is much smaller today, but still too wet and hypergranulated. Plan:       Discharge Instructions             PHYSICIAN ORDERS AND DISCHARGE INSTRUCTIONS     NOTE: Upon discharge from the [address], you will receive a patient experience survey.  We would be grateful if you would take the time to fill this survey out.     Wound care order history:                 [facility]   Arterial studies on 12/26/2021              Cultures: Obtained 6/18/2021                Grafts:                Antibiotics:           Continuing wound care orders and information:                 Residence:                Continue home health care with:               Your wound-care supplies will be provided by:      Wound cleansing:               RJ not scrub or use excessive force.              Wash hands with soap and water before and after dressing changes.             IREOA to applying a clean dressing, cleanse wound with normal saline, wound cleanser, or mild soap and water.               Ask the physician or nurse before getting the wound(s) wet in a shower     Daily Wound management:              Keep weight off wounds and reposition every 2 hours.              Avoid standing for long periods of time.              Apply wraps/stockings in AM and remove at bedtime.              If swelling is present, elevate legs to the level of the heart or above for 30 minutes 4-5 times a day and/or when sitting.                                      When taking antibiotics take entire prescription as ordered by physician do not stop taking until medicine is all gone.                                                           Orders for this week: 7/23/21     Rx: [facility]                      Right Plantar Foot - wash with soap and water, pat dry. Apply Liquid Silver Nitrate damp gauze to wound bed for 3 days. Cover with ca alginate and sofsorb or ABD pad wrap with kerlix AMD and secure with tape (Leave these layers in place for 3 days, until Monday 7/19/21  ON Monday- wash with soap and water, pat dry. Apply  ca alginate to wound bed, cover with ABD pad. Wrap with Kerlix and secure with tape. Change daily.     Auth for grafts on 6/18/2021     Follow up with Dr Praveen Keen in 1 week in the wound care center  Call [phone] for any questions or concerns.   Date__________   Time____________             Treatment Note Wound 06/18/21 Right Plantar # 1-Dressing/Treatment:  (silver nitrate;4x4; ca alg;abd;conform;tape)    Written Patient Dismissal Instructions Given Electronically signed by Josie Parra MD on 7/23/2021 at 4:42 PM

## 2021-07-30 ENCOUNTER — HOSPITAL ENCOUNTER (OUTPATIENT)
Dept: WOUND CARE | Age: 46
Discharge: HOME | End: 2021-07-30
Payer: MEDICARE

## 2021-07-30 VITALS — RESPIRATION RATE: 18 BRPM

## 2021-07-30 DIAGNOSIS — L89.893 PRESSURE ULCER OF TOE OF RIGHT FOOT, STAGE 3 (HCC): ICD-10-CM

## 2021-07-30 DIAGNOSIS — L89.893 PRESSURE ULCER OF RIGHT FOOT, STAGE 3 (HCC): Primary | ICD-10-CM

## 2021-07-30 PROCEDURE — 97597 DBRDMT OPN WND 1ST 20 CM/<: CPT

## 2021-07-30 RX ORDER — CLOBETASOL PROPIONATE 0.5 MG/G
OINTMENT TOPICAL ONCE
Status: CANCELLED | OUTPATIENT
Start: 2021-07-30 | End: 2021-07-30

## 2021-07-30 RX ORDER — LIDOCAINE HYDROCHLORIDE 40 MG/ML
SOLUTION TOPICAL ONCE
Status: CANCELLED | OUTPATIENT
Start: 2021-07-30 | End: 2021-07-30

## 2021-07-30 RX ORDER — BACITRACIN ZINC AND POLYMYXIN B SULFATE 500; 1000 [USP'U]/G; [USP'U]/G
OINTMENT TOPICAL ONCE
Status: CANCELLED | OUTPATIENT
Start: 2021-07-30 | End: 2021-07-30

## 2021-07-30 RX ORDER — LIDOCAINE HYDROCHLORIDE 40 MG/ML
SOLUTION TOPICAL ONCE
Status: DISCONTINUED | OUTPATIENT
Start: 2021-07-30 | End: 2021-07-31 | Stop reason: HOSPADM

## 2021-07-30 RX ORDER — LIDOCAINE HYDROCHLORIDE 20 MG/ML
JELLY TOPICAL ONCE
Status: CANCELLED | OUTPATIENT
Start: 2021-07-30 | End: 2021-07-30

## 2021-07-30 RX ORDER — LIDOCAINE 50 MG/G
OINTMENT TOPICAL ONCE
Status: CANCELLED | OUTPATIENT
Start: 2021-07-30 | End: 2021-07-30

## 2021-07-30 RX ORDER — BETAMETHASONE DIPROPIONATE 0.05 %
OINTMENT (GRAM) TOPICAL ONCE
Status: CANCELLED | OUTPATIENT
Start: 2021-07-30 | End: 2021-07-30

## 2021-07-30 RX ORDER — BACITRACIN, NEOMYCIN, POLYMYXIN B 400; 3.5; 5 [USP'U]/G; MG/G; [USP'U]/G
OINTMENT TOPICAL ONCE
Status: CANCELLED | OUTPATIENT
Start: 2021-07-30 | End: 2021-07-30

## 2021-07-30 RX ORDER — GENTAMICIN SULFATE 1 MG/G
OINTMENT TOPICAL ONCE
Status: CANCELLED | OUTPATIENT
Start: 2021-07-30 | End: 2021-07-30

## 2021-07-30 RX ORDER — GINSENG 100 MG
CAPSULE ORAL ONCE
Status: CANCELLED | OUTPATIENT
Start: 2021-07-30 | End: 2021-07-30

## 2021-07-30 RX ORDER — LIDOCAINE 40 MG/G
CREAM TOPICAL ONCE
Status: CANCELLED | OUTPATIENT
Start: 2021-07-30 | End: 2021-07-30

## 2021-07-30 NOTE — PROGRESS NOTES
Wound Care Center Progress Note With Procedure    Luke Tony  AGE: 50 y.o. GENDER: male  : 1975  EPISODE DATE:  2021     Subjective:     Chief Complaint   Patient presents with    Wound Check     BLE         HISTORY of PRESENT ILLNESS      Luke Tony is a 50 y.o. male who presents today for wound evaluation of Chronic pressure ulcer(s) of the foot. The ulcer is of moderate severity. The underlying cause of the wound is probably due to pressure from walking and blistering. He has still significant sensitivity on his foot. No other issues. Wound is actually getting smaller. We discuss wearing only 1 pair of socks as his feet have excessive sweating and moisture . Wound Pain Timing/Severity: none  Quality of pain: N/A  Severity of pain:  0 / 10   Modifying Factors: chronic pressure  Associated Signs/Symptoms: none        PAST MEDICAL HISTORY        Diagnosis Date    Hypertension     does not take medications       PAST SURGICAL HISTORY    Past Surgical History:   Procedure Laterality Date    FOOT DEBRIDEMENT Bilateral 2021    BILATERAL TOE  DEBRIDEMENT INCISION AND DRAINAGE, AMPUTATION OF LEFT 1ST, 2ND, 3RD AND TIP OF 4TH TOE AND RIGHT GREAT TOE performed by Nora Kussmaul, MD at [address] [place] [address]    History reviewed. No pertinent family history. SOCIAL HISTORY    Social History     Tobacco Use    Smoking status: Former Smoker     Quit date: 2021     Years since quittin.5    Smokeless tobacco: Never Used   Vaping Use    Vaping Use: Never used   Substance Use Topics    Alcohol use: Yes     Comment: most days    Drug use: No       ALLERGIES    No Known Allergies    MEDICATIONS    Current Outpatient Medications on File Prior to Encounter   Medication Sig Dispense Refill    oxyCODONE-acetaminophen (PERCOCET) 5-325 MG per tablet Take 1 tablet by mouth every 8 hours as needed for Pain.       gabapentin (NEURONTIN) 100 MG capsule Take 1 capsule by mouth 3 times daily for 20 days. 60 capsule 0    metoprolol tartrate (LOPRESSOR) 25 MG tablet Take 1 tablet by mouth 2 times daily 60 tablet 3    omeprazole (PRILOSEC) 20 MG delayed release capsule Take 20 mg by mouth daily      lisinopril (PRINIVIL;ZESTRIL) 20 MG tablet Take 20 mg by mouth daily      amLODIPine (NORVASC) 5 MG tablet Take 1 tablet by mouth daily 30 tablet 3     No current facility-administered medications on file prior to encounter. REVIEW OF SYSTEMS    Pertinent items are noted in HPI. Constitutional: Negative for systemic symptoms including fever, chills and malaise. Objective:      Resp 18     PHYSICAL EXAM     General: The patient is in no acute distress. Mental status:  Patient is appropriate, is  oriented to place and plan of care. Dermatologic exam: Visual inspection of the periwound reveals the skin to be macerated  Wound exam: see wound description below in procedure note      Assessment:     Problem List Items Addressed This Visit     WD-Pressure ulcer of right foot, stage 3 (HCC) - Primary    Relevant Medications    lidocaine (XYLOCAINE) 4 % external solution    Other Relevant Orders    Initiate Outpatient Wound Care Protocol    WD-Pressure ulcer of toe of right foot, stage 3 (HCC)    Relevant Medications    lidocaine (XYLOCAINE) 4 % external solution    Other Relevant Orders    Initiate Outpatient Wound Care Protocol        Procedure Note    Indications:  Based on my examination of this patient's wound(s) today, sharp excision into necrotic epidermis and dermis is required to promote healing and evaluate the extent of previous healing.     Performed by: Delvis Ryan MD    Consent obtained: Yes    Time out taken:  Yes    Pain Control: Anesthetic  Anesthetic: 4% Lidocaine Liquid Topical     Debridement:Non-excisional Debridement    Using curette, #15 blade scalpel and forceps the wound(s) was/were sharply debrided down through and including the removal of epidermis and St                      Right Plantar Foot -wash with soap and water, pat dry. Paint with betadine , apply  ca alginate to wound bed, cover with ABD pad. Wrap with Kerlix and secure with tape. Change daily.     Auth for grafts on 6/18/2021     Follow up with Dr Leni Chawla in 1 week in the wound care center  Call [phone] for any questions or concerns.   Date__________   Time____________        Treatment Note Wound 06/18/21 Right Plantar # 1-Dressing/Treatment:  (painted with betadine ca+ alginate abd kerlix tape)    Written Patient Dismissal Instructions Given            Electronically signed by Laisha Rooney MD on 7/30/2021 at 3:57 PM

## 2021-08-06 ENCOUNTER — HOSPITAL ENCOUNTER (EMERGENCY)
Age: 46
Discharge: LEFT BEFORE DISCHARGE | End: 2021-08-06
Payer: MEDICARE

## 2021-08-06 VITALS
BODY MASS INDEX: 22.35 KG/M2 | HEART RATE: 113 BPM | SYSTOLIC BLOOD PRESSURE: 141 MMHG | DIASTOLIC BLOOD PRESSURE: 104 MMHG | OXYGEN SATURATION: 98 % | WEIGHT: 165 LBS | TEMPERATURE: 97.6 F | HEIGHT: 72 IN | RESPIRATION RATE: 16 BRPM

## 2021-08-06 ASSESSMENT — PAIN DESCRIPTION - ORIENTATION: ORIENTATION: RIGHT

## 2021-08-06 ASSESSMENT — PAIN DESCRIPTION - PAIN TYPE: TYPE: CHRONIC PAIN

## 2021-08-06 ASSESSMENT — PAIN SCALES - GENERAL: PAINLEVEL_OUTOF10: 8

## 2021-08-06 ASSESSMENT — PAIN DESCRIPTION - LOCATION: LOCATION: FOOT

## 2021-08-07 ENCOUNTER — APPOINTMENT (OUTPATIENT)
Dept: GENERAL RADIOLOGY | Age: 46
End: 2021-08-07
Payer: MEDICARE

## 2021-08-07 ENCOUNTER — HOSPITAL ENCOUNTER (EMERGENCY)
Age: 46
Discharge: HOME | End: 2021-08-07
Attending: EMERGENCY MEDICINE
Payer: MEDICARE

## 2021-08-07 VITALS
OXYGEN SATURATION: 100 % | HEART RATE: 66 BPM | RESPIRATION RATE: 18 BRPM | HEIGHT: 72 IN | BODY MASS INDEX: 22.35 KG/M2 | SYSTOLIC BLOOD PRESSURE: 171 MMHG | TEMPERATURE: 99.1 F | WEIGHT: 165 LBS | DIASTOLIC BLOOD PRESSURE: 117 MMHG

## 2021-08-07 DIAGNOSIS — L97.519 ULCER OF FOOT, CHRONIC, RIGHT, WITH UNSPECIFIED SEVERITY (HCC): Primary | ICD-10-CM

## 2021-08-07 LAB
ALBUMIN SERPL-MCNC: 4.7 GM/DL (ref 3.4–5)
ALP BLD-CCNC: 124 IU/L (ref 40–129)
ALT SERPL-CCNC: 87 U/L (ref 10–40)
ANION GAP SERPL CALCULATED.3IONS-SCNC: 11 MMOL/L (ref 4–16)
AST SERPL-CCNC: 87 IU/L (ref 15–37)
BASOPHILS ABSOLUTE: 0 K/CU MM
BASOPHILS RELATIVE PERCENT: 0.8 % (ref 0–1)
BILIRUB SERPL-MCNC: 0.6 MG/DL (ref 0–1)
BUN BLDV-MCNC: 12 MG/DL (ref 6–23)
CALCIUM SERPL-MCNC: 9.4 MG/DL (ref 8.3–10.6)
CHLORIDE BLD-SCNC: 99 MMOL/L (ref 99–110)
CO2: 28 MMOL/L (ref 21–32)
CREAT SERPL-MCNC: 0.8 MG/DL (ref 0.9–1.3)
DIFFERENTIAL TYPE: ABNORMAL
EOSINOPHILS ABSOLUTE: 0.1 K/CU MM
EOSINOPHILS RELATIVE PERCENT: 1.8 % (ref 0–3)
GFR AFRICAN AMERICAN: >60 ML/MIN/1.73M2
GFR NON-AFRICAN AMERICAN: >60 ML/MIN/1.73M2
GLUCOSE BLD-MCNC: 81 MG/DL (ref 70–99)
HCT VFR BLD CALC: 43.2 % (ref 42–52)
HEMOGLOBIN: 13.8 GM/DL (ref 13.5–18)
IMMATURE NEUTROPHIL %: 0.3 % (ref 0–0.43)
LACTATE: 0.9 MMOL/L (ref 0.4–2)
LYMPHOCYTES ABSOLUTE: 1.9 K/CU MM
LYMPHOCYTES RELATIVE PERCENT: 47.6 % (ref 24–44)
MCH RBC QN AUTO: 28.1 PG (ref 27–31)
MCHC RBC AUTO-ENTMCNC: 31.9 % (ref 32–36)
MCV RBC AUTO: 88 FL (ref 78–100)
MONOCYTES ABSOLUTE: 0.4 K/CU MM
MONOCYTES RELATIVE PERCENT: 11 % (ref 0–4)
NUCLEATED RBC %: 0 %
PDW BLD-RTO: 17.5 % (ref 11.7–14.9)
PLATELET # BLD: 218 K/CU MM (ref 140–440)
PMV BLD AUTO: 8.7 FL (ref 7.5–11.1)
POTASSIUM SERPL-SCNC: 3.6 MMOL/L (ref 3.5–5.1)
RBC # BLD: 4.91 M/CU MM (ref 4.6–6.2)
SEGMENTED NEUTROPHILS ABSOLUTE COUNT: 1.5 K/CU MM
SEGMENTED NEUTROPHILS RELATIVE PERCENT: 38.5 % (ref 36–66)
SODIUM BLD-SCNC: 138 MMOL/L (ref 135–145)
TOTAL IMMATURE NEUTOROPHIL: 0.01 K/CU MM
TOTAL NUCLEATED RBC: 0 K/CU MM
TOTAL PROTEIN: 8.7 GM/DL (ref 6.4–8.2)
WBC # BLD: 3.9 K/CU MM (ref 4–10.5)

## 2021-08-07 PROCEDURE — 6370000000 HC RX 637 (ALT 250 FOR IP): Performed by: EMERGENCY MEDICINE

## 2021-08-07 PROCEDURE — 99283 EMERGENCY DEPT VISIT LOW MDM: CPT

## 2021-08-07 PROCEDURE — 80053 COMPREHEN METABOLIC PANEL: CPT

## 2021-08-07 PROCEDURE — 83605 ASSAY OF LACTIC ACID: CPT

## 2021-08-07 PROCEDURE — 85652 RBC SED RATE AUTOMATED: CPT

## 2021-08-07 PROCEDURE — 85025 COMPLETE CBC W/AUTO DIFF WBC: CPT

## 2021-08-07 PROCEDURE — 73630 X-RAY EXAM OF FOOT: CPT

## 2021-08-07 PROCEDURE — 86141 C-REACTIVE PROTEIN HS: CPT

## 2021-08-07 RX ORDER — HYDROCODONE BITARTRATE AND ACETAMINOPHEN 5; 325 MG/1; MG/1
1 TABLET ORAL ONCE
Status: COMPLETED | OUTPATIENT
Start: 2021-08-07 | End: 2021-08-07

## 2021-08-07 RX ADMIN — HYDROCODONE BITARTRATE AND ACETAMINOPHEN 1 TABLET: 5; 325 TABLET ORAL at 23:05

## 2021-08-07 ASSESSMENT — PAIN SCALES - GENERAL
PAINLEVEL_OUTOF10: 10
PAINLEVEL_OUTOF10: 10

## 2021-08-08 NOTE — ED PROVIDER NOTES
As physician-in-triage, I performed a virtual medical screening history and physical exam on this patient. HISTORY OF PRESENT ILLNESS  Keeley Farnsworth is a 50 y.o. male with history of chronic wounds to the foot who presents emergency department with complaints of increasing pain and some swelling to right foot. Patient had amputation of distal phalanx of the right great toe in December. He has been followed with wound clinic since that time but states he has some increasing pain over the last day to the plantar aspect of the right foot near an ulcer site. He has been followed with wound clinic but was last evaluated by them 2 weeks ago. Meloxicam which he has been prescribed has not been working for the pain. The pain describes a 10/10 stabbing throbbing pain. He denies measured fevers, nausea, vomiting, dizziness, lightheadedness. PHYSICAL EXAM  BP (!) 145/99   Pulse 88   Temp 99.1 °F (37.3 °C)   Resp 17   Ht 6' (1.829 m)   Wt 165 lb (74.8 kg)   SpO2 99%   BMI 22.38 kg/m²     On exam, the patient appears in no acute distress. Speech is clear. Breathing is unlabored.   Moves all extremities    Orders: X-ray of right foot, CBC, CMP, lactic acid        Shelton Wood DO  08/07/21 4375

## 2021-08-08 NOTE — CARE COORDINATION
CM received consult for patient currently in waiting room. CM met with patient to determine CM needs. CM introduced self and CM role. Patient states he presents to ER with c/o right foot wound. Patient states he has a history of toe amputations. Patient reports he is currently homeless. States he has been staying at the [facility]. Patient reports he has been in contact with a [facility] named sAhtyn Loera who has been paying the cost for patient to stay at the [facility]. CM spoke with patient in regards to [facility]. Patient states \"I can't do that\". Patient on the [facility] do not help list. Patient states he was advised by [facility] to seek treatment at the emergency department and request payment for [employer]. CM advised patient that CM does not provide hotel vouchers and hotel placement must go through [employer]. Patient states he will get in touch with Erika Briones, but Pao Rodriguez doesn't get paid until Friday\". Patient states he has the information to contact [employer] and has list of food pantries. Patient requesting telephone to contact Ashtyn Loera.

## 2021-08-08 NOTE — ED PROVIDER NOTES
Triage Chief Complaint:   Wound Check (right foot)      [place]:  Heidy Chawla is a 50 y.o. male that presents to the emergency department with wanting to have his right foot wound checked. He sees Dr. Carmen Washburn at the wound care center weekly and states he missed to this week's appointment because \"transportation did not show up to take him. \"  Patient states he has aching in his foot, 6 out of 10. Marely Pineda Denies any traumas. No falls. No fevers or chills. States not currently on any antibiotics. States Dr Snow Rivera had previously amputated several toes on this foot. Last seen on - note states wound is improving. He is on neurontin. Dr Carmen Washburn did small debridement. Picture in the chart from this visit. Past Medical History:   Diagnosis Date    Hypertension     does not take medications     Past Surgical History:   Procedure Laterality Date    FOOT DEBRIDEMENT Bilateral 2021    BILATERAL TOE  DEBRIDEMENT INCISION AND DRAINAGE, AMPUTATION OF LEFT 1ST, 2ND, 3RD AND TIP OF 4TH TOE AND RIGHT GREAT TOE performed by Angela Beltran MD at [place] [address] reviewed. No pertinent family history.   Social History     Socioeconomic History    Marital status: Single     Spouse name: Not on file    Number of children: Not on file    Years of education: Not on file    Highest education level: Not on file   Occupational History    Not on file   Tobacco Use    Smoking status: Former Smoker     Quit date: 2021     Years since quittin.5    Smokeless tobacco: Never Used   Vaping Use    Vaping Use: Never used   Substance and Sexual Activity    Alcohol use: Yes     Comment: most days    Drug use: No    Sexual activity: Not on file   Other Topics Concern    Not on file   Social History Narrative    Not on file     Social Determinants of Health     Financial Resource Strain:     Difficulty of Paying Living Expenses:    Food Insecurity:     Worried About Running Out of Food in the Last Year:     [place] [employer] Food in the Last Year:    Transportation Needs:     Lack of Transportation (Medical):  Lack of Transportation (Non-Medical):    Physical Activity:     Days of Exercise per Week:     Minutes of Exercise per Session:    Stress:     Feeling of Stress :    Social Connections:     Frequency of Communication with Friends and Family:     Frequency of Social Gatherings with Friends and Family:     Attends [employer] Services:     Active Member of Clubs or Organizations:     Attends Club or Organization Meetings:     Marital Status:    Intimate Partner Violence:     Fear of Current or Ex-Partner:     Emotionally Abused:     Physically Abused:     Sexually Abused:      No current facility-administered medications for this encounter. Current Outpatient Medications   Medication Sig Dispense Refill    oxyCODONE-acetaminophen (PERCOCET) 5-325 MG per tablet Take 1 tablet by mouth every 8 hours as needed for Pain.  gabapentin (NEURONTIN) 100 MG capsule Take 1 capsule by mouth 3 times daily for 20 days. 60 capsule 0    metoprolol tartrate (LOPRESSOR) 25 MG tablet Take 1 tablet by mouth 2 times daily 60 tablet 3    omeprazole (PRILOSEC) 20 MG delayed release capsule Take 20 mg by mouth daily      lisinopril (PRINIVIL;ZESTRIL) 20 MG tablet Take 20 mg by mouth daily      amLODIPine (NORVASC) 5 MG tablet Take 1 tablet by mouth daily 30 tablet 3     No Known Allergies  Nursing Notes Reviewed    ROS:  At least 10 systems reviewed and otherwise negative except as in the [place]. Physical Exam:  ED Triage Vitals [08/07/21 1749]   Enc Vitals Group      BP (!) 145/99      Pulse 88      Resp 17      Temp 99.1 °F (37.3 °C)      Temp src       SpO2 99 %      Weight 165 lb (74.8 kg)      Height 6' (1.829 m)      Head Circumference       Peak Flow       Pain Score       Pain Loc       Pain Edu? Excl. in [address]?       My pulse oximetry interpretation is which is within the normal range    GENERAL APPEARANCE: Awake and alert. Cooperative. No acute distress. HEAD: Normocephalic. Atraumatic. EYES: EOM's grossly intact. Sclera anicteric. ENT: Mucous membranes are moist. Tolerates saliva. No trismus. NECK: Supple. No meningismus. Trachea midline. HEART: RRR. Radial pulses 2+. LUNGS: Respirations unlabored. CTAB  ABDOMEN: Soft. Non-tender. No guarding or rebound. EXTREMITIES: No acute deformities. SKIN: Warm and dry. Bottom of right foot with chronic pressure ulcer. Wound is smaller than picture from previous visit from 7-30-21 that Dr. Rosendo Mcburney placed in the chart. No pustular drainage. Wound is moist.  NEUROLOGICAL: No gross facial drooping. Moves all 4 extremities spontaneously. PSYCHIATRIC: Normal mood.     I have reviewed and interpreted all of the currently available lab results from this visit (if applicable):  Results for orders placed or performed during the hospital encounter of 08/07/21   CBC auto diff   Result Value Ref Range    WBC 3.9 (L) 4.0 - 10.5 K/CU MM    RBC 4.91 4.6 - 6.2 M/CU MM    Hemoglobin 13.8 13.5 - 18.0 GM/DL    Hematocrit 43.2 42 - 52 %    MCV 88.0 78 - 100 FL    MCH 28.1 27 - 31 PG    MCHC 31.9 (L) 32.0 - 36.0 %    RDW 17.5 (H) 11.7 - 14.9 %    Platelets 542 934 - 349 K/CU MM    MPV 8.7 7.5 - 11.1 FL    Differential Type AUTOMATED DIFFERENTIAL     Segs Relative 38.5 36 - 66 %    Lymphocytes % 47.6 (H) 24 - 44 %    Monocytes % 11.0 (H) 0 - 4 %    Eosinophils % 1.8 0 - 3 %    Basophils % 0.8 0 - 1 %    Segs Absolute 1.5 K/CU MM    Lymphocytes Absolute 1.9 K/CU MM    Monocytes Absolute 0.4 K/CU MM    Eosinophils Absolute 0.1 K/CU MM    Basophils Absolute 0.0 K/CU MM    Nucleated RBC % 0.0 %    Total Nucleated RBC 0.0 K/CU MM    Total Immature Neutrophil 0.01 K/CU MM    Immature Neutrophil % 0.3 0 - 0.43 %   CMP   Result Value Ref Range    Sodium 138 135 - 145 MMOL/L    Potassium 3.6 3.5 - 5.1 MMOL/L    Chloride 99 99 - 110 mMol/L    CO2 28 21 - 32 MMOL/L    BUN 12 6 - 23 MG/DL    CREATININE 0.8 (L) 0.9 - 1.3 MG/DL    Glucose 81 70 - 99 MG/DL    Calcium 9.4 8.3 - 10.6 MG/DL    Albumin 4.7 3.4 - 5.0 GM/DL    Total Protein 8.7 (H) 6.4 - 8.2 GM/DL    Total Bilirubin 0.6 0.0 - 1.0 MG/DL    ALT 87 (H) 10 - 40 U/L    AST 87 (H) 15 - 37 IU/L    Alkaline Phosphatase 124 40 - 129 IU/L    GFR Non-African American >60 >60 mL/min/1.73m2    GFR African American >60 >60 mL/min/1.73m2    Anion Gap 11 4 - 16   Lactic Acid, Plasma   Result Value Ref Range    Lactate 0.9 0.4 - 2.0 mMOL/L        Radiographs:  [] Radiologist's Wet Read Report Reviewed:      XR FOOT RIGHT (MIN 3 VIEWS) (Final result)  Result time 08/07/21 20:25:33  Final result by Devan Burrell MD (08/07/21 20:25:33)                Impression:    No acute osseous abnormality right foot. Skin wound at the medial wall of right foot concerning for cellulitis. Follow-up imaging recommended if pain persists or worsens following   conservative management. Narrative:    EXAMINATION:   THREE XRAY VIEWS OF THE RIGHT FOOT     8/7/2021 7:43 pm     COMPARISON:   Right foot radiograph series 05/30/2020     HISTORY:   ORDERING SYSTEM PROVIDED HISTORY: R foot wound   TECHNOLOGIST PROVIDED HISTORY:   Reason for exam:->R foot wound   Reason for Exam: R foot wound   Acuity: Unknown   Type of Exam: Initial     FINDINGS:   Stable sequela from amputation at the interphalangeal joint right hallux. Mild soft tissue edema at the amputation site.  No soft tissue gas formation   or osseous erosion or destruction evident.  There appears to be a skin wound   at the ball of the foot medially. Osseous structures of the right foot appear intact and are aligned normally. Joint spaces appear well maintained. Soft tissues are unremarkable in   appearance.  No retained radiopaque foreign body.                    [] Discussed with Radiologist:     [] The following radiograph was interpreted by myself in the absence of a radiologist:     EKG: (All EKG's are interpreted by myself in the absence of a cardiologist)      MDM:  Patient afebrile. Heart rate in the 60s. Sats 100% on room air. Patient has a chronic wound to the bottom of his right foot that has been present for several months. Wound appears improved from last wound care visit a week ago. Dr. Aubree Hidalgo placed a photo in the chart. Patient was told to only wear 1 pair of socks because \"his feet have excessive sweating and moisture\" however patient continues to wear 2 pairs of socks. Wound is moist.  No pustular drainage. Given p.o. Norco for pain. X-ray shows no osseous abnormality. CBC normal white count of 3.9. Hemoglobin of 13.8. CMP normal electrolytes. Lactic acid within normal limits. Patient does not have any fevers. No drainage. Wound appears to be improving. Will discharge home to follow-up with his wound care doctor. .     Clinical Impression:  1. Ulcer of foot, chronic, right, with unspecified severity ([facility] [address]        Disposition Vitals:  [unfilled], [unfilled], [unfilled], [unfilled]    Disposition referral (if applicable):  Cali Pollard MD  [facility]   [address] [place] [address]  [phone]    Call   FOllow up with your wound care doctor      Disposition medications (if applicable):  New Prescriptions    No medications on file         (Please note that portions of this note may have been completed with a voice recognition program. Efforts were made to edit the dictations but occasionally words are mis-transcribed.)    MD Eliezer Reynoso MD  08/07/21 4891

## 2021-08-13 ENCOUNTER — HOSPITAL ENCOUNTER (OUTPATIENT)
Dept: WOUND CARE | Age: 46
Discharge: HOME | End: 2021-08-13
Payer: MEDICARE

## 2021-08-13 VITALS
HEART RATE: 95 BPM | TEMPERATURE: 96.8 F | DIASTOLIC BLOOD PRESSURE: 56 MMHG | RESPIRATION RATE: 16 BRPM | SYSTOLIC BLOOD PRESSURE: 94 MMHG

## 2021-08-13 DIAGNOSIS — L89.893 PRESSURE ULCER OF RIGHT FOOT, STAGE 3 (HCC): Primary | ICD-10-CM

## 2021-08-13 DIAGNOSIS — L89.893 PRESSURE ULCER OF TOE OF RIGHT FOOT, STAGE 3 (HCC): ICD-10-CM

## 2021-08-13 PROCEDURE — 11042 DBRDMT SUBQ TIS 1ST 20SQCM/<: CPT

## 2021-08-13 PROCEDURE — 11042 DBRDMT SUBQ TIS 1ST 20SQCM/<: CPT | Performed by: NURSE PRACTITIONER

## 2021-08-13 RX ORDER — LIDOCAINE HYDROCHLORIDE 20 MG/ML
JELLY TOPICAL ONCE
Status: CANCELLED | OUTPATIENT
Start: 2021-08-13 | End: 2021-08-13

## 2021-08-13 RX ORDER — BACITRACIN, NEOMYCIN, POLYMYXIN B 400; 3.5; 5 [USP'U]/G; MG/G; [USP'U]/G
OINTMENT TOPICAL ONCE
Status: CANCELLED | OUTPATIENT
Start: 2021-08-13 | End: 2021-08-13

## 2021-08-13 RX ORDER — LIDOCAINE 40 MG/G
CREAM TOPICAL ONCE
Status: CANCELLED | OUTPATIENT
Start: 2021-08-13 | End: 2021-08-13

## 2021-08-13 RX ORDER — GENTAMICIN SULFATE 1 MG/G
OINTMENT TOPICAL ONCE
Status: CANCELLED | OUTPATIENT
Start: 2021-08-13 | End: 2021-08-13

## 2021-08-13 RX ORDER — CLOBETASOL PROPIONATE 0.5 MG/G
OINTMENT TOPICAL ONCE
Status: CANCELLED | OUTPATIENT
Start: 2021-08-13 | End: 2021-08-13

## 2021-08-13 RX ORDER — LIDOCAINE 50 MG/G
OINTMENT TOPICAL ONCE
Status: CANCELLED | OUTPATIENT
Start: 2021-08-13 | End: 2021-08-13

## 2021-08-13 RX ORDER — LIDOCAINE HYDROCHLORIDE 40 MG/ML
SOLUTION TOPICAL ONCE
Status: CANCELLED | OUTPATIENT
Start: 2021-08-13 | End: 2021-08-13

## 2021-08-13 RX ORDER — BACITRACIN ZINC AND POLYMYXIN B SULFATE 500; 1000 [USP'U]/G; [USP'U]/G
OINTMENT TOPICAL ONCE
Status: CANCELLED | OUTPATIENT
Start: 2021-08-13 | End: 2021-08-13

## 2021-08-13 RX ORDER — GINSENG 100 MG
CAPSULE ORAL ONCE
Status: CANCELLED | OUTPATIENT
Start: 2021-08-13 | End: 2021-08-13

## 2021-08-13 RX ORDER — BETAMETHASONE DIPROPIONATE 0.05 %
OINTMENT (GRAM) TOPICAL ONCE
Status: CANCELLED | OUTPATIENT
Start: 2021-08-13 | End: 2021-08-13

## 2021-08-13 RX ORDER — LIDOCAINE HYDROCHLORIDE 40 MG/ML
SOLUTION TOPICAL ONCE
Status: DISCONTINUED | OUTPATIENT
Start: 2021-08-13 | End: 2021-08-14 | Stop reason: HOSPADM

## 2021-08-13 ASSESSMENT — PAIN DESCRIPTION - DESCRIPTORS: DESCRIPTORS: SHARP;SHOOTING

## 2021-08-13 ASSESSMENT — PAIN DESCRIPTION - ORIENTATION: ORIENTATION: RIGHT

## 2021-08-13 ASSESSMENT — PAIN - FUNCTIONAL ASSESSMENT: PAIN_FUNCTIONAL_ASSESSMENT: PREVENTS OR INTERFERES SOME ACTIVE ACTIVITIES AND ADLS

## 2021-08-13 ASSESSMENT — PAIN DESCRIPTION - PAIN TYPE: TYPE: CHRONIC PAIN

## 2021-08-13 ASSESSMENT — PAIN SCALES - GENERAL: PAINLEVEL_OUTOF10: 10

## 2021-08-13 ASSESSMENT — PAIN DESCRIPTION - PROGRESSION: CLINICAL_PROGRESSION: NOT CHANGED

## 2021-08-13 ASSESSMENT — PAIN DESCRIPTION - ONSET: ONSET: ON-GOING

## 2021-08-13 ASSESSMENT — PAIN DESCRIPTION - LOCATION: LOCATION: FOOT

## 2021-08-13 ASSESSMENT — PAIN DESCRIPTION - FREQUENCY: FREQUENCY: INTERMITTENT

## 2021-08-13 NOTE — PROGRESS NOTES
Wound Care Center Progress Note With Procedure    Risa Guerin  AGE: 50 y.o. GENDER: male  : 1975  EPISODE DATE:  2021     Subjective:     Chief Complaint   Patient presents with    Wound Check     BLE         HISTORY of PRESENT ILLNESS     Risa Guerin is a 50 y.o. male who presents today for wound evaluation of Chronic pressure ulcer(s) of the foot. The ulcer is of moderate severity. The underlying cause of the wound is probably due to pressure from walking and blistering. He has still significant sensitivity on his foot. No other issues. Wound is actually getting smaller. We discuss wearing only 1 pair of socks as his feet have excessive sweating and moisture . Patient did not make appointment last week due to ride issues. He went to the ER, where he received some lab tests and XR, all of which were (-) and unremarkable. He has not had a dressing on for about 3 days, and there is a foul odor and wound is macerated     Wound Pain Timing/Severity: none  Quality of pain: N/A  Severity of pain:  0 / 10   Modifying Factors: chronic pressure  Associated Signs/Symptoms: none        PAST MEDICAL HISTORY        Diagnosis Date    Hypertension     does not take medications       PAST SURGICAL HISTORY    Past Surgical History:   Procedure Laterality Date    FOOT DEBRIDEMENT Bilateral 2021    BILATERAL TOE  DEBRIDEMENT INCISION AND DRAINAGE, AMPUTATION OF LEFT 1ST, 2ND, 3RD AND TIP OF 4TH TOE AND RIGHT GREAT TOE performed by Tj Dubois MD at [address] [place] [address]    History reviewed. No pertinent family history.     SOCIAL HISTORY    Social History     Tobacco Use    Smoking status: Former Smoker     Quit date: 2021     Years since quittin.6    Smokeless tobacco: Never Used   Vaping Use    Vaping Use: Never used   Substance Use Topics    Alcohol use: Yes     Comment: most days    Drug use: No       ALLERGIES    No Known Allergies    MEDICATIONS    Current Outpatient Medications on File Prior to Encounter   Medication Sig Dispense Refill    metoprolol tartrate (LOPRESSOR) 25 MG tablet Take 1 tablet by mouth 2 times daily 60 tablet 3    omeprazole (PRILOSEC) 20 MG delayed release capsule Take 20 mg by mouth daily      lisinopril (PRINIVIL;ZESTRIL) 20 MG tablet Take 20 mg by mouth daily      amLODIPine (NORVASC) 5 MG tablet Take 1 tablet by mouth daily 30 tablet 3    oxyCODONE-acetaminophen (PERCOCET) 5-325 MG per tablet Take 1 tablet by mouth every 8 hours as needed for Pain.  gabapentin (NEURONTIN) 100 MG capsule Take 1 capsule by mouth 3 times daily for 20 days. 60 capsule 0     No current facility-administered medications on file prior to encounter. REVIEW OF SYSTEMS    Pertinent items are noted in HPI. Constitutional: Negative for systemic symptoms including fever, chills and malaise. Objective:      BP (!) 94/56   Pulse 95   Temp 96.8 °F (36 °C) (Temporal)   Resp 16     PHYSICAL EXAM      General: The patient is in no acute distress. Mental status:  Patient is appropriate, is  oriented to place and plan of care.   Dermatologic exam: Visual inspection of the periwound reveals the skin to be moist and clammy  Wound exam: see wound description below in procedure note      Assessment:     Problem List Items Addressed This Visit     WD-Pressure ulcer of right foot, stage 3 (HCC) - Primary    Relevant Medications    lidocaine (XYLOCAINE) 4 % external solution    Other Relevant Orders    Initiate Outpatient Wound Care Protocol    WD-Pressure ulcer of toe of right foot, stage 3 (HCC)    Relevant Medications    lidocaine (XYLOCAINE) 4 % external solution    Other Relevant Orders    Initiate Outpatient Wound Care Protocol        Procedure Note    Indications:  Based on my examination of this patient's wound(s) today, sharp excision into necrotic subcutaneous tissue is required to promote healing and evaluate the extent of previous healing. Performed by: GEN Hugo CNP    Consent obtained: Yes    Time out taken:  Yes    Pain Control: Anesthetic  Anesthetic: 5% Lidocaine Ointment Topical     Debridement:Excisional Debridement    Using curette the wound(s) was/were sharply debrided down through and including the removal of subcutaneous tissue. Devitalized Tissue Debrided:  slough and callus    Pre Debridement Measurements:  Are located in the Wound Documentation Flow Sheet    All active wounds listed below with today's date are evaluated  Wound(s)    debrided this date include # : 1     Post  Debridement Measurements:  Wound 06/18/21 Right Plantar # 1 (Active)   Wound Image   07/23/21 1415   Wound Etiology Pressure Stage  3 08/13/21 1147   Dressing Status New dressing applied 07/23/21 1457   Wound Cleansed Soap and water; Wound cleanser 08/13/21 1147   Dressing/Treatment Silver dressing;Alginate;ABD 07/16/21 1714   Offloading for Diabetic Foot Ulcers Forefoot offloading shoe 08/13/21 1147   Wound Length (cm) 1.5 cm 08/13/21 1147   Wound Width (cm) 2.4 cm 08/13/21 1147   Wound Depth (cm) 0.3 cm 08/13/21 1147   Wound Surface Area (cm^2) 3.6 cm^2 08/13/21 1147   Change in Wound Size % (l*w) 79.43 08/13/21 1147   Wound Volume (cm^3) 1.08 cm^3 08/13/21 1147   Wound Healing % 38 08/13/21 1147   Post-Procedure Length (cm) 1.5 cm 08/13/21 1202   Post-Procedure Width (cm) 2.4 cm 08/13/21 1202   Post-Procedure Depth (cm) 0.2 cm 08/13/21 1202   Post-Procedure Surface Area (cm^2) 3.6 cm^2 08/13/21 1202   Post-Procedure Volume (cm^3) 0.72 cm^3 08/13/21 1202   Distance Tunneling (cm) 0 cm 08/13/21 1147   Tunneling Position ___ O'Clock 0 08/13/21 1147   Undermining Starts ___ O'Clock 0 08/13/21 1147   Undermining Ends___ O'Clock 0 08/13/21 1147   Undermining Maxium Distance (cm) 0 08/13/21 1147   Wound Assessment Granulation tissue 08/13/21 1147   Drainage Amount Other (Comment) 08/13/21 1147   Drainage Description Serosanguinous 07/30/21 1306   Odor Mild 08/13/21 1147   Magi-wound Assessment Maceration; Hyperkeratosis (callous) 08/13/21 1147   Margins Defined edges; Unattached edges 08/13/21 1147   Wound Thickness Description not for Pressure Injury Full thickness 08/13/21 1147   Number of days: 56       Percent of Wound(s) Debrided: approximately 100%    Total  Area  Debrided:  3.6 sq cm     Bleeding:  None    Hemostasis Achieved:  not needed    Procedural Pain:  0  / 10     Post Procedural Pain:  0 / 10     Response to treatment:  Well tolerated by patient. Status of wound progress and description from last visit:   Wound is somehow improved. We will continue to prescribe patient opioid pain medication at this time. Patient understands all side effects and contraindications of opioids. No indication of abuse or seeking behavior. OARRS report checked at this time. We will continue to monitor. Plan:       Discharge Instructions         PHYSICIAN ORDERS AND DISCHARGE INSTRUCTIONS     NOTE: Upon discharge from the [address], you will receive a patient experience survey. We would be grateful if you would take the time to fill this survey out.     Wound care order history:                 [facility]'s   Arterial studies on 12/26/2020              Cultures: Obtained 6/18/2021                Grafts:                Antibiotics:           Continuing wound care orders and information:                 Residence:                Continue home health care with:               Your wound-care supplies will be provided by:      Wound cleansing:               WS not scrub or use excessive force.              Wash hands with soap and water before and after dressing changes.               XOTAT to applying a clean dressing, cleanse wound with normal saline, wound cleanser, or mild soap and water.               Ask the physician or nurse before getting the wound(s) wet in a shower     Daily Wound management:              Keep weight off wounds and reposition every

## 2021-08-14 ENCOUNTER — HOSPITAL ENCOUNTER (EMERGENCY)
Age: 46
Discharge: HOME | End: 2021-08-14
Payer: MEDICARE

## 2021-08-14 VITALS
HEIGHT: 72 IN | HEART RATE: 90 BPM | BODY MASS INDEX: 23.03 KG/M2 | TEMPERATURE: 97.9 F | WEIGHT: 170 LBS | RESPIRATION RATE: 16 BRPM | DIASTOLIC BLOOD PRESSURE: 95 MMHG | SYSTOLIC BLOOD PRESSURE: 125 MMHG | OXYGEN SATURATION: 94 %

## 2021-08-14 DIAGNOSIS — L97.512 PLANTAR ULCER OF RIGHT FOOT WITH FAT LAYER EXPOSED (HCC): Primary | ICD-10-CM

## 2021-08-14 PROCEDURE — 99285 EMERGENCY DEPT VISIT HI MDM: CPT

## 2021-08-14 PROCEDURE — 6370000000 HC RX 637 (ALT 250 FOR IP): Performed by: PHYSICIAN ASSISTANT

## 2021-08-14 RX ORDER — TRAMADOL HYDROCHLORIDE 50 MG/1
50 TABLET ORAL ONCE
Status: COMPLETED | OUTPATIENT
Start: 2021-08-14 | End: 2021-08-14

## 2021-08-14 RX ADMIN — TRAMADOL HYDROCHLORIDE 50 MG: 50 TABLET, FILM COATED ORAL at 03:32

## 2021-08-14 ASSESSMENT — PAIN DESCRIPTION - ORIENTATION: ORIENTATION: RIGHT

## 2021-08-14 ASSESSMENT — PAIN SCALES - GENERAL
PAINLEVEL_OUTOF10: 10
PAINLEVEL_OUTOF10: 10
PAINLEVEL_OUTOF10: 4

## 2021-08-14 ASSESSMENT — PAIN DESCRIPTION - LOCATION: LOCATION: FOOT

## 2021-08-14 NOTE — ED NOTES
Bed: ED-27  Expected date:   Expected time:   Means of arrival:   Comments:  EMS     Grant Jacob RN  08/14/21 4132

## 2021-08-14 NOTE — ED PROVIDER NOTES
Triage Chief Complaint:   Foot Pain and Wound Check    [place]:  Today in the ED I had the pleasure of caring for Prabhu Mota who is a 50 y.o. male that presents today to the emergency department for wound check. Right-sided foot pain. Context is patient has a history of chronic foot ulcers. Secondary to pressure ulcers homelessness. Wet socks and trench foot. In the past.  He has had multiple debridements in the past.  Has a pressure ulcer on the plantar surface of his right foot. That was debrided today. Had wound check today at the wound center. Per the wound care nurse. \"Patient's wound is much improved\". Patient endorses associated pain. Particular with ambulation. Also states that his socks are wet. Denies fever chills nausea vomit diarrhea. Abdominal pain flank pain chest pain hip pain headache. Denies any other symptoms or complaints of pain anywhere else in the body. Patient does endorse having several drinks of alcohol today. Endorses being slightly inebriated. ROS:  REVIEW OF SYSTEMS    At least 10 systems reviewed      All other review of systems are negative  See HPI and nursing notes for additional information       Past Medical History:   Diagnosis Date    Hypertension     does not take medications     Past Surgical History:   Procedure Laterality Date    FOOT DEBRIDEMENT Bilateral 2021    BILATERAL TOE  DEBRIDEMENT INCISION AND DRAINAGE, AMPUTATION OF LEFT 1ST, 2ND, 3RD AND TIP OF 4TH TOE AND RIGHT GREAT TOE performed by Darryle Lobos, MD at [place] [address] reviewed. No pertinent family history.   Social History     Socioeconomic History    Marital status: Single     Spouse name: Not on file    Number of children: Not on file    Years of education: Not on file    Highest education level: Not on file   Occupational History    Not on file   Tobacco Use    Smoking status: Former Smoker     Quit date: 2021     Years since quittin.6    Smokeless tobacco: Never Used   Vaping Use    Vaping Use: Never used   Substance and Sexual Activity    Alcohol use: Yes     Comment: most days    Drug use: No    Sexual activity: Not on file   Other Topics Concern    Not on file   Social History Narrative    Not on file     Social Determinants of Health     Financial Resource Strain:     Difficulty of Paying Living Expenses:    Food Insecurity:     Worried About Running Out of Food in the Last Year:     [address] in the Last Year:    Transportation Needs:     Lack of Transportation (Medical):  Lack of Transportation (Non-Medical):    Physical Activity:     Days of Exercise per Week:     Minutes of Exercise per Session:    Stress:     Feeling of Stress :    Social Connections:     Frequency of Communication with Friends and Family:     Frequency of Social Gatherings with Friends and Family:     Attends [employer] Services:     Active Member of Clubs or Organizations:     Attends Club or Organization Meetings:     Marital Status:    Intimate Partner Violence:     Fear of Current or Ex-Partner:     Emotionally Abused:     Physically Abused:     Sexually Abused:      No current facility-administered medications for this encounter. Current Outpatient Medications   Medication Sig Dispense Refill    oxyCODONE-acetaminophen (PERCOCET) 5-325 MG per tablet Take 1 tablet by mouth every 8 hours as needed for Pain.  gabapentin (NEURONTIN) 100 MG capsule Take 1 capsule by mouth 3 times daily for 20 days.  60 capsule 0    metoprolol tartrate (LOPRESSOR) 25 MG tablet Take 1 tablet by mouth 2 times daily 60 tablet 3    omeprazole (PRILOSEC) 20 MG delayed release capsule Take 20 mg by mouth daily      lisinopril (PRINIVIL;ZESTRIL) 20 MG tablet Take 20 mg by mouth daily      amLODIPine (NORVASC) 5 MG tablet Take 1 tablet by mouth daily 30 tablet 3     No Known Allergies    Nursing Notes Reviewed    Physical Exam:  ED Triage Vitals [08/14/21 0300]   Enc Vitals Group      BP (!) 138/109      Pulse 105      Resp 17      Temp 98 °F (36.7 °C)      Temp Source Oral      SpO2 95 %      Weight 170 lb (77.1 kg)      Height 6' (1.829 m)      Head Circumference       Peak Flow       Pain Score       Pain Loc       Pain Edu? Excl. in [address]? General :Patient is awake alert oriented person place and time no acute distress nontoxic appearing  HEENT: Pupils are equally round and reactive to light extraocular motors are intact conjunctivae clear sclerae white there is no injection no icterus. Nose without any rhinorrhea or epistaxis. Oral mucosa is moist no exudate buccal mucosa shows no ulcerations. Uvula is midline    Neck: Neck is supple full range of motion trachea midline thyroid nonpalpable  Cardiac: Heart regular rate rhythm no murmurs rubs clicks or gallops  Lungs: Lungs are clear to auscultation there is no wheezing rhonchi or rales. There is no use of accessory muscles no nasal flaring identified. Chest wall: There is no tenderness to palpation over the chest wall or over ribs  Abdomen: Abdomen is soft nontender nondistended. There is no firm or pulsatile masses no rebound rigidity or guarding negative Mckeon's negative McBurney, no peritoneal signs  Suprapubic:  there is no tenderness to palpation over the external bladder   Musculoskeletal: 5 out of 5 strength in all 4 extremities full flexion extension abduction and adduction supination pronation of all extremities and all digits. No obvious muscle atrophy is noted. No focal muscle deficits are appreciated  Dermatology: Skin is warm and dry there is no obvious abscesses lacerations. Plantar surface of his patient's right foot. There is a grade 2 pressure ulcer quarter size noted. Just on the plantar surface of the pad of patient's foot without any discharge erythema. Minimal tenderness palpation noted. No necrosis. No eschar noted.   Psych: Mentation is grossly normal cognition is grossly normal. Affect is appropriate  Neuro: Motor intact sensory intact cranial nerves II through XII are intact level of consciousness is normal cerebellar function is normal reflexes are grossly normal. No evidence of incontinence or loss of bowel or bladder no saddle anesthesia noted Lymphatic: There is no submandibular or cervical adenopathy appreciated. I have reviewed and interpreted all of the currently available lab results from this visit (if applicable):  No results found for this visit on 08/14/21. Radiographs (if obtained):  [] The following radiograph was interpreted by myself in the absence of a radiologist:   [] Radiologist's Report Reviewed:  No orders to display       EKG (if obtained):   Please See Note of attending physician for EKG interpretation. Chart review shows recent radiograph(s):  XR FOOT RIGHT (MIN 3 VIEWS)    Result Date: 8/7/2021  EXAMINATION: THREE XRAY VIEWS OF THE RIGHT FOOT 8/7/2021 7:43 pm COMPARISON: Right foot radiograph series 05/30/2020 HISTORY: ORDERING SYSTEM PROVIDED HISTORY: R foot wound TECHNOLOGIST PROVIDED HISTORY: Reason for exam:->R foot wound Reason for Exam: R foot wound Acuity: Unknown Type of Exam: Initial FINDINGS: Stable sequela from amputation at the interphalangeal joint right hallux. Mild soft tissue edema at the amputation site. No soft tissue gas formation or osseous erosion or destruction evident. There appears to be a skin wound at the ball of the foot medially. Osseous structures of the right foot appear intact and are aligned normally. Joint spaces appear well maintained. Soft tissues are unremarkable in appearance. No retained radiopaque foreign body. No acute osseous abnormality right foot. Skin wound at the medial wall of right foot concerning for cellulitis. Follow-up imaging recommended if pain persists or worsens following conservative management.        MDM:     Interventions given this visit: No orders of the defined types were placed in this encounter. Appearing patient presents today to the ED. With wound check. Analgesics is provided. Wound care is provided. Dry socks were provided. Patient is educated to follow-up with wound care as he has been doing. Compared to last office on patient's foot. His ulcer looks significantly improved. As wound care has been so far effective. I have low suspicion of deep space infection. Systemic infection. Patient was a bit tachycardic upon arrival.  Do believe this is secondary to patient's inebriated status. Patient is educated on return precautions. Wound care is provided as well as wound care materials. Analgesics single dose here in the ED     I independently managed patient today in the ED    BP (!) 138/109   Pulse 94   Temp 98 °F (36.7 °C) (Oral)   Resp 17   Ht 6' (1.829 m)   Wt 170 lb (77.1 kg)   SpO2 95%   BMI 23.06 kg/m²       Clinical Impression:  1. Plantar ulcer of right foot with fat layer exposed ([address]        Disposition referral (if applicable):  [phone]  In 1 week      Disposition medications (if applicable):  New Prescriptions    No medications on file         Comment: Please note this report has been produced using speech recognition software and may contain errors related to that system including errors in grammar, punctuation, and spelling, as well as words and phrases that may be inappropriate. If there are any questions or concerns please feel free to contact the dictating provider for clarification.       Prem Goldenight, [address] [place], [place]  08/14/21 8937

## 2021-08-14 NOTE — ED TRIAGE NOTES
Patient in [address] [place] [address] via EMS with complaints of right foot pain due to a wound following a toe amputation in December 2020.

## 2021-08-14 NOTE — ED NOTES
Reviewed AVS with patient who verbalized understanding. Discharged patient.      Lester Blunt RN  08/14/21 1812

## 2021-08-20 ENCOUNTER — HOSPITAL ENCOUNTER (OUTPATIENT)
Dept: WOUND CARE | Age: 46
Discharge: HOME | End: 2021-08-20
Payer: MEDICARE

## 2021-08-20 VITALS — TEMPERATURE: 96.4 F | RESPIRATION RATE: 17 BRPM

## 2021-08-20 DIAGNOSIS — L89.893 PRESSURE ULCER OF RIGHT FOOT, STAGE 3 (HCC): Primary | ICD-10-CM

## 2021-08-20 DIAGNOSIS — L89.893 PRESSURE ULCER OF TOE OF RIGHT FOOT, STAGE 3 (HCC): ICD-10-CM

## 2021-08-20 PROCEDURE — 11042 DBRDMT SUBQ TIS 1ST 20SQCM/<: CPT

## 2021-08-20 PROCEDURE — 11042 DBRDMT SUBQ TIS 1ST 20SQCM/<: CPT | Performed by: NURSE PRACTITIONER

## 2021-08-20 RX ORDER — BACITRACIN, NEOMYCIN, POLYMYXIN B 400; 3.5; 5 [USP'U]/G; MG/G; [USP'U]/G
OINTMENT TOPICAL ONCE
Status: CANCELLED | OUTPATIENT
Start: 2021-08-20 | End: 2021-08-20

## 2021-08-20 RX ORDER — LIDOCAINE HYDROCHLORIDE 20 MG/ML
JELLY TOPICAL ONCE
Status: CANCELLED | OUTPATIENT
Start: 2021-08-20 | End: 2021-08-20

## 2021-08-20 RX ORDER — BACITRACIN ZINC AND POLYMYXIN B SULFATE 500; 1000 [USP'U]/G; [USP'U]/G
OINTMENT TOPICAL ONCE
Status: CANCELLED | OUTPATIENT
Start: 2021-08-20 | End: 2021-08-20

## 2021-08-20 RX ORDER — LIDOCAINE 50 MG/G
OINTMENT TOPICAL ONCE
Status: CANCELLED | OUTPATIENT
Start: 2021-08-20 | End: 2021-08-20

## 2021-08-20 RX ORDER — CLOBETASOL PROPIONATE 0.5 MG/G
OINTMENT TOPICAL ONCE
Status: CANCELLED | OUTPATIENT
Start: 2021-08-20 | End: 2021-08-20

## 2021-08-20 RX ORDER — GENTAMICIN SULFATE 1 MG/G
OINTMENT TOPICAL ONCE
Status: CANCELLED | OUTPATIENT
Start: 2021-08-20 | End: 2021-08-20

## 2021-08-20 RX ORDER — GENTAMICIN SULFATE 1 MG/G
OINTMENT TOPICAL ONCE
Status: DISCONTINUED | OUTPATIENT
Start: 2021-08-20 | End: 2021-08-21 | Stop reason: HOSPADM

## 2021-08-20 RX ORDER — GINSENG 100 MG
CAPSULE ORAL ONCE
Status: CANCELLED | OUTPATIENT
Start: 2021-08-20 | End: 2021-08-20

## 2021-08-20 RX ORDER — LIDOCAINE HYDROCHLORIDE 40 MG/ML
SOLUTION TOPICAL ONCE
Status: DISCONTINUED | OUTPATIENT
Start: 2021-08-20 | End: 2021-08-21 | Stop reason: HOSPADM

## 2021-08-20 RX ORDER — LIDOCAINE 40 MG/G
CREAM TOPICAL ONCE
Status: CANCELLED | OUTPATIENT
Start: 2021-08-20 | End: 2021-08-20

## 2021-08-20 RX ORDER — BETAMETHASONE DIPROPIONATE 0.05 %
OINTMENT (GRAM) TOPICAL ONCE
Status: CANCELLED | OUTPATIENT
Start: 2021-08-20 | End: 2021-08-20

## 2021-08-20 RX ORDER — LIDOCAINE HYDROCHLORIDE 40 MG/ML
SOLUTION TOPICAL ONCE
Status: CANCELLED | OUTPATIENT
Start: 2021-08-20 | End: 2021-08-20

## 2021-08-20 ASSESSMENT — PAIN DESCRIPTION - FREQUENCY: FREQUENCY: INTERMITTENT

## 2021-08-20 ASSESSMENT — PAIN DESCRIPTION - PROGRESSION: CLINICAL_PROGRESSION: NOT CHANGED

## 2021-08-20 ASSESSMENT — PAIN DESCRIPTION - PAIN TYPE: TYPE: CHRONIC PAIN

## 2021-08-20 ASSESSMENT — PAIN - FUNCTIONAL ASSESSMENT: PAIN_FUNCTIONAL_ASSESSMENT: PREVENTS OR INTERFERES SOME ACTIVE ACTIVITIES AND ADLS

## 2021-08-20 ASSESSMENT — PAIN DESCRIPTION - ORIENTATION: ORIENTATION: RIGHT

## 2021-08-20 ASSESSMENT — PAIN DESCRIPTION - LOCATION: LOCATION: FOOT

## 2021-08-20 ASSESSMENT — PAIN DESCRIPTION - DESCRIPTORS: DESCRIPTORS: SHARP;SHOOTING

## 2021-08-20 ASSESSMENT — PAIN SCALES - GENERAL: PAINLEVEL_OUTOF10: 5

## 2021-08-20 ASSESSMENT — PAIN DESCRIPTION - ONSET: ONSET: ON-GOING

## 2021-08-24 ENCOUNTER — HOSPITAL ENCOUNTER (OUTPATIENT)
Dept: WOUND CARE | Age: 46
Discharge: HOME | End: 2021-08-24
Payer: MEDICARE

## 2021-08-24 VITALS
TEMPERATURE: 97.9 F | HEART RATE: 101 BPM | SYSTOLIC BLOOD PRESSURE: 154 MMHG | RESPIRATION RATE: 18 BRPM | DIASTOLIC BLOOD PRESSURE: 109 MMHG

## 2021-08-24 DIAGNOSIS — L89.893 PRESSURE ULCER OF TOE OF RIGHT FOOT, STAGE 3 (HCC): ICD-10-CM

## 2021-08-24 DIAGNOSIS — L89.893 PRESSURE ULCER OF RIGHT FOOT, STAGE 3 (HCC): Primary | ICD-10-CM

## 2021-08-24 PROCEDURE — 11042 DBRDMT SUBQ TIS 1ST 20SQCM/<: CPT | Performed by: NURSE PRACTITIONER

## 2021-08-24 PROCEDURE — 11042 DBRDMT SUBQ TIS 1ST 20SQCM/<: CPT

## 2021-08-24 RX ORDER — LIDOCAINE HYDROCHLORIDE 20 MG/ML
JELLY TOPICAL ONCE
Status: CANCELLED | OUTPATIENT
Start: 2021-08-24 | End: 2021-08-24

## 2021-08-24 RX ORDER — BACITRACIN ZINC AND POLYMYXIN B SULFATE 500; 1000 [USP'U]/G; [USP'U]/G
OINTMENT TOPICAL ONCE
Status: CANCELLED | OUTPATIENT
Start: 2021-08-24 | End: 2021-08-24

## 2021-08-24 RX ORDER — LIDOCAINE HYDROCHLORIDE 40 MG/ML
SOLUTION TOPICAL ONCE
Status: CANCELLED | OUTPATIENT
Start: 2021-08-24 | End: 2021-08-24

## 2021-08-24 RX ORDER — GINSENG 100 MG
CAPSULE ORAL ONCE
Status: CANCELLED | OUTPATIENT
Start: 2021-08-24 | End: 2021-08-24

## 2021-08-24 RX ORDER — CLOBETASOL PROPIONATE 0.5 MG/G
OINTMENT TOPICAL ONCE
Status: CANCELLED | OUTPATIENT
Start: 2021-08-24 | End: 2021-08-24

## 2021-08-24 RX ORDER — LIDOCAINE 40 MG/G
CREAM TOPICAL ONCE
Status: CANCELLED | OUTPATIENT
Start: 2021-08-24 | End: 2021-08-24

## 2021-08-24 RX ORDER — GENTAMICIN SULFATE 1 MG/G
OINTMENT TOPICAL ONCE
Status: CANCELLED | OUTPATIENT
Start: 2021-08-24 | End: 2021-08-24

## 2021-08-24 RX ORDER — BETAMETHASONE DIPROPIONATE 0.05 %
OINTMENT (GRAM) TOPICAL ONCE
Status: CANCELLED | OUTPATIENT
Start: 2021-08-24 | End: 2021-08-24

## 2021-08-24 RX ORDER — BACITRACIN, NEOMYCIN, POLYMYXIN B 400; 3.5; 5 [USP'U]/G; MG/G; [USP'U]/G
OINTMENT TOPICAL ONCE
Status: CANCELLED | OUTPATIENT
Start: 2021-08-24 | End: 2021-08-24

## 2021-08-24 RX ORDER — LIDOCAINE 50 MG/G
OINTMENT TOPICAL ONCE
Status: DISCONTINUED | OUTPATIENT
Start: 2021-08-24 | End: 2021-08-25 | Stop reason: HOSPADM

## 2021-08-24 RX ORDER — LIDOCAINE 50 MG/G
OINTMENT TOPICAL ONCE
Status: CANCELLED | OUTPATIENT
Start: 2021-08-24 | End: 2021-08-24

## 2021-08-24 ASSESSMENT — PAIN SCALES - GENERAL: PAINLEVEL_OUTOF10: 0

## 2021-08-24 NOTE — PROGRESS NOTES
Multilayer Compression Wrap   (Not Unna) Below the Knee    NAME:  Keeley Farnsworth  YOB: 1975  MEDICAL RECORD NUMBER:  [id number]  DATE:  8/24/2021    Multilayer compression wrap: Removed old Multilayer wrap if indicated and wash leg with mild soap/water. Applied moisturizing agent to dry skin as needed. Applied primary and secondary dressing as ordered. Applied multilayered dressing below the knee to right lower leg. Instructed patient/caregiver not to remove dressing and to keep it clean and dry. Instructed patient/caregiver on complications to report to provider, such as pain, numbness in toes, heavy drainage, and slippage of dressing. Instructed patient on purpose of compression dressing and on activity and exercise recommendations.       Electronically signed by Mikal Pathak RN on 8/24/2021 at 2:33 PM

## 2021-08-25 ENCOUNTER — HOSPITAL ENCOUNTER (OUTPATIENT)
Age: 46
Setting detail: OBSERVATION
Discharge: HOME | End: 2021-08-28
Attending: EMERGENCY MEDICINE
Payer: MEDICARE

## 2021-08-25 DIAGNOSIS — R22.0 FACIAL SWELLING: ICD-10-CM

## 2021-08-25 DIAGNOSIS — T78.3XXA ANGIOEDEMA, INITIAL ENCOUNTER: Primary | ICD-10-CM

## 2021-08-25 PROBLEM — T46.4X5A ANGIOEDEMA DUE TO ANGIOTENSIN CONVERTING ENZYME INHIBITOR (ACE-I): Status: ACTIVE | Noted: 2021-08-25

## 2021-08-25 LAB
ALBUMIN SERPL-MCNC: 4.2 GM/DL (ref 3.4–5)
ALP BLD-CCNC: 97 IU/L (ref 40–129)
ALT SERPL-CCNC: 67 U/L (ref 10–40)
ANION GAP SERPL CALCULATED.3IONS-SCNC: 18 MMOL/L (ref 4–16)
AST SERPL-CCNC: 71 IU/L (ref 15–37)
BASOPHILS ABSOLUTE: 0 K/CU MM
BASOPHILS RELATIVE PERCENT: 0.7 % (ref 0–1)
BILIRUB SERPL-MCNC: 1.1 MG/DL (ref 0–1)
BUN BLDV-MCNC: 28 MG/DL (ref 6–23)
CALCIUM SERPL-MCNC: 9.4 MG/DL (ref 8.3–10.6)
CHLORIDE BLD-SCNC: 93 MMOL/L (ref 99–110)
CO2: 17 MMOL/L (ref 21–32)
CREAT SERPL-MCNC: 1.6 MG/DL (ref 0.9–1.3)
DIFFERENTIAL TYPE: ABNORMAL
EOSINOPHILS ABSOLUTE: 0 K/CU MM
EOSINOPHILS RELATIVE PERCENT: 1 % (ref 0–3)
GFR AFRICAN AMERICAN: 56 ML/MIN/1.73M2
GFR NON-AFRICAN AMERICAN: 47 ML/MIN/1.73M2
GLUCOSE BLD-MCNC: 91 MG/DL (ref 70–99)
HCT VFR BLD CALC: 45.6 % (ref 42–52)
HEMOGLOBIN: 14.2 GM/DL (ref 13.5–18)
IMMATURE NEUTROPHIL %: 0.5 % (ref 0–0.43)
LYMPHOCYTES ABSOLUTE: 1.5 K/CU MM
LYMPHOCYTES RELATIVE PERCENT: 36.2 % (ref 24–44)
MCH RBC QN AUTO: 28.9 PG (ref 27–31)
MCHC RBC AUTO-ENTMCNC: 31.1 % (ref 32–36)
MCV RBC AUTO: 92.9 FL (ref 78–100)
MONOCYTES ABSOLUTE: 0.7 K/CU MM
MONOCYTES RELATIVE PERCENT: 15.9 % (ref 0–4)
NUCLEATED RBC %: 0 %
PDW BLD-RTO: 16.8 % (ref 11.7–14.9)
PLATELET # BLD: 205 K/CU MM (ref 140–440)
PMV BLD AUTO: 9.2 FL (ref 7.5–11.1)
POTASSIUM SERPL-SCNC: 4.2 MMOL/L (ref 3.5–5.1)
RBC # BLD: 4.91 M/CU MM (ref 4.6–6.2)
REASON FOR REJECTION: NORMAL
REJECTED TEST: NORMAL
SEGMENTED NEUTROPHILS ABSOLUTE COUNT: 1.9 K/CU MM
SEGMENTED NEUTROPHILS RELATIVE PERCENT: 45.7 % (ref 36–66)
SODIUM BLD-SCNC: 128 MMOL/L (ref 135–145)
TOTAL IMMATURE NEUTOROPHIL: 0.02 K/CU MM
TOTAL NUCLEATED RBC: 0 K/CU MM
TOTAL PROTEIN: 8.5 GM/DL (ref 6.4–8.2)
WBC # BLD: 4.1 K/CU MM (ref 4–10.5)

## 2021-08-25 PROCEDURE — 99285 EMERGENCY DEPT VISIT HI MDM: CPT

## 2021-08-25 PROCEDURE — 6370000000 HC RX 637 (ALT 250 FOR IP): Performed by: NURSE PRACTITIONER

## 2021-08-25 PROCEDURE — 2500000003 HC RX 250 WO HCPCS: Performed by: INTERNAL MEDICINE

## 2021-08-25 PROCEDURE — 80074 ACUTE HEPATITIS PANEL: CPT

## 2021-08-25 PROCEDURE — 6360000002 HC RX W HCPCS: Performed by: EMERGENCY MEDICINE

## 2021-08-25 PROCEDURE — G0378 HOSPITAL OBSERVATION PER HR: HCPCS

## 2021-08-25 PROCEDURE — 85025 COMPLETE CBC W/AUTO DIFF WBC: CPT

## 2021-08-25 PROCEDURE — 6360000002 HC RX W HCPCS: Performed by: NURSE PRACTITIONER

## 2021-08-25 PROCEDURE — 96376 TX/PRO/DX INJ SAME DRUG ADON: CPT

## 2021-08-25 PROCEDURE — 6360000002 HC RX W HCPCS: Performed by: INTERNAL MEDICINE

## 2021-08-25 PROCEDURE — 96372 THER/PROPH/DIAG INJ SC/IM: CPT

## 2021-08-25 PROCEDURE — 96375 TX/PRO/DX INJ NEW DRUG ADDON: CPT

## 2021-08-25 PROCEDURE — 80053 COMPREHEN METABOLIC PANEL: CPT

## 2021-08-25 PROCEDURE — 87389 HIV-1 AG W/HIV-1&-2 AB AG IA: CPT

## 2021-08-25 PROCEDURE — 2500000003 HC RX 250 WO HCPCS: Performed by: EMERGENCY MEDICINE

## 2021-08-25 PROCEDURE — 96374 THER/PROPH/DIAG INJ IV PUSH: CPT

## 2021-08-25 RX ORDER — SODIUM CHLORIDE 0.9 % (FLUSH) 0.9 %
5-40 SYRINGE (ML) INJECTION EVERY 12 HOURS SCHEDULED
Status: DISCONTINUED | OUTPATIENT
Start: 2021-08-25 | End: 2021-08-28 | Stop reason: HOSPADM

## 2021-08-25 RX ORDER — METOPROLOL TARTRATE 50 MG/1
25 TABLET, FILM COATED ORAL 2 TIMES DAILY
Status: DISCONTINUED | OUTPATIENT
Start: 2021-08-25 | End: 2021-08-28 | Stop reason: HOSPADM

## 2021-08-25 RX ORDER — SODIUM CHLORIDE 9 MG/ML
INJECTION, SOLUTION INTRAVENOUS CONTINUOUS
Status: DISCONTINUED | OUTPATIENT
Start: 2021-08-25 | End: 2021-08-27

## 2021-08-25 RX ORDER — METHYLPREDNISOLONE SODIUM SUCCINATE 40 MG/ML
40 INJECTION, POWDER, LYOPHILIZED, FOR SOLUTION INTRAMUSCULAR; INTRAVENOUS EVERY 8 HOURS
Status: DISCONTINUED | OUTPATIENT
Start: 2021-08-25 | End: 2021-08-26

## 2021-08-25 RX ORDER — GABAPENTIN 100 MG/1
100 CAPSULE ORAL 3 TIMES DAILY
Status: DISCONTINUED | OUTPATIENT
Start: 2021-08-25 | End: 2021-08-28 | Stop reason: HOSPADM

## 2021-08-25 RX ORDER — PANTOPRAZOLE SODIUM 40 MG/1
40 TABLET, DELAYED RELEASE ORAL DAILY
Status: DISCONTINUED | OUTPATIENT
Start: 2021-08-25 | End: 2021-08-28 | Stop reason: HOSPADM

## 2021-08-25 RX ORDER — LORATADINE 10 MG/1
10 TABLET ORAL DAILY
Status: DISCONTINUED | OUTPATIENT
Start: 2021-08-25 | End: 2021-08-25 | Stop reason: ALTCHOICE

## 2021-08-25 RX ORDER — OXYCODONE HYDROCHLORIDE AND ACETAMINOPHEN 5; 325 MG/1; MG/1
1 TABLET ORAL EVERY 6 HOURS PRN
Status: CANCELLED | OUTPATIENT
Start: 2021-08-25

## 2021-08-25 RX ORDER — SODIUM CHLORIDE 0.9 % (FLUSH) 0.9 %
5-40 SYRINGE (ML) INJECTION PRN
Status: DISCONTINUED | OUTPATIENT
Start: 2021-08-25 | End: 2021-08-28 | Stop reason: HOSPADM

## 2021-08-25 RX ORDER — FAMOTIDINE 20 MG/1
20 TABLET, FILM COATED ORAL 2 TIMES DAILY
Status: DISCONTINUED | OUTPATIENT
Start: 2021-08-25 | End: 2021-08-25 | Stop reason: SDUPTHER

## 2021-08-25 RX ORDER — OXYCODONE HYDROCHLORIDE AND ACETAMINOPHEN 5; 325 MG/1; MG/1
1 TABLET ORAL EVERY 8 HOURS PRN
Status: CANCELLED | OUTPATIENT
Start: 2021-08-25

## 2021-08-25 RX ORDER — ACETAMINOPHEN 325 MG/1
650 TABLET ORAL EVERY 6 HOURS PRN
Status: DISCONTINUED | OUTPATIENT
Start: 2021-08-25 | End: 2021-08-28 | Stop reason: HOSPADM

## 2021-08-25 RX ORDER — POLYETHYLENE GLYCOL 3350 17 G/17G
17 POWDER, FOR SOLUTION ORAL DAILY PRN
Status: DISCONTINUED | OUTPATIENT
Start: 2021-08-25 | End: 2021-08-28 | Stop reason: HOSPADM

## 2021-08-25 RX ORDER — CETIRIZINE HYDROCHLORIDE 10 MG/1
10 TABLET ORAL DAILY
Status: DISCONTINUED | OUTPATIENT
Start: 2021-08-25 | End: 2021-08-28 | Stop reason: HOSPADM

## 2021-08-25 RX ORDER — PREDNISONE 20 MG/1
20 TABLET ORAL DAILY
Status: DISCONTINUED | OUTPATIENT
Start: 2021-08-25 | End: 2021-08-25 | Stop reason: SDUPTHER

## 2021-08-25 RX ORDER — ACETAMINOPHEN 650 MG/1
650 SUPPOSITORY RECTAL EVERY 6 HOURS PRN
Status: DISCONTINUED | OUTPATIENT
Start: 2021-08-25 | End: 2021-08-28 | Stop reason: HOSPADM

## 2021-08-25 RX ORDER — SODIUM CHLORIDE 9 MG/ML
25 INJECTION, SOLUTION INTRAVENOUS PRN
Status: DISCONTINUED | OUTPATIENT
Start: 2021-08-25 | End: 2021-08-28 | Stop reason: HOSPADM

## 2021-08-25 RX ORDER — ONDANSETRON 4 MG/1
4 TABLET, ORALLY DISINTEGRATING ORAL EVERY 8 HOURS PRN
Status: DISCONTINUED | OUTPATIENT
Start: 2021-08-25 | End: 2021-08-28 | Stop reason: HOSPADM

## 2021-08-25 RX ORDER — DIPHENHYDRAMINE HYDROCHLORIDE 50 MG/ML
50 INJECTION INTRAMUSCULAR; INTRAVENOUS ONCE
Status: COMPLETED | OUTPATIENT
Start: 2021-08-25 | End: 2021-08-25

## 2021-08-25 RX ORDER — DIPHENHYDRAMINE HCL 25 MG
25 TABLET ORAL EVERY 6 HOURS PRN
Status: DISCONTINUED | OUTPATIENT
Start: 2021-08-25 | End: 2021-08-28 | Stop reason: HOSPADM

## 2021-08-25 RX ORDER — ONDANSETRON 2 MG/ML
4 INJECTION INTRAMUSCULAR; INTRAVENOUS EVERY 6 HOURS PRN
Status: DISCONTINUED | OUTPATIENT
Start: 2021-08-25 | End: 2021-08-28 | Stop reason: HOSPADM

## 2021-08-25 RX ORDER — METHYLPREDNISOLONE SODIUM SUCCINATE 125 MG/2ML
INJECTION, POWDER, LYOPHILIZED, FOR SOLUTION INTRAMUSCULAR; INTRAVENOUS
Status: DISPENSED
Start: 2021-08-25 | End: 2021-08-26

## 2021-08-25 RX ORDER — OXYCODONE HYDROCHLORIDE 5 MG/1
5 TABLET ORAL EVERY 12 HOURS PRN
Status: DISCONTINUED | OUTPATIENT
Start: 2021-08-25 | End: 2021-08-28 | Stop reason: HOSPADM

## 2021-08-25 RX ORDER — AMLODIPINE BESYLATE 5 MG/1
5 TABLET ORAL DAILY
Status: CANCELLED | OUTPATIENT
Start: 2021-08-25

## 2021-08-25 RX ORDER — METHYLPREDNISOLONE SODIUM SUCCINATE 125 MG/2ML
125 INJECTION, POWDER, LYOPHILIZED, FOR SOLUTION INTRAMUSCULAR; INTRAVENOUS ONCE
Status: COMPLETED | OUTPATIENT
Start: 2021-08-25 | End: 2021-08-25

## 2021-08-25 RX ORDER — SODIUM CHLORIDE 9 MG/ML
INJECTION, SOLUTION INTRAVENOUS CONTINUOUS
Status: DISCONTINUED | OUTPATIENT
Start: 2021-08-25 | End: 2021-08-25 | Stop reason: SDUPTHER

## 2021-08-25 RX ADMIN — GABAPENTIN 100 MG: 100 CAPSULE ORAL at 21:07

## 2021-08-25 RX ADMIN — ENOXAPARIN SODIUM 40 MG: 40 INJECTION SUBCUTANEOUS at 21:06

## 2021-08-25 RX ADMIN — FAMOTIDINE 20 MG: 10 INJECTION, SOLUTION INTRAVENOUS at 14:35

## 2021-08-25 RX ADMIN — METOPROLOL TARTRATE 25 MG: 50 TABLET, FILM COATED ORAL at 21:07

## 2021-08-25 RX ADMIN — CETIRIZINE HYDROCHLORIDE 10 MG: 10 TABLET, FILM COATED ORAL at 21:06

## 2021-08-25 RX ADMIN — METHYLPREDNISOLONE SODIUM SUCCINATE 40 MG: 40 INJECTION, POWDER, LYOPHILIZED, FOR SOLUTION INTRAMUSCULAR; INTRAVENOUS at 19:01

## 2021-08-25 RX ADMIN — METHYLPREDNISOLONE SODIUM SUCCINATE 125 MG: 125 INJECTION, POWDER, FOR SOLUTION INTRAMUSCULAR; INTRAVENOUS at 14:35

## 2021-08-25 RX ADMIN — PANTOPRAZOLE SODIUM 40 MG: 40 TABLET, DELAYED RELEASE ORAL at 21:06

## 2021-08-25 RX ADMIN — DIPHENHYDRAMINE HYDROCHLORIDE 50 MG: 50 INJECTION INTRAMUSCULAR; INTRAVENOUS at 14:35

## 2021-08-25 RX ADMIN — SODIUM CHLORIDE: 9 INJECTION, SOLUTION INTRAVENOUS at 19:01

## 2021-08-25 RX ADMIN — FAMOTIDINE 20 MG: 10 INJECTION, SOLUTION INTRAVENOUS at 21:07

## 2021-08-25 ASSESSMENT — PAIN SCALES - GENERAL: PAINLEVEL_OUTOF10: 0

## 2021-08-25 NOTE — H&P
History and Physical      Name:  Keeley Farnsworth /Age/Sex: 1975  (50 y.o. male)   MRN & CSN:  [id number] & [id number] Admission Date/Time: 2021  1:03 PM   Location:  ED12/ED-12 PCP: Riddhi Mays [address] Day: 1    Assessment and Plan:   Keeley Farnsworth is a 50 y.o.  male  who presents with <principal problem not specified>    #  Angioedema  No airway impairment. Swelling slightly improved with IV steroid. Observation. Continue oral steroid, continue Pepcid, Benadryl, Claritin. If stable, will DC in the morning. #  Right foot pressure ulcer  Regularly follow-up with wound care center. No signs of infection. Pain control. #  JOSETTE  Likely due to ACEI use. Lisinopril discontinued. Continue IV fluid. Repeat labs in the morning. #  Transaminitis  Could due to side effect of lisinopril. Repeat LFT in the morning. Diet No diet orders on file   DVT Prophylaxis [x] Lovenox, []  Heparin, [] SCDs, [] Ambulation   GI Prophylaxis [] PPI,  [] H2 Blocker,  [] Carafate,  [x] Diet/Tube Feeds   Code Status Prior   Disposition Patient requires continued admission due to angioedema   MDM [] Low, [x] Moderate,[]  High  Patient's risk as above due to angioedema     Patient seen and examined independently by me, attending did not see the patient, but was available for consultation. History of Present Illness:     Chief Complaint: <principal problem not specified>  Keeley Farnsworth is a 50 y.o.  male  who presents with swelling lips. Patient has chronic pressure ulcer of the right plantar foot, he had right post amputation in 2021. He was recently seen by PCP and was placed on lisinopril for high blood pressure. He reported today that he is lips became swelling in the last 2 days and he was brought to the to the ED for further evaluation. In the ED, vital signs were stable. Labs showed elevated BUN/creatinine, slightly lower sodium, slightly elevated AST/ALT. Angioedema was suspected. Patient received IV steroids, Benadryl, and Pepcid, he is admitted for further evaluation. Ten point ROS reviewed negative, unless as noted above    Objective:   No intake or output data in the 24 hours ending 08/25/21 1626   Vitals:   Vitals:    08/25/21 1256   BP: (!) 109/93   Pulse: 83   Resp: 17   Temp: 97.7 °F (36.5 °C)   SpO2: 100%     Physical Exam:   GEN Awake male, sitting upright in bed in no apparent distress. Appears given age. EYES Pupils are equally round. No scleral erythema, discharge, or conjunctivitis. HENT Mucous membranes are moist. Oral pharynx without exudates, no evidence of thrush. NECK Supple, no apparent thyromegaly or masses. RESP Clear to auscultation, no wheezes, rales or rhonchi. Symmetric chest movement while on room air. CARDIO/VASC S1/S2 auscultated. Regular rate without appreciable murmurs, rubs, or gallops. No JVD or carotid bruits. Peripheral pulses equal bilaterally and palpable. No peripheral edema. GI Abdomen is soft without significant tenderness, masses, or guarding. Bowel sounds are normoactive. Rectal exam deferred.  No costovertebral angle tenderness. Normal appearing external genitalia. Underwood catheter is not present. HEME/LYMPH No palpable cervical lymphadenopathy and no hepatosplenomegaly. No petechiae or ecchymoses. MSK No gross joint deformities. SKIN Normal coloration, warm, dry. NEURO Cranial nerves appear grossly intact, normal speech, no lateralizing weakness. PSYCH Awake, alert, oriented x 4. Affect appropriate. Past Medical History:      Past Medical History:   Diagnosis Date    Hypertension     does not take medications     PSHX:  has a past surgical history that includes Foot Debridement (Bilateral, 2/19/2021). Allergies: No Known Allergies    FAM HX: family history is not on file.   Soc HX:   Social History     Socioeconomic History    Marital status: Single     Spouse name: None    Number of children:

## 2021-08-25 NOTE — ED TRIAGE NOTES
Pt arrives from [facility] while he was in a routine visit, pt experiencing lower facial swelling that started this morning. Pt states that he is not having trouble breathing or swallowing. Pt not noted to be in distress, alert and oriented X4, VS stable. Pt has no complaints except swelling in his face, stated also that he started lisinopril within the last few months.

## 2021-08-25 NOTE — PROGRESS NOTES
Wound Care Center Progress Note With Procedure    Jose Angel Nolasco  AGE: 50 y.o. GENDER: male  : 1975  EPISODE DATE:  2021     Subjective:     Chief Complaint   Patient presents with    Wound Check     BLE         HISTORY of PRESENT ILLNESS     Daniel Zhang is a 50 y. o. male who presents today for wound evaluation of Chronic pressure ulcer(s) of the right plantar foot. The ulcer is of moderate severity. The underlying cause of the wound is probably due to pressure from walking and blistering.  He has still significant sensitivity on his foot.  History of amputation of left first second third and tip of the fourth toe and right great toe performed on 2021 related to frostbite. He is homeless and the right plantar foot is believed to be associated with pressure and blistering from walking. He has had several visits to the ED over the past several wounds for wound checks. Wound Pain Timing/Severity: none  Quality of pain: N/A  Severity of pain:  0 / 10   Modifying Factors: chronic pressure  Associated Signs/Symptoms: none        PAST MEDICAL HISTORY        Diagnosis Date    Hypertension     does not take medications       PAST SURGICAL HISTORY    Past Surgical History:   Procedure Laterality Date    FOOT DEBRIDEMENT Bilateral 2021    BILATERAL TOE  DEBRIDEMENT INCISION AND DRAINAGE, AMPUTATION OF LEFT 1ST, 2ND, 3RD AND TIP OF 4TH TOE AND RIGHT GREAT TOE performed by Devin Tang MD at [address] [place] [address]    History reviewed. No pertinent family history.     SOCIAL HISTORY    Social History     Tobacco Use    Smoking status: Former Smoker     Quit date: 2021     Years since quittin.6    Smokeless tobacco: Never Used   Vaping Use    Vaping Use: Never used   Substance Use Topics    Alcohol use: Yes     Comment: most days    Drug use: No       ALLERGIES    No Known Allergies    MEDICATIONS    Current Outpatient Medications on File Prior to Encounter   Medication Sig Dispense Refill    metoprolol tartrate (LOPRESSOR) 25 MG tablet Take 1 tablet by mouth 2 times daily 60 tablet 3    omeprazole (PRILOSEC) 20 MG delayed release capsule Take 20 mg by mouth daily      lisinopril (PRINIVIL;ZESTRIL) 20 MG tablet Take 20 mg by mouth daily      amLODIPine (NORVASC) 5 MG tablet Take 1 tablet by mouth daily 30 tablet 3    oxyCODONE-acetaminophen (PERCOCET) 5-325 MG per tablet Take 1 tablet by mouth every 8 hours as needed for Pain.  gabapentin (NEURONTIN) 100 MG capsule Take 1 capsule by mouth 3 times daily for 20 days. 60 capsule 0     No current facility-administered medications on file prior to encounter. REVIEW OF SYSTEMS    Pertinent items are noted in HPI. Constitutional: Negative for systemic symptoms including fever, chills and malaise. Objective:      Temp 96.4 °F (35.8 °C) (Temporal)   Resp 17     PHYSICAL EXAM    General: The patient is in no acute distress. Mental status:  Patient is appropriate, is  oriented to place and plan of care. Dermatologic exam: Visual inspection of the periwound reveals the skin to be coarse and callus  Wound exam: see wound description below in procedure note    Assessment:     Problem List Items Addressed This Visit     WD-Pressure ulcer of right foot, stage 3 ([address] - Primary    WD-Pressure ulcer of toe of right foot, stage 3 ([address]        Procedure Note    Indications:  Based on my examination of this patient's wound(s) today, sharp excision into necrotic subcutaneous tissue is required to promote healing and evaluate the extent of previous healing. Performed by: GEN Clemente - CNP    Consent obtained: Yes    Time out taken:  Yes    Pain Control: Anesthetic  Anesthetic: 4% Lidocaine Liquid Topical          Debridement:Excisional Debridement    Using curette and tissue nippers the wound(s) was/were sharply debrided down through and including the removal of subcutaneous tissue.         Devitalized Tissue Debrided:  slough, exudate and callus    Pre Debridement Measurements:  Are located in the Wound Documentation Flow Sheet    All active wounds listed below with today's date are evaluated  Wound(s)    debrided this date include # : 1     Post  Debridement Measurements:  Wound 06/18/21 Right Plantar # 1 (Active)   Wound Image   07/23/21 1415   Wound Etiology Pressure Stage  3 08/24/21 1323   Dressing Status New dressing applied 08/24/21 1433   Wound Cleansed Soap and water 08/24/21 1323   Dressing/Treatment Silver dressing;Alginate;ABD 07/16/21 1714   Offloading for Diabetic Foot Ulcers Forefoot offloading shoe 08/20/21 1058   Wound Length (cm) 1.5 cm 08/24/21 1323   Wound Width (cm) 2.2 cm 08/24/21 1323   Wound Depth (cm) 0.3 cm 08/24/21 1323   Wound Surface Area (cm^2) 3.3 cm^2 08/24/21 1323   Change in Wound Size % (l*w) 81.14 08/24/21 1323   Wound Volume (cm^3) 0.99 cm^3 08/24/21 1323   Wound Healing % 43 08/24/21 1323   Post-Procedure Length (cm) 1.5 cm 08/24/21 1403   Post-Procedure Width (cm) 2.2 cm 08/24/21 1403   Post-Procedure Depth (cm) 0.3 cm 08/24/21 1403   Post-Procedure Surface Area (cm^2) 3.3 cm^2 08/24/21 1403   Post-Procedure Volume (cm^3) 0.99 cm^3 08/24/21 1403   Distance Tunneling (cm) 0 cm 08/24/21 1323   Tunneling Position ___ O'Clock 0 08/24/21 1323   Undermining Starts ___ O'Clock 0 08/24/21 1323   Undermining Ends___ O'Clock 0 08/24/21 1323   Undermining Maxium Distance (cm) 0 08/24/21 1323   Wound Assessment Granulation tissue 08/24/21 1323   Drainage Amount Other (Comment) 08/24/21 1323   Drainage Description Serosanguinous 08/24/21 1323   Odor Moderate 08/24/21 1323   Magi-wound Assessment Maceration; Hyperkeratosis (callous) 08/24/21 1323   Margins Defined edges; Unattached edges 08/24/21 1323   Wound Thickness Description not for Pressure Injury Full thickness 08/24/21 1323   Number of days: 67       Percent of Wound(s) Debrided: approximately 100%    Total  Area  Debrided:  3.3 sq cm Bleeding:  Minimal    Hemostasis Achieved:  by pressure    Procedural Pain:  0  / 10     Post Procedural Pain:  0 / 10     Response to treatment:  Well tolerated by patient. Status of wound progress and description from last visit:  Improving, will add Gent topical to minimize bioburden and collagen and compression wraps. Using these wraps will help to keep the wound  and drier and hopefully decrease pressure. Plan:       Discharge Instructions       PHYSICIAN ORDERS AND DISCHARGE INSTRUCTIONS     NOTE: Upon discharge from the [address], you will receive a patient experience survey. We would be grateful if you would take the time to fill this survey out.     Wound care order history:                 [facility]   Arterial studies on 12/26/2020              Cultures: Obtained 6/18/2021                Grafts:                Antibiotics:           Continuing wound care orders and information:                 Residence:                Continue home health care with:               Your wound-care supplies will be provided by:      Wound cleansing:               LG not scrub or use excessive force.              Wash hands with soap and water before and after dressing changes.             UNWLK to applying a clean dressing, cleanse wound with normal saline, wound cleanser, or mild soap and water.               Ask the physician or nurse before getting the wound(s) wet in a shower     Daily Wound management:              Keep weight off wounds and reposition every 2 hours.              Avoid standing for long periods of time.              Apply wraps/stockings in AM and remove at bedtime.              If swelling is present, elevate legs to the level of the heart or above for 30 minutes 4-5 times a day and/or when sitting.                                      When taking antibiotics take entire prescription as ordered by physician do not stop taking until medicine is all gone.                                                           Orders for this week: 8/20/21     Rx: [facility] on [address]        Right Plantar Foot - wash with Anasept spray, pat dry. Paint periwound with betadine, apply gentamicin and anasept to wound bed, cover with endoform and Sofsorb. Wrap with Coban 2 Lite. Leave in place until Nurse visit Tuesday.       Auth for grafts on 6/18/2021     Nurse visit Tuesday  Follow up with Dr Patricia Ceballos in 1 week in the wound care center  Call [phone] for any questions or concerns.   Date__________   Time____________        Treatment Note Wound 06/18/21 Right Plantar # 1-Dressing/Treatment:  (betadine,gent/anasept,endoform,abd,[id number])    Written Patient Dismissal Instructions Given            Electronically signed by GEN Corona CNP on 8/25/2021 at 11:07 AM

## 2021-08-25 NOTE — PROGRESS NOTES
Wound Care Center Progress Note With Procedure    Betty Whitt  AGE: 50 y.o. GENDER: male  : 1975  EPISODE DATE:  2021     Subjective:     Chief Complaint   Patient presents with    Wound Check     right leg          HISTORY of PRESENT ILLNESS     Luis Zhang is a 50 y. o. male who presents today for wound evaluation of Chronic pressure ulcer(s) of the right plantar foot. The ulcer is of moderate severity. The underlying cause of the wound is probably due to pressure from walking and blistering.  He has still significant sensitivity on his foot.  History of amputation of left first second third and tip of the fourth toe and right great toe performed on 2021 related to frostbite. He is homeless and the right plantar foot is believed to be associated with pressure and blistering from walking. He has had several visits to the ED over the past several wounds for wound checks. Wound Pain Timing/Severity: none  Quality of pain: N/A  Severity of pain:  0 / 10   Modifying Factors: chronic pressure  Associated Signs/Symptoms: none        PAST MEDICAL HISTORY        Diagnosis Date    Hypertension     does not take medications       PAST SURGICAL HISTORY    Past Surgical History:   Procedure Laterality Date    FOOT DEBRIDEMENT Bilateral 2021    BILATERAL TOE  DEBRIDEMENT INCISION AND DRAINAGE, AMPUTATION OF LEFT 1ST, 2ND, 3RD AND TIP OF 4TH TOE AND RIGHT GREAT TOE performed by Kimberlee Rick MD at [address] [place] [address]    History reviewed. No pertinent family history.     SOCIAL HISTORY    Social History     Tobacco Use    Smoking status: Former Smoker     Quit date: 2021     Years since quittin.6    Smokeless tobacco: Never Used   Vaping Use    Vaping Use: Never used   Substance Use Topics    Alcohol use: Yes     Comment: most days    Drug use: No       ALLERGIES    No Known Allergies    MEDICATIONS    Current Outpatient Medications on File Prior to Encounter Medication Sig Dispense Refill    oxyCODONE-acetaminophen (PERCOCET) 5-325 MG per tablet Take 1 tablet by mouth every 8 hours as needed for Pain.  metoprolol tartrate (LOPRESSOR) 25 MG tablet Take 1 tablet by mouth 2 times daily 60 tablet 3    omeprazole (PRILOSEC) 20 MG delayed release capsule Take 20 mg by mouth daily      lisinopril (PRINIVIL;ZESTRIL) 20 MG tablet Take 20 mg by mouth daily      amLODIPine (NORVASC) 5 MG tablet Take 1 tablet by mouth daily 30 tablet 3    gabapentin (NEURONTIN) 100 MG capsule Take 1 capsule by mouth 3 times daily for 20 days. 60 capsule 0     No current facility-administered medications on file prior to encounter. REVIEW OF SYSTEMS    Pertinent items are noted in HPI. Constitutional: Negative for systemic symptoms including fever, chills and malaise. Objective:      BP (!) 154/109   Pulse 101   Temp 97.9 °F (36.6 °C) (Temporal)   Resp 18     PHYSICAL EXAM    General: The patient is in no acute distress. Mental status:  Patient is appropriate, is  oriented to place and plan of care. Dermatologic exam: Visual inspection of the periwound reveals the skin to be coarse and callus  Wound exam: see wound description below in procedure note      Assessment:     Problem List Items Addressed This Visit     WD-Pressure ulcer of right foot, stage 3 ([address] - Primary    WD-Pressure ulcer of toe of right foot, stage 3 ([address]        Procedure Note    Indications:  Based on my examination of this patient's wound(s) today, sharp excision into necrotic subcutaneous tissue is required to promote healing and evaluate the extent of previous healing. Performed by: GEN Clemente - CNP    Consent obtained: Yes    Time out taken:  Yes    Pain Control: Anesthetic  Anesthetic: 5% Lidocaine Ointment Topical     Debridement:Excisional Debridement    Using curette the wound(s) was/were sharply debrided down through and including the removal of subcutaneous tissue. Devitalized Tissue Debrided:  slough, exudate and callus    Pre Debridement Measurements:  Are located in the Wound Documentation Flow Sheet    All active wounds listed below with today's date are evaluated  Wound(s)    debrided this date include # : 1     Post  Debridement Measurements:  Wound 06/18/21 Right Plantar # 1 (Active)   Wound Image   07/23/21 1415   Wound Etiology Pressure Stage  3 08/24/21 1323   Dressing Status New dressing applied 08/24/21 1433   Wound Cleansed Soap and water 08/24/21 1323   Dressing/Treatment Silver dressing;Alginate;ABD 07/16/21 1714   Offloading for Diabetic Foot Ulcers Forefoot offloading shoe 08/20/21 1058   Wound Length (cm) 1.5 cm 08/24/21 1323   Wound Width (cm) 2.2 cm 08/24/21 1323   Wound Depth (cm) 0.3 cm 08/24/21 1323   Wound Surface Area (cm^2) 3.3 cm^2 08/24/21 1323   Change in Wound Size % (l*w) 81.14 08/24/21 1323   Wound Volume (cm^3) 0.99 cm^3 08/24/21 1323   Wound Healing % 43 08/24/21 1323   Post-Procedure Length (cm) 1.5 cm 08/24/21 1403   Post-Procedure Width (cm) 2.2 cm 08/24/21 1403   Post-Procedure Depth (cm) 0.3 cm 08/24/21 1403   Post-Procedure Surface Area (cm^2) 3.3 cm^2 08/24/21 1403   Post-Procedure Volume (cm^3) 0.99 cm^3 08/24/21 1403   Distance Tunneling (cm) 0 cm 08/24/21 1323   Tunneling Position ___ O'Clock 0 08/24/21 1323   Undermining Starts ___ O'Clock 0 08/24/21 1323   Undermining Ends___ O'Clock 0 08/24/21 1323   Undermining Maxium Distance (cm) 0 08/24/21 1323   Wound Assessment Granulation tissue 08/24/21 1323   Drainage Amount Other (Comment) 08/24/21 1323   Drainage Description Serosanguinous 08/24/21 1323   Odor Moderate 08/24/21 1323   Magi-wound Assessment Maceration; Hyperkeratosis (callous) 08/24/21 1323   Margins Defined edges; Unattached edges 08/24/21 1323   Wound Thickness Description not for Pressure Injury Full thickness 08/24/21 1323   Number of days: 67       Percent of Wound(s) Debrided: approximately 100%    Total  Area Debrided:  3.3 sq cm     Bleeding:  Minimal    Hemostasis Achieved:  by pressure    Procedural Pain:  2  / 10     Post Procedural Pain:  0 / 10     Response to treatment:  Well tolerated by patient. Status of wound progress and description from last visit: Stable, looking better, continue regimen. Plan:       Discharge Instructions       PHYSICIAN ORDERS AND DISCHARGE INSTRUCTIONS     NOTE: Upon discharge from the [address], you will receive a patient experience survey. We would be grateful if you would take the time to fill this survey out.     Wound care order history:                 [facility]   Arterial studies on 12/26/2020              Cultures: Obtained 6/18/2021                Grafts:                Antibiotics:           Continuing wound care orders and information:                 Residence:                Continue home health care with:               Your wound-care supplies will be provided by:      Wound cleansing:               VV not scrub or use excessive force.              Wash hands with soap and water before and after dressing changes.               YIVQO to applying a clean dressing, cleanse wound with normal saline, wound cleanser, or mild soap and water.               Ask the physician or nurse before getting the wound(s) wet in a shower     Daily Wound management:              Keep weight off wounds and reposition every 2 hours.              Avoid standing for long periods of time.              Apply wraps/stockings in AM and remove at bedtime.              If swelling is present, elevate legs to the level of the heart or above for 30 minutes 4-5 times a day and/or when sitting.                                      When taking antibiotics take entire prescription as ordered by physician do not stop taking until medicine is all gone.                                                           Orders for this week: 8/24/21     Rx: [facility]        Right Plantar Foot - wash with Anasept spray, pat dry. Paint periwound with betadine, apply gentamicin and anasept to wound bed, cover with endoform and drawtex and ABD. Wrap with Coban 2 Lite. Leave in place until Nurse visit Friday .        Auth for grafts on 6/18/2021     Nurse visit Friday     Follow up with Melissa Conte CNP in 1 week in the wound care center  Call [phone] for any questions or concerns.   Date__________   Time____________        Treatment Note Wound 06/18/21 Right Plantar # 1-Dressing/Treatment:  (anasept, gentamicin, endoform, drawtex, abd, coban 2 lite)    Written Patient Dismissal Instructions Given            Electronically signed by GEN De Leon CNP on 8/25/2021 at 10:51 AM

## 2021-08-25 NOTE — ED PROVIDER NOTES
EMERGENCY DEPARTMENT ENCOUNTER      CHIEF COMPLAINT:   Facial swelling    HPI: Ashlee Gordon is a 50 y.o. male who presents to the emergency department, for evaluation of swelling to his face and lips. The patient states he thinks it started this morning. He was seen at his primary care physician's office for routine visit and they referred him here. The patient denies any difficulty breathing or swallowing. He does not have any swelling to his tongue and feels no throat swelling. He has never had anything like this before. However, he was recently started on lisinopril. Symptoms have been constant. There are no exacerbating or alleviating factors. Denies any other complaints. REVIEW OF SYSTEMS:   Constitutional:  Denies fever or chills  Eyes:  Denies change in visual acuity  HENT: See HPI  Respiratory:  Denies cough or shortness of breath  Cardiovascular:  Denies chest pain or edema  GI:  Denies abdominal pain, nausea, vomiting, bloody stools or diarrhea  :  Denies dysuria  Musculoskeletal:  Denies back pain or joint pain  Integument:  Denies rash  Neurologic:  Denies headache, focal weakness or sensory changes  \"Remaining review of systems reviewed and negative. I have reviewed the nursing triage documentation and agree unless otherwise noted below. \"      PAST MEDICAL HISTORY:   Past Medical History:   Diagnosis Date    Hypertension     does not take medications       CURRENT MEDICATIONS:   Home medications reviewed. SURGICAL HISTORY:   Past Surgical History:   Procedure Laterality Date    FOOT DEBRIDEMENT Bilateral 2/19/2021    BILATERAL TOE  DEBRIDEMENT INCISION AND DRAINAGE, AMPUTATION OF LEFT 1ST, 2ND, 3RD AND TIP OF 4TH TOE AND RIGHT GREAT TOE performed by Krishna Willard MD at [address] [place] [address]:   History reviewed. No pertinent family history.     SOCIAL HISTORY:   Social History     Socioeconomic History    Marital status: Single     Spouse name: Not on file    Number of children: Not on file    Years of education: Not on file    Highest education level: Not on file   Occupational History    Not on file   Tobacco Use    Smoking status: Former Smoker     Quit date: 2021     Years since quittin.6    Smokeless tobacco: Never Used   Vaping Use    Vaping Use: Never used   Substance and Sexual Activity    Alcohol use: Yes     Comment: most days    Drug use: No    Sexual activity: Not on file   Other Topics Concern    Not on file   Social History Narrative    Not on file     Social Determinants of Health     Financial Resource Strain:     Difficulty of Paying Living Expenses:    Food Insecurity:     Worried About [address] in the Last Year:     [address] in the Last Year:    Transportation Needs:     Lack of Transportation (Medical):  Lack of Transportation (Non-Medical):    Physical Activity:     Days of Exercise per Week:     Minutes of Exercise per Session:    Stress:     Feeling of Stress :    Social Connections:     Frequency of Communication with Friends and Family:     Frequency of Social Gatherings with Friends and Family:     Attends [employer] Services:     Active Member of Clubs or Organizations:     Attends Club or Organization Meetings:     Marital Status:    Intimate Partner Violence:     Fear of Current or Ex-Partner:     Emotionally Abused:     Physically Abused:     Sexually Abused: ALLERGIES: Patient has no known allergies. PHYSICAL EXAM:  VITAL SIGNS:   ED Triage Vitals [21 1256]   Enc Vitals Group      BP (!) 109/93      Pulse 83      Resp 17      Temp 97.7 °F (36.5 °C)      Temp Source Oral      SpO2 100 %      Weight 172 lb (78 kg)      Height 6' (1.829 m)      Head Circumference       Peak Flow       Pain Score       Pain Loc       Pain Edu? Excl. in [address]?       Constitutional:  Non-toxic appearance  HENT: Normocephalic, Atraumatic, significant soft tissue swelling noted from below the eyes to the chin and includes both lips, no tongue swelling, no swelling to her posterior pharynx, bilateral external ears normal, Oropharynx moist, No oral exudates, Nose normal.  Eyes:  PERRL, Conjunctiva normal, No discharge. Neck: Normal range of motion, No tenderness, Supple, No stridor, No lymphadenopathy. Cardiovascular:  Normal heart rate, Normal rhythm  Pulmonary/Chest:  Normal breath sounds, No respiratory distress, No wheezing  Abdomen: Bowel sounds normal, Soft, No tenderness, No masses, No pulsatile masses  Extremities:  Normal range of motion, Intact distal pulses, No edema, No tenderness  Skin:  Warm, Dry, No erythema, No rash      EKG Interpretation  None    Radiology / Procedures:  Labs Reviewed   CBC WITH AUTO DIFFERENTIAL - Abnormal; Notable for the following components:       Result Value    MCHC 31.1 (*)     RDW 16.8 (*)     Monocytes % 15.9 (*)     Immature Neutrophil % 0.5 (*)     All other components within normal limits   COMPREHENSIVE METABOLIC PANEL - Abnormal; Notable for the following components:    Sodium 128 (*)     Chloride 93 (*)     CO2 17 (*)     BUN 28 (*)     CREATININE 1.6 (*)     Total Protein 8.5 (*)     Total Bilirubin 1.1 (*)     ALT 67 (*)     AST 71 (*)     GFR Non- 47 (*)     GFR  56 (*)     Anion Gap 18 (*)     All other components within normal limits   SPECIMEN REJECTION   HIV ANTIGEN/ANTIBODY   CBC   COMPREHENSIVE METABOLIC PANEL   HEPATITIS PANEL, ACUTE       ED COURSE & MEDICAL DECISION MAKING:  Pertinent Labs & Imaging studies reviewed. (See chart for details)  On exam, the patient is afebrile and nontoxic appearing. He is hemodynamically stable and neurologically intact. Labs are obtained and are significant for hyponatremia, decreased bicarb, elevated liver enzymes and renal insufficiency. An IV was placed and the patient was treated with IV Benadryl, Solu-Medrol and Pepcid. He had some improvement of his facial swelling.   There was no worsening or extension to the tongue or throat. I suspect that the patient has edema that is likely secondary to lisinopril. I have a low suspicion for worsening swelling, tongue involvement, airway involvement, anaphylaxis or shock. I recommended admission to the hospital and the patient was agreeable. I discussed the case with the hospitalist who will admit the patient for further treatment and care. The patient is currently in stable condition awaiting admission. Clinical Impression:  1. Angioedema, initial encounter    2. Facial swelling          Comment: Please note this report has been produced using speech recognition software and may contain errors related to that system including errors in grammar, punctuation, and spelling, as well as words and phrases that may be inappropriate. If there are any questions or concerns please feel free to contact the dictating provider for clarification.         Bri Asencio MD  08/25/21 9051

## 2021-08-26 ENCOUNTER — APPOINTMENT (OUTPATIENT)
Dept: CT IMAGING | Age: 46
End: 2021-08-26
Payer: MEDICARE

## 2021-08-26 LAB
ALBUMIN SERPL-MCNC: 4.4 GM/DL (ref 3.4–5)
ALP BLD-CCNC: 119 IU/L (ref 40–128)
ALT SERPL-CCNC: 54 U/L (ref 10–40)
ANION GAP SERPL CALCULATED.3IONS-SCNC: 14 MMOL/L (ref 4–16)
AST SERPL-CCNC: 42 IU/L (ref 15–37)
BILIRUB SERPL-MCNC: 0.4 MG/DL (ref 0–1)
BUN BLDV-MCNC: 34 MG/DL (ref 6–23)
CALCIUM SERPL-MCNC: 9.3 MG/DL (ref 8.3–10.6)
CHLORIDE BLD-SCNC: 96 MMOL/L (ref 99–110)
CO2: 22 MMOL/L (ref 21–32)
CREAT SERPL-MCNC: 1.3 MG/DL (ref 0.9–1.3)
GFR AFRICAN AMERICAN: >60 ML/MIN/1.73M2
GFR NON-AFRICAN AMERICAN: 59 ML/MIN/1.73M2
GLUCOSE BLD-MCNC: 178 MG/DL (ref 70–99)
HAV IGM SER IA-ACNC: NON REACTIVE
HCT VFR BLD CALC: 39.9 % (ref 42–52)
HEMOGLOBIN: 12.7 GM/DL (ref 13.5–18)
HEPATITIS B CORE IGM ANTIBODY: NON REACTIVE
HEPATITIS B SURFACE ANTIGEN: NON REACTIVE
HEPATITIS C ANTIBODY: NON REACTIVE
HIV SCREEN: NON REACTIVE
MCH RBC QN AUTO: 28.7 PG (ref 27–31)
MCHC RBC AUTO-ENTMCNC: 31.8 % (ref 32–36)
MCV RBC AUTO: 90.3 FL (ref 78–100)
PDW BLD-RTO: 16.7 % (ref 11.7–14.9)
PLATELET # BLD: 202 K/CU MM (ref 140–440)
PMV BLD AUTO: 9 FL (ref 7.5–11.1)
POTASSIUM SERPL-SCNC: 4.2 MMOL/L (ref 3.5–5.1)
RBC # BLD: 4.42 M/CU MM (ref 4.6–6.2)
SODIUM BLD-SCNC: 132 MMOL/L (ref 135–145)
TOTAL PROTEIN: 7.7 GM/DL (ref 6.4–8.2)
WBC # BLD: 2.8 K/CU MM (ref 4–10.5)

## 2021-08-26 PROCEDURE — 2500000003 HC RX 250 WO HCPCS: Performed by: INTERNAL MEDICINE

## 2021-08-26 PROCEDURE — 6360000002 HC RX W HCPCS: Performed by: INTERNAL MEDICINE

## 2021-08-26 PROCEDURE — 80053 COMPREHEN METABOLIC PANEL: CPT

## 2021-08-26 PROCEDURE — G0378 HOSPITAL OBSERVATION PER HR: HCPCS

## 2021-08-26 PROCEDURE — 6370000000 HC RX 637 (ALT 250 FOR IP): Performed by: HOSPITALIST

## 2021-08-26 PROCEDURE — 80074 ACUTE HEPATITIS PANEL: CPT

## 2021-08-26 PROCEDURE — 6370000000 HC RX 637 (ALT 250 FOR IP): Performed by: NURSE PRACTITIONER

## 2021-08-26 PROCEDURE — 2580000003 HC RX 258: Performed by: HOSPITALIST

## 2021-08-26 PROCEDURE — 96376 TX/PRO/DX INJ SAME DRUG ADON: CPT

## 2021-08-26 PROCEDURE — 96372 THER/PROPH/DIAG INJ SC/IM: CPT

## 2021-08-26 PROCEDURE — 6360000002 HC RX W HCPCS: Performed by: NURSE PRACTITIONER

## 2021-08-26 PROCEDURE — 36415 COLL VENOUS BLD VENIPUNCTURE: CPT

## 2021-08-26 PROCEDURE — 70486 CT MAXILLOFACIAL W/O DYE: CPT

## 2021-08-26 PROCEDURE — 2580000003 HC RX 258: Performed by: NURSE PRACTITIONER

## 2021-08-26 PROCEDURE — 6360000002 HC RX W HCPCS: Performed by: HOSPITALIST

## 2021-08-26 PROCEDURE — 92610 EVALUATE SWALLOWING FUNCTION: CPT

## 2021-08-26 PROCEDURE — 80048 BASIC METABOLIC PNL TOTAL CA: CPT

## 2021-08-26 PROCEDURE — 94761 N-INVAS EAR/PLS OXIMETRY MLT: CPT

## 2021-08-26 PROCEDURE — 85027 COMPLETE CBC AUTOMATED: CPT

## 2021-08-26 RX ORDER — METHYLPREDNISOLONE SODIUM SUCCINATE 40 MG/ML
40 INJECTION, POWDER, LYOPHILIZED, FOR SOLUTION INTRAMUSCULAR; INTRAVENOUS EVERY 8 HOURS
Status: DISCONTINUED | OUTPATIENT
Start: 2021-08-26 | End: 2021-08-27

## 2021-08-26 RX ORDER — AMOXICILLIN AND CLAVULANATE POTASSIUM 875; 125 MG/1; MG/1
1 TABLET, FILM COATED ORAL EVERY 12 HOURS SCHEDULED
Status: DISCONTINUED | OUTPATIENT
Start: 2021-08-26 | End: 2021-08-26

## 2021-08-26 RX ORDER — FLUTICASONE PROPIONATE 50 MCG
1 SPRAY, SUSPENSION (ML) NASAL DAILY
Status: DISCONTINUED | OUTPATIENT
Start: 2021-08-26 | End: 2021-08-28 | Stop reason: HOSPADM

## 2021-08-26 RX ADMIN — SODIUM CHLORIDE, PRESERVATIVE FREE 10 ML: 5 INJECTION INTRAVENOUS at 08:43

## 2021-08-26 RX ADMIN — METOPROLOL TARTRATE 25 MG: 50 TABLET, FILM COATED ORAL at 08:43

## 2021-08-26 RX ADMIN — SODIUM CHLORIDE: 9 INJECTION, SOLUTION INTRAVENOUS at 15:05

## 2021-08-26 RX ADMIN — ENOXAPARIN SODIUM 40 MG: 40 INJECTION SUBCUTANEOUS at 08:42

## 2021-08-26 RX ADMIN — FAMOTIDINE 20 MG: 10 INJECTION, SOLUTION INTRAVENOUS at 08:43

## 2021-08-26 RX ADMIN — METOPROLOL TARTRATE 25 MG: 50 TABLET, FILM COATED ORAL at 23:04

## 2021-08-26 RX ADMIN — FAMOTIDINE 20 MG: 10 INJECTION, SOLUTION INTRAVENOUS at 23:04

## 2021-08-26 RX ADMIN — SALINE NASAL SPRAY 1 SPRAY: 1.5 SOLUTION NASAL at 14:57

## 2021-08-26 RX ADMIN — GABAPENTIN 100 MG: 100 CAPSULE ORAL at 14:24

## 2021-08-26 RX ADMIN — METHYLPREDNISOLONE SODIUM SUCCINATE 40 MG: 40 INJECTION, POWDER, LYOPHILIZED, FOR SOLUTION INTRAMUSCULAR; INTRAVENOUS at 04:55

## 2021-08-26 RX ADMIN — CETIRIZINE HYDROCHLORIDE 10 MG: 10 TABLET, FILM COATED ORAL at 08:43

## 2021-08-26 RX ADMIN — GABAPENTIN 100 MG: 100 CAPSULE ORAL at 23:04

## 2021-08-26 RX ADMIN — SODIUM CHLORIDE, PRESERVATIVE FREE 10 ML: 5 INJECTION INTRAVENOUS at 23:04

## 2021-08-26 RX ADMIN — FLUTICASONE PROPIONATE 1 SPRAY: 50 SPRAY, METERED NASAL at 14:57

## 2021-08-26 RX ADMIN — PANTOPRAZOLE SODIUM 40 MG: 40 TABLET, DELAYED RELEASE ORAL at 08:43

## 2021-08-26 RX ADMIN — METHYLPREDNISOLONE SODIUM SUCCINATE 40 MG: 40 INJECTION, POWDER, FOR SOLUTION INTRAMUSCULAR; INTRAVENOUS at 16:08

## 2021-08-26 RX ADMIN — AMOXICILLIN AND CLAVULANATE POTASSIUM 1 TABLET: 875; 125 TABLET, FILM COATED ORAL at 14:57

## 2021-08-26 RX ADMIN — GABAPENTIN 100 MG: 100 CAPSULE ORAL at 08:43

## 2021-08-26 RX ADMIN — METHYLPREDNISOLONE SODIUM SUCCINATE 40 MG: 40 INJECTION, POWDER, LYOPHILIZED, FOR SOLUTION INTRAMUSCULAR; INTRAVENOUS at 10:05

## 2021-08-26 ASSESSMENT — PAIN DESCRIPTION - PROGRESSION
CLINICAL_PROGRESSION: NOT CHANGED
CLINICAL_PROGRESSION: NOT CHANGED

## 2021-08-26 ASSESSMENT — PAIN SCALES - GENERAL: PAINLEVEL_OUTOF10: 0

## 2021-08-26 NOTE — PROGRESS NOTES
Hospitalist Progress Note    Subjective:    Pt complains of difficulty swallowing. Still has facial swelling. ROS: denies fever, chills, cp, sob, n/v, HA unless stated above.      fluticasone  1 spray Each Nostril Daily    sodium chloride  1 spray Each Nostril Q6H    amoxicillin-clavulanate  1 tablet Oral 2 times per day    gabapentin  100 mg Oral TID    metoprolol tartrate  25 mg Oral BID    pantoprazole  40 mg Oral Daily    sodium chloride flush  5-40 mL IntraVENous 2 times per day    enoxaparin  40 mg Subcutaneous Daily    famotidine (PEPCID) injection  20 mg IntraVENous BID    cetirizine  10 mg Oral Daily     sodium chloride flush, 5-40 mL, PRN  sodium chloride, 25 mL, PRN  ondansetron, 4 mg, Q8H PRN   Or  ondansetron, 4 mg, Q6H PRN  polyethylene glycol, 17 g, Daily PRN  acetaminophen, 650 mg, Q6H PRN   Or  acetaminophen, 650 mg, Q6H PRN  diphenhydrAMINE, 25 mg, Q6H PRN  oxyCODONE, 5 mg, Q12H PRN         Objective:    /80   Pulse 64   Temp 97.9 °F (36.6 °C) (Oral)   Resp 12   Ht 6' (1.829 m)   Wt 172 lb (78 kg)   SpO2 99%   BMI 23.33 kg/m²   General Appearance: AOX3, in no acute distress  Head: face swollen  Eyes: JOHN, EOMI , conjunctivae normal  Neck: neck supple and non tender, No JVD  Pulmonary/Chest: CTAB  Cardiovascular: NRR, S1 and S2  Abdomen: soft, non-tender, non-distended, normal bowel sounds,  Extremities: no cyanosis, clubbing or edema, pulses intact  Neurologic: non focal  Skin: warm and dry, no rash or erythema      Recent Labs     08/25/21  1359 08/26/21  0431   * 132*   K 4.2 4.2   CL 93* 96*   CO2 17* 22   BUN 28* 34*   CREATININE 1.6* 1.3   GLUCOSE 91 178*   CALCIUM 9.4 9.3       Recent Labs     08/25/21  1359 08/26/21  0431   WBC 4.1 2.8*   RBC 4.91 4.42*   HGB 14.2 12.7*   HCT 45.6 39.9*   MCV 92.9 90.3   MCH 28.9 28.7   MCHC 31.1* 31.8*   RDW 16.8* 16.7*    202   MPV 9.2 9.0         Summary  50year old man with history of HTN who was recently started on lisinopril. He was sent to the ER by PCP for facial and lip swelling, suspicious for angioedema    Assessment:  1. Lisinopril associated angioedema of face   2. Mild dysphagia  3. Hyponatremia  4. Acute renal injury (POA)  5. Dehdyration  6. No airway compromise  7.  Hx of HTN    Plan:  - continue solumedrol and cetirizin  - lisinopril has been discontinued  - SLP eval  - continue IVF  - continue other management  - CT face unremarkable  -close monioring      DVT ppx: lovenox    Disposition: 1- 2 days pending clinical improvement    Electronically signed by Jj Weaver MD on 8/26/2021 at 3:26 PM

## 2021-08-26 NOTE — PROGRESS NOTES
PT arrived back to room from CT. PT is sitting on the bed eating lunch. Call light in reach. All requests were met by the patient. Pt shows no signs of distress.

## 2021-08-26 NOTE — ED NOTES
Report called to YAYA Richardson. All questions answered at this time.       Danilo Johnson RN  08/26/21 0825

## 2021-08-26 NOTE — PROGRESS NOTES
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      Impressions: Prabhu Mota was seen for a bedside swallowing evaluation after being admitted to [facility] with angioedema. Pt was alert and cooperative throughout assessment. Relevant medical hx includes hypertension. Pt denies hx of dysphagia PTA and reports current globus sensation with solid foods. Pt was positioned upright in bed and presented with a clear vocal quality and strong volitional cough. Oral mechanism examination indicated reduced labial coordination with adequate strength and coordination. PO trials of regular solids and thin liquids by cup/straw sips were given. Oropharyngeal swallow appears intact characterized by adequate mastication and oral clearance, timely swallow initiation and adequate laryngeal elevation. Clear vocal quality and 0 overt s/s of aspiration were observed with all PO trials given. Recommend continue regular diet/thin liquids with aspiration precautions. No further acute ST needs were identified at this time.       ONSET DATE: this admission     Date of Eval: 2021  Evaluating Therapist: DASHA Daly    Current Diet level:  Current Diet : Regular  Current Liquid Diet : Thin      Primary Complaint  Patient Complaint: facial swelling    Pain:  Pain Assessment  Pain Assessment: 0-10  Pain Level: 0    Reason for Referral  Prabhu Mota was referred for a bedside swallow evaluation to assess the efficiency of his swallow function, identify signs and symptoms of aspiration and make recommendations regarding safe dietary consistencies, effective compensatory strategies, and safe eating environment. Impression  Dysphagia Diagnosis: Swallow function appears grossly intact  Dysphagia Outcome Severity Scale: Level 6: Within functional limits/Modified independence     Treatment Plan  Requires SLP Intervention: No  Duration/Frequency of Treatment: n/a  D/C Recommendations: To be determined       Recommended Diet and Intervention  Diet Solids Recommendation: Regular  Liquid Consistency Recommendation: Thin  Recommended Form of Meds: PO          Compensatory Swallowing Strategies  Compensatory Swallowing Strategies: Upright as possible for all oral intake    Treatment/Goals  Short-term Goals  Timeframe for Short-term Goals: n/a    General  Chart Reviewed: Yes  Behavior/Cognition: Alert; Cooperative;Pleasant mood  Respiratory Status: Room air  O2 Device: None (Room air)  Communication Observation: Functional  Follows Directions: Complex  Dentition: Adequate  Patient Positioning: Upright in bed  Baseline Vocal Quality: Normal  Volitional Cough: Strong  Prior Dysphagia History: Pt reports globus sensation  Consistencies Administered: Reg solid; Thin - cup; Thin - straw; Thin - teaspoon           Vision/Hearing  Vision  Vision: Within Functional Limits  Hearing  Hearing: Within functional limits    Oral Motor Deficits  Oral/Motor  Oral Motor: Exceptions to [facility]    Oral Phase Dysfunction  Oral Phase  Oral Phase: WFL     Indicators of Pharyngeal Phase Dysfunction   Pharyngeal Phase  Pharyngeal Phase: WFL    Prognosis  Prognosis  Prognosis for safe diet advancement: good  Individuals consulted  Consulted and agree with results and recommendations: Patient    Education  Patient Education: recommendations/POC  Patient Education Response: Verbalizes understanding  Safety Devices in place: Yes  Type of devices:  All fall risk precautions in place Therapy Time  SLP Individual Minutes  Time In: 8643  Time Out: Yen  Minutes: [address] [facility], Raymundo Kern, 8/26/2021

## 2021-08-26 NOTE — PROGRESS NOTES
Nutrition Assessment     Type and Reason for Visit: Initial, Positive Nutrition Screen    Nutrition Recommendations/Plan:     Continue regular diet consistency with restrictions to encourage heart healthy diet      Nutrition Assessment:  Pt admitted with facial swelling causing difficult swallowing. Has been evaluated by SLP and found to have no acute ST needs. MST: 1 for weight loss PTA. Pt has been requesting meal trays during admission although intake of meals is not being recorded. Weight stable X6 months. Pt with no acute nutrition needs identified at this time. Will continue to monitor and assess per protocol during los. Malnutrition Assessment:  Malnutrition Status:  No malnutrition     Estimated Daily Nutrient Needs:  Energy (kcal): 9345-6179; Weight Used for Energy Requirements:  Current     Protein (g): 78-94 (1-1.2 g/kg current BW); Weight Used for Protein Requirements:  Current        Fluid (ml/day): 9623-5485; Weight Used for Fluid Requirements:  1 ml/kcal      Nutrition Related Findings: Glucose up to 180, was recently started on steroid      Current Nutrition Therapies:    ADULT DIET; Regular; Low Fat/Low Chol/High Fiber/2 gm Na;  Low Sodium (2 gm)    Anthropometric Measures:  · Height: 6' (182.9 cm)  · Current Body Wt: 172 lb (78 kg)   · BMI: 23.3    Nutrition Diagnosis:   No nutrition diagnosis at this time     Nutrition Interventions:   Food and/or Nutrient Delivery:  Continue Current Diet  Nutrition Education/Counseling:  No recommendation at this time   Coordination of Nutrition Care:  No recommendation at this time    Goals:  Pt will tolerate meals and snacks with greater than 75% intake during los       Nutrition Monitoring and Evaluation:   Behavioral-Environmental Outcomes:  None Identified   Food/Nutrient Intake Outcomes:  Food and Nutrient Intake  Physical Signs/Symptoms Outcomes:  None Identified     Discharge Planning:    No discharge needs at this time     Electronically signed by Rita Christian RD, LD on 8/26/21 at 4:36 PM EDT    Contact: [phone]

## 2021-08-27 LAB
ANION GAP SERPL CALCULATED.3IONS-SCNC: 9 MMOL/L (ref 4–16)
BUN BLDV-MCNC: 19 MG/DL (ref 6–23)
CALCIUM SERPL-MCNC: 9 MG/DL (ref 8.3–10.6)
CHLORIDE BLD-SCNC: 103 MMOL/L (ref 99–110)
CO2: 24 MMOL/L (ref 21–32)
CREAT SERPL-MCNC: 0.9 MG/DL (ref 0.9–1.3)
GFR AFRICAN AMERICAN: >60 ML/MIN/1.73M2
GFR NON-AFRICAN AMERICAN: >60 ML/MIN/1.73M2
GLUCOSE BLD-MCNC: 125 MG/DL (ref 70–99)
POTASSIUM SERPL-SCNC: 4.5 MMOL/L (ref 3.5–5.1)
SODIUM BLD-SCNC: 136 MMOL/L (ref 135–145)

## 2021-08-27 PROCEDURE — 6360000002 HC RX W HCPCS: Performed by: NURSE PRACTITIONER

## 2021-08-27 PROCEDURE — 96365 THER/PROPH/DIAG IV INF INIT: CPT

## 2021-08-27 PROCEDURE — 94761 N-INVAS EAR/PLS OXIMETRY MLT: CPT

## 2021-08-27 PROCEDURE — 96376 TX/PRO/DX INJ SAME DRUG ADON: CPT

## 2021-08-27 PROCEDURE — 6370000000 HC RX 637 (ALT 250 FOR IP): Performed by: HOSPITALIST

## 2021-08-27 PROCEDURE — G0378 HOSPITAL OBSERVATION PER HR: HCPCS

## 2021-08-27 PROCEDURE — 6370000000 HC RX 637 (ALT 250 FOR IP): Performed by: NURSE PRACTITIONER

## 2021-08-27 PROCEDURE — 2580000003 HC RX 258: Performed by: NURSE PRACTITIONER

## 2021-08-27 PROCEDURE — 96372 THER/PROPH/DIAG INJ SC/IM: CPT

## 2021-08-27 PROCEDURE — 2500000003 HC RX 250 WO HCPCS: Performed by: INTERNAL MEDICINE

## 2021-08-27 PROCEDURE — 36415 COLL VENOUS BLD VENIPUNCTURE: CPT

## 2021-08-27 PROCEDURE — 6370000000 HC RX 637 (ALT 250 FOR IP): Performed by: INTERNAL MEDICINE

## 2021-08-27 PROCEDURE — 80048 BASIC METABOLIC PNL TOTAL CA: CPT

## 2021-08-27 PROCEDURE — 6360000002 HC RX W HCPCS: Performed by: HOSPITALIST

## 2021-08-27 RX ORDER — AMLODIPINE BESYLATE 10 MG/1
10 TABLET ORAL DAILY
Status: DISCONTINUED | OUTPATIENT
Start: 2021-08-27 | End: 2021-08-28 | Stop reason: HOSPADM

## 2021-08-27 RX ORDER — HYDROCHLOROTHIAZIDE 25 MG/1
25 TABLET ORAL DAILY
Status: DISCONTINUED | OUTPATIENT
Start: 2021-08-27 | End: 2021-08-28 | Stop reason: HOSPADM

## 2021-08-27 RX ADMIN — PANTOPRAZOLE SODIUM 40 MG: 40 TABLET, DELAYED RELEASE ORAL at 08:31

## 2021-08-27 RX ADMIN — HYDRALAZINE HYDROCHLORIDE 10 MG: 20 INJECTION, SOLUTION INTRAMUSCULAR; INTRAVENOUS at 05:24

## 2021-08-27 RX ADMIN — GABAPENTIN 100 MG: 100 CAPSULE ORAL at 08:30

## 2021-08-27 RX ADMIN — FAMOTIDINE 20 MG: 10 INJECTION, SOLUTION INTRAVENOUS at 08:31

## 2021-08-27 RX ADMIN — AMLODIPINE BESYLATE 10 MG: 10 TABLET ORAL at 08:31

## 2021-08-27 RX ADMIN — METHYLPREDNISOLONE SODIUM SUCCINATE 40 MG: 40 INJECTION, POWDER, FOR SOLUTION INTRAMUSCULAR; INTRAVENOUS at 00:20

## 2021-08-27 RX ADMIN — GABAPENTIN 100 MG: 100 CAPSULE ORAL at 20:35

## 2021-08-27 RX ADMIN — SODIUM CHLORIDE, PRESERVATIVE FREE 10 ML: 5 INJECTION INTRAVENOUS at 20:36

## 2021-08-27 RX ADMIN — FLUTICASONE PROPIONATE 1 SPRAY: 50 SPRAY, METERED NASAL at 08:31

## 2021-08-27 RX ADMIN — HYDROCHLOROTHIAZIDE 25 MG: 25 TABLET ORAL at 08:30

## 2021-08-27 RX ADMIN — METHYLPREDNISOLONE SODIUM SUCCINATE 40 MG: 40 INJECTION, POWDER, FOR SOLUTION INTRAMUSCULAR; INTRAVENOUS at 08:31

## 2021-08-27 RX ADMIN — SODIUM CHLORIDE, PRESERVATIVE FREE 10 ML: 5 INJECTION INTRAVENOUS at 08:35

## 2021-08-27 RX ADMIN — FAMOTIDINE 20 MG: 10 INJECTION, SOLUTION INTRAVENOUS at 20:35

## 2021-08-27 RX ADMIN — OXYCODONE HYDROCHLORIDE 5 MG: 5 TABLET ORAL at 08:31

## 2021-08-27 RX ADMIN — ENOXAPARIN SODIUM 40 MG: 40 INJECTION SUBCUTANEOUS at 08:31

## 2021-08-27 RX ADMIN — CETIRIZINE HYDROCHLORIDE 10 MG: 10 TABLET, FILM COATED ORAL at 08:30

## 2021-08-27 RX ADMIN — METOPROLOL TARTRATE 25 MG: 50 TABLET, FILM COATED ORAL at 20:35

## 2021-08-27 RX ADMIN — GABAPENTIN 100 MG: 100 CAPSULE ORAL at 14:20

## 2021-08-27 RX ADMIN — METOPROLOL TARTRATE 25 MG: 50 TABLET, FILM COATED ORAL at 08:30

## 2021-08-27 ASSESSMENT — PAIN DESCRIPTION - LOCATION
LOCATION: FOOT

## 2021-08-27 ASSESSMENT — PAIN DESCRIPTION - PROGRESSION

## 2021-08-27 ASSESSMENT — PAIN DESCRIPTION - ONSET
ONSET: ON-GOING

## 2021-08-27 ASSESSMENT — PAIN DESCRIPTION - DESCRIPTORS
DESCRIPTORS: DULL
DESCRIPTORS: ACHING
DESCRIPTORS: ACHING

## 2021-08-27 ASSESSMENT — PAIN - FUNCTIONAL ASSESSMENT: PAIN_FUNCTIONAL_ASSESSMENT: ACTIVITIES ARE NOT PREVENTED

## 2021-08-27 ASSESSMENT — PAIN SCALES - GENERAL
PAINLEVEL_OUTOF10: 7
PAINLEVEL_OUTOF10: 8
PAINLEVEL_OUTOF10: 6
PAINLEVEL_OUTOF10: 0
PAINLEVEL_OUTOF10: 0

## 2021-08-27 ASSESSMENT — PAIN DESCRIPTION - ORIENTATION
ORIENTATION: RIGHT

## 2021-08-27 ASSESSMENT — PAIN DESCRIPTION - PAIN TYPE
TYPE: ACUTE PAIN
TYPE: ACUTE PAIN
TYPE: CHRONIC PAIN

## 2021-08-27 ASSESSMENT — PAIN DESCRIPTION - FREQUENCY: FREQUENCY: CONTINUOUS

## 2021-08-27 NOTE — PLAN OF CARE
Problem: Falls - Risk of:  Goal: Will remain free from falls  Description: Will remain free from falls  8/27/2021 0840 by Stephanie Capps RN  Outcome: Ongoing  8/27/2021 0839 by Stephanie Capps RN  Outcome: Ongoing  Goal: Absence of physical injury  Description: Absence of physical injury  8/27/2021 0840 by Stephanie Capps RN  Outcome: Ongoing  8/27/2021 0839 by Stephanie Capps RN  Outcome: Ongoing

## 2021-08-27 NOTE — CARE COORDINATION
Met c pt to initiate discharge planning. Pt is very kind and cooperative. Pt reports he is still homeless and states he is not able to stay in shelter due to his previous criminal charges. Pt expressed frustration with shelter process and those running it. Pt is very self sufficient, working diligently on getting his SSID established, working with local . Pt has pcp/ active insurance that covers meds. Other than concern for his high blood pressure, no discharge needs id'd.

## 2021-08-27 NOTE — PROGRESS NOTES
Hospitalist Progress Note    Subjective:    Pt has no new complaint today. Blood pressure uncontrolled. Still has facial swelling. ROS: denies fever, chills, cp, sob, n/v, HA unless stated above.      amLODIPine  10 mg Oral Daily    hydroCHLOROthiazide  25 mg Oral Daily    fluticasone  1 spray Each Nostril Daily    gabapentin  100 mg Oral TID    metoprolol tartrate  25 mg Oral BID    pantoprazole  40 mg Oral Daily    sodium chloride flush  5-40 mL IntraVENous 2 times per day    enoxaparin  40 mg Subcutaneous Daily    famotidine (PEPCID) injection  20 mg IntraVENous BID    cetirizine  10 mg Oral Daily     sodium chloride flush, 5-40 mL, PRN  sodium chloride, 25 mL, PRN  ondansetron, 4 mg, Q8H PRN   Or  ondansetron, 4 mg, Q6H PRN  polyethylene glycol, 17 g, Daily PRN  acetaminophen, 650 mg, Q6H PRN   Or  acetaminophen, 650 mg, Q6H PRN  diphenhydrAMINE, 25 mg, Q6H PRN  oxyCODONE, 5 mg, Q12H PRN         Objective:    BP (!) 205/156   Pulse 67   Temp 97.4 °F (36.3 °C) (Oral)   Resp 16   Ht 6' (1.829 m)   Wt 172 lb (78 kg)   SpO2 100%   BMI 23.33 kg/m²   General Appearance: AOX3, in no acute distress  Head: face swollen  Eyes: JOHN, EOMI , conjunctivae normal  Neck: neck supple and non tender, No JVD  Pulmonary/Chest: CTAB  Cardiovascular: NRR, S1 and S2  Abdomen: soft, non-tender, non-distended, normal bowel sounds,  Extremities: no cyanosis, clubbing or edema, pulses intact  Neurologic: non focal  Skin: warm and dry, no rash or erythema      Recent Labs     08/25/21  1359 08/26/21  0431 08/27/21  0820   * 132* 136   K 4.2 4.2 4.5   CL 93* 96* 103   CO2 17* 22 24   BUN 28* 34* 19   CREATININE 1.6* 1.3 0.9   GLUCOSE 91 178* 125*   CALCIUM 9.4 9.3 9.0       Recent Labs     08/25/21  1359 08/26/21  0431   WBC 4.1 2.8*   RBC 4.91 4.42*   HGB 14.2 12.7*   HCT 45.6 39.9*   MCV 92.9 90.3   MCH 28.9 28.7   MCHC 31.1* 31.8*   RDW 16.8* 16.7*    202   MPV 9.2 9.0         Summary  45 year old man with history of HTN who was recently started on lisinopril. He was sent to the ER by PCP for facial and lip swelling, suspicious for angioedema    Assessment:  1. Uncontrolled HTN  2. Lisinopril associated angioedema of face   3. Mild dysphagia  4. Hyponatremia  5. Acute renal injury (POA)  6. Dehdyration  7. No airway compromise  8.  Hx of HTN    Plan:  - start  amlodipine and HCTZ  - continue lopressor  - DC solumerdol  - DC IVF  - continue other management   - monitor overnight for BP control      DVT ppx: lovenox    Disposition: 1- 2 days pending clinical improvement and BP control    Electronically signed by Venkatesh Roberto MD on 8/27/2021 at 2:12 PM

## 2021-08-28 VITALS
TEMPERATURE: 98.8 F | RESPIRATION RATE: 15 BRPM | SYSTOLIC BLOOD PRESSURE: 137 MMHG | DIASTOLIC BLOOD PRESSURE: 96 MMHG | OXYGEN SATURATION: 100 % | HEART RATE: 87 BPM | BODY MASS INDEX: 22.99 KG/M2 | HEIGHT: 72 IN | WEIGHT: 169.75 LBS

## 2021-08-28 PROBLEM — E87.1 HYPONATREMIA: Status: ACTIVE | Noted: 2021-08-28

## 2021-08-28 PROBLEM — N17.9 ACUTE RENAL INJURY (HCC): Status: ACTIVE | Noted: 2021-08-28

## 2021-08-28 PROBLEM — I16.0 HYPERTENSIVE URGENCY: Status: ACTIVE | Noted: 2021-08-28

## 2021-08-28 PROBLEM — E86.0 DEHYDRATION: Status: ACTIVE | Noted: 2021-08-28

## 2021-08-28 PROCEDURE — 2500000003 HC RX 250 WO HCPCS: Performed by: INTERNAL MEDICINE

## 2021-08-28 PROCEDURE — 94761 N-INVAS EAR/PLS OXIMETRY MLT: CPT

## 2021-08-28 PROCEDURE — 2580000003 HC RX 258: Performed by: NURSE PRACTITIONER

## 2021-08-28 PROCEDURE — 6370000000 HC RX 637 (ALT 250 FOR IP): Performed by: NURSE PRACTITIONER

## 2021-08-28 PROCEDURE — G0378 HOSPITAL OBSERVATION PER HR: HCPCS

## 2021-08-28 PROCEDURE — 6370000000 HC RX 637 (ALT 250 FOR IP): Performed by: HOSPITALIST

## 2021-08-28 RX ORDER — AMLODIPINE BESYLATE 10 MG/1
10 TABLET ORAL DAILY
Qty: 30 TABLET | Refills: 0 | Status: SHIPPED | OUTPATIENT
Start: 2021-08-28 | End: 2021-10-11 | Stop reason: SDUPTHER

## 2021-08-28 RX ORDER — HYDROCHLOROTHIAZIDE 25 MG/1
25 TABLET ORAL DAILY
Qty: 30 TABLET | Refills: 3 | Status: SHIPPED | OUTPATIENT
Start: 2021-08-28 | End: 2021-10-11 | Stop reason: SDUPTHER

## 2021-08-28 RX ADMIN — FLUTICASONE PROPIONATE 1 SPRAY: 50 SPRAY, METERED NASAL at 11:31

## 2021-08-28 RX ADMIN — HYDROCHLOROTHIAZIDE 25 MG: 25 TABLET ORAL at 11:28

## 2021-08-28 RX ADMIN — PANTOPRAZOLE SODIUM 40 MG: 40 TABLET, DELAYED RELEASE ORAL at 11:28

## 2021-08-28 RX ADMIN — FAMOTIDINE 20 MG: 10 INJECTION, SOLUTION INTRAVENOUS at 11:29

## 2021-08-28 RX ADMIN — AMLODIPINE BESYLATE 10 MG: 10 TABLET ORAL at 11:28

## 2021-08-28 RX ADMIN — GABAPENTIN 100 MG: 100 CAPSULE ORAL at 11:28

## 2021-08-28 RX ADMIN — SODIUM CHLORIDE, PRESERVATIVE FREE 10 ML: 5 INJECTION INTRAVENOUS at 11:29

## 2021-08-28 RX ADMIN — METOPROLOL TARTRATE 25 MG: 50 TABLET, FILM COATED ORAL at 11:29

## 2021-08-28 RX ADMIN — CETIRIZINE HYDROCHLORIDE 10 MG: 10 TABLET, FILM COATED ORAL at 11:28

## 2021-08-28 ASSESSMENT — PAIN DESCRIPTION - PROGRESSION
CLINICAL_PROGRESSION: NOT CHANGED

## 2021-08-28 ASSESSMENT — PAIN SCALES - GENERAL: PAINLEVEL_OUTOF10: 0

## 2021-08-28 NOTE — DISCHARGE SUMMARY
medications  · amLODIPine 10 MG tablet  · hydroCHLOROthiazide 25 MG tablet           Discharge Exam:  General Appearance: alert and oriented to person, place and time  Skin: warm and dry, no rash or erythema  Head: normocephalic and atraumatic  Eyes: pupils equal, round, and reactive to light,   Neck: neck supple and non tender   Pulmonary/Chest: clear to auscultation bilaterally  Cardiovascular: normal rate, normal S1 and S2,   Abdomen: soft, non-tender, non-distended, normal bowel sounds,   Extremities: no edema  Neurologic: Non focal     Vitals:    Vitals:    08/27/21 2006 08/28/21 0000 08/28/21 0357 08/28/21 0401   BP: (!) 147/96 (!) 158/99 (!) 130/93    Pulse: 85 73 77    Resp: 16 16 16    Temp: 97.4 °F (36.3 °C) 97.4 °F (36.3 °C) 98 °F (36.7 °C)    TempSrc: Oral Oral Oral    SpO2: 100% 100% 96%    Weight:    169 lb 12.1 oz (77 kg)   Height:           I/O last 3 completed shifts:  In: -   Out: 2000 [Urine:2000]  No intake/output data recorded. LABS:  Recent Labs     08/25/21  1359 08/26/21  0431 08/27/21  0820   * 132* 136   K 4.2 4.2 4.5   CL 93* 96* 103   CO2 17* 22 24   BUN 28* 34* 19   CREATININE 1.6* 1.3 0.9   GLUCOSE 91 178* 125*   CALCIUM 9.4 9.3 9.0       Recent Labs     08/25/21  1359 08/26/21  0431   WBC 4.1 2.8*   RBC 4.91 4.42*   HGB 14.2 12.7*   HCT 45.6 39.9*   MCV 92.9 90.3   MCH 28.9 28.7   MCHC 31.1* 31.8*   RDW 16.8* 16.7*    202   MPV 9.2 9.0         Imaging:    CT SINUS WO CONTRAST   Final Result   No evidence of sinusitis. Follow up:  Estuardo Langford, APRN - CNP  [facility]  [id number] [address]  [phone]            Patient Instructions:    Activity level: as tolerated   Diet: regular diet          Dispo: home     Condition at discharge stable    Note that 40 minutes was spent in preparing discharge papers, discussing discharge with patient, medication review, etc.    Signed:  Electronically signed by Marcelo Campbell MD on 8/28/2021 at 10:15 AM

## 2021-08-31 ENCOUNTER — HOSPITAL ENCOUNTER (OUTPATIENT)
Dept: WOUND CARE | Age: 46
Discharge: HOME | End: 2021-08-31
Payer: MEDICARE

## 2021-08-31 VITALS — RESPIRATION RATE: 16 BRPM

## 2021-08-31 DIAGNOSIS — L89.893 PRESSURE ULCER OF RIGHT FOOT, STAGE 3 (HCC): Primary | ICD-10-CM

## 2021-08-31 DIAGNOSIS — L89.893 PRESSURE ULCER OF TOE OF RIGHT FOOT, STAGE 3 (HCC): ICD-10-CM

## 2021-08-31 PROCEDURE — 11042 DBRDMT SUBQ TIS 1ST 20SQCM/<: CPT | Performed by: NURSE PRACTITIONER

## 2021-08-31 PROCEDURE — 11042 DBRDMT SUBQ TIS 1ST 20SQCM/<: CPT

## 2021-08-31 RX ORDER — BACITRACIN ZINC AND POLYMYXIN B SULFATE 500; 1000 [USP'U]/G; [USP'U]/G
OINTMENT TOPICAL ONCE
Status: CANCELLED | OUTPATIENT
Start: 2021-08-31 | End: 2021-08-31

## 2021-08-31 RX ORDER — LIDOCAINE 50 MG/G
OINTMENT TOPICAL ONCE
Status: CANCELLED | OUTPATIENT
Start: 2021-08-31 | End: 2021-08-31

## 2021-08-31 RX ORDER — GINSENG 100 MG
CAPSULE ORAL ONCE
Status: CANCELLED | OUTPATIENT
Start: 2021-08-31 | End: 2021-08-31

## 2021-08-31 RX ORDER — LIDOCAINE HYDROCHLORIDE 20 MG/ML
JELLY TOPICAL ONCE
Status: CANCELLED | OUTPATIENT
Start: 2021-08-31 | End: 2021-08-31

## 2021-08-31 RX ORDER — GENTAMICIN SULFATE 1 MG/G
OINTMENT TOPICAL ONCE
Status: CANCELLED | OUTPATIENT
Start: 2021-08-31 | End: 2021-08-31

## 2021-08-31 RX ORDER — BETAMETHASONE DIPROPIONATE 0.05 %
OINTMENT (GRAM) TOPICAL ONCE
Status: CANCELLED | OUTPATIENT
Start: 2021-08-31 | End: 2021-08-31

## 2021-08-31 RX ORDER — LIDOCAINE HYDROCHLORIDE 40 MG/ML
SOLUTION TOPICAL ONCE
Status: CANCELLED | OUTPATIENT
Start: 2021-08-31 | End: 2021-08-31

## 2021-08-31 RX ORDER — LIDOCAINE 50 MG/G
OINTMENT TOPICAL ONCE
Status: DISCONTINUED | OUTPATIENT
Start: 2021-08-31 | End: 2021-09-01 | Stop reason: HOSPADM

## 2021-08-31 RX ORDER — CLOBETASOL PROPIONATE 0.5 MG/G
OINTMENT TOPICAL ONCE
Status: CANCELLED | OUTPATIENT
Start: 2021-08-31 | End: 2021-08-31

## 2021-08-31 RX ORDER — BACITRACIN, NEOMYCIN, POLYMYXIN B 400; 3.5; 5 [USP'U]/G; MG/G; [USP'U]/G
OINTMENT TOPICAL ONCE
Status: CANCELLED | OUTPATIENT
Start: 2021-08-31 | End: 2021-08-31

## 2021-08-31 RX ORDER — LIDOCAINE 40 MG/G
CREAM TOPICAL ONCE
Status: CANCELLED | OUTPATIENT
Start: 2021-08-31 | End: 2021-08-31

## 2021-08-31 ASSESSMENT — PAIN SCALES - GENERAL: PAINLEVEL_OUTOF10: 6

## 2021-08-31 ASSESSMENT — PAIN DESCRIPTION - ORIENTATION: ORIENTATION: RIGHT

## 2021-08-31 ASSESSMENT — PAIN DESCRIPTION - FREQUENCY: FREQUENCY: CONTINUOUS

## 2021-08-31 ASSESSMENT — PAIN DESCRIPTION - DESCRIPTORS: DESCRIPTORS: CONSTANT;RADIATING;THROBBING

## 2021-08-31 ASSESSMENT — PAIN DESCRIPTION - PROGRESSION: CLINICAL_PROGRESSION: NOT CHANGED

## 2021-08-31 ASSESSMENT — PAIN DESCRIPTION - LOCATION: LOCATION: FOOT

## 2021-08-31 ASSESSMENT — PAIN DESCRIPTION - ONSET: ONSET: ON-GOING

## 2021-08-31 ASSESSMENT — PAIN DESCRIPTION - PAIN TYPE: TYPE: CHRONIC PAIN

## 2021-08-31 NOTE — PROGRESS NOTES
Multilayer Compression Wrap   (Not Unna) Below the Knee    NAME:  Juan Carlos Nur  YOB: 1975  MEDICAL RECORD NUMBER:  [id number]  DATE:  8/31/2021    Multilayer compression wrap: Removed old Multilayer wrap if indicated and wash leg with mild soap/water. Applied moisturizing agent to dry skin as needed. Applied primary and secondary dressing as ordered. Applied multilayered dressing below the knee to right lower leg. Instructed patient/caregiver not to remove dressing and to keep it clean and dry. Instructed patient/caregiver on complications to report to provider, such as pain, numbness in toes, heavy drainage, and slippage of dressing. Instructed patient on purpose of compression dressing and on activity and exercise recommendations.       Electronically signed by Arsenio Levin RN on 8/31/2021 at 3:03 PM

## 2021-08-31 NOTE — PROGRESS NOTES
on File Prior to Encounter   Medication Sig Dispense Refill    amLODIPine (NORVASC) 10 MG tablet Take 1 tablet by mouth daily 30 tablet 0    hydroCHLOROthiazide (HYDRODIURIL) 25 MG tablet Take 1 tablet by mouth daily 30 tablet 3    oxyCODONE-acetaminophen (PERCOCET) 5-325 MG per tablet Take 1 tablet by mouth every 8 hours as needed for Pain.  metoprolol tartrate (LOPRESSOR) 25 MG tablet Take 1 tablet by mouth 2 times daily 60 tablet 3    omeprazole (PRILOSEC) 20 MG delayed release capsule Take 20 mg by mouth daily      gabapentin (NEURONTIN) 100 MG capsule Take 1 capsule by mouth 3 times daily for 20 days. 60 capsule 0     No current facility-administered medications on file prior to encounter. REVIEW OF SYSTEMS    Pertinent items are noted in HPI. Constitutional: Negative for systemic symptoms including fever, chills and malaise. Objective:      Resp 16     PHYSICAL EXAM    General: The patient is in no acute distress. Mental status:  Patient is appropriate, is  oriented to place and plan of care. Dermatologic exam: Visual inspection of the periwound reveals the skin to be moist and coarse  Wound exam: see wound description below in procedure note      Assessment:     Problem List Items Addressed This Visit     WD-Pressure ulcer of right foot, stage 3 (HCC) - Primary    Relevant Medications    lidocaine (XYLOCAINE) 5 % ointment    Other Relevant Orders    Initiate Outpatient Wound Care Protocol    WD-Pressure ulcer of toe of right foot, stage 3 (HCC)    Relevant Medications    lidocaine (XYLOCAINE) 5 % ointment    Other Relevant Orders    Initiate Outpatient Wound Care Protocol        Procedure Note    Indications:  Based on my examination of this patient's wound(s) today, sharp excision into necrotic subcutaneous tissue is required to promote healing and evaluate the extent of previous healing.     Performed by: GEN Bernardo - CNP    Consent obtained: Yes    Time out taken: Yes    Pain Control: Anesthetic  Anesthetic: 5% Lidocaine Ointment Topical     Debridement:Excisional Debridement    Using curette the wound(s) was/were sharply debrided down through and including the removal of subcutaneous tissue. Devitalized Tissue Debrided:  slough, exudate and callus    Pre Debridement Measurements:  Are located in the Wound Documentation Flow Sheet    All active wounds listed below with today's date are evaluated  Wound(s)    debrided this date include # : 1     Post  Debridement Measurements:  Wound 06/18/21 Right Plantar # 1 (Active)   Wound Image   07/23/21 1415   Wound Etiology Pressure Stage  3 08/28/21 1134   Dressing Status New dressing applied 08/28/21 1134   Wound Cleansed Soap and water 08/31/21 1354   Dressing/Treatment Silver dressing;Alginate;ABD 07/16/21 1714   Offloading for Diabetic Foot Ulcers Forefoot offloading shoe 08/28/21 1134   Wound Length (cm) 1.5 cm 08/31/21 1354   Wound Width (cm) 2 cm 08/31/21 1354   Wound Depth (cm) 0.1 cm 08/31/21 1354   Wound Surface Area (cm^2) 3 cm^2 08/31/21 1354   Change in Wound Size % (l*w) 82.86 08/31/21 1354   Wound Volume (cm^3) 0.3 cm^3 08/31/21 1354   Wound Healing % 83 08/31/21 1354   Post-Procedure Length (cm) 1.5 cm 08/31/21 1426   Post-Procedure Width (cm) 2 cm 08/31/21 1426   Post-Procedure Depth (cm) 0.1 cm 08/31/21 1426   Post-Procedure Surface Area (cm^2) 3 cm^2 08/31/21 1426   Post-Procedure Volume (cm^3) 0.3 cm^3 08/31/21 1426   Distance Tunneling (cm) 0 cm 08/31/21 1354   Tunneling Position ___ O'Clock 0 08/31/21 1354   Undermining Starts ___ O'Clock 0 08/31/21 1354   Undermining Ends___ O'Clock 0 08/31/21 1354   Undermining Maxium Distance (cm) 0 08/31/21 1354   Wound Assessment Granulation tissue 08/31/21 1354   Drainage Amount Other (Comment) 08/31/21 1354   Drainage Description Serosanguinous 08/31/21 1354   Odor Moderate 08/31/21 1354   Magi-wound Assessment Maceration; Hyperkeratosis (callous) 08/31/21 1351 Margins Defined edges; Unattached edges 08/31/21 1354   Wound Thickness Description not for Pressure Injury Full thickness 08/31/21 1354   Number of days: 74       Percent of Wound(s) Debrided: approximately 100%    Total  Area  Debrided:  3 sq cm     Bleeding:  Minimal    Hemostasis Achieved:  by pressure    Procedural Pain:  0  / 10     Post Procedural Pain:  0 / 10     Response to treatment:  Well tolerated by patient. Status of wound progress and description from last visit: Improved from last visit, the patient missed his clinic appointment Friday related to hospitalization for allergic reaction to lisinopril. Will continue compression. Plan:       Discharge Instructions         Discharge Instructions        PHYSICIAN ORDERS AND DISCHARGE INSTRUCTIONS     NOTE: Upon discharge from the [address], you will receive a patient experience survey. We would be grateful if you would take the time to fill this survey out.     Wound care order history:                 [facility]'s   Arterial studies on 12/26/2020              Cultures: Obtained 6/18/2021                Grafts:                Antibiotics:           Continuing wound care orders and information:                 Residence:                Continue home health care with:               Your wound-care supplies will be provided by:      Wound cleansing:               FA not scrub or use excessive force.              Wash hands with soap and water before and after dressing changes.               AWKIX to applying a clean dressing, cleanse wound with normal saline, wound cleanser, or mild soap and water.               Ask the physician or nurse before getting the wound(s) wet in a shower     Daily Wound management:              Keep weight off wounds and reposition every 2 hours.              Avoid standing for long periods of time.              Apply wraps/stockings in AM and remove at bedtime.              If swelling is present, elevate legs to the level of

## 2021-09-03 ENCOUNTER — HOSPITAL ENCOUNTER (OUTPATIENT)
Dept: WOUND CARE | Age: 46
Discharge: HOME | End: 2021-09-03
Payer: MEDICARE

## 2021-09-03 VITALS
RESPIRATION RATE: 18 BRPM | DIASTOLIC BLOOD PRESSURE: 96 MMHG | TEMPERATURE: 98.7 F | SYSTOLIC BLOOD PRESSURE: 136 MMHG | HEART RATE: 87 BPM

## 2021-09-03 PROCEDURE — 29581 APPL MULTLAYER CMPRN SYS LEG: CPT

## 2021-09-03 ASSESSMENT — PAIN SCALES - GENERAL: PAINLEVEL_OUTOF10: 8

## 2021-09-03 ASSESSMENT — PAIN DESCRIPTION - ONSET: ONSET: ON-GOING

## 2021-09-03 ASSESSMENT — PAIN DESCRIPTION - PAIN TYPE: TYPE: CHRONIC PAIN

## 2021-09-03 ASSESSMENT — PAIN DESCRIPTION - LOCATION: LOCATION: FOOT

## 2021-09-03 ASSESSMENT — PAIN - FUNCTIONAL ASSESSMENT: PAIN_FUNCTIONAL_ASSESSMENT: ACTIVITIES ARE NOT PREVENTED

## 2021-09-03 ASSESSMENT — PAIN DESCRIPTION - ORIENTATION: ORIENTATION: RIGHT

## 2021-09-03 ASSESSMENT — PAIN DESCRIPTION - DESCRIPTORS: DESCRIPTORS: CONSTANT;SHARP;THROBBING

## 2021-09-03 ASSESSMENT — PAIN DESCRIPTION - FREQUENCY: FREQUENCY: CONTINUOUS

## 2021-09-03 ASSESSMENT — PAIN DESCRIPTION - PROGRESSION: CLINICAL_PROGRESSION: NOT CHANGED

## 2021-09-03 NOTE — PROGRESS NOTES
Multilayer Compression Wrap   (Not Unna) Below the Knee    NAME:  Cesar Fonseca  YOB: 1975  MEDICAL RECORD NUMBER:  [id number]  DATE:  9/3/2021    Multilayer compression wrap: Removed old Multilayer wrap if indicated and wash leg with mild soap/water. Applied moisturizing agent to dry skin as needed. Applied primary and secondary dressing as ordered. Applied multilayered dressing below the knee to right lower leg. Instructed patient/caregiver not to remove dressing and to keep it clean and dry. Instructed patient/caregiver on complications to report to provider, such as pain, numbness in toes, heavy drainage, and slippage of dressing. Instructed patient on purpose of compression dressing and on activity and exercise recommendations.       Electronically signed by Alexandr Snider LPN on 4/1/5750 at 34:72 AM

## 2021-09-07 ENCOUNTER — HOSPITAL ENCOUNTER (OUTPATIENT)
Dept: WOUND CARE | Age: 46
Discharge: HOME | End: 2021-09-07
Payer: MEDICARE

## 2021-09-07 VITALS
RESPIRATION RATE: 18 BRPM | TEMPERATURE: 98.6 F | HEART RATE: 93 BPM | SYSTOLIC BLOOD PRESSURE: 145 MMHG | DIASTOLIC BLOOD PRESSURE: 93 MMHG

## 2021-09-07 DIAGNOSIS — L89.893 PRESSURE ULCER OF TOE OF RIGHT FOOT, STAGE 3 (HCC): ICD-10-CM

## 2021-09-07 DIAGNOSIS — L89.893 PRESSURE ULCER OF RIGHT FOOT, STAGE 3 (HCC): Primary | ICD-10-CM

## 2021-09-07 PROCEDURE — 11042 DBRDMT SUBQ TIS 1ST 20SQCM/<: CPT | Performed by: NURSE PRACTITIONER

## 2021-09-07 PROCEDURE — 11042 DBRDMT SUBQ TIS 1ST 20SQCM/<: CPT

## 2021-09-07 RX ORDER — LIDOCAINE HYDROCHLORIDE 20 MG/ML
JELLY TOPICAL ONCE
Status: CANCELLED | OUTPATIENT
Start: 2021-09-07 | End: 2021-09-07

## 2021-09-07 RX ORDER — CLOBETASOL PROPIONATE 0.5 MG/G
OINTMENT TOPICAL ONCE
Status: CANCELLED | OUTPATIENT
Start: 2021-09-07 | End: 2021-09-07

## 2021-09-07 RX ORDER — BACITRACIN, NEOMYCIN, POLYMYXIN B 400; 3.5; 5 [USP'U]/G; MG/G; [USP'U]/G
OINTMENT TOPICAL ONCE
Status: CANCELLED | OUTPATIENT
Start: 2021-09-07 | End: 2021-09-07

## 2021-09-07 RX ORDER — BETAMETHASONE DIPROPIONATE 0.05 %
OINTMENT (GRAM) TOPICAL ONCE
Status: CANCELLED | OUTPATIENT
Start: 2021-09-07 | End: 2021-09-07

## 2021-09-07 RX ORDER — LIDOCAINE HYDROCHLORIDE 40 MG/ML
SOLUTION TOPICAL ONCE
Status: CANCELLED | OUTPATIENT
Start: 2021-09-07 | End: 2021-09-07

## 2021-09-07 RX ORDER — LIDOCAINE 50 MG/G
OINTMENT TOPICAL ONCE
Status: CANCELLED | OUTPATIENT
Start: 2021-09-07 | End: 2021-09-07

## 2021-09-07 RX ORDER — GINSENG 100 MG
CAPSULE ORAL ONCE
Status: CANCELLED | OUTPATIENT
Start: 2021-09-07 | End: 2021-09-07

## 2021-09-07 RX ORDER — LIDOCAINE 50 MG/G
OINTMENT TOPICAL ONCE
Status: DISCONTINUED | OUTPATIENT
Start: 2021-09-07 | End: 2021-09-08 | Stop reason: HOSPADM

## 2021-09-07 RX ORDER — BACITRACIN ZINC AND POLYMYXIN B SULFATE 500; 1000 [USP'U]/G; [USP'U]/G
OINTMENT TOPICAL ONCE
Status: CANCELLED | OUTPATIENT
Start: 2021-09-07 | End: 2021-09-07

## 2021-09-07 RX ORDER — GENTAMICIN SULFATE 1 MG/G
OINTMENT TOPICAL ONCE
Status: CANCELLED | OUTPATIENT
Start: 2021-09-07 | End: 2021-09-07

## 2021-09-07 RX ORDER — LIDOCAINE 40 MG/G
CREAM TOPICAL ONCE
Status: CANCELLED | OUTPATIENT
Start: 2021-09-07 | End: 2021-09-07

## 2021-09-07 ASSESSMENT — PAIN DESCRIPTION - FREQUENCY: FREQUENCY: CONTINUOUS

## 2021-09-07 ASSESSMENT — PAIN - FUNCTIONAL ASSESSMENT: PAIN_FUNCTIONAL_ASSESSMENT: ACTIVITIES ARE NOT PREVENTED

## 2021-09-07 ASSESSMENT — PAIN DESCRIPTION - DESCRIPTORS: DESCRIPTORS: THROBBING;SHARP

## 2021-09-07 ASSESSMENT — PAIN DESCRIPTION - ORIENTATION: ORIENTATION: RIGHT

## 2021-09-07 ASSESSMENT — PAIN DESCRIPTION - LOCATION: LOCATION: FOOT

## 2021-09-07 ASSESSMENT — PAIN DESCRIPTION - PAIN TYPE: TYPE: CHRONIC PAIN

## 2021-09-07 ASSESSMENT — PAIN SCALES - GENERAL: PAINLEVEL_OUTOF10: 8

## 2021-09-07 ASSESSMENT — PAIN DESCRIPTION - PROGRESSION: CLINICAL_PROGRESSION: NOT CHANGED

## 2021-09-07 ASSESSMENT — PAIN DESCRIPTION - ONSET: ONSET: ON-GOING

## 2021-09-07 NOTE — PROGRESS NOTES
Wound Care Center Progress Note With Procedure    Delmar Hunter  AGE: 50 y.o. GENDER: male  : 1975  EPISODE DATE:  2021     Subjective:     Chief Complaint   Patient presents with    Wound Check     RLE         HISTORY of PRESENT ILLNESS     Vanessa Zhang is a 50 y. o. male who presents today for wound evaluation of Chronic pressure ulcer(s) of the right plantar foot. The ulcer is of moderate severity. The underlying cause of the wound is probably due to pressure from walking and blistering.  He has still significant sensitivity on his foot.  History of amputation of left first second third and tip of the fourth toe and right great toe performed on 2021 related to frostbite. He is homeless and the right plantar foot is believed to be associated with pressure and blistering from walking. Patient was recently hospitalized with an adverse drug reaction     Wound Pain Timing/Severity: none  Quality of pain: N/A  Severity of pain:  0 / 10   Modifying Factors: chronic pressure  Associated Signs/Symptoms: none        PAST MEDICAL HISTORY        Diagnosis Date    Hypertension     does not take medications       PAST SURGICAL HISTORY    Past Surgical History:   Procedure Laterality Date    FOOT DEBRIDEMENT Bilateral 2021    BILATERAL TOE  DEBRIDEMENT INCISION AND DRAINAGE, AMPUTATION OF LEFT 1ST, 2ND, 3RD AND TIP OF 4TH TOE AND RIGHT GREAT TOE performed by Mikki Whitten MD at [address] [place] [address]    History reviewed. No pertinent family history.     SOCIAL HISTORY    Social History     Tobacco Use    Smoking status: Former Smoker     Quit date: 2021     Years since quittin.6    Smokeless tobacco: Never Used   Vaping Use    Vaping Use: Never used   Substance Use Topics    Alcohol use: Yes     Comment: most days    Drug use: No       ALLERGIES    Allergies   Allergen Reactions    Lisinopril Angioedema       MEDICATIONS    Current Outpatient Medications on File Prior to Encounter   Medication Sig Dispense Refill    amLODIPine (NORVASC) 10 MG tablet Take 1 tablet by mouth daily 30 tablet 0    hydroCHLOROthiazide (HYDRODIURIL) 25 MG tablet Take 1 tablet by mouth daily 30 tablet 3    oxyCODONE-acetaminophen (PERCOCET) 5-325 MG per tablet Take 1 tablet by mouth every 8 hours as needed for Pain.  metoprolol tartrate (LOPRESSOR) 25 MG tablet Take 1 tablet by mouth 2 times daily 60 tablet 3    omeprazole (PRILOSEC) 20 MG delayed release capsule Take 20 mg by mouth daily      gabapentin (NEURONTIN) 100 MG capsule Take 1 capsule by mouth 3 times daily for 20 days. 60 capsule 0     No current facility-administered medications on file prior to encounter. REVIEW OF SYSTEMS    Pertinent items are noted in HPI. Constitutional: Negative for systemic symptoms including fever, chills and malaise. Objective:      BP (!) 145/93   Pulse 93   Temp 98.6 °F (37 °C) (Temporal)   Resp 18     PHYSICAL EXAM      General: The patient is in no acute distress. Mental status:  Patient is appropriate, is  oriented to place and plan of care.   Dermatologic exam: Visual inspection of the periwound reveals the skin to be normal in turgor and texture, dry, coarse and edematous  Wound exam: see wound description below in procedure note      Assessment:     Problem List Items Addressed This Visit     WD-Pressure ulcer of right foot, stage 3 (HCC) - Primary    Relevant Medications    lidocaine (XYLOCAINE) 5 % ointment    Other Relevant Orders    Initiate Outpatient Wound Care Protocol    WD-Pressure ulcer of toe of right foot, stage 3 (HCC)    Relevant Medications    lidocaine (XYLOCAINE) 5 % ointment    Other Relevant Orders    Initiate Outpatient Wound Care Protocol        Procedure Note    Indications:  Based on my examination of this patient's wound(s) today, sharp excision into necrotic subcutaneous tissue is required to promote healing and evaluate the extent of previous healing. Performed by: GEN Arredondo CNP    Consent obtained: Yes    Time out taken:  Yes    Pain Control:   N/A    Debridement:Excisional Debridement    Using curette the wound(s) was/were sharply debrided down through and including the removal of subcutaneous tissue.         Devitalized Tissue Debrided:  fibrin, biofilm, slough, exudate and callus    Pre Debridement Measurements:  Are located in the Wound Documentation Flow Sheet    All active wounds listed below with today's date are evaluated  Wound(s)    debrided this date include # : 1     Post  Debridement Measurements:  Wound 04/08/21 Right;Plantar (Active)   Number of days: 152       Wound 04/08/21 Toe (Comment  which one) Right great toe (Active)   Number of days: 152       Wound 04/08/21 Toe (Comment  which one) Right 2nd toe (Active)   Number of days: 152       Wound 04/08/21 Foot Right;Lateral cluster (Active)   Number of days: 152       Wound 04/08/21 Toe (Comment  which one) Left great blister (Active)   Number of days: 152       Wound 04/08/21 Toe (Comment  which one) Left 3rd toe (Active)   Number of days: 152       Wound 04/08/21 Toe (Comment  which one) Left 4th toe (Active)   Number of days: 152       Wound 04/08/21 Toe (Comment  which one) Left 5th toe (Active)   Number of days: 152       Wound 06/18/21 Right Plantar # 1 (Active)   Wound Image   09/07/21 1317   Wound Etiology Pressure Stage  3 09/03/21 1051   Dressing Status New dressing applied 09/03/21 1051   Wound Cleansed Soap and water 09/07/21 1317   Dressing/Treatment Silver dressing;Alginate;ABD 07/16/21 1714   Offloading for Diabetic Foot Ulcers Forefoot offloading shoe 09/03/21 1051   Wound Length (cm) 1.4 cm 09/07/21 1317   Wound Width (cm) 2 cm 09/07/21 1317   Wound Depth (cm) 0.2 cm 09/07/21 1317   Wound Surface Area (cm^2) 2.8 cm^2 09/07/21 1317   Change in Wound Size % (l*w) 84 09/07/21 1317   Wound Volume (cm^3) 0.56 cm^3 09/07/21 1317   Wound Healing % 68 09/07/21 1317   Post-Procedure Length (cm) 1.4 cm 09/07/21 1344   Post-Procedure Width (cm) 2 cm 09/07/21 1344   Post-Procedure Depth (cm) 0.2 cm 09/07/21 1344   Post-Procedure Surface Area (cm^2) 2.8 cm^2 09/07/21 1344   Post-Procedure Volume (cm^3) 0.56 cm^3 09/07/21 1344   Distance Tunneling (cm) 0 cm 09/07/21 1317   Tunneling Position ___ O'Clock 0 09/07/21 1317   Undermining Starts ___ O'Clock 0 09/07/21 1317   Undermining Ends___ O'Clock 0 09/07/21 1317   Undermining Maxium Distance (cm) 0 09/07/21 1317   Wound Assessment Pink/red 09/07/21 1317   Drainage Amount Large 09/07/21 1317   Drainage Description Serosanguinous 09/07/21 1317   Odor Moderate 09/07/21 1317   Magi-wound Assessment Maceration; Hyperkeratosis (callous) 09/07/21 1317   Margins Defined edges; Unattached edges 09/07/21 1317   Wound Thickness Description not for Pressure Injury Full thickness 09/07/21 1317   Number of days: 81       Percent of Wound(s) Debrided: approximately 100%    Total  Area  Debrided:  2.8 sq cm     Bleeding:  Minimal    Hemostasis Achieved:  by pressure    Procedural Pain:  0  / 10     Post Procedural Pain:  0 / 10     Response to treatment:  Well tolerated by patient. Status of wound progress and description from last visit:   Stable. Wound appears clean and ddy. Will not write for opioids this week. RN visit Friday       Plan:       Discharge Instructions         Discharge Instructions        PHYSICIAN ORDERS AND DISCHARGE INSTRUCTIONS     NOTE: Upon discharge from the [address], you will receive a patient experience survey.  We would be grateful if you would take the time to fill this survey out.     Wound care order history:                 [facility]   Arterial studies on 12/26/2020              Cultures: Obtained 6/18/2021                Grafts:                Antibiotics:           Continuing wound care orders and information:                 Residence:                Continue home health care with:               Your

## 2021-09-07 NOTE — PROGRESS NOTES
Multilayer Compression Wrap   (Not Unna) Below the Knee    NAME:  Keeley Farnsworth  YOB: 1975  MEDICAL RECORD NUMBER:  [id number]  DATE:  9/7/2021    Multilayer compression wrap: Removed old Multilayer wrap if indicated and wash leg with mild soap/water. Applied moisturizing agent to dry skin as needed. Applied primary and secondary dressing as ordered. Applied multilayered dressing below the knee to right lower leg. Instructed patient/caregiver not to remove dressing and to keep it clean and dry. Instructed patient/caregiver on complications to report to provider, such as pain, numbness in toes, heavy drainage, and slippage of dressing. Instructed patient on purpose of compression dressing and on activity and exercise recommendations.       Electronically signed by Rayna Garrido LPN on 8/7/8101 at 1:02 PM

## 2021-09-12 ENCOUNTER — HOSPITAL ENCOUNTER (EMERGENCY)
Age: 46
Discharge: LEFT BEFORE DISCHARGE | End: 2021-09-13
Payer: MEDICARE

## 2021-09-12 VITALS
OXYGEN SATURATION: 95 % | HEIGHT: 72 IN | SYSTOLIC BLOOD PRESSURE: 141 MMHG | BODY MASS INDEX: 21.67 KG/M2 | HEART RATE: 112 BPM | DIASTOLIC BLOOD PRESSURE: 95 MMHG | TEMPERATURE: 97.5 F | RESPIRATION RATE: 16 BRPM | WEIGHT: 160 LBS

## 2021-09-12 LAB
ALBUMIN SERPL-MCNC: 4.4 GM/DL (ref 3.4–5)
ALP BLD-CCNC: 112 IU/L (ref 40–129)
ALT SERPL-CCNC: 33 U/L (ref 10–40)
ANION GAP SERPL CALCULATED.3IONS-SCNC: 15 MMOL/L (ref 4–16)
AST SERPL-CCNC: 52 IU/L (ref 15–37)
BASOPHILS ABSOLUTE: 0 K/CU MM
BASOPHILS RELATIVE PERCENT: 1 % (ref 0–1)
BILIRUB SERPL-MCNC: 0.2 MG/DL (ref 0–1)
BUN BLDV-MCNC: 8 MG/DL (ref 6–23)
CALCIUM SERPL-MCNC: 9 MG/DL (ref 8.3–10.6)
CHLORIDE BLD-SCNC: 103 MMOL/L (ref 99–110)
CO2: 24 MMOL/L (ref 21–32)
CREAT SERPL-MCNC: 0.8 MG/DL (ref 0.9–1.3)
DIFFERENTIAL TYPE: ABNORMAL
EOSINOPHILS ABSOLUTE: 0.1 K/CU MM
EOSINOPHILS RELATIVE PERCENT: 1.5 % (ref 0–3)
GFR AFRICAN AMERICAN: >60 ML/MIN/1.73M2
GFR NON-AFRICAN AMERICAN: >60 ML/MIN/1.73M2
GLUCOSE BLD-MCNC: 93 MG/DL (ref 70–99)
HCT VFR BLD CALC: 42.2 % (ref 42–52)
HEMOGLOBIN: 13.8 GM/DL (ref 13.5–18)
IMMATURE NEUTROPHIL %: 0 % (ref 0–0.43)
LIPASE: 51 IU/L (ref 13–60)
LYMPHOCYTES ABSOLUTE: 2.6 K/CU MM
LYMPHOCYTES RELATIVE PERCENT: 63.3 % (ref 24–44)
MCH RBC QN AUTO: 28.6 PG (ref 27–31)
MCHC RBC AUTO-ENTMCNC: 32.7 % (ref 32–36)
MCV RBC AUTO: 87.6 FL (ref 78–100)
MONOCYTES ABSOLUTE: 0.2 K/CU MM
MONOCYTES RELATIVE PERCENT: 5.4 % (ref 0–4)
NUCLEATED RBC %: 0 %
PDW BLD-RTO: 16.5 % (ref 11.7–14.9)
PLATELET # BLD: 312 K/CU MM (ref 140–440)
PMV BLD AUTO: 8.1 FL (ref 7.5–11.1)
POTASSIUM SERPL-SCNC: 3.9 MMOL/L (ref 3.5–5.1)
RBC # BLD: 4.82 M/CU MM (ref 4.6–6.2)
SEGMENTED NEUTROPHILS ABSOLUTE COUNT: 1.2 K/CU MM
SEGMENTED NEUTROPHILS RELATIVE PERCENT: 28.8 % (ref 36–66)
SODIUM BLD-SCNC: 142 MMOL/L (ref 135–145)
TOTAL IMMATURE NEUTOROPHIL: 0 K/CU MM
TOTAL NUCLEATED RBC: 0 K/CU MM
TOTAL PROTEIN: 8.1 GM/DL (ref 6.4–8.2)
WBC # BLD: 4.1 K/CU MM (ref 4–10.5)

## 2021-09-12 PROCEDURE — 80053 COMPREHEN METABOLIC PANEL: CPT

## 2021-09-12 PROCEDURE — 85025 COMPLETE CBC W/AUTO DIFF WBC: CPT

## 2021-09-12 PROCEDURE — 83690 ASSAY OF LIPASE: CPT

## 2021-09-12 ASSESSMENT — PAIN SCALES - GENERAL: PAINLEVEL_OUTOF10: 8

## 2021-09-13 NOTE — ED TRIAGE NOTES
Pt presents to the ED c/o nausea and abd pain for the past two weeks. Pt is alert and oriented, rates pain 8/10.

## 2021-09-14 ENCOUNTER — HOSPITAL ENCOUNTER (OUTPATIENT)
Dept: WOUND CARE | Age: 46
Discharge: HOME | End: 2021-09-14
Payer: MEDICARE

## 2021-09-14 VITALS
HEART RATE: 105 BPM | DIASTOLIC BLOOD PRESSURE: 109 MMHG | SYSTOLIC BLOOD PRESSURE: 137 MMHG | RESPIRATION RATE: 16 BRPM | TEMPERATURE: 96.5 F

## 2021-09-14 DIAGNOSIS — L89.893 PRESSURE ULCER OF TOE OF RIGHT FOOT, STAGE 3 (HCC): ICD-10-CM

## 2021-09-14 DIAGNOSIS — L89.893 PRESSURE ULCER OF RIGHT FOOT, STAGE 3 (HCC): Primary | ICD-10-CM

## 2021-09-14 PROCEDURE — 11042 DBRDMT SUBQ TIS 1ST 20SQCM/<: CPT | Performed by: NURSE PRACTITIONER

## 2021-09-14 PROCEDURE — 11042 DBRDMT SUBQ TIS 1ST 20SQCM/<: CPT

## 2021-09-14 RX ORDER — BACITRACIN ZINC AND POLYMYXIN B SULFATE 500; 1000 [USP'U]/G; [USP'U]/G
OINTMENT TOPICAL ONCE
Status: CANCELLED | OUTPATIENT
Start: 2021-09-14 | End: 2021-09-14

## 2021-09-14 RX ORDER — LIDOCAINE 50 MG/G
OINTMENT TOPICAL ONCE
Status: DISCONTINUED | OUTPATIENT
Start: 2021-09-14 | End: 2021-09-15 | Stop reason: HOSPADM

## 2021-09-14 RX ORDER — CLOBETASOL PROPIONATE 0.5 MG/G
OINTMENT TOPICAL ONCE
Status: CANCELLED | OUTPATIENT
Start: 2021-09-14 | End: 2021-09-14

## 2021-09-14 RX ORDER — GENTAMICIN SULFATE 1 MG/G
OINTMENT TOPICAL ONCE
Status: CANCELLED | OUTPATIENT
Start: 2021-09-14 | End: 2021-09-14

## 2021-09-14 RX ORDER — LIDOCAINE 50 MG/G
OINTMENT TOPICAL ONCE
Status: CANCELLED | OUTPATIENT
Start: 2021-09-14 | End: 2021-09-14

## 2021-09-14 RX ORDER — LIDOCAINE HYDROCHLORIDE 20 MG/ML
JELLY TOPICAL ONCE
Status: CANCELLED | OUTPATIENT
Start: 2021-09-14 | End: 2021-09-14

## 2021-09-14 RX ORDER — BACITRACIN, NEOMYCIN, POLYMYXIN B 400; 3.5; 5 [USP'U]/G; MG/G; [USP'U]/G
OINTMENT TOPICAL ONCE
Status: CANCELLED | OUTPATIENT
Start: 2021-09-14 | End: 2021-09-14

## 2021-09-14 RX ORDER — LIDOCAINE HYDROCHLORIDE 40 MG/ML
SOLUTION TOPICAL ONCE
Status: CANCELLED | OUTPATIENT
Start: 2021-09-14 | End: 2021-09-14

## 2021-09-14 RX ORDER — BETAMETHASONE DIPROPIONATE 0.05 %
OINTMENT (GRAM) TOPICAL ONCE
Status: CANCELLED | OUTPATIENT
Start: 2021-09-14 | End: 2021-09-14

## 2021-09-14 RX ORDER — GINSENG 100 MG
CAPSULE ORAL ONCE
Status: CANCELLED | OUTPATIENT
Start: 2021-09-14 | End: 2021-09-14

## 2021-09-14 RX ORDER — LIDOCAINE 40 MG/G
CREAM TOPICAL ONCE
Status: CANCELLED | OUTPATIENT
Start: 2021-09-14 | End: 2021-09-14

## 2021-09-14 RX ORDER — GENTAMICIN SULFATE 1 MG/G
OINTMENT TOPICAL ONCE
Status: DISCONTINUED | OUTPATIENT
Start: 2021-09-14 | End: 2021-09-15 | Stop reason: HOSPADM

## 2021-09-14 ASSESSMENT — PAIN DESCRIPTION - FREQUENCY: FREQUENCY: CONTINUOUS

## 2021-09-14 ASSESSMENT — PAIN DESCRIPTION - ORIENTATION: ORIENTATION: RIGHT

## 2021-09-14 ASSESSMENT — PAIN DESCRIPTION - DESCRIPTORS: DESCRIPTORS: THROBBING;SHARP

## 2021-09-14 ASSESSMENT — PAIN DESCRIPTION - LOCATION: LOCATION: FOOT

## 2021-09-14 ASSESSMENT — PAIN DESCRIPTION - PROGRESSION: CLINICAL_PROGRESSION: NOT CHANGED

## 2021-09-14 ASSESSMENT — PAIN - FUNCTIONAL ASSESSMENT: PAIN_FUNCTIONAL_ASSESSMENT: ACTIVITIES ARE NOT PREVENTED

## 2021-09-14 ASSESSMENT — PAIN DESCRIPTION - PAIN TYPE: TYPE: CHRONIC PAIN

## 2021-09-14 ASSESSMENT — PAIN SCALES - GENERAL: PAINLEVEL_OUTOF10: 7

## 2021-09-14 ASSESSMENT — PAIN DESCRIPTION - ONSET: ONSET: ON-GOING

## 2021-09-14 NOTE — PROGRESS NOTES
Multilayer Compression Wrap   (Not Unna) Below the Knee    NAME:  Qamar Goodwin  YOB: 1975  MEDICAL RECORD NUMBER:  [id number]  DATE:  9/14/2021    Multilayer compression wrap: Removed old Multilayer wrap if indicated and wash leg with mild soap/water. Applied moisturizing agent to dry skin as needed. Applied primary and secondary dressing as ordered. Applied multilayered dressing below the knee to right lower leg. Instructed patient/caregiver not to remove dressing and to keep it clean and dry. Instructed patient/caregiver on complications to report to provider, such as pain, numbness in toes, heavy drainage, and slippage of dressing. Instructed patient on purpose of compression dressing and on activity and exercise recommendations.       Electronically signed by Ralph Campbell LPN on 5/52/5785 at 1:59 PM

## 2021-09-15 NOTE — PROGRESS NOTES
Wound Care Center Progress Note With Procedure    Tiot Benitez  AGE: 50 y.o. GENDER: male  : 1975  EPISODE DATE:  2021     Subjective:     Chief Complaint   Patient presents with    Wound Check         HISTORY of PRESENT ILLNESS     Dima Zhang is a 50 y. o. male who presents today for wound evaluation of Chronic pressure ulcer(s) of the right plantar foot. The ulcer is of moderate severity. The underlying cause of the wound is probably due to pressure from walking and blistering.  He has still significant sensitivity on his foot.  History of amputation of left first second third and tip of the fourth toe and right great toe performed on 2021 related to frostbite. He is homeless and the right plantar foot is believed to be associated with pressure and blistering from walking. He has had several visits to the ED over the past several wounds for wound checks.     Wound Pain Timing/Severity: none  Quality of pain: N/A  Severity of pain:  0 / 10   Modifying Factors: chronic pressure  Associated Signs/Symptoms: none    Diabetes: No  Smoking: No, former smoker  Obesity: No  Anticoagulant therapy: No  Immunosuppression: No    Patient educated on the 6 essential components necessary for wound healing: Circulation, Debridements, Proper Dressings and Topical Wound Products, Infection Control, Edema Control and Offloading. Patient educated on those factors that negatively effect or impact wound healing: smoking, obesity, uncontrolled diabetes, anticoagulant and immunosuppressive regimens, inadequate nutrition, untreated arterial and venous disease if applicable and measures to manage edema.           PAST MEDICAL HISTORY        Diagnosis Date    Hypertension     does not take medications       PAST SURGICAL HISTORY    Past Surgical History:   Procedure Laterality Date    FOOT DEBRIDEMENT Bilateral 2021    BILATERAL TOE  DEBRIDEMENT INCISION AND DRAINAGE, AMPUTATION OF LEFT 1ST, 2ND, 3RD AND TIP OF 4TH TOE AND RIGHT GREAT TOE performed by Devin Tang MD at [address] [place] [address]    History reviewed. No pertinent family history. SOCIAL HISTORY    Social History     Tobacco Use    Smoking status: Former Smoker     Quit date: 2021     Years since quittin.7    Smokeless tobacco: Never Used   Vaping Use    Vaping Use: Never used   Substance Use Topics    Alcohol use: Yes     Comment: most days    Drug use: No       ALLERGIES    Allergies   Allergen Reactions    Lisinopril Angioedema       MEDICATIONS    Current Outpatient Medications on File Prior to Encounter   Medication Sig Dispense Refill    amLODIPine (NORVASC) 10 MG tablet Take 1 tablet by mouth daily 30 tablet 0    hydroCHLOROthiazide (HYDRODIURIL) 25 MG tablet Take 1 tablet by mouth daily 30 tablet 3    metoprolol tartrate (LOPRESSOR) 25 MG tablet Take 1 tablet by mouth 2 times daily 60 tablet 3    omeprazole (PRILOSEC) 20 MG delayed release capsule Take 20 mg by mouth daily      gabapentin (NEURONTIN) 100 MG capsule Take 1 capsule by mouth 3 times daily for 20 days. 60 capsule 0     No current facility-administered medications on file prior to encounter. REVIEW OF SYSTEMS    Pertinent items are noted in HPI. Constitutional: Negative for systemic symptoms including fever, chills and malaise. Objective:      BP (!) 137/109   Pulse 105   Temp 96.5 °F (35.8 °C) (Temporal)   Resp 16     PHYSICAL EXAM    General: The patient is in no acute distress. Mental status:  Patient is appropriate, is  oriented to place and plan of care.   Dermatologic exam: Visual inspection of the periwound reveals the skin to be coarse and callus  Wound exam: see wound description below in procedure note    Assessment:     Problem List Items Addressed This Visit     WD-Pressure ulcer of right foot, stage 3 ([address] - Primary    WD-Pressure ulcer of toe of right foot, stage 3 ([address]        Procedure Note    Indications:  Based on my cm 09/14/21 1318   Wound Surface Area (cm^2) 2.55 cm^2 09/14/21 1318   Change in Wound Size % (l*w) 85.43 09/14/21 1318   Wound Volume (cm^3) 0.51 cm^3 09/14/21 1318   Wound Healing % 71 09/14/21 1318   Post-Procedure Length (cm) 1.5 cm 09/14/21 1338   Post-Procedure Width (cm) 1.7 cm 09/14/21 1338   Post-Procedure Depth (cm) 0.2 cm 09/14/21 1338   Post-Procedure Surface Area (cm^2) 2.55 cm^2 09/14/21 1338   Post-Procedure Volume (cm^3) 0.51 cm^3 09/14/21 1338   Distance Tunneling (cm) 0 cm 09/14/21 1318   Tunneling Position ___ O'Clock 0 09/14/21 1318   Undermining Starts ___ O'Clock 0 09/14/21 1318   Undermining Ends___ O'Clock 0 09/14/21 1318   Undermining Maxium Distance (cm) 0 09/14/21 1318   Wound Assessment Granulation tissue 09/14/21 1318   Drainage Amount Large 09/14/21 1318   Drainage Description Serosanguinous;Green 09/14/21 1318   Odor Moderate 09/14/21 1318   Magi-wound Assessment Maceration; Hyperkeratosis (callous) 09/14/21 1318   Margins Defined edges; Unattached edges 09/14/21 1318   Wound Thickness Description not for Pressure Injury Full thickness 09/14/21 1318   Number of days: 89       Percent of Wound(s) Debrided: approximately 100%    Total  Area  Debrided:  2.55 sq cm     Bleeding:  Minimal    Hemostasis Achieved:  by pressure    Procedural Pain:  0  / 10     Post Procedural Pain:  0 / 10     Response to treatment:  Well tolerated by patient. Status of wound progress and description from last visit: Smaller, continue regimen. Plan:       Discharge Instructions         Discharge Instructions        PHYSICIAN ORDERS AND DISCHARGE INSTRUCTIONS     NOTE: Upon discharge from the [address], you will receive a patient experience survey.  We would be grateful if you would take the time to fill this survey out.     Wound care order history:                 [facility]   Arterial studies on 12/26/2020              Cultures: Obtained 6/18/2021                Grafts:                Antibiotics:

## 2021-09-17 ENCOUNTER — HOSPITAL ENCOUNTER (OUTPATIENT)
Dept: WOUND CARE | Age: 46
Discharge: HOME | End: 2021-09-17
Payer: MEDICARE

## 2021-09-17 VITALS
DIASTOLIC BLOOD PRESSURE: 99 MMHG | HEART RATE: 99 BPM | SYSTOLIC BLOOD PRESSURE: 136 MMHG | TEMPERATURE: 96 F | RESPIRATION RATE: 18 BRPM

## 2021-09-17 PROCEDURE — 29581 APPL MULTLAYER CMPRN SYS LEG: CPT

## 2021-09-17 RX ORDER — BETAMETHASONE DIPROPIONATE 0.05 %
OINTMENT (GRAM) TOPICAL ONCE
Status: CANCELLED | OUTPATIENT
Start: 2021-09-17 | End: 2021-09-17

## 2021-09-17 RX ORDER — CLOBETASOL PROPIONATE 0.5 MG/G
OINTMENT TOPICAL ONCE
Status: CANCELLED | OUTPATIENT
Start: 2021-09-17 | End: 2021-09-17

## 2021-09-17 RX ORDER — BACITRACIN ZINC AND POLYMYXIN B SULFATE 500; 1000 [USP'U]/G; [USP'U]/G
OINTMENT TOPICAL ONCE
Status: CANCELLED | OUTPATIENT
Start: 2021-09-17 | End: 2021-09-17

## 2021-09-17 RX ORDER — BACITRACIN, NEOMYCIN, POLYMYXIN B 400; 3.5; 5 [USP'U]/G; MG/G; [USP'U]/G
OINTMENT TOPICAL ONCE
Status: CANCELLED | OUTPATIENT
Start: 2021-09-17 | End: 2021-09-17

## 2021-09-17 RX ORDER — GENTAMICIN SULFATE 1 MG/G
OINTMENT TOPICAL ONCE
Status: CANCELLED | OUTPATIENT
Start: 2021-09-17 | End: 2021-09-17

## 2021-09-17 RX ORDER — LIDOCAINE HYDROCHLORIDE 20 MG/ML
JELLY TOPICAL ONCE
Status: CANCELLED | OUTPATIENT
Start: 2021-09-17 | End: 2021-09-17

## 2021-09-17 RX ORDER — LIDOCAINE HYDROCHLORIDE 40 MG/ML
SOLUTION TOPICAL ONCE
Status: CANCELLED | OUTPATIENT
Start: 2021-09-17 | End: 2021-09-17

## 2021-09-17 RX ORDER — LIDOCAINE 40 MG/G
CREAM TOPICAL ONCE
Status: CANCELLED | OUTPATIENT
Start: 2021-09-17 | End: 2021-09-17

## 2021-09-17 RX ORDER — LIDOCAINE 50 MG/G
OINTMENT TOPICAL ONCE
Status: CANCELLED | OUTPATIENT
Start: 2021-09-17 | End: 2021-09-17

## 2021-09-17 RX ORDER — GINSENG 100 MG
CAPSULE ORAL ONCE
Status: CANCELLED | OUTPATIENT
Start: 2021-09-17 | End: 2021-09-17

## 2021-09-17 ASSESSMENT — PAIN SCALES - GENERAL: PAINLEVEL_OUTOF10: 7

## 2021-09-17 NOTE — PROGRESS NOTES
Multilayer Compression Wrap   (Not Unna) Below the Knee    NAME:  Gay Blake  YOB: 1975  MEDICAL RECORD NUMBER:  [id number]  DATE:  9/17/2021    Multilayer compression wrap: Removed old Multilayer wrap if indicated and wash leg with mild soap/water. Applied moisturizing agent to dry skin as needed. Applied primary and secondary dressing as ordered. Applied multilayered dressing below the knee to right lower leg. Instructed patient/caregiver not to remove dressing and to keep it clean and dry. Instructed patient/caregiver on complications to report to provider, such as pain, numbness in toes, heavy drainage, and slippage of dressing. Instructed patient on purpose of compression dressing and on activity and exercise recommendations.       Electronically signed by Lola Guzman LPN on 2/07/5885 at 39:99 AM

## 2021-09-21 ENCOUNTER — HOSPITAL ENCOUNTER (OUTPATIENT)
Dept: WOUND CARE | Age: 46
Discharge: HOME | End: 2021-09-21
Payer: MEDICARE

## 2021-09-21 VITALS — RESPIRATION RATE: 18 BRPM

## 2021-09-21 DIAGNOSIS — L89.893 PRESSURE ULCER OF RIGHT FOOT, STAGE 3 (HCC): Primary | ICD-10-CM

## 2021-09-21 PROCEDURE — 11042 DBRDMT SUBQ TIS 1ST 20SQCM/<: CPT

## 2021-09-21 PROCEDURE — 11042 DBRDMT SUBQ TIS 1ST 20SQCM/<: CPT | Performed by: NURSE PRACTITIONER

## 2021-09-21 RX ORDER — LIDOCAINE HYDROCHLORIDE 40 MG/ML
SOLUTION TOPICAL ONCE
Status: CANCELLED | OUTPATIENT
Start: 2021-09-21 | End: 2021-09-21

## 2021-09-21 RX ORDER — CLOBETASOL PROPIONATE 0.5 MG/G
OINTMENT TOPICAL ONCE
Status: CANCELLED | OUTPATIENT
Start: 2021-09-21 | End: 2021-09-21

## 2021-09-21 RX ORDER — BACITRACIN, NEOMYCIN, POLYMYXIN B 400; 3.5; 5 [USP'U]/G; MG/G; [USP'U]/G
OINTMENT TOPICAL ONCE
Status: CANCELLED | OUTPATIENT
Start: 2021-09-21 | End: 2021-09-21

## 2021-09-21 RX ORDER — LIDOCAINE HYDROCHLORIDE 20 MG/ML
JELLY TOPICAL ONCE
Status: CANCELLED | OUTPATIENT
Start: 2021-09-21 | End: 2021-09-21

## 2021-09-21 RX ORDER — GENTAMICIN SULFATE 1 MG/G
OINTMENT TOPICAL ONCE
Status: DISCONTINUED | OUTPATIENT
Start: 2021-09-21 | End: 2021-09-22 | Stop reason: HOSPADM

## 2021-09-21 RX ORDER — BETAMETHASONE DIPROPIONATE 0.05 %
OINTMENT (GRAM) TOPICAL ONCE
Status: CANCELLED | OUTPATIENT
Start: 2021-09-21 | End: 2021-09-21

## 2021-09-21 RX ORDER — LIDOCAINE 50 MG/G
OINTMENT TOPICAL ONCE
Status: DISCONTINUED | OUTPATIENT
Start: 2021-09-21 | End: 2021-09-22 | Stop reason: HOSPADM

## 2021-09-21 RX ORDER — GENTAMICIN SULFATE 1 MG/G
OINTMENT TOPICAL ONCE
Status: CANCELLED | OUTPATIENT
Start: 2021-09-21 | End: 2021-09-21

## 2021-09-21 RX ORDER — GINSENG 100 MG
CAPSULE ORAL ONCE
Status: CANCELLED | OUTPATIENT
Start: 2021-09-21 | End: 2021-09-21

## 2021-09-21 RX ORDER — BACITRACIN ZINC AND POLYMYXIN B SULFATE 500; 1000 [USP'U]/G; [USP'U]/G
OINTMENT TOPICAL ONCE
Status: CANCELLED | OUTPATIENT
Start: 2021-09-21 | End: 2021-09-21

## 2021-09-21 RX ORDER — LIDOCAINE 40 MG/G
CREAM TOPICAL ONCE
Status: CANCELLED | OUTPATIENT
Start: 2021-09-21 | End: 2021-09-21

## 2021-09-21 RX ORDER — LIDOCAINE 50 MG/G
OINTMENT TOPICAL ONCE
Status: CANCELLED | OUTPATIENT
Start: 2021-09-21 | End: 2021-09-21

## 2021-09-21 ASSESSMENT — PAIN DESCRIPTION - ONSET: ONSET: ON-GOING

## 2021-09-21 ASSESSMENT — PAIN DESCRIPTION - FREQUENCY: FREQUENCY: CONTINUOUS

## 2021-09-21 ASSESSMENT — PAIN DESCRIPTION - ORIENTATION: ORIENTATION: RIGHT

## 2021-09-21 ASSESSMENT — PAIN DESCRIPTION - PAIN TYPE: TYPE: CHRONIC PAIN

## 2021-09-21 ASSESSMENT — PAIN DESCRIPTION - LOCATION: LOCATION: FOOT

## 2021-09-21 ASSESSMENT — PAIN DESCRIPTION - PROGRESSION: CLINICAL_PROGRESSION: NOT CHANGED

## 2021-09-21 ASSESSMENT — PAIN SCALES - GENERAL: PAINLEVEL_OUTOF10: 8

## 2021-09-21 ASSESSMENT — PAIN DESCRIPTION - DESCRIPTORS: DESCRIPTORS: THROBBING;SHARP

## 2021-09-21 NOTE — PROGRESS NOTES
Wound Care Center Progress Note With Procedure    Gay Blake  AGE: 50 y.o. GENDER: male  : 1975  EPISODE DATE:  2021     Subjective:     Chief Complaint   Patient presents with    Wound Check     BLE         HISTORY of PRESENT ILLNESS     Shadi Zhang is a 50 y. o. male who presents today for wound evaluation of Chronic pressure ulcer(s) of the right plantar foot. The ulcer is of moderate severity. The underlying cause of the wound is probably due to pressure from walking and blistering.  He has still significant sensitivity on his foot.  History of amputation of left first second third and tip of the fourth toe and right great toe performed on 2021 related to frostbite. He is homeless and the right plantar foot is believed to be associated with pressure and blistering from walking. He has had several visits to the ED over the past several wounds for wound checks.     Wound Pain Timing/Severity: none  Quality of pain: N/A  Severity of pain:  0 / 10   Modifying Factors: chronic pressure  Associated Signs/Symptoms: none     Diabetes: No  Smoking: No, former smoker  Obesity: No  Anticoagulant therapy: No  Immunosuppression: No     Patient educated on the 6 essential components necessary for wound healing: Circulation, Debridements, Proper Dressings and Topical Wound Products, Infection Control, Edema Control and Offloading.     Patient educated on those factors that negatively effect or impact wound healing: smoking, obesity, uncontrolled diabetes, anticoagulant and immunosuppressive regimens, inadequate nutrition, untreated arterial and venous disease if applicable and measures to manage edema.          PAST MEDICAL HISTORY        Diagnosis Date    Hypertension     does not take medications       PAST SURGICAL HISTORY    Past Surgical History:   Procedure Laterality Date    FOOT DEBRIDEMENT Bilateral 2021    BILATERAL TOE  DEBRIDEMENT INCISION AND DRAINAGE, AMPUTATION OF LEFT 1ST, 2ND, Procedure Note    Indications:  Based on my examination of this patient's wound(s) today, sharp excision into necrotic subcutaneous tissue is required to promote healing and evaluate the extent of previous healing. Performed by: GEN Campo CNP    Consent obtained: Yes    Time out taken:  Yes    Pain Control: Anesthetic  Anesthetic: 5% Lidocaine Ointment Topical     Debridement:Excisional Debridement    Using curette the wound(s) was/were sharply debrided down through and including the removal of subcutaneous tissue.         Devitalized Tissue Debrided:  slough and exudate    Pre Debridement Measurements:  Are located in the Wound Documentation Flow Sheet    All active wounds listed below with today's date are evaluated  Wound(s)    debrided this date include # : 1     Post  Debridement Measurements:  Wound 06/18/21 Right Plantar # 1 (Active)   Wound Image   09/07/21 1317   Wound Etiology Pressure Stage  3 09/21/21 1310   Dressing Status New dressing applied 09/17/21 1119   Wound Cleansed Soap and water 09/21/21 1310   Dressing/Treatment Silver dressing;Alginate;ABD 07/16/21 1714   Offloading for Diabetic Foot Ulcers Forefoot offloading shoe 09/21/21 1310   Wound Length (cm) 1.2 cm 09/21/21 1310   Wound Width (cm) 1 cm 09/21/21 1310   Wound Depth (cm) 0.2 cm 09/21/21 1310   Wound Surface Area (cm^2) 1.2 cm^2 09/21/21 1310   Change in Wound Size % (l*w) 93.14 09/21/21 1310   Wound Volume (cm^3) 0.24 cm^3 09/21/21 1310   Wound Healing % 86 09/21/21 1310   Post-Procedure Length (cm) 1.2 cm 09/21/21 1322   Post-Procedure Width (cm) 1 cm 09/21/21 1322   Post-Procedure Depth (cm) 0.2 cm 09/21/21 1322   Post-Procedure Surface Area (cm^2) 1.2 cm^2 09/21/21 1322   Post-Procedure Volume (cm^3) 0.24 cm^3 09/21/21 1322   Distance Tunneling (cm) 0 cm 09/21/21 1310   Tunneling Position ___ O'Clock 0 09/21/21 1310   Undermining Starts ___ O'Clock 0 09/21/21 1310   Undermining Ends___ O'Clock 0 09/21/21 1310 Undermining Maxium Distance (cm) 0 09/21/21 1310   Wound Assessment Granulation tissue 09/21/21 1310   Drainage Amount Large 09/21/21 1310   Drainage Description Serosanguinous;Green 09/21/21 1310   Odor Moderate 09/21/21 1310   Magi-wound Assessment Maceration; Hyperkeratosis (callous) 09/21/21 1310   Margins Defined edges; Unattached edges 09/21/21 1310   Wound Thickness Description not for Pressure Injury Full thickness 09/21/21 1310   Number of days: 95       Percent of Wound(s) Debrided: approximately 100%    Total  Area  Debrided: 1.2 sq cm     Bleeding:  Minimal    Hemostasis Achieved:  by pressure    Procedural Pain:  0  / 10     Post Procedural Pain:  0 / 10     Response to treatment:  Well tolerated by patient. Status of wound progress and description from last visit: Healing nicely, continues to improve in size weekly. Continue regimen. Plan:       Discharge Instructions         Discharge Instructions        PHYSICIAN ORDERS AND DISCHARGE INSTRUCTIONS     NOTE: Upon discharge from the [address], you will receive a patient experience survey. We would be grateful if you would take the time to fill this survey out.     Wound care order history:                 [facility]   Arterial studies on 12/26/2020              Cultures: Obtained 6/18/2021                Grafts:                Antibiotics:           Continuing wound care orders and information:                 Residence:                Continue home health care with:               Your wound-care supplies will be provided by:      Wound cleansing:               UB not scrub or use excessive force.              Wash hands with soap and water before and after dressing changes.               YQZTY to applying a clean dressing, cleanse wound with normal saline, wound cleanser, or mild soap and water.               Ask the physician or nurse before getting the wound(s) wet in a shower     Daily Wound management:              Keep weight off wounds and reposition every 2 hours.              Avoid standing for long periods of time.              Apply wraps/stockings in AM and remove at bedtime.              If swelling is present, elevate legs to the level of the heart or above for 30 minutes 4-5 times a day and/or when sitting.                                      When taking antibiotics take entire prescription as ordered by physician do not stop taking until medicine is all gone.                                                           Orders for this week: 9/21/21     Rx: [facility] on [facility]        Right Plantar Foot - Paint periwound with betadine, apply gentamicin and stimulin powder  to wound bed, cover with silver calcium alginate, drawtex and sorbex. Wrap with Coban 2 Lite. Leave in place until Nurse visit Friday .        Auth for grafts on 6/18/2021     Nurse visit Friday      Follow up with Nicole Thornton CNP in 1 week in the wound care center  Call [phone] for any questions or concerns.   Date__________   Time____________          Treatment Note Wound 06/18/21 Right Plantar # 1-Dressing/Treatment:  ( gentamin stimulin manuela alginate drwatex sorbex coban2 lite)    Written Patient Dismissal Instructions Given            Electronically signed by GEN Odonnell CNP on 9/21/2021 at 1:57 PM

## 2021-09-21 NOTE — PROGRESS NOTES
Multilayer Compression Wrap   (Not Unna) Below the Knee    NAME:  Tito Benitez  YOB: 1975  MEDICAL RECORD NUMBER:  [id number]  DATE:  9/21/2021    Multilayer compression wrap: Removed old Multilayer wrap if indicated and wash leg with mild soap/water. Applied moisturizing agent to dry skin as needed. Applied primary and secondary dressing as ordered. Applied multilayered dressing below the knee to right lower leg. Instructed patient/caregiver not to remove dressing and to keep it clean and dry. Instructed patient/caregiver on complications to report to provider, such as pain, numbness in toes, heavy drainage, and slippage of dressing. Instructed patient on purpose of compression dressing and on activity and exercise recommendations.       Electronically signed by Percell Rubinstein, RN on 9/21/2021 at 1:54 PM

## 2021-09-24 ENCOUNTER — HOSPITAL ENCOUNTER (OUTPATIENT)
Dept: WOUND CARE | Age: 46
Discharge: HOME | End: 2021-09-24
Payer: MEDICARE

## 2021-09-24 VITALS
TEMPERATURE: 97.6 F | RESPIRATION RATE: 18 BRPM | DIASTOLIC BLOOD PRESSURE: 99 MMHG | SYSTOLIC BLOOD PRESSURE: 136 MMHG | HEART RATE: 99 BPM

## 2021-09-24 PROCEDURE — 29581 APPL MULTLAYER CMPRN SYS LEG: CPT

## 2021-09-24 NOTE — PROGRESS NOTES
Multilayer Compression Wrap   (Not Unna) Below the Knee    NAME:  Ashlee Gordon  YOB: 1975  MEDICAL RECORD NUMBER:  [id number]  DATE:  9/24/2021    Multilayer compression wrap: Removed old Multilayer wrap if indicated and wash leg with mild soap/water. Applied moisturizing agent to dry skin as needed. Applied primary and secondary dressing as ordered. Applied multilayered dressing below the knee to right lower leg. Instructed patient/caregiver not to remove dressing and to keep it clean and dry. Instructed patient/caregiver on complications to report to provider, such as pain, numbness in toes, heavy drainage, and slippage of dressing. Instructed patient on purpose of compression dressing and on activity and exercise recommendations.       Electronically signed by Saige Pastor LPN on 5/84/9489 at 83:02 AM

## 2021-09-27 PROBLEM — E86.0 DEHYDRATION: Status: RESOLVED | Noted: 2021-08-28 | Resolved: 2021-09-27

## 2021-09-28 ENCOUNTER — HOSPITAL ENCOUNTER (OUTPATIENT)
Dept: WOUND CARE | Age: 46
Discharge: HOME | End: 2021-09-28
Payer: MEDICARE

## 2021-09-28 VITALS
TEMPERATURE: 97.6 F | DIASTOLIC BLOOD PRESSURE: 92 MMHG | SYSTOLIC BLOOD PRESSURE: 137 MMHG | HEART RATE: 112 BPM | RESPIRATION RATE: 18 BRPM

## 2021-09-28 DIAGNOSIS — L89.893 PRESSURE ULCER OF RIGHT FOOT, STAGE 3 (HCC): Primary | ICD-10-CM

## 2021-09-28 DIAGNOSIS — L89.893 PRESSURE ULCER OF TOE OF RIGHT FOOT, STAGE 3 (HCC): ICD-10-CM

## 2021-09-28 PROCEDURE — 11042 DBRDMT SUBQ TIS 1ST 20SQCM/<: CPT | Performed by: NURSE PRACTITIONER

## 2021-09-28 PROCEDURE — 11042 DBRDMT SUBQ TIS 1ST 20SQCM/<: CPT

## 2021-09-28 RX ORDER — BACITRACIN, NEOMYCIN, POLYMYXIN B 400; 3.5; 5 [USP'U]/G; MG/G; [USP'U]/G
OINTMENT TOPICAL ONCE
Status: CANCELLED | OUTPATIENT
Start: 2021-09-28 | End: 2021-09-28

## 2021-09-28 RX ORDER — BETAMETHASONE DIPROPIONATE 0.05 %
OINTMENT (GRAM) TOPICAL ONCE
Status: CANCELLED | OUTPATIENT
Start: 2021-09-28 | End: 2021-09-28

## 2021-09-28 RX ORDER — LIDOCAINE HYDROCHLORIDE 20 MG/ML
JELLY TOPICAL ONCE
Status: CANCELLED | OUTPATIENT
Start: 2021-09-28 | End: 2021-09-28

## 2021-09-28 RX ORDER — GENTAMICIN SULFATE 1 MG/G
OINTMENT TOPICAL ONCE
Status: CANCELLED | OUTPATIENT
Start: 2021-09-28 | End: 2021-09-28

## 2021-09-28 RX ORDER — LIDOCAINE 50 MG/G
OINTMENT TOPICAL ONCE
Status: CANCELLED | OUTPATIENT
Start: 2021-09-28 | End: 2021-09-28

## 2021-09-28 RX ORDER — CLOBETASOL PROPIONATE 0.5 MG/G
OINTMENT TOPICAL ONCE
Status: CANCELLED | OUTPATIENT
Start: 2021-09-28 | End: 2021-09-28

## 2021-09-28 RX ORDER — BACITRACIN ZINC AND POLYMYXIN B SULFATE 500; 1000 [USP'U]/G; [USP'U]/G
OINTMENT TOPICAL ONCE
Status: CANCELLED | OUTPATIENT
Start: 2021-09-28 | End: 2021-09-28

## 2021-09-28 RX ORDER — LIDOCAINE HYDROCHLORIDE 40 MG/ML
SOLUTION TOPICAL ONCE
Status: CANCELLED | OUTPATIENT
Start: 2021-09-28 | End: 2021-09-28

## 2021-09-28 RX ORDER — LIDOCAINE 50 MG/G
OINTMENT TOPICAL ONCE
Status: DISCONTINUED | OUTPATIENT
Start: 2021-09-28 | End: 2021-09-29 | Stop reason: HOSPADM

## 2021-09-28 RX ORDER — LIDOCAINE 40 MG/G
CREAM TOPICAL ONCE
Status: CANCELLED | OUTPATIENT
Start: 2021-09-28 | End: 2021-09-28

## 2021-09-28 RX ORDER — GINSENG 100 MG
CAPSULE ORAL ONCE
Status: CANCELLED | OUTPATIENT
Start: 2021-09-28 | End: 2021-09-28

## 2021-09-28 RX ORDER — CLOBETASOL PROPIONATE 0.5 MG/G
OINTMENT TOPICAL ONCE
Status: DISCONTINUED | OUTPATIENT
Start: 2021-09-28 | End: 2021-09-29 | Stop reason: HOSPADM

## 2021-09-28 RX ORDER — GENTAMICIN SULFATE 1 MG/G
OINTMENT TOPICAL ONCE
Status: DISCONTINUED | OUTPATIENT
Start: 2021-09-28 | End: 2021-09-29 | Stop reason: HOSPADM

## 2021-09-28 ASSESSMENT — PAIN DESCRIPTION - PROGRESSION: CLINICAL_PROGRESSION: NOT CHANGED

## 2021-09-28 ASSESSMENT — PAIN DESCRIPTION - FREQUENCY: FREQUENCY: CONTINUOUS

## 2021-09-28 ASSESSMENT — PAIN DESCRIPTION - DESCRIPTORS: DESCRIPTORS: SHARP;THROBBING

## 2021-09-28 ASSESSMENT — PAIN DESCRIPTION - LOCATION: LOCATION: FOOT

## 2021-09-28 ASSESSMENT — PAIN DESCRIPTION - ORIENTATION: ORIENTATION: RIGHT

## 2021-09-28 ASSESSMENT — PAIN SCALES - GENERAL: PAINLEVEL_OUTOF10: 7

## 2021-09-28 ASSESSMENT — PAIN DESCRIPTION - PAIN TYPE: TYPE: CHRONIC PAIN

## 2021-09-28 ASSESSMENT — PAIN DESCRIPTION - ONSET: ONSET: ON-GOING

## 2021-09-28 NOTE — PROGRESS NOTES
Wound Care Center Progress Note With Procedure    Fabienne Ritchie  AGE: 50 y.o. GENDER: male  : 1975  EPISODE DATE:  2021     Subjective:     Chief Complaint   Patient presents with    Wound Check     right foot          HISTORY of PRESENT ILLNESS     Nehemiah Zhang is a 50 y. o. male who presents today for wound evaluation of Chronic pressure ulcer(s) of the right plantar foot. The ulcer is of moderate severity. The underlying cause of the wound is probably due to pressure from walking and blistering.  He still has significant sensitivity with his foot.  History of amputation of left first second third and tip of the fourth toe and right great toe performed on 2021 related to frostbite. He is homeless and the right plantar foot is believed to be associated with pressure and blistering from excessive walking.  He has had several visits to the ED over the past several wounds for wound checks.     Wound Pain Timing/Severity: none  Quality of pain: N/A  Severity of pain:  0 / 10   Modifying Factors: chronic pressure  Associated Signs/Symptoms: none     Diabetes: No  Smoking: No, former smoker  Obesity: No  Anticoagulant therapy: No  Immunosuppression: No     Patient educated on the 6 essential components necessary for wound healing: Circulation, Debridements, Proper Dressings and Topical Wound Products, Infection Control, Edema Control and Offloading.     Patient educated on those factors that negatively effect or impact wound healing: smoking, obesity, uncontrolled diabetes, anticoagulant and immunosuppressive regimens, inadequate nutrition, untreated arterial and venous disease if applicable and measures to manage edema.         PAST MEDICAL HISTORY        Diagnosis Date    Hypertension     does not take medications       PAST SURGICAL HISTORY    Past Surgical History:   Procedure Laterality Date    FOOT DEBRIDEMENT Bilateral 2021    BILATERAL TOE  DEBRIDEMENT INCISION AND DRAINAGE, AMPUTATION OF LEFT 1ST, 2ND, 3RD AND TIP OF 4TH TOE AND RIGHT GREAT TOE performed by Dyllan Alonso MD at [facility]    History reviewed. No pertinent family history. SOCIAL HISTORY    Social History     Tobacco Use    Smoking status: Former Smoker     Quit date: 2021     Years since quittin.7    Smokeless tobacco: Never Used   Vaping Use    Vaping Use: Never used   Substance Use Topics    Alcohol use: Yes     Comment: most days    Drug use: No       ALLERGIES    Allergies   Allergen Reactions    Lisinopril Angioedema       MEDICATIONS    Current Outpatient Medications on File Prior to Encounter   Medication Sig Dispense Refill    amLODIPine (NORVASC) 10 MG tablet Take 1 tablet by mouth daily 30 tablet 0    hydroCHLOROthiazide (HYDRODIURIL) 25 MG tablet Take 1 tablet by mouth daily 30 tablet 3    metoprolol tartrate (LOPRESSOR) 25 MG tablet Take 1 tablet by mouth 2 times daily 60 tablet 3    omeprazole (PRILOSEC) 20 MG delayed release capsule Take 20 mg by mouth daily      gabapentin (NEURONTIN) 100 MG capsule Take 1 capsule by mouth 3 times daily for 20 days. 60 capsule 0     No current facility-administered medications on file prior to encounter. REVIEW OF SYSTEMS    Pertinent items are noted in HPI. Constitutional: Negative for systemic symptoms including fever, chills and malaise. Objective:      BP (!) 137/92   Pulse 112   Temp 97.6 °F (36.4 °C) (Temporal)   Resp 18     PHYSICAL EXAM    General: The patient is in no acute distress. Mental status:  Patient is appropriate, is  oriented to place and plan of care.   Dermatologic exam: Visual inspection of the periwound reveals the skin to be coarse and callus  Wound exam: see wound description below in procedure note      Assessment:     Problem List Items Addressed This Visit     WD-Pressure ulcer of right foot, stage 3 (HCC) - Primary    Relevant Medications    lidocaine (XYLOCAINE) 5 % ointment    clobetasol (TEMOVATE) 0.05 % ointment    gentamicin (GARAMYCIN) 0.1 % ointment    Other Relevant Orders    Initiate Outpatient Wound Care Protocol    WD-Pressure ulcer of toe of right foot, stage 3 (HCC)    Relevant Medications    lidocaine (XYLOCAINE) 5 % ointment    clobetasol (TEMOVATE) 0.05 % ointment    gentamicin (GARAMYCIN) 0.1 % ointment    Other Relevant Orders    Initiate Outpatient Wound Care Protocol        Procedure Note    Indications:  Based on my examination of this patient's wound(s) today, sharp excision into necrotic subcutaneous tissue is required to promote healing and evaluate the extent of previous healing. Performed by: GEN Martinez CNP    Consent obtained: Yes    Time out taken:  Yes    Pain Control: Anesthetic  Anesthetic: 5% Lidocaine Ointment Topical     Debridement:Excisional Debridement    Using curette the wound(s) was/were sharply debrided down through and including the removal of subcutaneous tissue.         Devitalized Tissue Debrided:  slough, exudate and callus    Pre Debridement Measurements:  Are located in the Wound Documentation Flow Sheet    All active wounds listed below with today's date are evaluated  Wound(s)    debrided this date include # : 1     Post  Debridement Measurements:  Wound 06/18/21 Right Plantar # 1 (Active)   Wound Image   09/07/21 1317   Wound Etiology Pressure Stage  3 09/28/21 1349   Dressing Status New dressing applied 09/28/21 1429   Wound Cleansed Soap and water 09/28/21 1349   Dressing/Treatment Silver dressing;Alginate;ABD 07/16/21 1714   Offloading for Diabetic Foot Ulcers Forefoot offloading shoe 09/28/21 1429   Wound Length (cm) 1.2 cm 09/28/21 1349   Wound Width (cm) 1.5 cm 09/28/21 1349   Wound Depth (cm) 0.2 cm 09/28/21 1349   Wound Surface Area (cm^2) 1.8 cm^2 09/28/21 1349   Change in Wound Size % (l*w) 89.71 09/28/21 1349   Wound Volume (cm^3) 0.36 cm^3 09/28/21 1349   Wound Healing % 79 09/28/21 1349   Post-Procedure Length (cm) 1.2 cm 09/28/21 1407   Post-Procedure Width (cm) 1.5 cm 09/28/21 1407   Post-Procedure Depth (cm) 0.2 cm 09/28/21 1407   Post-Procedure Surface Area (cm^2) 1.8 cm^2 09/28/21 1407   Post-Procedure Volume (cm^3) 0.36 cm^3 09/28/21 1407   Distance Tunneling (cm) 0 cm 09/28/21 1349   Tunneling Position ___ O'Clock 0 09/28/21 1349   Undermining Starts ___ O'Clock 0 09/28/21 1349   Undermining Ends___ O'Clock 0 09/28/21 1349   Undermining Maxium Distance (cm) 0 09/28/21 1349   Wound Assessment Granulation tissue 09/28/21 1349   Drainage Amount Large 09/28/21 1349   Drainage Description Serosanguinous;Green 09/28/21 1349   Odor Moderate 09/28/21 1349   Magi-wound Assessment Maceration; Hyperkeratosis (callous) 09/28/21 1349   Margins Defined edges; Unattached edges 09/28/21 1349   Wound Thickness Description not for Pressure Injury Full thickness 09/28/21 1349   Number of days: 102       Percent of Wound(s) Debrided: approximately 100%    Total  Area  Debrided: 1.8 sq cm     Bleeding:  Minimal    Hemostasis Achieved:  by pressure    Procedural Pain:  0  / 10     Post Procedural Pain:  0 / 10     Response to treatment:  Well tolerated by patient. Status of wound progress and description from last visit: Stable, continue regimen. Plan:       Discharge Instructions         Discharge Instructions        PHYSICIAN ORDERS AND DISCHARGE INSTRUCTIONS     NOTE: Upon discharge from the [address], you will receive a patient experience survey.  We would be grateful if you would take the time to fill this survey out.     Wound care order history:                 [facility]   Arterial studies on 12/26/2020              Cultures: Obtained 6/18/2021                Grafts:                Antibiotics:           Continuing wound care orders and information:                 Residence:                Continue home health care with:               Your wound-care supplies will be provided by:      Wound cleansing:               OH not scrub or use excessive force.              Wash hands with soap and water before and after dressing changes.             ZINRL to applying a clean dressing, cleanse wound with normal saline, wound cleanser, or mild soap and water.               Ask the physician or nurse before getting the wound(s) wet in a shower     Daily Wound management:              Keep weight off wounds and reposition every 2 hours.              Avoid standing for long periods of time.              Apply wraps/stockings in AM and remove at bedtime.              If swelling is present, elevate legs to the level of the heart or above for 30 minutes 4-5 times a day and/or when sitting.                                      When taking antibiotics take entire prescription as ordered by physician do not stop taking until medicine is all gone.                                                           Orders for this week: 9/21/21     Rx: [facility] on [employer]        Right Plantar Foot - Paint periwound with betadine, apply gentamicin and stimulin powder  to wound bed, cover with silver calcium alginate, drawtex and sorbex. Wrap with Coban 2 Lite. Leave in place until Nurse visit Friday .        Auth for grafts on 6/18/2021     Nurse visit Friday      Follow up with Leandro Bateman CNP in 1 week in the wound care center  Call [phone] for any questions or concerns.   Date__________   Time____________                Treatment Note Wound 06/18/21 Right Plantar # 1-Dressing/Treatment:  (betadine vandana gent/stimulin ag agl drawtex sorbex coban2 lte)    Written Patient Dismissal Instructions Given            Electronically signed by GEN Lopez CNP on 9/28/2021 at 5:19 PM

## 2021-09-28 NOTE — PROGRESS NOTES
Multilayer Compression Wrap   (Not Unna) Below the Knee    NAME:  Madalyn Weldon  YOB: 1975  MEDICAL RECORD NUMBER:  [id number]  DATE:  9/28/2021    Multilayer compression wrap: Removed old Multilayer wrap if indicated and wash leg with mild soap/water. Applied moisturizing agent to dry skin as needed. Applied primary and secondary dressing as ordered. Applied multilayered dressing below the knee to right lower leg. Instructed patient/caregiver not to remove dressing and to keep it clean and dry. Instructed patient/caregiver on complications to report to provider, such as pain, numbness in toes, heavy drainage, and slippage of dressing. Instructed patient on purpose of compression dressing and on activity and exercise recommendations.       Electronically signed by Alden Valencia LPN on 7/34/8192 at 2:57 PM

## 2021-10-05 ENCOUNTER — HOSPITAL ENCOUNTER (OUTPATIENT)
Dept: WOUND CARE | Age: 46
Discharge: HOME | End: 2021-10-05
Payer: MEDICARE

## 2021-10-05 VITALS — TEMPERATURE: 99 F | SYSTOLIC BLOOD PRESSURE: 141 MMHG | HEART RATE: 98 BPM | DIASTOLIC BLOOD PRESSURE: 101 MMHG

## 2021-10-05 DIAGNOSIS — L89.893 PRESSURE ULCER OF RIGHT FOOT, STAGE 3 (HCC): Primary | ICD-10-CM

## 2021-10-05 DIAGNOSIS — L89.893 PRESSURE ULCER OF TOE OF RIGHT FOOT, STAGE 3 (HCC): ICD-10-CM

## 2021-10-05 PROCEDURE — 11042 DBRDMT SUBQ TIS 1ST 20SQCM/<: CPT

## 2021-10-05 PROCEDURE — 11042 DBRDMT SUBQ TIS 1ST 20SQCM/<: CPT | Performed by: NURSE PRACTITIONER

## 2021-10-05 RX ORDER — BETAMETHASONE DIPROPIONATE 0.05 %
OINTMENT (GRAM) TOPICAL ONCE
Status: CANCELLED | OUTPATIENT
Start: 2021-10-05 | End: 2021-10-05

## 2021-10-05 RX ORDER — CLOBETASOL PROPIONATE 0.5 MG/G
OINTMENT TOPICAL ONCE
Status: CANCELLED | OUTPATIENT
Start: 2021-10-05 | End: 2021-10-05

## 2021-10-05 RX ORDER — LIDOCAINE 50 MG/G
OINTMENT TOPICAL ONCE
Status: CANCELLED | OUTPATIENT
Start: 2021-10-05 | End: 2021-10-05

## 2021-10-05 RX ORDER — LIDOCAINE 40 MG/G
CREAM TOPICAL ONCE
Status: CANCELLED | OUTPATIENT
Start: 2021-10-05 | End: 2021-10-05

## 2021-10-05 RX ORDER — LIDOCAINE HYDROCHLORIDE 20 MG/ML
JELLY TOPICAL ONCE
Status: CANCELLED | OUTPATIENT
Start: 2021-10-05 | End: 2021-10-05

## 2021-10-05 RX ORDER — GINSENG 100 MG
CAPSULE ORAL ONCE
Status: CANCELLED | OUTPATIENT
Start: 2021-10-05 | End: 2021-10-05

## 2021-10-05 RX ORDER — BACITRACIN, NEOMYCIN, POLYMYXIN B 400; 3.5; 5 [USP'U]/G; MG/G; [USP'U]/G
OINTMENT TOPICAL ONCE
Status: CANCELLED | OUTPATIENT
Start: 2021-10-05 | End: 2021-10-05

## 2021-10-05 RX ORDER — BACITRACIN ZINC AND POLYMYXIN B SULFATE 500; 1000 [USP'U]/G; [USP'U]/G
OINTMENT TOPICAL ONCE
Status: CANCELLED | OUTPATIENT
Start: 2021-10-05 | End: 2021-10-05

## 2021-10-05 RX ORDER — LIDOCAINE HYDROCHLORIDE 40 MG/ML
SOLUTION TOPICAL ONCE
Status: CANCELLED | OUTPATIENT
Start: 2021-10-05 | End: 2021-10-05

## 2021-10-05 RX ORDER — GENTAMICIN SULFATE 1 MG/G
OINTMENT TOPICAL ONCE
Status: CANCELLED | OUTPATIENT
Start: 2021-10-05 | End: 2021-10-05

## 2021-10-05 RX ORDER — GENTAMICIN SULFATE 1 MG/G
OINTMENT TOPICAL ONCE
Status: DISCONTINUED | OUTPATIENT
Start: 2021-10-05 | End: 2021-10-06 | Stop reason: HOSPADM

## 2021-10-05 RX ORDER — LIDOCAINE HYDROCHLORIDE 40 MG/ML
SOLUTION TOPICAL ONCE
Status: DISCONTINUED | OUTPATIENT
Start: 2021-10-05 | End: 2021-10-06 | Stop reason: HOSPADM

## 2021-10-05 ASSESSMENT — PAIN DESCRIPTION - DESCRIPTORS: DESCRIPTORS: SHARP

## 2021-10-05 ASSESSMENT — PAIN DESCRIPTION - FREQUENCY: FREQUENCY: CONTINUOUS

## 2021-10-05 ASSESSMENT — PAIN DESCRIPTION - PAIN TYPE: TYPE: CHRONIC PAIN

## 2021-10-05 ASSESSMENT — PAIN - FUNCTIONAL ASSESSMENT: PAIN_FUNCTIONAL_ASSESSMENT: PREVENTS OR INTERFERES SOME ACTIVE ACTIVITIES AND ADLS

## 2021-10-05 ASSESSMENT — PAIN DESCRIPTION - PROGRESSION: CLINICAL_PROGRESSION: NOT CHANGED

## 2021-10-05 ASSESSMENT — PAIN DESCRIPTION - ORIENTATION: ORIENTATION: RIGHT

## 2021-10-05 ASSESSMENT — PAIN DESCRIPTION - LOCATION: LOCATION: FOOT

## 2021-10-05 ASSESSMENT — PAIN SCALES - GENERAL: PAINLEVEL_OUTOF10: 7

## 2021-10-05 ASSESSMENT — PAIN DESCRIPTION - ONSET: ONSET: ON-GOING

## 2021-10-05 NOTE — PROGRESS NOTES
Multilayer Compression Wrap   (Not Unna) Below the Knee    NAME:  Fei Thomas  YOB: 1975  MEDICAL RECORD NUMBER:  [id number]  DATE:  10/5/2021    Multilayer compression wrap: Removed old Multilayer wrap if indicated and wash leg with mild soap/water. Applied moisturizing agent to dry skin as needed. Applied primary and secondary dressing as ordered. Applied multilayered dressing below the knee to right lower leg. Instructed patient/caregiver not to remove dressing and to keep it clean and dry. Instructed patient/caregiver on complications to report to provider, such as pain, numbness in toes, heavy drainage, and slippage of dressing. Instructed patient on purpose of compression dressing and on activity and exercise recommendations.       Electronically signed by Rocío Guaman RN on 10/5/2021 at 1:45 PM

## 2021-10-06 NOTE — PROGRESS NOTES
AMPUTATION OF LEFT 1ST, 2ND, 3RD AND TIP OF 4TH TOE AND RIGHT GREAT TOE performed by Curry Savage MD at [facility]    History reviewed. No pertinent family history. SOCIAL HISTORY    Social History     Tobacco Use    Smoking status: Former Smoker     Quit date: 2021     Years since quittin.7    Smokeless tobacco: Never Used   Vaping Use    Vaping Use: Never used   Substance Use Topics    Alcohol use: Yes     Comment: most days    Drug use: No       ALLERGIES    Allergies   Allergen Reactions    Lisinopril Angioedema       MEDICATIONS    Current Outpatient Medications on File Prior to Encounter   Medication Sig Dispense Refill    amLODIPine (NORVASC) 10 MG tablet Take 1 tablet by mouth daily 30 tablet 0    hydroCHLOROthiazide (HYDRODIURIL) 25 MG tablet Take 1 tablet by mouth daily 30 tablet 3    metoprolol tartrate (LOPRESSOR) 25 MG tablet Take 1 tablet by mouth 2 times daily 60 tablet 3    omeprazole (PRILOSEC) 20 MG delayed release capsule Take 20 mg by mouth daily      gabapentin (NEURONTIN) 100 MG capsule Take 1 capsule by mouth 3 times daily for 20 days. 60 capsule 0     No current facility-administered medications on file prior to encounter. REVIEW OF SYSTEMS    Pertinent items are noted in HPI. Constitutional: Negative for systemic symptoms including fever, chills and malaise. Objective:      BP (!) 141/101   Pulse 98   Temp 99 °F (37.2 °C) (Temporal)     PHYSICAL EXAM    General: The patient is in no acute distress. Mental status:  Patient is appropriate, is  oriented to place and plan of care.   Dermatologic exam: Visual inspection of the periwound reveals the skin to be normal in turgor and texture  Wound exam: see wound description below in procedure note      Assessment:     Problem List Items Addressed This Visit     WD-Pressure ulcer of right foot, stage 3 ([address] - Primary    WD-Pressure ulcer of toe of right foot, stage 3 ([address] Procedure Note    Indications:  Based on my examination of this patient's wound(s) today, sharp excision into necrotic subcutaneous tissue is required to promote healing and evaluate the extent of previous healing. Performed by: GEN Marks CNP    Consent obtained: Yes    Time out taken:  Yes    Pain Control: Anesthetic  Anesthetic: 4% Lidocaine Liquid Topical        Debridement:Excisional Debridement    Using curette the wound(s) was/were sharply debrided down through and including the removal of subcutaneous tissue.         Devitalized Tissue Debrided:  slough and exudate    Pre Debridement Measurements:  Are located in the Wound Documentation Flow Sheet    All active wounds listed below with today's date are evaluated  Wound(s)    debrided this date include # : 1     Post  Debridement Measurements:  Wound 06/18/21 Right Plantar # 1 (Active)   Wound Image   10/05/21 1253   Wound Etiology Pressure Stage  3 09/28/21 1349   Dressing Status New dressing applied 10/05/21 1342   Wound Cleansed Soap and water;Vashe 10/05/21 1253   Dressing/Treatment Silver dressing;Alginate;ABD 07/16/21 1714   Offloading for Diabetic Foot Ulcers Forefoot offloading shoe 10/05/21 1342   Wound Length (cm) 1.2 cm 10/05/21 1253   Wound Width (cm) 0.8 cm 10/05/21 1253   Wound Depth (cm) 0.2 cm 10/05/21 1253   Wound Surface Area (cm^2) 0.96 cm^2 10/05/21 1253   Change in Wound Size % (l*w) 94.51 10/05/21 1253   Wound Volume (cm^3) 0.192 cm^3 10/05/21 1253   Wound Healing % 89 10/05/21 1253   Post-Procedure Length (cm) 1.2 cm 10/05/21 1307   Post-Procedure Width (cm) 0.8 cm 10/05/21 1307   Post-Procedure Depth (cm) 0.2 cm 10/05/21 1307   Post-Procedure Surface Area (cm^2) 0.96 cm^2 10/05/21 1307   Post-Procedure Volume (cm^3) 0.192 cm^3 10/05/21 1307   Distance Tunneling (cm) 0 cm 10/05/21 1253   Tunneling Position ___ O'Clock 0 10/05/21 1253   Undermining Starts ___ O'Clock 1200 10/05/21 1253   Undermining Ends___ O'Clock 1200 10/05/21 1253   Undermining Maxium Distance (cm) 0.2 10/05/21 1253   Wound Assessment Granulation tissue;Pink/red 10/05/21 1253   Drainage Amount Moderate 10/05/21 1253   Drainage Description Serosanguinous 10/05/21 1253   Odor Moderate 10/05/21 1253   Magi-wound Assessment Maceration 10/05/21 1253   Margins Defined edges 10/05/21 1253   Wound Thickness Description not for Pressure Injury Full thickness 10/05/21 1253   Number of days: 110       Percent of Wound(s) Debrided: approximately 100%    Total  Area  Debrided:  0.96 sq cm     Bleeding:  Minimal    Hemostasis Achieved:  by pressure    Procedural Pain:  0  / 10     Post Procedural Pain:  0 / 10     Response to treatment:  Well tolerated by patient. Status of wound progress and description from last visit:   Continues to improve weekly. Continue regimen. Plan:       Discharge Instructions       PHYSICIAN ORDERS AND DISCHARGE INSTRUCTIONS     NOTE: Upon discharge from the [address], you will receive a patient experience survey. We would be grateful if you would take the time to fill this survey out.     Wound care order history:                 [facility]   Arterial studies on 12/26/2020              Cultures: Obtained 6/18/2021                Grafts:                Antibiotics:           Continuing wound care orders and information:                 Residence:                Continue home health care with:               Your wound-care supplies will be provided by:      Wound cleansing:               JM not scrub or use excessive force.              Wash hands with soap and water before and after dressing changes.               QMNXZ to applying a clean dressing, cleanse wound with normal saline, wound cleanser, or mild soap and water.               Ask the physician or nurse before getting the wound(s) wet in a shower     Daily Wound management:              Keep weight off wounds and reposition every 2 hours.              Avoid standing for long periods of time.              NWRAM wraps/stockings in AM and remove at bedtime.              If swelling is present, elevate legs to the level of the heart or above for 30 minutes 4-5 times a day and/or when sitting.                                      When taking antibiotics take entire prescription as ordered by physician do not stop taking until medicine is all gone.                                                           Orders for this week: 10/5/21     Rx: [facility] on [facility]        Right Plantar Foot - Paint periwound with betadine, apply gentamicin and stimulin powder  to wound bed, cover with silver calcium alginate, drawtex and sorbex. Wrap with Coban 2 Lite. Leave in place until Nurse visit Friday .        Auth for grafts on 6/18/2021     Nurse visit Friday      Follow up with Jarvis Joel CNP in 1 week in the wound care center  Call [phone] for any questions or concerns.   Date__________   Time__________        Treatment Note Wound 06/18/21 Right Plantar # 1-Dressing/Treatment:  ( gentamin stimulin manuela alginate drwatex sorbex coban2 lite)    Written Patient Dismissal Instructions Given            Electronically signed by GEN Bellamy CNP on 10/6/2021 at 3:16 PM

## 2021-10-11 ENCOUNTER — APPOINTMENT (OUTPATIENT)
Dept: GENERAL RADIOLOGY | Age: 46
End: 2021-10-11
Payer: MEDICARE

## 2021-10-11 ENCOUNTER — HOSPITAL ENCOUNTER (EMERGENCY)
Age: 46
Discharge: ANOTHER HOSPITAL | End: 2021-10-11
Payer: MEDICARE

## 2021-10-11 VITALS
DIASTOLIC BLOOD PRESSURE: 87 MMHG | TEMPERATURE: 97.8 F | HEART RATE: 90 BPM | BODY MASS INDEX: 20.32 KG/M2 | RESPIRATION RATE: 16 BRPM | SYSTOLIC BLOOD PRESSURE: 119 MMHG | WEIGHT: 150 LBS | OXYGEN SATURATION: 94 % | HEIGHT: 72 IN

## 2021-10-11 DIAGNOSIS — M79.671 BILATERAL FOOT PAIN: ICD-10-CM

## 2021-10-11 DIAGNOSIS — L97.519 PLANTAR ULCER OF RIGHT FOOT, UNSPECIFIED ULCER STAGE (HCC): ICD-10-CM

## 2021-10-11 DIAGNOSIS — I10 HYPERTENSION, UNSPECIFIED TYPE: Primary | ICD-10-CM

## 2021-10-11 DIAGNOSIS — M79.672 BILATERAL FOOT PAIN: ICD-10-CM

## 2021-10-11 PROCEDURE — 73630 X-RAY EXAM OF FOOT: CPT

## 2021-10-11 PROCEDURE — 99285 EMERGENCY DEPT VISIT HI MDM: CPT

## 2021-10-11 PROCEDURE — 6370000000 HC RX 637 (ALT 250 FOR IP): Performed by: PHYSICIAN ASSISTANT

## 2021-10-11 RX ORDER — AMLODIPINE BESYLATE 10 MG/1
10 TABLET ORAL DAILY
Qty: 30 TABLET | Refills: 0 | Status: SHIPPED | OUTPATIENT
Start: 2021-10-11 | End: 2021-12-24 | Stop reason: SDUPTHER

## 2021-10-11 RX ORDER — HYDROCODONE BITARTRATE AND ACETAMINOPHEN 5; 325 MG/1; MG/1
1 TABLET ORAL ONCE
Status: DISCONTINUED | OUTPATIENT
Start: 2021-10-11 | End: 2021-10-11 | Stop reason: HOSPADM

## 2021-10-11 RX ORDER — METOPROLOL TARTRATE 50 MG/1
25 TABLET, FILM COATED ORAL ONCE
Status: COMPLETED | OUTPATIENT
Start: 2021-10-11 | End: 2021-10-11

## 2021-10-11 RX ORDER — AMLODIPINE BESYLATE 5 MG/1
10 TABLET ORAL ONCE
Status: COMPLETED | OUTPATIENT
Start: 2021-10-11 | End: 2021-10-11

## 2021-10-11 RX ORDER — HYDROCHLOROTHIAZIDE 25 MG/1
25 TABLET ORAL DAILY
Qty: 30 TABLET | Refills: 0 | Status: SHIPPED | OUTPATIENT
Start: 2021-10-11 | End: 2021-12-24 | Stop reason: SDUPTHER

## 2021-10-11 RX ORDER — HYDROCHLOROTHIAZIDE 12.5 MG/1
25 TABLET ORAL ONCE
Status: COMPLETED | OUTPATIENT
Start: 2021-10-11 | End: 2021-10-11

## 2021-10-11 RX ADMIN — METOPROLOL TARTRATE 25 MG: 50 TABLET, FILM COATED ORAL at 05:33

## 2021-10-11 RX ADMIN — HYDROCHLOROTHIAZIDE 25 MG: 12.5 TABLET ORAL at 05:33

## 2021-10-11 RX ADMIN — AMLODIPINE BESYLATE 10 MG: 5 TABLET ORAL at 05:33

## 2021-10-11 ASSESSMENT — PAIN SCALES - GENERAL: PAINLEVEL_OUTOF10: 10

## 2021-10-11 NOTE — ED PROVIDER NOTES
Emergency [facility] EMERGENCY DEPARTMENT    Patient: Brenda Marlow  MRN: [id number]  : 1975  Date of Evaluation: 10/11/2021  ED Provider: Humberto Krueger PA-C    Chief Complaint       Chief Complaint   Patient presents with    Foot Pain     right    Hypertension       GREGORIO Marlow is a 50 y.o. male who presents to the emergency department for foot pain and hypertension. On my exam, patient reports neuropathy in the bilateral feet which is a chronic issue. He has had multiple toe amputations with Dr. Anthony Gutierrez and currently has an ulcer to the plantar right foot for which he follows at the [address] [place] [address] on  and . Patient states he has been awaiting referral to Pain Management for his neuropathic pain. On my exam, patient tells me his concern is his blood pressure. He reports a history of HTN but states he has been out of his medications for at least a month. He states he had to be admitted to the hospital in the past due to issues with his blood pressure and doesn't want to get to that point again. He denies headache, dizziness, visual changes, chest pain, palpitations, sob, n/v/d.          ROS     CONSTITUTIONAL:  Denies fever. EYES:  Denies visual changes. HEAD:  Denies headache. RESPIRATORY:  Denies any shortness of breath. CARDIOVASCULAR:  Denies chest pain. MUSCULOSKELETAL:  + bilateral LE pain. BACK:  Denies back pain. INTEGUMENT:  + right foot ulcer  NEUROLOGIC:  Denies any numbness/tingling.     Past History     Past Medical History:   Diagnosis Date    Hypertension     does not take medications     Past Surgical History:   Procedure Laterality Date    FOOT DEBRIDEMENT Bilateral 2021    BILATERAL TOE  DEBRIDEMENT INCISION AND DRAINAGE, AMPUTATION OF LEFT 1ST, 2ND, 3RD AND TIP OF 4TH TOE AND RIGHT GREAT TOE performed by Thuy Osman MD at [address] [place] [address] And [facility] History    Marital status: Single     Spouse name: None    Number of children: None    Years of education: None    Highest education level: None   Occupational History    None   Tobacco Use    Smoking status: Former Smoker     Quit date: 2021     Years since quittin.7    Smokeless tobacco: Never Used   Vaping Use    Vaping Use: Never used   Substance and Sexual Activity    Alcohol use: Yes     Comment: most days    Drug use: No    Sexual activity: None   Other Topics Concern    None   Social History Narrative    None     Social Determinants of Health     Financial Resource Strain:     Difficulty of Paying Living Expenses:    Food Insecurity:     Worried About Running Out of Food in the Last Year:     Ran Out of Food in the Last Year:    Transportation Needs:     Lack of Transportation (Medical):  Lack of Transportation (Non-Medical):    Physical Activity:     Days of Exercise per Week:     Minutes of Exercise per Session:    Stress:     Feeling of Stress :    Social Connections:     Frequency of Communication with Friends and Family:     Frequency of Social Gatherings with Friends and Family:     Attends Sabianism Services:     Active Member of Clubs or Organizations:     Attends Club or Organization Meetings:     Marital Status:    Intimate Partner Violence:     Fear of Current or Ex-Partner:     Emotionally Abused:     Physically Abused:     Sexually Abused:        Medications/Allergies     Previous Medications    AMLODIPINE (NORVASC) 10 MG TABLET    Take 1 tablet by mouth daily    GABAPENTIN (NEURONTIN) 100 MG CAPSULE    Take 1 capsule by mouth 3 times daily for 20 days.     HYDROCHLOROTHIAZIDE (HYDRODIURIL) 25 MG TABLET    Take 1 tablet by mouth daily    METOPROLOL TARTRATE (LOPRESSOR) 25 MG TABLET    Take 1 tablet by mouth 2 times daily    OMEPRAZOLE (PRILOSEC) 20 MG DELAYED RELEASE CAPSULE    Take 20 mg by mouth daily     Allergies   Allergen Reactions    Lisinopril Angioedema        Physical Exam       ED Triage Vitals [10/11/21 0313]   BP Temp Temp Source Pulse Resp SpO2 Height Weight   (!) 154/100 97.8 °F (36.6 °C) Oral 87 16 97 % 6' (1.829 m) 150 lb (68 kg)     GENERAL APPEARANCE:  Well-developed, well-nourished, no acute distress. HEAD:  NC/AT. EYES:  Sclera anicteric. ENT:  Ears, nose, mouth normal.     NECK:  Supple. CARDIO:  RRR. LUNGS:   CTAB. Respirations unlabored. ABDOMEN:  Soft, non-distended, non-tender. BS active. EXTREMITIES:  No acute deformities--chronic toe amputations. Ulcer to plantar right foot with no evidence of secondary infection. SKIN:  Warm and dry. NEUROLOGICAL:  Alert and oriented. PSYCHIATRIC:  Normal mood. Diagnostics     Labs:  No results found for this visit on 10/11/21. Radiographs:  XR FOOT RIGHT (MIN 3 VIEWS)    Result Date: 10/11/2021  EXAMINATION: THREE XRAY VIEWS OF THE RIGHT FOOT 10/11/2021 3:03 am COMPARISON: Right foot radiographs August 7, 2021 HISTORY: ORDERING SYSTEM PROVIDED HISTORY: pain TECHNOLOGIST PROVIDED HISTORY: Reason for exam:->pain Reason for Exam: pain Acuity: Acute Type of Exam: Initial Mechanism of Injury: pain Relevant Medical/Surgical History: pain Right foot pain FINDINGS: There has been partial amputation of the right great toe. The right distal phalanx has been removed. There is no acute fracture, dislocation, or bone destruction. The bones are demineralized. There is no soft tissue gas. No definite evidence of osteomyelitis. ED Course and MDM   -  Patient seen and evaluated in the emergency department. -  Triage and nursing notes reviewed and incorporated. -  Old chart records reviewed and incorporated. -  Supervising physician was Dr. Arias Carrizales. Patient was seen independently. -  Work-up included:  XR performed in triage  -  No evidence of infection of right foot wound. New dressing applied by nursing. He follows with [facility] on Tuesdays and Fridays.   Given a single dose of Norco for pain here in the ED as well as his BP medications. Provided refills of BP medications. FU with PCP for further medication management. He is agreeable with plan of care and disposition.  -  Disposition:  Home    In light of current events, I did utilize appropriate PPE (including N95 and surgical face mask, safety glasses, and gloves, as recommended by the health facility/national standard best practice, during my bedside interactions with the patient. Final Impression      1. Hypertension, unspecified type    2. Bilateral foot pain    3.  Plantar ulcer of right foot, unspecified ulcer stage ([facility]            DISPOSITION        Juan Morin PA-C  [address] [place] [address]  10/11/21 4552

## 2021-10-11 NOTE — ED TRIAGE NOTES
Pt presents to the ED c/o right foot pain after kicking a door. Hx of htn, states he thinks his bp is elevated. Pt is alert and oriented, rates pain 10/10.

## 2021-10-17 ENCOUNTER — HOSPITAL ENCOUNTER (EMERGENCY)
Age: 46
Discharge: HOME | End: 2021-10-17
Attending: EMERGENCY MEDICINE
Payer: MEDICARE

## 2021-10-17 ENCOUNTER — APPOINTMENT (OUTPATIENT)
Dept: GENERAL RADIOLOGY | Age: 46
End: 2021-10-17
Payer: MEDICARE

## 2021-10-17 VITALS
WEIGHT: 170 LBS | HEIGHT: 72 IN | TEMPERATURE: 98.2 F | DIASTOLIC BLOOD PRESSURE: 97 MMHG | OXYGEN SATURATION: 100 % | RESPIRATION RATE: 19 BRPM | BODY MASS INDEX: 23.03 KG/M2 | SYSTOLIC BLOOD PRESSURE: 136 MMHG | HEART RATE: 114 BPM

## 2021-10-17 DIAGNOSIS — L97.519 ULCER OF RIGHT FOOT, UNSPECIFIED ULCER STAGE (HCC): ICD-10-CM

## 2021-10-17 DIAGNOSIS — M79.671 RIGHT FOOT PAIN: Primary | ICD-10-CM

## 2021-10-17 PROCEDURE — 99284 EMERGENCY DEPT VISIT MOD MDM: CPT

## 2021-10-17 PROCEDURE — 73630 X-RAY EXAM OF FOOT: CPT

## 2021-10-17 PROCEDURE — 6370000000 HC RX 637 (ALT 250 FOR IP): Performed by: EMERGENCY MEDICINE

## 2021-10-17 RX ORDER — OXYCODONE HYDROCHLORIDE AND ACETAMINOPHEN 5; 325 MG/1; MG/1
1 TABLET ORAL ONCE
Status: COMPLETED | OUTPATIENT
Start: 2021-10-17 | End: 2021-10-17

## 2021-10-17 RX ORDER — OXYCODONE HYDROCHLORIDE AND ACETAMINOPHEN 5; 325 MG/1; MG/1
1 TABLET ORAL EVERY 6 HOURS PRN
Qty: 12 TABLET | Refills: 0 | Status: SHIPPED | OUTPATIENT
Start: 2021-10-17 | End: 2021-10-20

## 2021-10-17 RX ADMIN — OXYCODONE HYDROCHLORIDE AND ACETAMINOPHEN 1 TABLET: 5; 325 TABLET ORAL at 16:57

## 2021-10-17 ASSESSMENT — ENCOUNTER SYMPTOMS
SORE THROAT: 0
RHINORRHEA: 0
BACK PAIN: 0
EYE PAIN: 0
EYE DISCHARGE: 0
NAUSEA: 0
COUGH: 0
ABDOMINAL PAIN: 0
VOMITING: 0
SHORTNESS OF BREATH: 0

## 2021-10-17 ASSESSMENT — PAIN DESCRIPTION - PAIN TYPE: TYPE: ACUTE PAIN

## 2021-10-17 ASSESSMENT — PAIN DESCRIPTION - FREQUENCY: FREQUENCY: CONTINUOUS

## 2021-10-17 ASSESSMENT — PAIN SCALES - GENERAL
PAINLEVEL_OUTOF10: 9
PAINLEVEL_OUTOF10: 9

## 2021-10-17 ASSESSMENT — PAIN DESCRIPTION - ORIENTATION: ORIENTATION: RIGHT

## 2021-10-17 ASSESSMENT — PAIN DESCRIPTION - LOCATION: LOCATION: FOOT

## 2021-10-17 NOTE — ED NOTES
Patient educated on after visit care needs and instructions. Verbalized understanding and denies other needs. [address]  Williame Zaki  10/17/21 5315

## 2021-10-17 NOTE — ED PROVIDER NOTES
[address] ENCOUNTER      Pt Name: Jose Duvall  MRN: [id number]  [place] 1975  Date of evaluation: 10/17/2021  Provider: Alysa Grant MD    CHIEF COMPLAINT       Chief Complaint   Patient presents with    Wound Check     reports he was told to report here to have wound to right foot re-dressed         HISTORY OF PRESENT ILLNESS      Jose Duvall is a 50 y.o. male who presents to the emergency department  for   Chief Complaint   Patient presents with    Wound Check     reports he was told to report here to have wound to right foot re-dressed       69-year-old male presents with pain in his right foot. He has undergone a prior toe amputations on the right. He does have an ulcer on the plantar surface of the right foot that is being cared for by wound care. He did miss his most recent wound care appointment. He denies any significant drainage at the foot. Denies any recent trauma or injury. He is not currently established with pain management. States he has been trying to get into pain management. He denies any fevers or chills. He is ambulatory with a cane. He states he has not been able to appropriately dressed the room because he missed his recent appoint with wound care. No respiratory symptoms. No cough or sputum production. No fevers or chills. No chest pain or abdominal pain. GCS of 15 in the emergency department. Nursing Notes, Triage Notes & Vital Signs were reviewed. REVIEW OF SYSTEMS    (2-9 systems for level 4, 10 or more for level 5)     Review of Systems   Constitutional: Negative for chills and fever. HENT: Negative for congestion, rhinorrhea and sore throat. Eyes: Negative for pain and discharge. Respiratory: Negative for cough and shortness of breath. Cardiovascular: Negative for chest pain and palpitations.    Gastrointestinal: Negative for abdominal pain, nausea and vomiting. Endocrine: Negative for polydipsia and polyuria. Genitourinary: Negative for dysuria and flank pain. Musculoskeletal: Negative for back pain and neck pain. Right foot pain; right foot ulcer   Skin: Positive for wound. Negative for pallor. Neurological: Negative for dizziness, facial asymmetry, light-headedness, numbness and headaches. Psychiatric/Behavioral: Negative for confusion. Except as noted above the remainder of the review of systems was reviewed and negative. PAST MEDICAL HISTORY     Past Medical History:   Diagnosis Date    Hypertension     does not take medications       Prior to Admission medications    Medication Sig Start Date End Date Taking? Authorizing Provider   oxyCODONE-acetaminophen (PERCOCET) 5-325 MG per tablet Take 1 tablet by mouth every 6 hours as needed for Pain for up to 3 days. Intended supply: 3 days. Take lowest dose possible to manage pain 10/17/21 10/20/21 Yes Luis Angel Rios MD   amLODIPine (NORVASC) 10 MG tablet Take 1 tablet by mouth daily 10/11/21   Clara Hood PA-C   hydroCHLOROthiazide (HYDRODIURIL) 25 MG tablet Take 1 tablet by mouth daily 10/11/21   Clara Hood PA-C   metoprolol tartrate (LOPRESSOR) 25 MG tablet Take 1 tablet by mouth 2 times daily 10/11/21   Clara Hood PA-C   gabapentin (NEURONTIN) 100 MG capsule Take 1 capsule by mouth 3 times daily for 20 days.  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 [facility]       Discharge Medication List as of 10/17/2021  5:59 PM      CONTINUE these medications which have NOT CHANGED    Details   amLODIPine (NORVASC) 10 MG tablet Take 1 tablet by mouth daily, Disp-30 tablet, R-0Normal      hydroCHLOROthiazide (HYDRODIURIL) 25 MG tablet Take 1 tablet by mouth daily, Disp-30 tablet, R-0Normal      metoprolol tartrate (LOPRESSOR) 25 MG tablet Take 1 tablet by mouth 2 times daily, Disp-60 tablet, R-0Normal      gabapentin (NEURONTIN) 100 MG capsule Take 1 capsule by mouth 3 times daily for 20 days. , Disp-60 capsule, R-0Normal      omeprazole (PRILOSEC) 20 MG delayed release capsule Take 20 mg by mouth dailyHistorical Med             ALLERGIES     Lisinopril    FAMILY HISTORY     No family history on file.        SOCIAL HISTORY       Social History     Socioeconomic History    Marital status: Single     Spouse name: Not on file    Number of children: Not on file    Years of education: Not on file    Highest education level: Not on file   Occupational History    Not on file   Tobacco Use    Smoking status: Former Smoker     Quit date: 2021     Years since quittin.7    Smokeless tobacco: Never Used   Vaping Use    Vaping Use: Never used   Substance and Sexual Activity    Alcohol use: Yes     Comment: most days    Drug use: No    Sexual activity: Not on file   Other Topics Concern    Not on file   Social History Narrative    Not on file     Social Determinants of Health     Financial Resource Strain:     Difficulty of Paying Living Expenses:    Food Insecurity:     Worried About Running Out of Food in the Last Year:    [address] in the Last Year:    Transportation Needs:     Lack of Transportation (Medical):  Lack of Transportation (Non-Medical):    Physical Activity:     Days of Exercise per Week:     Minutes of Exercise per Session:    Stress:     Feeling of Stress :    Social Connections:     Frequency of Communication with Friends and Family:     Frequency of Social Gatherings with Friends and Family:     Attends Restorationism Services:     Active Member of Clubs or Organizations:     Attends Club or Organization Meetings:     Marital Status:    Intimate Partner Violence:     Fear of Current or Ex-Partner:     Emotionally Abused:     Physically Abused:     Sexually Abused:        SCREENINGS    Lauren Coma Scale  Eye Opening: Spontaneous  Best Verbal Response: Oriented  Best Motor Response: Obeys commands  [place] Coma Scale Score: 15          PHYSICAL EXAM    (up to 7 for level 4, 8 or more for level 5)     ED Triage Vitals [10/17/21 1530]   BP Temp Temp Source Pulse Resp SpO2 Height Weight   (!) 136/97 98.2 °F (36.8 °C) Oral 114 19 100 % 6' (1.829 m) 170 lb (77.1 kg)       Physical Exam  Vitals reviewed. Constitutional:       Appearance: He is not ill-appearing or toxic-appearing. HENT:      Head: Normocephalic and atraumatic. Nose: No congestion or rhinorrhea. Mouth/Throat:      Mouth: Mucous membranes are dry. Pharynx: No oropharyngeal exudate or posterior oropharyngeal erythema. Eyes:      General:         Right eye: No discharge. Left eye: No discharge. Extraocular Movements: Extraocular movements intact. Pupils: Pupils are equal, round, and reactive to light. Cardiovascular:      Rate and Rhythm: Tachycardia present. Heart sounds: No friction rub. No gallop. Pulmonary:      Effort: Pulmonary effort is normal. No respiratory distress. Chest:      Chest wall: No tenderness. Abdominal:      Palpations: Abdomen is soft. Musculoskeletal:         General: Tenderness present. No deformity or signs of injury. Normal range of motion. Cervical back: Neck supple. No tenderness. Skin:     General: Skin is warm. Capillary Refill: Capillary refill takes less than 2 seconds. Findings: Lesion present. No erythema. Comments: Ulcer on plantar surface of right foot; no surrounding erythema; no drainage   Neurological:      General: No focal deficit present. Mental Status: He is alert and oriented to person, place, and time. DIAGNOSTIC RESULTS     Labs Reviewed - No data to display       RADIOLOGY:     Non-plain film images such as CT, Ultrasound and MRI are read by the radiologist. Plain radiographic images are visualized and preliminarily interpreted by the emergency physician. Interpretation per the Radiologist below, if available at the time of this note:    XR FOOT RIGHT (MIN 3 VIEWS)   Final Result   1. No acute osseous abnormality or radiographic evidence of osteomyelitis. 2. Plantar forefoot ulceration and soft tissue swelling that may relate to   cellulitis. ED BEDSIDE ULTRASOUND:   Performed by ED Physician Shin Lora MD       LABS:  Labs Reviewed - No data to display    All other labs were within normal range or not returned as of this dictation. EMERGENCY DEPARTMENT COURSE and DIFFERENTIAL DIAGNOSIS/MDM:   Vitals:    Vitals:    10/17/21 1530   BP: (!) 136/97   Pulse: 114   Resp: 19   Temp: 98.2 °F (36.8 °C)   TempSrc: Oral   SpO2: 100%   Weight: 170 lb (77.1 kg)   Height: 6' (1.829 m)           MDM  Number of Diagnoses or Management Options  Right foot pain  Ulcer of right foot, unspecified ulcer stage ([facility] [address]  Diagnosis management comments: 50 yom presents reporting pain in his right foot. Has an ulcer on the plantar surface of his right foot. Is undergone prior amputations on both feet. He did miss an appointment with wound care recently.   Denies any drainage [address]  [id number]  [place] [address]  [phone]    Schedule an appointment as soon as possible for a visit in 1 day      [address] [address]  [phone]  Schedule an appointment as soon as possible for a visit in 1 day        DISCHARGE MEDICATIONS:  Discharge Medication List as of 10/17/2021  5:59 PM      START taking these medications    Details   oxyCODONE-acetaminophen (PERCOCET) 5-325 MG per tablet Take 1 tablet by mouth every 6 hours as needed for Pain for up to 3 days. Intended supply: 3 days. Take lowest dose possible to manage pain, Disp-12 tablet, R-0Normal             ED Provider Disposition Time  DISPOSITION Decision To Discharge 10/17/2021 05:54:45 PM      Appropriate personal protective equipment was worn during the patient's evaluation. These included surgical, eye protection, surgical mask or in 95 respirator and gloves. The patient was also placed in a surgical mask for the prevention of possible spread of respiratory viral illnesses. The Patient was instructed to read the package inserts with any medication that was prescribed. Major potential reactions and medication interactions were discussed. The Patient understands that there are numerous possible adverse reactions not covered. The patient was also instructed to arrange follow-up with his or her primary care provider for review of any pending labwork or incidental findings on any radiology results that were obtained. All efforts were made to discuss any incidental findings that require further monitoring. Controlled Substances Monitoring:     No flowsheet data found.     (Please note that portions of this note were completed with a voice recognition program.  Efforts were made to edit the dictations but occasionally words are mis-transcribed.)    Alysa Grant MD (electronically signed)  Attending Emergency Physician           Alysa Grant MD  10/17/21

## 2021-10-25 ENCOUNTER — TELEPHONE (OUTPATIENT)
Dept: WOUND CARE | Age: 46
End: 2021-10-25

## 2021-10-26 ENCOUNTER — HOSPITAL ENCOUNTER (OUTPATIENT)
Dept: WOUND CARE | Age: 46
Discharge: HOME | End: 2021-10-26
Payer: MEDICARE

## 2021-10-26 VITALS
TEMPERATURE: 98 F | HEART RATE: 90 BPM | SYSTOLIC BLOOD PRESSURE: 162 MMHG | RESPIRATION RATE: 19 BRPM | DIASTOLIC BLOOD PRESSURE: 107 MMHG

## 2021-10-26 DIAGNOSIS — L89.893 PRESSURE ULCER OF RIGHT FOOT, STAGE 3 (HCC): Primary | ICD-10-CM

## 2021-10-26 PROCEDURE — 87070 CULTURE OTHR SPECIMN AEROBIC: CPT

## 2021-10-26 PROCEDURE — 87077 CULTURE AEROBIC IDENTIFY: CPT

## 2021-10-26 PROCEDURE — 11042 DBRDMT SUBQ TIS 1ST 20SQCM/<: CPT | Performed by: NURSE PRACTITIONER

## 2021-10-26 PROCEDURE — 87075 CULTR BACTERIA EXCEPT BLOOD: CPT

## 2021-10-26 PROCEDURE — 11042 DBRDMT SUBQ TIS 1ST 20SQCM/<: CPT

## 2021-10-26 RX ORDER — LIDOCAINE HYDROCHLORIDE 20 MG/ML
JELLY TOPICAL ONCE
Status: CANCELLED | OUTPATIENT
Start: 2021-10-26 | End: 2021-10-26

## 2021-10-26 RX ORDER — LIDOCAINE 50 MG/G
OINTMENT TOPICAL ONCE
Status: DISCONTINUED | OUTPATIENT
Start: 2021-10-26 | End: 2021-10-27 | Stop reason: HOSPADM

## 2021-10-26 RX ORDER — GINSENG 100 MG
CAPSULE ORAL ONCE
Status: CANCELLED | OUTPATIENT
Start: 2021-10-26 | End: 2021-10-26

## 2021-10-26 RX ORDER — CLOBETASOL PROPIONATE 0.5 MG/G
OINTMENT TOPICAL ONCE
Status: CANCELLED | OUTPATIENT
Start: 2021-10-26 | End: 2021-10-26

## 2021-10-26 RX ORDER — LIDOCAINE 40 MG/G
CREAM TOPICAL ONCE
Status: CANCELLED | OUTPATIENT
Start: 2021-10-26 | End: 2021-10-26

## 2021-10-26 RX ORDER — BACITRACIN, NEOMYCIN, POLYMYXIN B 400; 3.5; 5 [USP'U]/G; MG/G; [USP'U]/G
OINTMENT TOPICAL ONCE
Status: CANCELLED | OUTPATIENT
Start: 2021-10-26 | End: 2021-10-26

## 2021-10-26 RX ORDER — BETAMETHASONE DIPROPIONATE 0.05 %
OINTMENT (GRAM) TOPICAL ONCE
Status: CANCELLED | OUTPATIENT
Start: 2021-10-26 | End: 2021-10-26

## 2021-10-26 RX ORDER — GENTAMICIN SULFATE 1 MG/G
OINTMENT TOPICAL ONCE
Status: CANCELLED | OUTPATIENT
Start: 2021-10-26 | End: 2021-10-26

## 2021-10-26 RX ORDER — LIDOCAINE HYDROCHLORIDE 40 MG/ML
SOLUTION TOPICAL ONCE
Status: CANCELLED | OUTPATIENT
Start: 2021-10-26 | End: 2021-10-26

## 2021-10-26 RX ORDER — LIDOCAINE 50 MG/G
OINTMENT TOPICAL ONCE
Status: CANCELLED | OUTPATIENT
Start: 2021-10-26 | End: 2021-10-26

## 2021-10-26 RX ORDER — BACITRACIN ZINC AND POLYMYXIN B SULFATE 500; 1000 [USP'U]/G; [USP'U]/G
OINTMENT TOPICAL ONCE
Status: CANCELLED | OUTPATIENT
Start: 2021-10-26 | End: 2021-10-26

## 2021-10-26 RX ORDER — GENTAMICIN SULFATE 1 MG/G
OINTMENT TOPICAL ONCE
Status: DISCONTINUED | OUTPATIENT
Start: 2021-10-26 | End: 2021-10-27 | Stop reason: HOSPADM

## 2021-10-26 ASSESSMENT — PAIN DESCRIPTION - LOCATION: LOCATION: FOOT

## 2021-10-26 ASSESSMENT — PAIN DESCRIPTION - PAIN TYPE: TYPE: ACUTE PAIN

## 2021-10-26 ASSESSMENT — PAIN DESCRIPTION - PROGRESSION: CLINICAL_PROGRESSION: GRADUALLY WORSENING

## 2021-10-26 ASSESSMENT — PAIN DESCRIPTION - ORIENTATION: ORIENTATION: RIGHT

## 2021-10-26 ASSESSMENT — PAIN - FUNCTIONAL ASSESSMENT: PAIN_FUNCTIONAL_ASSESSMENT: PREVENTS OR INTERFERES SOME ACTIVE ACTIVITIES AND ADLS

## 2021-10-26 ASSESSMENT — PAIN DESCRIPTION - ONSET: ONSET: ON-GOING

## 2021-10-26 ASSESSMENT — PAIN SCALES - GENERAL: PAINLEVEL_OUTOF10: 6

## 2021-10-26 ASSESSMENT — PAIN DESCRIPTION - FREQUENCY: FREQUENCY: CONTINUOUS

## 2021-10-26 NOTE — PROGRESS NOTES
Wound Care Center Progress Note With Procedure    Fran Cruz  AGE: 50 y.o. GENDER: male  : 1975  EPISODE DATE:  10/26/2021     Subjective:     Chief Complaint   Patient presents with    Wound Check     right foot          HISTORY of PRESENT ILLNESS     Nehemiah Zhang is a 50 y. o. male who presents today for wound evaluation of Chronic pressure ulcer(s) of the right plantar foot. The ulcer is of moderate severity. The underlying cause of the wound is probably due to pressure from walking and blistering.  He still has significant sensitivity with his foot.  History of amputation of left first second third and tip of the fourth toe and right great toe performed on 2021 related to frostbite. He is homeless and the right plantar foot is believed to be associated with pressure and blistering from excessive walking.  He has had several visits to the ED over the past several wounds for wound checks.     Wound Pain Timing/Severity: waxing and waning  Quality of pain: throbbing  Severity of pain:  4 / 10   Modifying Factors: chronic pressure  Associated Signs/Symptoms: none     Diabetes: No  Smoking: No, former smoker  Obesity: No  Anticoagulant therapy: No  Immunosuppression: No     Patient educated on the 6 essential components necessary for wound healing: Circulation, Debridements, Proper Dressings and Topical Wound Products, Infection Control, Edema Control and Offloading.     Patient educated on those factors that negatively effect or impact wound healing: smoking, obesity, uncontrolled diabetes, anticoagulant and immunosuppressive regimens, inadequate nutrition, untreated arterial and venous disease if applicable and measures to manage edema.         PAST MEDICAL HISTORY        Diagnosis Date    Hypertension     does not take medications       PAST SURGICAL HISTORY    Past Surgical History:   Procedure Laterality Date    FOOT DEBRIDEMENT Bilateral 2021    BILATERAL TOE  DEBRIDEMENT INCISION AND DRAINAGE, AMPUTATION OF LEFT 1ST, 2ND, 3RD AND TIP OF 4TH TOE AND RIGHT GREAT TOE performed by Cesar Monroy MD at [facility]    History reviewed. No pertinent family history. SOCIAL HISTORY    Social History     Tobacco Use    Smoking status: Former Smoker     Quit date: 2021     Years since quittin.8    Smokeless tobacco: Never Used   Vaping Use    Vaping Use: Never used   Substance Use Topics    Alcohol use: Yes     Comment: most days    Drug use: No       ALLERGIES    Allergies   Allergen Reactions    Lisinopril Angioedema       MEDICATIONS    Current Outpatient Medications on File Prior to Encounter   Medication Sig Dispense Refill    amLODIPine (NORVASC) 10 MG tablet Take 1 tablet by mouth daily 30 tablet 0    hydroCHLOROthiazide (HYDRODIURIL) 25 MG tablet Take 1 tablet by mouth daily 30 tablet 0    metoprolol tartrate (LOPRESSOR) 25 MG tablet Take 1 tablet by mouth 2 times daily 60 tablet 0    omeprazole (PRILOSEC) 20 MG delayed release capsule Take 20 mg by mouth daily      gabapentin (NEURONTIN) 100 MG capsule Take 1 capsule by mouth 3 times daily for 20 days. 60 capsule 0     No current facility-administered medications on file prior to encounter. REVIEW OF SYSTEMS    Pertinent items are noted in HPI. Constitutional: Negative for systemic symptoms including fever, chills and malaise. Objective:      BP (!) 162/107   Pulse 90   Temp 98 °F (36.7 °C) (Temporal)   Resp 19     PHYSICAL EXAM    General: The patient is in no acute distress. Mental status:  Patient is appropriate, is  oriented to place and plan of care.   Dermatologic exam: Visual inspection of the periwound reveals the skin to be moist  Wound exam: see wound description below in procedure note      Assessment:     Problem List Items Addressed This Visit     WD-Pressure ulcer of right foot, stage 3 (HCC) - Primary    Relevant Medications    lidocaine (XYLOCAINE) 5 % ointment gentamicin (GARAMYCIN) 0.1 % ointment    Other Relevant Orders    Initiate Outpatient Wound Care Protocol        Procedure Note    Indications:  Based on my examination of this patient's wound(s) today, sharp excision into necrotic subcutaneous tissue is required to promote healing and evaluate the extent of previous healing. Performed by: GEN Hollins CNP    Consent obtained: Yes    Time out taken:  Yes    Pain Control: Anesthetic  Anesthetic: 5% Lidocaine Ointment Topical     Debridement:Excisional Debridement    Using curette the wound(s) was/were sharply debrided down through and including the removal of subcutaneous tissue.         Devitalized Tissue Debrided:  slough and exudate    Pre Debridement Measurements:  Are located in the Wound Documentation Flow Sheet    All active wounds listed below with today's date are evaluated  Wound(s)    debrided this date include # : 1     Post  Debridement Measurements:  Wound 06/18/21 Right Plantar # 1 (Active)   Wound Image   10/05/21 1253   Wound Etiology Pressure Stage  3 10/26/21 1209   Dressing Status New dressing applied 10/26/21 1209   Wound Cleansed Soap and water 10/26/21 1155   Dressing/Treatment Silver dressing;Alginate;ABD 07/16/21 1714   Offloading for Diabetic Foot Ulcers Forefoot offloading shoe 10/26/21 1209   Wound Length (cm) 1 cm 10/26/21 1155   Wound Width (cm) 1.5 cm 10/26/21 1155   Wound Depth (cm) 0.7 cm 10/26/21 1155   Wound Surface Area (cm^2) 1.5 cm^2 10/26/21 1155   Change in Wound Size % (l*w) 91.43 10/26/21 1155   Wound Volume (cm^3) 1.05 cm^3 10/26/21 1155   Wound Healing % 40 10/26/21 1155   Post-Procedure Length (cm) 1 cm 10/26/21 1204   Post-Procedure Width (cm) 1.5 cm 10/26/21 1204   Post-Procedure Depth (cm) 0.7 cm 10/26/21 1204   Post-Procedure Surface Area (cm^2) 1.5 cm^2 10/26/21 1204   Post-Procedure Volume (cm^3) 1.05 cm^3 10/26/21 1204   Distance Tunneling (cm) 0 cm 10/26/21 1155   Tunneling Position ___ O'Clock 0 10/26/21 1155   Undermining Starts ___ O'Clock 0 10/26/21 1155   Undermining Ends___ O'Clock 0 10/26/21 1155   Undermining Maxium Distance (cm) 0 10/26/21 1155   Wound Assessment Granulation tissue;Pink/red 10/26/21 1155   Drainage Amount Moderate 10/26/21 1155   Drainage Description Serosanguinous 10/26/21 1155   Odor Moderate 10/26/21 1155   Magi-wound Assessment Maceration 10/26/21 1155   Margins Defined edges 10/26/21 1155   Wound Thickness Description not for Pressure Injury Full thickness 10/26/21 1155   Number of days: 130       Percent of Wound(s) Debrided: approximately 100%    Total  Area  Debrided:  1.5 sq cm     Bleeding:  Minimal    Hemostasis Achieved:  by pressure    Procedural Pain:  4  / 10     Post Procedural Pain:  0 / 10     Response to treatment:  Well tolerated by patient. Status of wound progress and description from last visit: Worse today. We got a call from [address] [place] [address] his PCP practice that patient needed seen ASAP as the wound was worse. We did get him in the same day. The patient hasn't been here since 10/5/21stating he has had difficulty with transportation. The foot is macerated and the wound is larger. Discussed in detail with the patient his situation, he is homeless, he gets mail and goes to the soup kitchen. Discussed assistance from Medicaid, states he applied, missed there call and COVID happened. I reinforced he needs to follow up and try to get assistance. Plan:       Discharge Instructions       PHYSICIAN ORDERS AND DISCHARGE INSTRUCTIONS     NOTE: Upon discharge from the [address], you will receive a patient experience survey.  We would be grateful if you would take the time to fill this survey out.     Wound care order history:                 [facility]'s   Arterial studies on 12/26/2020              Cultures: Rt Plantar Foot reCultured 10/26/21              Grafts:                Antibiotics:           Continuing wound care orders and information:                 Residence:                Continue home health care with:               Your wound-care supplies will be provided by:      Wound cleansing:               QQ not scrub or use excessive force.              Wash hands with soap and water before and after dressing changes.             MENXW to applying a clean dressing, cleanse wound with normal saline, wound cleanser, or mild soap and water.               Ask the physician or nurse before getting the wound(s) wet in a shower     Daily Wound management:              Keep weight off wounds and reposition every 2 hours.              Avoid standing for long periods of time.              Apply wraps/stockings in AM and remove at bedtime.              If swelling is present, elevate legs to the level of the heart or above for 30 minutes 4-5 times a day and/or when sitting.                                      When taking antibiotics take entire prescription as ordered by physician do not stop taking until medicine is all gone.                                                           Orders for this week: 10/26/21     Rt Plantar Foot reCultured 10/26/21    Rx: [facility] on [address]  Right Plantar Foot - Wash with soap and water, pat dry. Paint periwound with betadine, apply gentamicin and stimulin powder to wound bed, cover with silver calcium alginate, drawtex and sorbex. Wrap with Coban 2 Lite. Leave in place until Nurse visit Friday.         Auth for grafts on 6/18/2021       Nurse visit Friday  Follow up with Ena Mcknight CNP in 1 week in the wound care center  Call [phone] for any questions or concerns.   Date__________   Time__________        Treatment Note Wound 06/18/21 Right Plantar # 1-Dressing/Treatment:  ( gentamin stimulin manuela alginate drwatex sorbex coban2 lite)    Written Patient Dismissal Instructions Given            Electronically signed by GEN Cruz CNP on 10/26/2021 at 1:14 PM

## 2021-10-29 ENCOUNTER — HOSPITAL ENCOUNTER (OUTPATIENT)
Dept: WOUND CARE | Age: 46
Discharge: HOME | End: 2021-10-29
Payer: MEDICARE

## 2021-10-29 VITALS
SYSTOLIC BLOOD PRESSURE: 148 MMHG | TEMPERATURE: 97.8 F | HEART RATE: 89 BPM | RESPIRATION RATE: 18 BRPM | DIASTOLIC BLOOD PRESSURE: 72 MMHG

## 2021-10-29 PROCEDURE — 29581 APPL MULTLAYER CMPRN SYS LEG: CPT

## 2021-10-29 ASSESSMENT — PAIN DESCRIPTION - ORIENTATION: ORIENTATION: RIGHT

## 2021-10-29 ASSESSMENT — PAIN DESCRIPTION - PAIN TYPE: TYPE: ACUTE PAIN

## 2021-10-29 ASSESSMENT — PAIN DESCRIPTION - LOCATION: LOCATION: FOOT

## 2021-10-29 ASSESSMENT — PAIN DESCRIPTION - DESCRIPTORS: DESCRIPTORS: STABBING

## 2021-10-29 ASSESSMENT — PAIN SCALES - GENERAL: PAINLEVEL_OUTOF10: 5

## 2021-10-29 NOTE — PROGRESS NOTES
Multilayer Compression Wrap   (Not Unna) Below the Knee    NAME:  Lisa Greenfield  YOB: 1975  MEDICAL RECORD NUMBER:  [id number]  DATE:  10/29/2021    Multilayer compression wrap: Removed old Multilayer wrap if indicated and wash leg with mild soap/water. Applied moisturizing agent to dry skin as needed. Applied primary and secondary dressing as ordered. Applied multilayered dressing below the knee to right lower leg. Instructed patient/caregiver not to remove dressing and to keep it clean and dry. Instructed patient/caregiver on complications to report to provider, such as pain, numbness in toes, heavy drainage, and slippage of dressing. Instructed patient on purpose of compression dressing and on activity and exercise recommendations.       Electronically signed by Dylan Harrison LPN on 36/21/5307 at 1:55 PM

## 2021-10-31 LAB
CULTURE: ABNORMAL
Lab: ABNORMAL
SPECIMEN: ABNORMAL

## 2021-11-02 ENCOUNTER — HOSPITAL ENCOUNTER (OUTPATIENT)
Dept: WOUND CARE | Age: 46
Discharge: HOME | End: 2021-11-02
Payer: MEDICARE

## 2021-11-02 VITALS
RESPIRATION RATE: 18 BRPM | SYSTOLIC BLOOD PRESSURE: 114 MMHG | DIASTOLIC BLOOD PRESSURE: 79 MMHG | HEART RATE: 109 BPM | TEMPERATURE: 98.6 F

## 2021-11-02 DIAGNOSIS — L89.893 PRESSURE ULCER OF RIGHT FOOT, STAGE 3 (HCC): Primary | ICD-10-CM

## 2021-11-02 DIAGNOSIS — L89.893 PRESSURE ULCER OF TOE OF RIGHT FOOT, STAGE 3 (HCC): ICD-10-CM

## 2021-11-02 PROCEDURE — 11042 DBRDMT SUBQ TIS 1ST 20SQCM/<: CPT | Performed by: NURSE PRACTITIONER

## 2021-11-02 PROCEDURE — 11042 DBRDMT SUBQ TIS 1ST 20SQCM/<: CPT

## 2021-11-02 RX ORDER — LIDOCAINE HYDROCHLORIDE 40 MG/ML
SOLUTION TOPICAL ONCE
Status: CANCELLED | OUTPATIENT
Start: 2021-11-02 | End: 2021-11-02

## 2021-11-02 RX ORDER — CLOBETASOL PROPIONATE 0.5 MG/G
OINTMENT TOPICAL ONCE
Status: CANCELLED | OUTPATIENT
Start: 2021-11-02 | End: 2021-11-02

## 2021-11-02 RX ORDER — LIDOCAINE HYDROCHLORIDE 40 MG/ML
SOLUTION TOPICAL ONCE
Status: DISCONTINUED | OUTPATIENT
Start: 2021-11-02 | End: 2021-11-03 | Stop reason: HOSPADM

## 2021-11-02 RX ORDER — BACITRACIN ZINC AND POLYMYXIN B SULFATE 500; 1000 [USP'U]/G; [USP'U]/G
OINTMENT TOPICAL ONCE
Status: CANCELLED | OUTPATIENT
Start: 2021-11-02 | End: 2021-11-02

## 2021-11-02 RX ORDER — LIDOCAINE HYDROCHLORIDE 20 MG/ML
JELLY TOPICAL ONCE
Status: CANCELLED | OUTPATIENT
Start: 2021-11-02 | End: 2021-11-02

## 2021-11-02 RX ORDER — BACITRACIN, NEOMYCIN, POLYMYXIN B 400; 3.5; 5 [USP'U]/G; MG/G; [USP'U]/G
OINTMENT TOPICAL ONCE
Status: CANCELLED | OUTPATIENT
Start: 2021-11-02 | End: 2021-11-02

## 2021-11-02 RX ORDER — LIDOCAINE 50 MG/G
OINTMENT TOPICAL ONCE
Status: CANCELLED | OUTPATIENT
Start: 2021-11-02 | End: 2021-11-02

## 2021-11-02 RX ORDER — BETAMETHASONE DIPROPIONATE 0.05 %
OINTMENT (GRAM) TOPICAL ONCE
Status: CANCELLED | OUTPATIENT
Start: 2021-11-02 | End: 2021-11-02

## 2021-11-02 RX ORDER — GENTAMICIN SULFATE 1 MG/G
OINTMENT TOPICAL ONCE
Status: CANCELLED | OUTPATIENT
Start: 2021-11-02 | End: 2021-11-02

## 2021-11-02 RX ORDER — LIDOCAINE 40 MG/G
CREAM TOPICAL ONCE
Status: CANCELLED | OUTPATIENT
Start: 2021-11-02 | End: 2021-11-02

## 2021-11-02 RX ORDER — GINSENG 100 MG
CAPSULE ORAL ONCE
Status: CANCELLED | OUTPATIENT
Start: 2021-11-02 | End: 2021-11-02

## 2021-11-02 ASSESSMENT — PAIN DESCRIPTION - PROGRESSION: CLINICAL_PROGRESSION: NOT CHANGED

## 2021-11-02 ASSESSMENT — PAIN SCALES - GENERAL: PAINLEVEL_OUTOF10: 6

## 2021-11-02 ASSESSMENT — PAIN DESCRIPTION - PAIN TYPE: TYPE: ACUTE PAIN

## 2021-11-02 ASSESSMENT — PAIN DESCRIPTION - FREQUENCY: FREQUENCY: CONTINUOUS

## 2021-11-02 ASSESSMENT — PAIN DESCRIPTION - LOCATION: LOCATION: FOOT

## 2021-11-02 ASSESSMENT — PAIN DESCRIPTION - ONSET: ONSET: ON-GOING

## 2021-11-02 NOTE — PROGRESS NOTES
Wound Care Center Progress Note With Procedure    Zara Vickers  AGE: 50 y.o. GENDER: male  : 1975  EPISODE DATE:  2021     Subjective:     Chief Complaint   Patient presents with    Wound Check     Right foot          HISTORY of PRESENT ILLNESS     Nehemiah Zhang is a 50 y. o. male who presents today for wound evaluation of Chronic pressure ulcer(s) of the right plantar foot. The ulcer is of moderate severity. The underlying cause of the wound is probably due to pressure from walking and blistering.  He still has significant sensitivity with his foot.  History of amputation of left first second third and tip of the fourth toe and right great toe performed on 2021 related to frostbite. He is homeless and the right plantar foot is believed to be associated with pressure and blistering from excessive walking.  He has had several visits to the ED over the past several wounds for wound checks.     Wound Pain Timing/Severity: waxing and waning  Quality of pain: throbbing  Severity of pain:  4 / 10   Modifying Factors: chronic pressure  Associated Signs/Symptoms: none     Diabetes: No  Smoking: No, former smoker  Obesity: No  Anticoagulant therapy: No  Immunosuppression: No     Patient educated on the 6 essential components necessary for wound healing: Circulation, Debridements, Proper Dressings and Topical Wound Products, Infection Control, Edema Control and Offloading.     Patient educated on those factors that negatively effect or impact wound healing: smoking, obesity, uncontrolled diabetes, anticoagulant and immunosuppressive regimens, inadequate nutrition, untreated arterial and venous disease if applicable and measures to manage edema.         PAST MEDICAL HISTORY        Diagnosis Date    Hypertension     does not take medications       PAST SURGICAL HISTORY    Past Surgical History:   Procedure Laterality Date    FOOT DEBRIDEMENT Bilateral 2021    BILATERAL TOE  DEBRIDEMENT INCISION AND Other Relevant Orders    Initiate Outpatient Wound Care Protocol    WD-Pressure ulcer of toe of right foot, stage 3 (HCC)    Relevant Medications    lidocaine (XYLOCAINE) 4 % external solution    Other Relevant Orders    Initiate Outpatient Wound Care Protocol        Procedure Note    Indications:  Based on my examination of this patient's wound(s) today, sharp excision into necrotic subcutaneous tissue is required to promote healing and evaluate the extent of previous healing. Performed by: GEN Garcia CNP    Consent obtained: Yes    Time out taken:  Yes    Pain Control: Anesthetic  Anesthetic: 5% Lidocaine Ointment Topical     Debridement:Excisional Debridement    Using curette the wound(s) was/were sharply debrided down through and including the removal of subcutaneous tissue.         Devitalized Tissue Debrided:  slough, exudate and callus    Pre Debridement Measurements:  Are located in the Wound Documentation Flow Sheet    All active wounds listed below with today's date are evaluated  Wound(s)    debrided this date include # : 1     Post  Debridement Measurements:  Wound 06/18/21 Right Plantar # 1 (Active)   Wound Image   10/05/21 1253   Wound Etiology Pressure Stage  3 11/02/21 1345   Dressing Status New dressing applied 11/02/21 1345   Wound Cleansed Soap and water 11/02/21 1317   Dressing/Treatment Silver dressing;Alginate;ABD 07/16/21 1714   Offloading for Diabetic Foot Ulcers Forefoot offloading shoe 11/02/21 1345   Wound Length (cm) 1 cm 11/02/21 1317   Wound Width (cm) 1.1 cm 11/02/21 1317   Wound Depth (cm) 0.6 cm 11/02/21 1317   Wound Surface Area (cm^2) 1.1 cm^2 11/02/21 1317   Change in Wound Size % (l*w) 93.71 11/02/21 1317   Wound Volume (cm^3) 0.66 cm^3 11/02/21 1317   Wound Healing % 62 11/02/21 1317   Post-Procedure Length (cm) 1 cm 11/02/21 1332   Post-Procedure Width (cm) 1.1 cm 11/02/21 1332   Post-Procedure Depth (cm) 0.6 cm 11/02/21 1332   Post-Procedure Surface Area (cm^2) 1.1 cm^2 11/02/21 1332   Post-Procedure Volume (cm^3) 0.66 cm^3 11/02/21 1332   Distance Tunneling (cm) 0 cm 11/02/21 1317   Tunneling Position ___ O'Clock 0 11/02/21 1317   Undermining Starts ___ O'Clock 0 11/02/21 1317   Undermining Ends___ O'Clock 0 11/02/21 1317   Undermining Maxium Distance (cm) 0 11/02/21 1317   Wound Assessment Granulation tissue;Pink/red 11/02/21 1317   Drainage Amount Moderate 11/02/21 1317   Drainage Description Serosanguinous 11/02/21 1317   Odor Moderate 11/02/21 1317   Magi-wound Assessment Hyperkeratosis (callous) 11/02/21 1317   Margins Defined edges 11/02/21 1317   Wound Thickness Description not for Pressure Injury Full thickness 11/02/21 1317   Number of days: 137       Percent of Wound(s) Debrided: approximately 100%    Total  Area  Debrided:  1.1 sq cm     Bleeding:  Minimal    Hemostasis Achieved:  by pressure    Procedural Pain:  0  / 10     Post Procedural Pain:  0 / 10     Response to treatment:  Well tolerated by patient. Status of wound progress and description from last visit: Improving, continue regimen. Plan:       Discharge Instructions         Discharge Instructions        PHYSICIAN ORDERS AND DISCHARGE INSTRUCTIONS     NOTE: Upon discharge from the [address], you will receive a patient experience survey. We would be grateful if you would take the time to fill this survey out.     Wound care order history:                 [facility]   Arterial studies on 12/26/2020              Cultures: Rt Plantar Foot reCultured 10/26/21              Grafts:                Antibiotics:           Continuing wound care orders and information:                 Residence:                Continue home health care with:               Your wound-care supplies will be provided by:      Wound cleansing:               XG not scrub or use excessive force.              Wash hands with soap and water before and after dressing changes.               NRYMD to applying a clean dressing, cleanse wound with normal saline, wound cleanser, or mild soap and water.               Ask the physician or nurse before getting the wound(s) wet in a shower     Daily Wound management:              Keep weight off wounds and reposition every 2 hours.              Avoid standing for long periods of time.              Apply wraps/stockings in AM and remove at bedtime.              If swelling is present, elevate legs to the level of the heart or above for 30 minutes 4-5 times a day and/or when sitting.                                      When taking antibiotics take entire prescription as ordered by physician do not stop taking until medicine is all gone.                                                           Orders for this week: 11/2/21     Rt Plantar Foot reCultured 10/26/21     Rx: [facility]        Right Plantar Foot - Wash with soap and water, pat dry. Paint periwound with betadine, apply gentamicin and stimulin powder to wound bed, cover with silver calcium alginate, drawtex and sorbex. Wrap with Coban 2 Lite. Leave in place until Nurse visit Friday.         Auth for grafts on 6/18/2021        Nurse visit Friday  Follow up with Thuan Salvador CNP in 1 week in the wound care center  Call [phone] for any questions or concerns.   Date__________   Time__________             Treatment Note Wound 06/18/21 Right Plantar # 1-Dressing/Treatment:  ( gentamin stimulin manuela alginate drwatex sorbex coban2 lite)    Written Patient Dismissal Instructions Given            Electronically signed by GEN Waite CNP on 11/2/2021 at 3:22 PM

## 2021-11-02 NOTE — PROGRESS NOTES
Multilayer Compression Wrap   (Not Unna) Below the Knee    NAME:  Smitty Epley  YOB: 1975  MEDICAL RECORD NUMBER:  [id number]  DATE:  11/2/2021    Multilayer compression wrap: Removed old Multilayer wrap if indicated and wash leg with mild soap/water. Applied moisturizing agent to dry skin as needed. Applied primary and secondary dressing as ordered. Applied multilayered dressing below the knee to right lower leg. Instructed patient/caregiver not to remove dressing and to keep it clean and dry. Instructed patient/caregiver on complications to report to provider, such as pain, numbness in toes, heavy drainage, and slippage of dressing. Instructed patient on purpose of compression dressing and on activity and exercise recommendations.       Electronically signed by Chloe Porter LPN on 62/5/7541 at 4:14 PM

## 2021-11-05 ENCOUNTER — HOSPITAL ENCOUNTER (OUTPATIENT)
Dept: WOUND CARE | Age: 46
Discharge: HOME | End: 2021-11-05
Payer: MEDICARE

## 2021-11-05 VITALS
RESPIRATION RATE: 18 BRPM | SYSTOLIC BLOOD PRESSURE: 110 MMHG | HEART RATE: 108 BPM | TEMPERATURE: 97.8 F | DIASTOLIC BLOOD PRESSURE: 72 MMHG

## 2021-11-05 DIAGNOSIS — L89.893 PRESSURE ULCER OF RIGHT FOOT, STAGE 3 (HCC): Primary | ICD-10-CM

## 2021-11-05 DIAGNOSIS — L89.893 PRESSURE ULCER OF TOE OF RIGHT FOOT, STAGE 3 (HCC): ICD-10-CM

## 2021-11-05 PROCEDURE — 29581 APPL MULTLAYER CMPRN SYS LEG: CPT

## 2021-11-05 RX ORDER — CLOBETASOL PROPIONATE 0.5 MG/G
OINTMENT TOPICAL ONCE
Status: CANCELLED | OUTPATIENT
Start: 2021-11-05 | End: 2021-11-05

## 2021-11-05 RX ORDER — LIDOCAINE HYDROCHLORIDE 40 MG/ML
SOLUTION TOPICAL ONCE
Status: CANCELLED | OUTPATIENT
Start: 2021-11-05 | End: 2021-11-05

## 2021-11-05 RX ORDER — LIDOCAINE 40 MG/G
CREAM TOPICAL ONCE
Status: CANCELLED | OUTPATIENT
Start: 2021-11-05 | End: 2021-11-05

## 2021-11-05 RX ORDER — GENTAMICIN SULFATE 1 MG/G
OINTMENT TOPICAL ONCE
Status: DISCONTINUED | OUTPATIENT
Start: 2021-11-05 | End: 2021-11-06 | Stop reason: HOSPADM

## 2021-11-05 RX ORDER — BACITRACIN, NEOMYCIN, POLYMYXIN B 400; 3.5; 5 [USP'U]/G; MG/G; [USP'U]/G
OINTMENT TOPICAL ONCE
Status: CANCELLED | OUTPATIENT
Start: 2021-11-05 | End: 2021-11-05

## 2021-11-05 RX ORDER — LIDOCAINE HYDROCHLORIDE 20 MG/ML
JELLY TOPICAL ONCE
Status: CANCELLED | OUTPATIENT
Start: 2021-11-05 | End: 2021-11-05

## 2021-11-05 RX ORDER — BETAMETHASONE DIPROPIONATE 0.05 %
OINTMENT (GRAM) TOPICAL ONCE
Status: CANCELLED | OUTPATIENT
Start: 2021-11-05 | End: 2021-11-05

## 2021-11-05 RX ORDER — GENTAMICIN SULFATE 1 MG/G
OINTMENT TOPICAL ONCE
Status: CANCELLED | OUTPATIENT
Start: 2021-11-05 | End: 2021-11-05

## 2021-11-05 RX ORDER — BACITRACIN ZINC AND POLYMYXIN B SULFATE 500; 1000 [USP'U]/G; [USP'U]/G
OINTMENT TOPICAL ONCE
Status: CANCELLED | OUTPATIENT
Start: 2021-11-05 | End: 2021-11-05

## 2021-11-05 RX ORDER — GINSENG 100 MG
CAPSULE ORAL ONCE
Status: CANCELLED | OUTPATIENT
Start: 2021-11-05 | End: 2021-11-05

## 2021-11-05 RX ORDER — LIDOCAINE 50 MG/G
OINTMENT TOPICAL ONCE
Status: CANCELLED | OUTPATIENT
Start: 2021-11-05 | End: 2021-11-05

## 2021-11-05 ASSESSMENT — PAIN DESCRIPTION - ONSET: ONSET: ON-GOING

## 2021-11-05 ASSESSMENT — PAIN - FUNCTIONAL ASSESSMENT: PAIN_FUNCTIONAL_ASSESSMENT: PREVENTS OR INTERFERES SOME ACTIVE ACTIVITIES AND ADLS

## 2021-11-05 ASSESSMENT — PAIN DESCRIPTION - ORIENTATION: ORIENTATION: RIGHT

## 2021-11-05 ASSESSMENT — PAIN DESCRIPTION - FREQUENCY: FREQUENCY: CONTINUOUS

## 2021-11-05 ASSESSMENT — PAIN DESCRIPTION - LOCATION: LOCATION: FOOT

## 2021-11-05 ASSESSMENT — PAIN DESCRIPTION - PROGRESSION: CLINICAL_PROGRESSION: NOT CHANGED

## 2021-11-05 ASSESSMENT — PAIN SCALES - GENERAL: PAINLEVEL_OUTOF10: 8

## 2021-11-05 ASSESSMENT — PAIN DESCRIPTION - PAIN TYPE: TYPE: CHRONIC PAIN

## 2021-11-05 ASSESSMENT — PAIN DESCRIPTION - DESCRIPTORS: DESCRIPTORS: STABBING

## 2021-11-05 NOTE — PROGRESS NOTES
Multilayer Compression Wrap   (Not Unna) Below the Knee    NAME:  Ann Marie Boudreaux  YOB: 1975  MEDICAL RECORD NUMBER:  [id number]  DATE:  11/5/2021    Multilayer compression wrap: Removed old Multilayer wrap if indicated and wash leg with mild soap/water. Applied moisturizing agent to dry skin as needed. Applied primary and secondary dressing as ordered. Applied multilayered dressing below the knee to right lower leg. Instructed patient/caregiver not to remove dressing and to keep it clean and dry. Instructed patient/caregiver on complications to report to provider, such as pain, numbness in toes, heavy drainage, and slippage of dressing. Instructed patient on purpose of compression dressing and on activity and exercise recommendations.       Electronically signed by Thuy Quintero RN on 11/5/2021 at 2:49 PM

## 2021-11-09 ENCOUNTER — HOSPITAL ENCOUNTER (OUTPATIENT)
Dept: WOUND CARE | Age: 46
Discharge: HOME | End: 2021-11-09
Payer: MEDICARE

## 2021-11-09 VITALS
SYSTOLIC BLOOD PRESSURE: 117 MMHG | DIASTOLIC BLOOD PRESSURE: 84 MMHG | TEMPERATURE: 97 F | RESPIRATION RATE: 18 BRPM | HEART RATE: 102 BPM

## 2021-11-09 DIAGNOSIS — L89.893 PRESSURE ULCER OF TOE OF RIGHT FOOT, STAGE 3 (HCC): ICD-10-CM

## 2021-11-09 DIAGNOSIS — L89.893 PRESSURE ULCER OF RIGHT FOOT, STAGE 3 (HCC): Primary | ICD-10-CM

## 2021-11-09 PROCEDURE — 11042 DBRDMT SUBQ TIS 1ST 20SQCM/<: CPT | Performed by: NURSE PRACTITIONER

## 2021-11-09 PROCEDURE — 11042 DBRDMT SUBQ TIS 1ST 20SQCM/<: CPT

## 2021-11-09 RX ORDER — LIDOCAINE HYDROCHLORIDE 20 MG/ML
JELLY TOPICAL ONCE
OUTPATIENT
Start: 2021-11-09 | End: 2021-11-09

## 2021-11-09 RX ORDER — CLOBETASOL PROPIONATE 0.5 MG/G
OINTMENT TOPICAL ONCE
OUTPATIENT
Start: 2021-11-09 | End: 2021-11-09

## 2021-11-09 RX ORDER — LIDOCAINE HYDROCHLORIDE 40 MG/ML
SOLUTION TOPICAL ONCE
OUTPATIENT
Start: 2021-11-09 | End: 2021-11-09

## 2021-11-09 RX ORDER — GENTAMICIN SULFATE 1 MG/G
OINTMENT TOPICAL ONCE
OUTPATIENT
Start: 2021-11-09 | End: 2021-11-09

## 2021-11-09 RX ORDER — BACITRACIN, NEOMYCIN, POLYMYXIN B 400; 3.5; 5 [USP'U]/G; MG/G; [USP'U]/G
OINTMENT TOPICAL ONCE
OUTPATIENT
Start: 2021-11-09 | End: 2021-11-09

## 2021-11-09 RX ORDER — BACITRACIN ZINC AND POLYMYXIN B SULFATE 500; 1000 [USP'U]/G; [USP'U]/G
OINTMENT TOPICAL ONCE
OUTPATIENT
Start: 2021-11-09 | End: 2021-11-09

## 2021-11-09 RX ORDER — LIDOCAINE 40 MG/G
CREAM TOPICAL ONCE
OUTPATIENT
Start: 2021-11-09 | End: 2021-11-09

## 2021-11-09 RX ORDER — GENTAMICIN SULFATE 1 MG/G
OINTMENT TOPICAL ONCE
Status: DISCONTINUED | OUTPATIENT
Start: 2021-11-09 | End: 2021-11-10 | Stop reason: HOSPADM

## 2021-11-09 RX ORDER — BETAMETHASONE DIPROPIONATE 0.05 %
OINTMENT (GRAM) TOPICAL ONCE
OUTPATIENT
Start: 2021-11-09 | End: 2021-11-09

## 2021-11-09 RX ORDER — LIDOCAINE 50 MG/G
OINTMENT TOPICAL ONCE
OUTPATIENT
Start: 2021-11-09 | End: 2021-11-09

## 2021-11-09 RX ORDER — LIDOCAINE 50 MG/G
OINTMENT TOPICAL ONCE
Status: DISCONTINUED | OUTPATIENT
Start: 2021-11-09 | End: 2021-11-10 | Stop reason: HOSPADM

## 2021-11-09 RX ORDER — GINSENG 100 MG
CAPSULE ORAL ONCE
OUTPATIENT
Start: 2021-11-09 | End: 2021-11-09

## 2021-11-09 ASSESSMENT — PAIN DESCRIPTION - PROGRESSION: CLINICAL_PROGRESSION: NOT CHANGED

## 2021-11-09 ASSESSMENT — PAIN DESCRIPTION - FREQUENCY: FREQUENCY: CONTINUOUS

## 2021-11-09 ASSESSMENT — PAIN DESCRIPTION - PAIN TYPE: TYPE: ACUTE PAIN

## 2021-11-09 ASSESSMENT — PAIN DESCRIPTION - LOCATION: LOCATION: FOOT

## 2021-11-09 ASSESSMENT — PAIN DESCRIPTION - DESCRIPTORS: DESCRIPTORS: STABBING

## 2021-11-09 ASSESSMENT — PAIN DESCRIPTION - ONSET: ONSET: ON-GOING

## 2021-11-09 ASSESSMENT — PAIN DESCRIPTION - ORIENTATION: ORIENTATION: RIGHT

## 2021-11-09 ASSESSMENT — PAIN SCALES - GENERAL: PAINLEVEL_OUTOF10: 5

## 2021-11-09 NOTE — PROGRESS NOTES
Multilayer Compression Wrap   (Not Unna) Below the Knee    NAME:  oShan Jackson  YOB: 1975  MEDICAL RECORD NUMBER:  [id number]  DATE:  11/9/2021    Multilayer compression wrap: Removed old Multilayer wrap if indicated and wash leg with mild soap/water. Applied moisturizing agent to dry skin as needed. Applied primary and secondary dressing as ordered. Applied multilayered dressing below the knee to right lower leg. Instructed patient/caregiver not to remove dressing and to keep it clean and dry. Instructed patient/caregiver on complications to report to provider, such as pain, numbness in toes, heavy drainage, and slippage of dressing. Instructed patient on purpose of compression dressing and on activity and exercise recommendations.       Electronically signed by Patricia Rios RN on 11/9/2021 at 3:23 PM

## 2021-11-12 NOTE — PROGRESS NOTES
Wound Care Center Progress Note With Procedure    Tamara Oliveira  AGE: 48 y.o. GENDER: male  : 1975  EPISODE DATE:  2021     Subjective:     Chief Complaint   Patient presents with    Wound Check     right foot          HISTORY of PRESENT ILLNESS     Nehemiah Zhang is a 50 y. o. male who presents today for wound evaluation of Chronic pressure ulcer(s) of the right plantar foot. The ulcer is of moderate severity. The underlying cause of the wound is probably due to pressure from walking and blistering.  He still has significant sensitivity with his foot.  History of amputation of left first second third and tip of the fourth toe and right great toe performed on 2021 related to frostbite. He is homeless and the right plantar foot is believed to be associated with pressure and blistering from excessive walking.  He has had several visits to the ED over the past several wounds for wound checks.     Wound Pain Timing/Severity: waxing and waning  Quality of pain: throbbing  Severity of pain:   10   Modifying Factors: chronic pressure  Associated Signs/Symptoms: none     Diabetes: No  Smoking: No, former smoker  Obesity: No  Anticoagulant therapy: No  Immunosuppression: No     Patient educated on the 6 essential components necessary for wound healing: Circulation, Debridements, Proper Dressings and Topical Wound Products, Infection Control, Edema Control and Offloading.     Patient educated on those factors that negatively effect or impact wound healing: smoking, obesity, uncontrolled diabetes, anticoagulant and immunosuppressive regimens, inadequate nutrition, untreated arterial and venous disease if applicable and measures to manage edema.         PAST MEDICAL HISTORY        Diagnosis Date    Hypertension     does not take medications       PAST SURGICAL HISTORY    Past Surgical History:   Procedure Laterality Date    FOOT DEBRIDEMENT Bilateral 2021    BILATERAL TOE  DEBRIDEMENT INCISION AND DRAINAGE, AMPUTATION OF LEFT 1ST, 2ND, 3RD AND TIP OF 4TH TOE AND RIGHT GREAT TOE performed by Claribel Townsend MD at [address] [place] [address]    History reviewed. No pertinent family history. SOCIAL HISTORY    Social History     Tobacco Use    Smoking status: Former Smoker     Quit date: 2021     Years since quittin.8    Smokeless tobacco: Never Used   Vaping Use    Vaping Use: Never used   Substance Use Topics    Alcohol use: Yes     Comment: most days    Drug use: No       ALLERGIES    Allergies   Allergen Reactions    Lisinopril Angioedema       MEDICATIONS    Current Outpatient Medications on File Prior to Encounter   Medication Sig Dispense Refill    amLODIPine (NORVASC) 10 MG tablet Take 1 tablet by mouth daily 30 tablet 0    hydroCHLOROthiazide (HYDRODIURIL) 25 MG tablet Take 1 tablet by mouth daily 30 tablet 0    metoprolol tartrate (LOPRESSOR) 25 MG tablet Take 1 tablet by mouth 2 times daily 60 tablet 0    omeprazole (PRILOSEC) 20 MG delayed release capsule Take 20 mg by mouth daily      gabapentin (NEURONTIN) 100 MG capsule Take 1 capsule by mouth 3 times daily for 20 days. 60 capsule 0     No current facility-administered medications on file prior to encounter. REVIEW OF SYSTEMS    Pertinent items are noted in HPI. Constitutional: Negative for systemic symptoms including fever, chills and malaise. Objective:      /84   Pulse 102   Temp 97 °F (36.1 °C) (Temporal)   Resp 18     PHYSICAL EXAM    General: The patient is in no acute distress. Mental status:  Patient is appropriate, is  oriented to place and plan of care.   Dermatologic exam: Visual inspection of the periwound reveals the skin to be moist  Wound exam: see wound description below in procedure note      Assessment:     Problem List Items Addressed This Visit     WD-Pressure ulcer of right foot, stage 3 ([address] - Primary    WD-Pressure ulcer of toe of right foot, stage 3 ([address]        Procedure Note    Indications:  Based on my examination of this patient's wound(s) today, sharp excision into necrotic subcutaneous tissue is required to promote healing and evaluate the extent of previous healing. Performed by: GEN Miller CNP    Consent obtained: Yes    Time out taken:  Yes    Pain Control: Anesthetic  Anesthetic: 5% Lidocaine Ointment Topical     Debridement:Excisional Debridement    Using curette the wound(s) was/were sharply debrided down through and including the removal of subcutaneous tissue.         Devitalized Tissue Debrided:  slough, exudate and callus    Pre Debridement Measurements:  Are located in the Wound Documentation Flow Sheet    All active wounds listed below with today's date are evaluated  Wound(s)    debrided this date include # : 1     Post  Debridement Measurements:  Wound 06/18/21 Right Plantar # 1 (Active)   Wound Image   11/05/21 1410   Wound Etiology Pressure Stage  3 11/09/21 1427   Dressing Status New dressing applied 11/02/21 1345   Wound Cleansed Soap and water 11/09/21 1427   Dressing/Treatment Silver dressing; Alginate; ABD 07/16/21 1714   Offloading for Diabetic Foot Ulcers Forefoot offloading shoe 11/09/21 1427   Wound Length (cm) 0.5 cm 11/09/21 1427   Wound Width (cm) 1 cm 11/09/21 1427   Wound Depth (cm) 0.2 cm 11/09/21 1427   Wound Surface Area (cm^2) 0.5 cm^2 11/09/21 1427   Change in Wound Size % (l*w) 97.14 11/09/21 1427   Wound Volume (cm^3) 0.1 cm^3 11/09/21 1427   Wound Healing % 94 11/09/21 1427   Post-Procedure Length (cm) 0.5 cm 11/09/21 1437   Post-Procedure Width (cm) 1 cm 11/09/21 1437   Post-Procedure Depth (cm) 0.2 cm 11/09/21 1437   Post-Procedure Surface Area (cm^2) 0.5 cm^2 11/09/21 1437   Post-Procedure Volume (cm^3) 0.1 cm^3 11/09/21 1437   Distance Tunneling (cm) 0 cm 11/09/21 1427   Tunneling Position ___ O'Clock 0 11/09/21 1427   Undermining Starts ___ O'Clock 0 11/09/21 1427   Undermining Ends___ O'Clock 0 11/09/21 1427 Undermining Maxium Distance (cm) 0 11/09/21 1427   Wound Assessment Granulation tissue 11/09/21 1427   Drainage Amount Moderate 11/09/21 1427   Drainage Description Serosanguinous 11/09/21 1427   Odor Moderate 11/09/21 1427   Magi-wound Assessment Hyperkeratosis (callous) 11/09/21 1427   Margins Defined edges 11/09/21 1427   Wound Thickness Description not for Pressure Injury Full thickness 11/09/21 1427   Number of days: 146       Percent of Wound(s) Debrided: approximately 100%    Total  Area  Debrided: 0.5 sq cm     Bleeding:  Minimal    Hemostasis Achieved:  by pressure    Procedural Pain:  0  / 10     Post Procedural Pain:  0 / 10     Response to treatment:  Well tolerated by patient. Status of wound progress and description from last visit: Improving, continue regimen. Plan:       Discharge Instructions         Discharge Instructions        PHYSICIAN ORDERS AND DISCHARGE INSTRUCTIONS     NOTE: Upon discharge from the [address], you will receive a patient experience survey. We would be grateful if you would take the time to fill this survey out.     Wound care order history:                 [facility]'s   Arterial studies on 12/26/2020              Cultures: Rt Plantar Foot reCultured 10/26/21              Grafts:                Antibiotics:           Continuing wound care orders and information:                 Residence:                Continue home health care with:               Your wound-care supplies will be provided by:      Wound cleansing:               JA not scrub or use excessive force.              Wash hands with soap and water before and after dressing changes.               OYPXX to applying a clean dressing, cleanse wound with normal saline, wound cleanser, or mild soap and water.               Ask the physician or nurse before getting the wound(s) wet in a shower     Daily Wound management:              Keep weight off wounds and reposition every 2 hours.              Avoid standing for long periods of time.              DPKVD wraps/stockings in AM and remove at bedtime.              If swelling is present, elevate legs to the level of the heart or above for 30 minutes 4-5 times a day and/or when sitting.                                      When taking antibiotics take entire prescription as ordered by physician do not stop taking until medicine is all gone.                                                           Orders for this week: 11/9/21     Rt Plantar Foot reCultured 10/26/21     Rx: [facility]        Right Plantar Foot - Wash with soap and water, pat dry. Paint periwound with betadine, apply gentamicin and stimulin powder to wound bed, cover with silver calcium alginate, drawtex and sorbex. Wrap with Coban 2 Lite. Leave in place until Nurse visit Friday.         Auth for grafts on 6/18/2021        Nurse visit Friday  Follow up with Fazal Renae CNP in 1 week in the wound care center  Call [phone] for any questions or concerns.   Date__________   Time__________               Treatment Note Wound 06/18/21 Right Plantar # 1-Dressing/Treatment:  (betadine gentamicin stimulin drawtex Genella Broach lite)    Written Patient Dismissal Instructions Given            Electronically signed by GEN Lopez CNP on 11/12/2021 at 8:08 AM

## 2021-12-02 ENCOUNTER — TELEPHONE (OUTPATIENT)
Dept: WOUND CARE | Age: 46
End: 2021-12-02

## 2021-12-09 ENCOUNTER — TELEPHONE (OUTPATIENT)
Dept: WOUND CARE | Age: 46
End: 2021-12-09

## 2021-12-18 ENCOUNTER — HOSPITAL ENCOUNTER (EMERGENCY)
Age: 46
Discharge: HOME | End: 2021-12-18
Payer: MEDICARE

## 2021-12-18 ENCOUNTER — APPOINTMENT (OUTPATIENT)
Dept: GENERAL RADIOLOGY | Age: 46
End: 2021-12-18
Payer: MEDICARE

## 2021-12-18 VITALS
HEIGHT: 72 IN | BODY MASS INDEX: 21.67 KG/M2 | DIASTOLIC BLOOD PRESSURE: 100 MMHG | OXYGEN SATURATION: 98 % | SYSTOLIC BLOOD PRESSURE: 143 MMHG | HEART RATE: 98 BPM | TEMPERATURE: 97.9 F | RESPIRATION RATE: 18 BRPM | WEIGHT: 160 LBS

## 2021-12-18 DIAGNOSIS — M79.673 CHRONIC FOOT PAIN, UNSPECIFIED LATERALITY: Primary | ICD-10-CM

## 2021-12-18 DIAGNOSIS — G89.29 CHRONIC FOOT PAIN, UNSPECIFIED LATERALITY: Primary | ICD-10-CM

## 2021-12-18 LAB — GLUCOSE BLD-MCNC: 98 MG/DL (ref 70–99)

## 2021-12-18 PROCEDURE — 82962 GLUCOSE BLOOD TEST: CPT

## 2021-12-18 PROCEDURE — 99284 EMERGENCY DEPT VISIT MOD MDM: CPT

## 2021-12-18 PROCEDURE — 73620 X-RAY EXAM OF FOOT: CPT

## 2021-12-18 PROCEDURE — 73630 X-RAY EXAM OF FOOT: CPT

## 2021-12-18 ASSESSMENT — PAIN DESCRIPTION - LOCATION: LOCATION: FOOT

## 2021-12-18 ASSESSMENT — PAIN DESCRIPTION - ORIENTATION: ORIENTATION: LEFT;RIGHT

## 2021-12-18 ASSESSMENT — PAIN DESCRIPTION - FREQUENCY: FREQUENCY: CONTINUOUS

## 2021-12-18 ASSESSMENT — PAIN DESCRIPTION - PAIN TYPE: TYPE: ACUTE PAIN

## 2021-12-19 NOTE — ED NOTES
Patient Homeless, has foot pain right foot, Swelling toes amputated a year ago, Blood Glucose 98     Shannon Joel  12/18/21 1909

## 2021-12-19 NOTE — ED NOTES
Discharge instructions reviewed with patient; pt voiced understanding at this time.       Rivera Donis RN  12/18/21 5839

## 2021-12-19 NOTE — ED PROVIDER NOTES
.  Triage Chief Complaint:   Foot Pain (Reports pain in bilateral feet from toe amputation 1 year ago)    [place]:   Today in the ED I had the pleasure of caring for Indigo Taylor who is a 48 y.o. male that presents to the emergency department. Patient has a history of trench foot. For which she is got toe amputation secondary to being homeless. Patient states his PCP will not prescribe him Percocet so he came here for evaluation for foot pain. This pain is chronic. Been present times years. There is no new trauma. No fever chills nausea vomiting diarrhea. ROS:  REVIEW OF SYSTEMS    At least 10 systems reviewed      All other review of systems are negative  See HPI and nursing notes for additional information       Past Medical History:   Diagnosis Date    Hypertension     does not take medications     Past Surgical History:   Procedure Laterality Date    FOOT DEBRIDEMENT Bilateral 2021    BILATERAL TOE  DEBRIDEMENT INCISION AND DRAINAGE, AMPUTATION OF LEFT 1ST, 2ND, 3RD AND TIP OF 4TH TOE AND RIGHT GREAT TOE performed by Christina Brannon MD at [address]     No family history on file.   Social History     Socioeconomic History    Marital status: Single     Spouse name: Not on file    Number of children: Not on file    Years of education: Not on file    Highest education level: Not on file   Occupational History    Not on file   Tobacco Use    Smoking status: Former Smoker     Quit date: 2021     Years since quittin.9    Smokeless tobacco: Never Used   Vaping Use    Vaping Use: Never used   Substance and Sexual Activity    Alcohol use: Yes     Comment: most days    Drug use: No    Sexual activity: Not on file   Other Topics Concern    Not on file   Social History Narrative    Not on file     Social Determinants of Health     Financial Resource Strain:     Difficulty of Paying Living Expenses: Not on file   Food Insecurity:     Worried About [address] in the Last Year: Not on file    Ran Out of Food in the Last Year: Not on file   Transportation Needs:     Lack of Transportation (Medical): Not on file    Lack of Transportation (Non-Medical): Not on file   Physical Activity:     Days of Exercise per Week: Not on file    Minutes of Exercise per Session: Not on file   Stress:     Feeling of Stress : Not on file   Social Connections:     Frequency of Communication with Friends and Family: Not on file    Frequency of Social Gatherings with Friends and Family: Not on file    Attends Mosque Services: Not on file    Active Member of [address] [place] [address] Vided[employer] or Organizations: Not on file    Attends Club or Organization Meetings: Not on file    Marital Status: Not on file   Intimate Partner Violence:     Fear of Current or Ex-Partner: Not on file    Emotionally Abused: Not on file    Physically Abused: Not on file    Sexually Abused: Not on file   Housing Stability:     Unable to Pay for Housing in the Last Year: Not on file    Number of [place] in the Last Year: Not on file    Unstable Housing in the Last Year: Not on file     No current facility-administered medications for this encounter. Current Outpatient Medications   Medication Sig Dispense Refill    amLODIPine (NORVASC) 10 MG tablet Take 1 tablet by mouth daily 30 tablet 0    hydroCHLOROthiazide (HYDRODIURIL) 25 MG tablet Take 1 tablet by mouth daily 30 tablet 0    metoprolol tartrate (LOPRESSOR) 25 MG tablet Take 1 tablet by mouth 2 times daily 60 tablet 0    gabapentin (NEURONTIN) 100 MG capsule Take 1 capsule by mouth 3 times daily for 20 days.  60 capsule 0    omeprazole (PRILOSEC) 20 MG delayed release capsule Take 20 mg by mouth daily       Allergies   Allergen Reactions    Lisinopril Angioedema       Nursing Notes Reviewed    Physical Exam:  ED Triage Vitals [12/18/21 1333]   Enc Vitals Group      /83      Pulse 110      Resp 18      Temp 97.9 °F (36.6 °C)      Temp Source Oral      SpO2 96 % Weight 160 lb (72.6 kg)      Height 6' (1.829 m)      Head Circumference       Peak Flow       Pain Score       Pain Loc       Pain Edu? Excl. in [address]? General :Patient is awake alert oriented person place and time no acute distress nontoxic appearing  HEENT: Pupils are equally round and reactive to light extraocular motors are intact conjunctivae clear sclerae white there is no injection no icterus. Nose without any rhinorrhea or epistaxis. Oral mucosa is moist no exudate buccal mucosa shows no ulcerations. Uvula is midline    Neck: Neck is supple full range of motion trachea midline thyroid nonpalpable  Cardiac: Heart regular rate rhythm no murmurs rubs clicks or gallops  Lungs: Lungs are clear to auscultation there is no wheezing rhonchi or rales. There is no use of accessory muscles no nasal flaring identified. Suprapubic:  there is no tenderness to palpation over the external bladder   Musculoskeletal: 5 out of 5 strength in all 4 extremities full flexion extension abduction and adduction supination pronation of all extremities and all digits. No obvious muscle atrophy is noted. No focal muscle deficits are appreciated. Bilateral feet DP/PT pulse 2+. Multiple toe amputations. With well-healed incisions. Clean dry and intact. There are no ulcerations. There is no crepitus no is heat or warmth. No appreciable lesions or deformities. No bony tenderness palpation.     Dermatology: Skin is warm and dry there is no obvious abscesses lacerations or lesions noted  Psych: Mentation is grossly normal cognition is grossly normal. Affect is appropriate        I have reviewed and interpreted all of the currently available lab results from this visit (if applicable):  Results for orders placed or performed during the hospital encounter of 12/18/21   POCT Glucose   Result Value Ref Range    POC Glucose 98 70 - 99 MG/DL      Radiographs (if obtained):  [] The following radiograph was interpreted by myself VIEWS)    Result Date: 12/18/2021  EXAMINATION: THREE XRAY VIEWS OF THE LEFT FOOT; TWO XRAY VIEWS OF THE RIGHT FOOT 12/18/2021 6:37 pm COMPARISON: Right foot radiograph October 17, 2021; left foot radiograph May 13, 2021 HISTORY: ORDERING SYSTEM PROVIDED HISTORY: hx of toe amputation TECHNOLOGIST PROVIDED HISTORY: Reason for exam:->hx of toe amputation Reason for Exam: hx of toe amputation; ORDERING SYSTEM PROVIDED HISTORY: Hx of toe amputations TECHNOLOGIST PROVIDED HISTORY: Reason for exam:->Hx of toe amputations Reason for Exam: Hx of toe amputations, discolored wound on bottom ot ball of right foot FINDINGS: Left foot: Three views of the left foot again demonstrate partial amputations of the 1st through 3rd toes. No acute fracture identified. 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 No definitive radiographic evidence of osteomyelitis. Right foot: Partial amputation of the right great toe. No definitive radiographic evidence of osteomyelitis. No fracture identified. Mild midfoot osteoarthritis. 1. No definitive radiographic evidence of osteomyelitis of the right or left foot. 2. 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 MDM:     Interventions given this visit: No orders of the defined types were placed in this encounter. Presents today for chronic foot pain. There is no acute exacerbation. No new symptoms. I estimate there is LOW risk for (including but not limited to) OPEN FRACTURE, COMPARTMENT SYNDROME, DEEP VENOUS THROMBOSIS, COMPLETE TENDON RUPTURE, NECROTIZING FASCIITIS, or ACUTE ARTERIAL INJURY, thus I consider the discharge disposition reasonable. Gagandeep Falling (or their surrogate) and I have discussed the diagnosis and risks, and we agree with discharging home with close follow-up. We also discussed returning to the Emergency Department immediately if new or worsening symptoms occur.  We have discussed the symptoms which are most concerning that necessitate immediate return. I independently managed patient today in the ED    /83   Pulse 98   Temp 97.9 °F (36.6 °C) (Oral)   Resp 18   Ht 6' (1.829 m)   Wt 160 lb (72.6 kg)   SpO2 96%   BMI 21.70 kg/m²       Clinical Impression:  1. Chronic foot pain, unspecified laterality        Disposition referral (if applicable):  No follow-up provider specified. Disposition medications (if applicable):  New Prescriptions    No medications on file         Comment: Please note this report has been produced using speech recognition software and may contain errors related to that system including errors in grammar, punctuation, and spelling, as well as words and phrases that may be inappropriate. If there are any questions or concerns please feel free to contact the dictating provider for clarification.       Zoe Mccullough, [address] [place] [address]  12/18/21 2320

## 2021-12-24 ENCOUNTER — APPOINTMENT (OUTPATIENT)
Dept: GENERAL RADIOLOGY | Age: 46
End: 2021-12-24
Payer: MEDICARE

## 2021-12-24 ENCOUNTER — HOSPITAL ENCOUNTER (EMERGENCY)
Age: 46
Discharge: HOME | End: 2021-12-24
Payer: MEDICARE

## 2021-12-24 VITALS
DIASTOLIC BLOOD PRESSURE: 84 MMHG | RESPIRATION RATE: 16 BRPM | BODY MASS INDEX: 21.67 KG/M2 | SYSTOLIC BLOOD PRESSURE: 134 MMHG | OXYGEN SATURATION: 96 % | HEIGHT: 72 IN | WEIGHT: 160 LBS | HEART RATE: 102 BPM | TEMPERATURE: 98 F

## 2021-12-24 DIAGNOSIS — U07.1 COVID: Primary | ICD-10-CM

## 2021-12-24 LAB
ALBUMIN SERPL-MCNC: 4 GM/DL (ref 3.4–5)
ALP BLD-CCNC: 81 IU/L (ref 40–129)
ALT SERPL-CCNC: 66 U/L (ref 10–40)
ANION GAP SERPL CALCULATED.3IONS-SCNC: 13 MMOL/L (ref 4–16)
AST SERPL-CCNC: 105 IU/L (ref 15–37)
BACTERIA: NEGATIVE /HPF
BASOPHILS ABSOLUTE: 0 K/CU MM
BASOPHILS RELATIVE PERCENT: 0.5 % (ref 0–1)
BILIRUB SERPL-MCNC: 0.2 MG/DL (ref 0–1)
BILIRUBIN URINE: NEGATIVE MG/DL
BLOOD, URINE: ABNORMAL
BUN BLDV-MCNC: 7 MG/DL (ref 6–23)
CALCIUM SERPL-MCNC: 9 MG/DL (ref 8.3–10.6)
CHLORIDE BLD-SCNC: 97 MMOL/L (ref 99–110)
CLARITY: CLEAR
CO2: 21 MMOL/L (ref 21–32)
COLOR: ABNORMAL
CREAT SERPL-MCNC: 0.7 MG/DL (ref 0.9–1.3)
DIFFERENTIAL TYPE: ABNORMAL
EOSINOPHILS ABSOLUTE: 0 K/CU MM
EOSINOPHILS RELATIVE PERCENT: 0 % (ref 0–3)
GFR AFRICAN AMERICAN: >60 ML/MIN/1.73M2
GFR NON-AFRICAN AMERICAN: >60 ML/MIN/1.73M2
GLUCOSE BLD-MCNC: 115 MG/DL (ref 70–99)
GLUCOSE, URINE: NEGATIVE MG/DL
HCT VFR BLD CALC: 42.1 % (ref 42–52)
HEMOGLOBIN: 13.8 GM/DL (ref 13.5–18)
IMMATURE NEUTROPHIL %: 0 % (ref 0–0.43)
KETONES, URINE: NEGATIVE MG/DL
LEUKOCYTE ESTERASE, URINE: NEGATIVE
LYMPHOCYTES ABSOLUTE: 1.9 K/CU MM
LYMPHOCYTES RELATIVE PERCENT: 50.4 % (ref 24–44)
MCH RBC QN AUTO: 29.4 PG (ref 27–31)
MCHC RBC AUTO-ENTMCNC: 32.8 % (ref 32–36)
MCV RBC AUTO: 89.8 FL (ref 78–100)
MONOCYTES ABSOLUTE: 0.6 K/CU MM
MONOCYTES RELATIVE PERCENT: 17.3 % (ref 0–4)
MUCUS: ABNORMAL HPF
NITRITE URINE, QUANTITATIVE: NEGATIVE
NUCLEATED RBC %: 0 %
PDW BLD-RTO: 14.3 % (ref 11.7–14.9)
PH, URINE: 6 (ref 5–8)
PLATELET # BLD: 183 K/CU MM (ref 140–440)
PMV BLD AUTO: 8.8 FL (ref 7.5–11.1)
POTASSIUM SERPL-SCNC: 3.8 MMOL/L (ref 3.5–5.1)
PROTEIN UA: NEGATIVE MG/DL
RAPID INFLUENZA  B AGN: NEGATIVE
RAPID INFLUENZA A AGN: NEGATIVE
RBC # BLD: 4.69 M/CU MM (ref 4.6–6.2)
RBC URINE: <1 /HPF (ref 0–3)
SARS-COV-2, NAAT: DETECTED
SEGMENTED NEUTROPHILS ABSOLUTE COUNT: 1.2 K/CU MM
SEGMENTED NEUTROPHILS RELATIVE PERCENT: 31.8 % (ref 36–66)
SODIUM BLD-SCNC: 131 MMOL/L (ref 135–145)
SOURCE: ABNORMAL
SPECIFIC GRAVITY UA: 1 (ref 1–1.03)
TOTAL IMMATURE NEUTOROPHIL: 0 K/CU MM
TOTAL NUCLEATED RBC: 0 K/CU MM
TOTAL PROTEIN: 7.7 GM/DL (ref 6.4–8.2)
TRICHOMONAS: ABNORMAL /HPF
UROBILINOGEN, URINE: NEGATIVE MG/DL (ref 0.2–1)
WBC # BLD: 3.7 K/CU MM (ref 4–10.5)
WBC UA: 1 /HPF (ref 0–2)

## 2021-12-24 PROCEDURE — 6370000000 HC RX 637 (ALT 250 FOR IP): Performed by: PHYSICIAN ASSISTANT

## 2021-12-24 PROCEDURE — 87804 INFLUENZA ASSAY W/OPTIC: CPT

## 2021-12-24 PROCEDURE — 93005 ELECTROCARDIOGRAM TRACING: CPT | Performed by: PHYSICIAN ASSISTANT

## 2021-12-24 PROCEDURE — 99285 EMERGENCY DEPT VISIT HI MDM: CPT

## 2021-12-24 PROCEDURE — 80053 COMPREHEN METABOLIC PANEL: CPT

## 2021-12-24 PROCEDURE — 81001 URINALYSIS AUTO W/SCOPE: CPT

## 2021-12-24 PROCEDURE — 71045 X-RAY EXAM CHEST 1 VIEW: CPT

## 2021-12-24 PROCEDURE — 85025 COMPLETE CBC W/AUTO DIFF WBC: CPT

## 2021-12-24 PROCEDURE — 87635 SARS-COV-2 COVID-19 AMP PRB: CPT

## 2021-12-24 RX ORDER — HYDROCHLOROTHIAZIDE 25 MG/1
25 TABLET ORAL DAILY
Qty: 30 TABLET | Refills: 0 | Status: SHIPPED | OUTPATIENT
Start: 2021-12-24

## 2021-12-24 RX ORDER — AMLODIPINE BESYLATE 10 MG/1
10 TABLET ORAL DAILY
Qty: 30 TABLET | Refills: 0 | Status: SHIPPED | OUTPATIENT
Start: 2021-12-24

## 2021-12-24 RX ORDER — ONDANSETRON 4 MG/1
4 TABLET, ORALLY DISINTEGRATING ORAL EVERY 8 HOURS PRN
Qty: 20 TABLET | Refills: 0 | Status: SHIPPED | OUTPATIENT
Start: 2021-12-24

## 2021-12-24 RX ORDER — ACETAMINOPHEN 500 MG
1000 TABLET ORAL ONCE
Status: COMPLETED | OUTPATIENT
Start: 2021-12-24 | End: 2021-12-24

## 2021-12-24 RX ADMIN — ACETAMINOPHEN 1000 MG: 500 TABLET ORAL at 21:27

## 2021-12-24 ASSESSMENT — ENCOUNTER SYMPTOMS
ABDOMINAL PAIN: 0
VOMITING: 0
COUGH: 0
NAUSEA: 0
BACK PAIN: 0
SHORTNESS OF BREATH: 0
RHINORRHEA: 0
EYE PAIN: 0
DIARRHEA: 0

## 2021-12-24 ASSESSMENT — PAIN SCALES - GENERAL
PAINLEVEL_OUTOF10: 9
PAINLEVEL_OUTOF10: 10

## 2021-12-24 NOTE — ED TRIAGE NOTES
Pt to ED with complaints of nausea, body aches, and headache x 2 days. Pt states he is concerned for COVID.

## 2021-12-24 NOTE — ED PROVIDER NOTES
EKG Interpretation    Interpreted by emergency department physician    Rhythm: sinus tachycardia  Rate: 100-110  Axis: normal  Ectopy: none  Conduction: normal  ST Segments: normal  T Waves: normal  Q Waves: none    Clinical Impression: Sinus tachycardia, otherwise normal EKG    MD Fransisco Velasquez MD  12/24/21 [id number]

## 2021-12-24 NOTE — ED PROVIDER NOTES
As provider-in-triage, I performed a medical screening history and physical exam on this patient. HISTORY OF PRESENT ILLNESS  Fransisco Michele is a 48 y.o. male who presents to the emergency department today with generalized malaise, fevers, chills, nausea vomiting diarrhea, decreased oral intake. Patient verbalizes concern for possible COVID-19. He states this is \"like any other cold he never had\". He states he has been coughing, he states that a fever in the 100s. He states he has been unable to eat or drink having worsening diarrhea. Feels generally weak. He is not vaccinated. He denies any significant chest pain or abdominal discomfort. PHYSICAL EXAM  BP (!) 132/107   Pulse 111   Temp 97.6 °F (36.4 °C) (Oral)   Resp 18   Ht 6' (1.829 m)   Wt 160 lb (72.6 kg)   SpO2 97%   BMI 21.70 kg/m²     On exam, the patient appears in no acute distress. Speech is clear. Breathing is unlabored. Moves all extremities    Comment: Please note this report has been produced using speech recognition software and may contain errors related to that system including errors in grammar, punctuation, and spelling, as well as words and phrases that may be inappropriate. If there are any questions or concerns please feel free to contact the dictating provider for clarification.         Radha Dockery, PA  12/24/21 8385

## 2021-12-25 LAB
EKG ATRIAL RATE: 107 BPM
EKG DIAGNOSIS: NORMAL
EKG P AXIS: 51 DEGREES
EKG P-R INTERVAL: 142 MS
EKG Q-T INTERVAL: 354 MS
EKG QRS DURATION: 84 MS
EKG QTC CALCULATION (BAZETT): 472 MS
EKG R AXIS: -13 DEGREES
EKG T AXIS: 61 DEGREES
EKG VENTRICULAR RATE: 107 BPM

## 2021-12-25 PROCEDURE — 93010 ELECTROCARDIOGRAM REPORT: CPT | Performed by: INTERNAL MEDICINE

## 2021-12-25 NOTE — ED PROVIDER NOTES
**ADVANCED PRACTICE PROVIDER, I HAVE EVALUATED THIS PATIENT**        [address] ENCOUNTER      Pt Name: Eliza Fuentes  WOLFGANG:[id number]  [place]arr 1975  Date of evaluation: 12/24/2021  Provider: Sherie Hinds PA-C      Chief Complaint:    Chief Complaint   Patient presents with    Concern For COVID-19         Nursing Notes, Past Medical Hx, Past Surgical Hx, Social Hx, Allergies, and Family Hx were all reviewed and agreed with or any disagreements were addressed in the HPI.    HPI: (Location, Duration, Timing, Severity, Quality, Assoc Sx, Context, Modifying factors)    Chief Complaint of possible covid    This is a  48 y.o. male who presents complaint of headache, cough, diarrhea, fevers, chills, malaise. He mentions he was advised to come to the emergency department for COVID-19 test.  He states that he did receive a Covid vaccine shot earlier in the year, when he was in the facility, but is not sure if he needed the second shot, but he states he only received 1. Denies any chest pain, shortness of breath, abdominal pain. PastMedical/Surgical History:      Diagnosis Date    Hypertension     does not take medications         Procedure Laterality Date    FOOT DEBRIDEMENT Bilateral 2/19/2021    BILATERAL TOE  DEBRIDEMENT INCISION AND DRAINAGE, AMPUTATION OF LEFT 1ST, 2ND, 3RD AND TIP OF 4TH TOE AND RIGHT GREAT TOE performed by Srinath Pina MD at [facility] OR       Medications:  Discharge Medication List as of 12/24/2021  9:35 PM      CONTINUE these medications which have NOT CHANGED    Details   gabapentin (NEURONTIN) 100 MG capsule Take 1 capsule by mouth 3 times daily for 20 days. , Disp-60 capsule, R-0Normal      omeprazole (PRILOSEC) 20 MG delayed release capsule Take 20 mg by mouth dailyHistorical Med               Review of Systems:  (2-9 systems needed)  Review of Systems   Constitutional: Negative for chills RAPID - Abnormal; Notable for the following components:       Result Value    SARS-CoV-2, NAAT DETECTED (*)     All other components within normal limits   CBC WITH AUTO DIFFERENTIAL - Abnormal; Notable for the following components:    WBC 3.7 (*)     Segs Relative 31.8 (*)     Lymphocytes % 50.4 (*)     Monocytes % 17.3 (*)     All other components within normal limits   COMPREHENSIVE METABOLIC PANEL - Abnormal; Notable for the following components:    Sodium 131 (*)     Chloride 97 (*)     CREATININE 0.7 (*)     Glucose 115 (*)     ALT 66 (*)      (*)     All other components within normal limits   URINALYSIS - Abnormal; Notable for the following components:    Color, UA STRAW (*)     Blood, Urine SMALL (*)     Mucus, UA RARE (*)     All other components within normal limits   RAPID INFLUENZA A/B ANTIGENS        Remainder of labs reviewed and were negative at this time or not returned at the time of this note. RADIOLOGY:   Non-plain film images such as CT, Ultrasound and MRI are read by the radiologist. Naty Cee PA-C have directly visualized the radiologic plain film image(s) with the below findings:      Interpretation per the Radiologist below, if available at the time of this note:    XR CHEST PORTABLE   Final Result   No acute process.               XR FOOT RIGHT (2 VIEWS)    Result Date: 12/18/2021  EXAMINATION: THREE XRAY VIEWS OF THE LEFT FOOT; TWO XRAY VIEWS OF THE RIGHT FOOT 12/18/2021 6:37 pm COMPARISON: Right foot radiograph October 17, 2021; left foot radiograph May 13, 2021 HISTORY: ORDERING SYSTEM PROVIDED HISTORY: hx of toe amputation TECHNOLOGIST PROVIDED HISTORY: Reason for exam:->hx of toe amputation Reason for Exam: hx of toe amputation; ORDERING SYSTEM PROVIDED HISTORY: Hx of toe amputations TECHNOLOGIST PROVIDED HISTORY: Reason for exam:->Hx of toe amputations Reason for Exam: Hx of toe amputations, discolored wound on bottom ot ball of right foot FINDINGS: Left foot: Three views of the left foot again demonstrate partial amputations of the 1st through 3rd toes. No acute fracture identified. 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 No definitive radiographic evidence of osteomyelitis. Right foot: Partial amputation of the right great toe. No definitive radiographic evidence of osteomyelitis. No fracture identified. Mild midfoot osteoarthritis. 1. No definitive radiographic evidence of osteomyelitis of the right or left foot. 2. 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 XR FOOT LEFT (MIN 3 VIEWS)    Result Date: 12/18/2021  EXAMINATION: THREE XRAY VIEWS OF THE LEFT FOOT; TWO XRAY VIEWS OF THE RIGHT FOOT 12/18/2021 6:37 pm COMPARISON: Right foot radiograph October 17, 2021; left foot radiograph May 13, 2021 HISTORY: ORDERING SYSTEM PROVIDED HISTORY: hx of toe amputation TECHNOLOGIST PROVIDED HISTORY: Reason for exam:->hx of toe amputation Reason for Exam: hx of toe amputation; ORDERING SYSTEM PROVIDED HISTORY: Hx of toe amputations TECHNOLOGIST PROVIDED HISTORY: Reason for exam:->Hx of toe amputations Reason for Exam: Hx of toe amputations, discolored wound on bottom ot ball of right foot FINDINGS: Left foot: Three views of the left foot again demonstrate partial amputations of the 1st through 3rd toes. No acute fracture identified. 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 No definitive radiographic evidence of osteomyelitis. Right foot: Partial amputation of the right great toe. No definitive radiographic evidence of osteomyelitis. No fracture identified. Mild midfoot osteoarthritis. 1. No definitive radiographic evidence of osteomyelitis of the right or left foot. 2. 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      XR CHEST PORTABLE    Result Date: 12/24/2021  EXAMINATION: ONE XRAY VIEW OF THE CHEST 12/24/2021 5:12 pm COMPARISON: Chest radiograph 12/26/2020. HISTORY: ORDERING SYSTEM PROVIDED HISTORY: cough, fever TECHNOLOGIST PROVIDED HISTORY: Reason for exam:->cough, fever Reason for Exam: cough, fever Additional signs and symptoms: none Relevant Medical/Surgical History: none FINDINGS: The lungs are without acute focal process. There is no effusion or pneumothorax. The cardiomediastinal silhouette is without acute process. The osseous structures are without acute process. No acute process. MEDICAL DECISION MAKING / ED COURSE:      PROCEDURES:   Procedures    None    Patient was given:  Medications   acetaminophen (TYLENOL) tablet 1,000 mg (1,000 mg Oral Given 12/24/21 2127)       Patient present with above HPI. Patient's EKG shows sinus tach, otherwise normal EKG with no evidence of ST elevations or ischemia. Patient is positive for COVID-19 patient is not hypoxic. Tachycardia consistent with his history of Covid. He is denying any chest pain, hemoptysis, near syncopal symptoms. He is well in appearance, tolerating p.o. I do feel he safe for outpatient management, and I see no evidence to warrant any additional work-up here in the emergency department. Urinalysis appears to be unremarkable. Mild leukopenia consistent with his COVID-19. Slight elevation in his liver enzymes, patient is without any abdominal pain. No noted jaundice. Chest x-ray unremarkable. He is followed by wound care, and has plans to follow-up with them regarding his chronic wounds, but he denies any worsening of those today. His main complaint is concern for COVID-19 as he has developed symptoms over the past 2 days. Plan for antipyretics here. Given patient's history of hypertension. He does qualify for medical antibodies and is agreeable with this as outpatient. He was given resources for COVID-19, return precautions, and infusion referral.  Patient verbalized understanding is agreeable this plan. The patient tolerated their visit well.   I evaluated the patient. The physician was available for consultation as needed. The patient and / or the family were informed of the results of any tests, a time was given to answer questions, a plan was proposed and they agreed with plan. CLINICAL IMPRESSION:  1.  COVID        DISPOSITION Decision To Discharge 12/24/2021 09:29:40 PM      PATIENT REFERRED TO:  GEN Velarde [facility]  [id number]  [facility] [address]  [phone]          Keira Modi [facility]  [id number]  [facility] [address]  [phone]            DISCHARGE MEDICATIONS:  Discharge Medication List as of 12/24/2021  9:35 PM          DISCONTINUED MEDICATIONS:  Discharge Medication List as of 12/24/2021  9:35 PM                 (Please note the MDM and HPI sections of this note were completed with a voice recognition program.  Efforts were made to edit the dictations but occasionally words are mis-transcribed.)    Electronically signed, Rudy Knott PA-C,          Rudy Knott PA-C  12/24/21 4640

## 2021-12-26 ENCOUNTER — HOSPITAL ENCOUNTER (EMERGENCY)
Age: 46
Discharge: HOME | End: 2021-12-27
Payer: MEDICARE

## 2021-12-26 DIAGNOSIS — R31.9 HEMATURIA, UNSPECIFIED TYPE: ICD-10-CM

## 2021-12-26 DIAGNOSIS — K62.5 BRBPR (BRIGHT RED BLOOD PER RECTUM): Primary | ICD-10-CM

## 2021-12-26 DIAGNOSIS — U07.1 COVID-19 VIRUS INFECTION: ICD-10-CM

## 2021-12-26 PROCEDURE — 99283 EMERGENCY DEPT VISIT LOW MDM: CPT

## 2021-12-26 PROCEDURE — 81001 URINALYSIS AUTO W/SCOPE: CPT

## 2021-12-26 PROCEDURE — 80053 COMPREHEN METABOLIC PANEL: CPT

## 2021-12-26 PROCEDURE — 85025 COMPLETE CBC W/AUTO DIFF WBC: CPT

## 2021-12-26 PROCEDURE — 93005 ELECTROCARDIOGRAM TRACING: CPT | Performed by: PHYSICIAN ASSISTANT

## 2021-12-26 ASSESSMENT — PAIN DESCRIPTION - DESCRIPTORS: DESCRIPTORS: HEADACHE

## 2021-12-26 ASSESSMENT — PAIN DESCRIPTION - LOCATION: LOCATION: CHEST;TOE (COMMENT WHICH ONE)

## 2021-12-26 ASSESSMENT — PAIN SCALES - GENERAL: PAINLEVEL_OUTOF10: 10

## 2021-12-26 ASSESSMENT — PAIN DESCRIPTION - PAIN TYPE: TYPE: CHRONIC PAIN

## 2021-12-26 ASSESSMENT — PAIN DESCRIPTION - FREQUENCY: FREQUENCY: CONTINUOUS

## 2021-12-26 NOTE — ED PROVIDER NOTES
ED attending EKG interpretation (I otherwise did not participate in the care of this patient)     EKG Interpretation  Interpreted by me  Compared to 12/24/2021 rhythm: normal sinus   Rate: normal 89  Axis: normal  Ectopy: none  Conduction: normal  ST Segments: no acute change  T Waves: no acute change  Clinical Impression: normal sinus rhythm, no acute change     Gurjit Grier MD  12/26/21 5524

## 2021-12-27 ENCOUNTER — APPOINTMENT (OUTPATIENT)
Dept: CT IMAGING | Age: 46
End: 2021-12-27
Payer: MEDICARE

## 2021-12-27 VITALS
DIASTOLIC BLOOD PRESSURE: 89 MMHG | HEART RATE: 99 BPM | OXYGEN SATURATION: 99 % | TEMPERATURE: 98.7 F | RESPIRATION RATE: 16 BRPM | HEIGHT: 72 IN | BODY MASS INDEX: 23.3 KG/M2 | SYSTOLIC BLOOD PRESSURE: 140 MMHG | WEIGHT: 172 LBS

## 2021-12-27 LAB
ALBUMIN SERPL-MCNC: 4 GM/DL (ref 3.4–5)
ALP BLD-CCNC: 69 IU/L (ref 40–128)
ALT SERPL-CCNC: 44 U/L (ref 10–40)
ANION GAP SERPL CALCULATED.3IONS-SCNC: 9 MMOL/L (ref 4–16)
AST SERPL-CCNC: 54 IU/L (ref 15–37)
BACTERIA: NEGATIVE /HPF
BASOPHILS ABSOLUTE: 0 K/CU MM
BASOPHILS RELATIVE PERCENT: 0.4 % (ref 0–1)
BILIRUB SERPL-MCNC: 0.3 MG/DL (ref 0–1)
BILIRUBIN URINE: NEGATIVE MG/DL
BLOOD, URINE: NEGATIVE
BUN BLDV-MCNC: 13 MG/DL (ref 6–23)
CALCIUM SERPL-MCNC: 8.3 MG/DL (ref 8.3–10.6)
CHLORIDE BLD-SCNC: 98 MMOL/L (ref 99–110)
CLARITY: CLEAR
CO2: 28 MMOL/L (ref 21–32)
COLOR: YELLOW
CREAT SERPL-MCNC: 0.9 MG/DL (ref 0.9–1.3)
DIFFERENTIAL TYPE: ABNORMAL
EKG ATRIAL RATE: 89 BPM
EKG DIAGNOSIS: NORMAL
EKG P AXIS: 53 DEGREES
EKG P-R INTERVAL: 152 MS
EKG Q-T INTERVAL: 392 MS
EKG QRS DURATION: 84 MS
EKG QTC CALCULATION (BAZETT): 476 MS
EKG R AXIS: -14 DEGREES
EKG T AXIS: 56 DEGREES
EKG VENTRICULAR RATE: 89 BPM
EOSINOPHILS ABSOLUTE: 0 K/CU MM
EOSINOPHILS RELATIVE PERCENT: 0.4 % (ref 0–3)
GFR AFRICAN AMERICAN: >60 ML/MIN/1.73M2
GFR NON-AFRICAN AMERICAN: >60 ML/MIN/1.73M2
GLUCOSE BLD-MCNC: 98 MG/DL (ref 70–99)
GLUCOSE, URINE: NEGATIVE MG/DL
HCT VFR BLD CALC: 41 % (ref 42–52)
HEMOGLOBIN: 13.3 GM/DL (ref 13.5–18)
HYALINE CASTS: 2 /LPF
IMMATURE NEUTROPHIL %: 0 % (ref 0–0.43)
KETONES, URINE: NEGATIVE MG/DL
LEUKOCYTE ESTERASE, URINE: NEGATIVE
LYMPHOCYTES ABSOLUTE: 0.9 K/CU MM
LYMPHOCYTES RELATIVE PERCENT: 41.5 % (ref 24–44)
MCH RBC QN AUTO: 29.2 PG (ref 27–31)
MCHC RBC AUTO-ENTMCNC: 32.4 % (ref 32–36)
MCV RBC AUTO: 89.9 FL (ref 78–100)
MONOCYTES ABSOLUTE: 0.3 K/CU MM
MONOCYTES RELATIVE PERCENT: 12.9 % (ref 0–4)
MUCUS: ABNORMAL HPF
NITRITE URINE, QUANTITATIVE: NEGATIVE
NUCLEATED RBC %: 0 %
PDW BLD-RTO: 14.1 % (ref 11.7–14.9)
PH, URINE: 5 (ref 5–8)
PLATELET # BLD: 162 K/CU MM (ref 140–440)
PMV BLD AUTO: 10 FL (ref 7.5–11.1)
POTASSIUM SERPL-SCNC: 3.7 MMOL/L (ref 3.5–5.1)
PROTEIN UA: NEGATIVE MG/DL
RBC # BLD: 4.56 M/CU MM (ref 4.6–6.2)
RBC URINE: 1 /HPF (ref 0–3)
SEGMENTED NEUTROPHILS ABSOLUTE COUNT: 1 K/CU MM
SEGMENTED NEUTROPHILS RELATIVE PERCENT: 44.8 % (ref 36–66)
SODIUM BLD-SCNC: 135 MMOL/L (ref 135–145)
SPECIFIC GRAVITY UA: 1.02 (ref 1–1.03)
SQUAMOUS EPITHELIAL: <1 /HPF
TOTAL IMMATURE NEUTOROPHIL: 0 K/CU MM
TOTAL NUCLEATED RBC: 0 K/CU MM
TOTAL PROTEIN: 6.9 GM/DL (ref 6.4–8.2)
TRICHOMONAS: ABNORMAL /HPF
UROBILINOGEN, URINE: NEGATIVE MG/DL (ref 0.2–1)
WBC # BLD: 2.2 K/CU MM (ref 4–10.5)
WBC UA: <1 /HPF (ref 0–2)

## 2021-12-27 PROCEDURE — 74176 CT ABD & PELVIS W/O CONTRAST: CPT

## 2021-12-27 PROCEDURE — 6370000000 HC RX 637 (ALT 250 FOR IP): Performed by: PHYSICIAN ASSISTANT

## 2021-12-27 PROCEDURE — 80053 COMPREHEN METABOLIC PANEL: CPT

## 2021-12-27 PROCEDURE — 93010 ELECTROCARDIOGRAM REPORT: CPT | Performed by: INTERNAL MEDICINE

## 2021-12-27 RX ORDER — ONDANSETRON 4 MG/1
4 TABLET, ORALLY DISINTEGRATING ORAL ONCE
Status: COMPLETED | OUTPATIENT
Start: 2021-12-27 | End: 2021-12-27

## 2021-12-27 RX ORDER — HYDROCORTISONE ACETATE 25 MG/1
25 SUPPOSITORY RECTAL EVERY 12 HOURS
Qty: 12 SUPPOSITORY | Refills: 0 | Status: SHIPPED | OUTPATIENT
Start: 2021-12-27

## 2021-12-27 RX ADMIN — ONDANSETRON 4 MG: 4 TABLET, ORALLY DISINTEGRATING ORAL at 01:08

## 2021-12-27 ASSESSMENT — PAIN SCALES - GENERAL: PAINLEVEL_OUTOF10: 9

## 2021-12-27 NOTE — ED PROVIDER NOTES
Emer[facility] [address] EMERGENCY DEPARTMENT    Patient: Marleni Gonzalez  MRN: [id number]  : 1975  Date of Evaluation: 2021  ED Provider: Valeria Zamora PA-C    Chief Complaint       Chief Complaint   Patient presents with   Angel Cuevas is a 48 y.o. male who presents to the emergency department for hematuria and rectal bleeding. Patient states he was diagnosed with COVID 2 days ago. He was started on blood pressure medications and Zofran at that time. Patient states since then, he has noted blood in his urine and stool. Bright red. He reports associated lower abdominal pain and back pain. Denies n/v.  Stool otherwise normal consistency. He is not on any blood thinners. He denies any history of kidney stones but states he has had hemorrhoids in the past.    ROS     CONSTITUTIONAL:  Denies fever. EYES:  Denies visual changes. HEAD:  Denies headache. ENT:  Denies earache, nasal congestion, sore throat. NECK:  Denies neck pain. RESPIRATORY:  Denies any shortness of breath. CARDIOVASCULAR:  Denies chest pain. GI:  Denies nausea or vomiting.  + rectal bleeding. :  + hematuria. MUSCULOSKELETAL:  Denies extremity pain or swelling. BACK:  Denies back pain. INTEGUMENT:  Denies skin changes. LYMPHATIC:  Denies lymphadenopathy. NEUROLOGIC:  Denies any numbness/tingling.     Past History     Past Medical History:   Diagnosis Date    Hypertension     does not take medications     Past Surgical History:   Procedure Laterality Date    FOOT DEBRIDEMENT Bilateral 2021    BILATERAL TOE  DEBRIDEMENT INCISION AND DRAINAGE, AMPUTATION OF LEFT 1ST, 2ND, 3RD AND TIP OF 4TH TOE AND RIGHT GREAT TOE performed by Cesar Monroy MD at [address] [place] [address] History     Socioeconomic History    Marital status: Single     Spouse name: None    Number of children: None    Years of education: None    Highest education level: None   Occupational History    None   Tobacco Use    Smoking status: Former Smoker     Quit date: 2021     Years since quittin.9    Smokeless tobacco: Never Used   Vaping Use    Vaping Use: Never used   Substance and Sexual Activity    Alcohol use: Yes     Comment: most days    Drug use: No    Sexual activity: None   Other Topics Concern    None   Social History Narrative    None     Social Determinants of Health     Financial Resource Strain:     Difficulty of Paying Living Expenses: Not on file   Food Insecurity:     Worried About Running Out of Food in the Last Year: Not on file    [place] of Food in the Last Year: Not on file   Transportation Needs:     Lack of Transportation (Medical): Not on file    Lack of Transportation (Non-Medical): Not on file   Physical Activity:     Days of Exercise per Week: Not on file    Minutes of Exercise per Session: Not on file   Stress:     Feeling of Stress : Not on file   Social Connections:     Frequency of Communication with Friends and Family: Not on file    Frequency of Social Gatherings with Friends and Family: Not on file    Attends Sabianist Services: Not on file    Active Member of [address] [place] [address] or Organizations: Not on file    Attends Club or Organization Meetings: Not on file    Marital Status: Not on file   Intimate Partner Violence:     Fear of Current or Ex-Partner: Not on file    Emotionally Abused: Not on file    Physically Abused: Not on file    Sexually Abused: Not on file   Housing Stability:     Unable to Pay for Housing in the Last Year: Not on file    Number of [place] in the Last Year: Not on file    Unstable Housing in the Last Year: Not on file       Medications/Allergies     Previous Medications    AMLODIPINE (NORVASC) 10 MG TABLET    Take 1 tablet by mouth daily    GABAPENTIN (NEURONTIN) 100 MG CAPSULE    Take 1 capsule by mouth 3 times daily for 20 days.     HYDROCHLOROTHIAZIDE (HYDRODIURIL) 25 MG TABLET    Take 1 tablet by mouth daily    METOPROLOL TARTRATE (LOPRESSOR) 25 MG TABLET    Take 1 tablet by mouth 2 times daily    OMEPRAZOLE (PRILOSEC) 20 MG DELAYED RELEASE CAPSULE    Take 20 mg by mouth daily    ONDANSETRON (ZOFRAN ODT) 4 MG DISINTEGRATING TABLET    Take 1 tablet by mouth every 8 hours as needed for Nausea     Allergies   Allergen Reactions    Lisinopril Angioedema        Physical Exam       ED Triage Vitals [12/26/21 1658]   BP Temp Temp Source Pulse Resp SpO2 Height Weight   (!) 121/94 98.2 °F (36.8 °C) Oral 87 18 98 % 6' (1.829 m) 160 lb (72.6 kg)     GENERAL APPEARANCE:  Well-developed, well-nourished, no acute distress. HEAD:  NC/AT. EYES:  Sclera anicteric. ENT:  Ears, nose, mouth normal.     NECK:  Supple. CARDIO:  RRR. LUNGS:   CTAB. Respirations unlabored. ABDOMEN:  Soft, non-distended, non-tender. BS active. EXTREMITIES:  No acute deformities. SKIN:  Warm and dry. NEUROLOGICAL:  Alert and oriented. PSYCHIATRIC:  Normal mood.      Diagnostics     Labs:  Labs Reviewed   CBC WITH AUTO DIFFERENTIAL - Abnormal; Notable for the following components:       Result Value    WBC 2.2 (*)     RBC 4.56 (*)     Hemoglobin 13.3 (*)     Hematocrit 41.0 (*)     Monocytes % 12.9 (*)     All other components within normal limits   URINE RT REFLEX TO CULTURE - Abnormal; Notable for the following components:    Mucus, UA RARE (*)     All other components within normal limits   COMPREHENSIVE METABOLIC PANEL W/ REFLEX TO MG FOR LOW K - Abnormal; Notable for the following components:    Chloride 98 (*)     ALT 44 (*)     AST 54 (*)     All other components within normal limits     Radiographs:    CT ABDOMEN PELVIS WO CONTRAST Additional Contrast? None    Result Date: 12/27/2021  EXAMINATION: CT OF THE ABDOMEN AND PELVIS WITHOUT CONTRAST 12/27/2021 12:25 am TECHNIQUE: CT of the abdomen and pelvis was performed without the administration of intravenous contrast. Multiplanar reformatted images are provided for review. Dose modulation, iterative reconstruction, and/or weight based adjustment of the mA/kV was utilized to reduce the radiation dose to as low as reasonably achievable. COMPARISON: 10/10/2019 HISTORY: ORDERING SYSTEM PROVIDED HISTORY: hematuria TECHNOLOGIST PROVIDED HISTORY: Reason for exam:->hematuria Additional Contrast?->None Decision Support Exception - unselect if not a suspected or confirmed emergency medical condition->Emergency Medical Condition (MA) Reason for Exam: hematuria FINDINGS: Lower Chest: Minimal right basilar dependent atelectasis. Organs: Diffuse hepatic steatosis. Unremarkable spleen, pancreas adrenals, and gallbladder on this unenhanced study. No radiopaque urolithiasis or hydroureteronephrosis on either side. GI/Bowel: Normal appendix. Small to moderate amount of retained stool throughout the colon with no evidence of bowel obstruction. Pelvis: Prostate top-normal in size. Urinary bladder incompletely distended. Tiny fat containing left inguinal hernia. Peritoneum/Retroperitoneum: No free air or free fluid. No adenopathy. Minimal vascular calcification. No abdominal aortic aneurysm Bones/Soft Tissues: No acute osseous abnormality     No radiopaque urolithiasis or hydroureteronephrosis on either side. Normal appendix. Diffuse hepatic steatosis.  RECOMMENDATIONS: Unavailable     XR FOOT RIGHT (2 VIEWS)    Result Date: 12/18/2021  EXAMINATION: THREE XRAY VIEWS OF THE LEFT FOOT; TWO XRAY VIEWS OF THE RIGHT FOOT 12/18/2021 6:37 pm COMPARISON: Right foot radiograph October 17, 2021; left foot radiograph May 13, 2021 HISTORY: ORDERING SYSTEM PROVIDED HISTORY: hx of toe amputation TECHNOLOGIST PROVIDED HISTORY: Reason for exam:->hx of toe amputation Reason for Exam: hx of toe amputation; ORDERING SYSTEM PROVIDED HISTORY: Hx of toe amputations TECHNOLOGIST PROVIDED HISTORY: Reason for exam:->Hx of toe amputations Reason for Exam: Hx of toe amputations, discolored wound on bottom ot ball of right foot FINDINGS: Left foot: Three views of the left foot again demonstrate partial amputations of the 1st through 3rd toes. No acute fracture identified. 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 No definitive radiographic evidence of osteomyelitis. Right foot: Partial amputation of the right great toe. No definitive radiographic evidence of osteomyelitis. No fracture identified. Mild midfoot osteoarthritis. 1. No definitive radiographic evidence of osteomyelitis of the right or left foot. 2. 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 XR FOOT LEFT (MIN 3 VIEWS)    Result Date: 12/18/2021  EXAMINATION: THREE XRAY VIEWS OF THE LEFT FOOT; TWO XRAY VIEWS OF THE RIGHT FOOT 12/18/2021 6:37 pm COMPARISON: Right foot radiograph October 17, 2021; left foot radiograph May 13, 2021 HISTORY: ORDERING SYSTEM PROVIDED HISTORY: hx of toe amputation TECHNOLOGIST PROVIDED HISTORY: Reason for exam:->hx of toe amputation Reason for Exam: hx of toe amputation; ORDERING SYSTEM PROVIDED HISTORY: Hx of toe amputations TECHNOLOGIST PROVIDED HISTORY: Reason for exam:->Hx of toe amputations Reason for Exam: Hx of toe amputations, discolored wound on bottom ot ball of right foot FINDINGS: Left foot: Three views of the left foot again demonstrate partial amputations of the 1st through 3rd toes. No acute fracture identified. 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 No definitive radiographic evidence of osteomyelitis. Right foot: Partial amputation of the right great toe. No definitive radiographic evidence of osteomyelitis. No fracture identified. Mild midfoot osteoarthritis. 1. No definitive radiographic evidence of osteomyelitis of the right or left foot. 2. 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      XR CHEST PORTABLE    Result Date: 12/24/2021  EXAMINATION: ONE XRAY VIEW OF THE CHEST 12/24/2021 5:12 pm COMPARISON: Chest radiograph 12/26/2020. HISTORY: ORDERING SYSTEM PROVIDED HISTORY: cough, fever TECHNOLOGIST PROVIDED HISTORY: Reason for exam:->cough, fever Reason for Exam: cough, fever Additional signs and symptoms: none Relevant Medical/Surgical History: none FINDINGS: The lungs are without acute focal process. There is no effusion or pneumothorax. The cardiomediastinal silhouette is without acute process. The osseous structures are without acute process. No acute process. ED Course and MDM   -  Patient seen and evaluated in the emergency department. -  Triage and nursing notes reviewed and incorporated. -  Old chart records reviewed and incorporated. -  Supervising physician was Dr. Zaria Maravilla. Patient was seen independently. -  Work-up included:  See above  -  ED medications:  Zofran  -  Results discussed with patient. CT abd pelv is non-acute. No blood on UA. H&H stable. Patient declined rectal exam.  He states he has had hemorrhoids in the past--agreeable to treatment for this with Anusol, increased fiber in diet, water, refraining from straining. Referred to Urology for hematuria. Return here as needed. He is agreeable with plan of care and disposition.  -  Disposition:  Home    In light of current events, I did utilize appropriate PPE (including N95 and surgical face mask, safety glasses, and gloves, as recommended by the health facility/national standard best practice, during my bedside interactions with the patient. Final Impression      1. BRBPR (bright red blood per rectum)    2. Hematuria, unspecified type    3.  COVID-19 virus infection            DISPOSITION        Humberto Rate, [address], [place]  12/27/21 0946

## 2021-12-27 NOTE — ED NOTES
Discharge instructions reviewed with patient. Medications and follow up discussed. Patient denies questions and verbalizes understanding.      Mathew Charles RN  12/27/21 6323

## 2021-12-28 ENCOUNTER — CARE COORDINATION (OUTPATIENT)
Dept: CARE COORDINATION | Age: 46
End: 2021-12-28

## 2021-12-28 NOTE — CARE COORDINATION
First attempt. ACM call to pt regarding ER / Covid follow up. No answer. Left message for pt requesting return call to ACM.

## 2021-12-29 NOTE — CARE COORDINATION
Second attempt. [facility] call to pt regarding ER / Covid follow up. No answer. Left message for pt requesting return call to AC. No further outreach to be completed.

## 2022-01-13 ENCOUNTER — TELEPHONE (OUTPATIENT)
Dept: WOUND CARE | Age: 47
End: 2022-01-13

## 2022-04-12 ENCOUNTER — HOSPITAL ENCOUNTER (EMERGENCY)
Age: 47
Discharge: HOME | End: 2022-04-13
Payer: MEDICARE

## 2022-04-12 DIAGNOSIS — M79.671 BILATERAL FOOT PAIN: Primary | ICD-10-CM

## 2022-04-12 DIAGNOSIS — M79.672 BILATERAL FOOT PAIN: Primary | ICD-10-CM

## 2022-04-12 PROCEDURE — 99285 EMERGENCY DEPT VISIT HI MDM: CPT

## 2022-04-12 ASSESSMENT — PAIN SCALES - GENERAL: PAINLEVEL_OUTOF10: 10

## 2022-04-12 ASSESSMENT — PAIN - FUNCTIONAL ASSESSMENT: PAIN_FUNCTIONAL_ASSESSMENT: 0-10

## 2022-04-13 ENCOUNTER — APPOINTMENT (OUTPATIENT)
Dept: GENERAL RADIOLOGY | Age: 47
End: 2022-04-13
Payer: MEDICARE

## 2022-04-13 VITALS
WEIGHT: 179 LBS | TEMPERATURE: 97.6 F | HEIGHT: 72 IN | SYSTOLIC BLOOD PRESSURE: 127 MMHG | HEART RATE: 85 BPM | BODY MASS INDEX: 24.24 KG/M2 | RESPIRATION RATE: 15 BRPM | OXYGEN SATURATION: 93 % | DIASTOLIC BLOOD PRESSURE: 76 MMHG

## 2022-04-13 PROCEDURE — 73630 X-RAY EXAM OF FOOT: CPT

## 2022-04-13 PROCEDURE — 6370000000 HC RX 637 (ALT 250 FOR IP): Performed by: PHYSICIAN ASSISTANT

## 2022-04-13 RX ORDER — NAPROXEN 500 MG/1
500 TABLET ORAL 2 TIMES DAILY
Qty: 15 TABLET | Refills: 0 | Status: SHIPPED | OUTPATIENT
Start: 2022-04-13

## 2022-04-13 RX ORDER — NAPROXEN 250 MG/1
500 TABLET ORAL ONCE
Status: COMPLETED | OUTPATIENT
Start: 2022-04-13 | End: 2022-04-13

## 2022-04-13 RX ADMIN — NAPROXEN 500 MG: 250 TABLET ORAL at 00:43

## 2022-04-13 ASSESSMENT — PAIN SCALES - GENERAL
PAINLEVEL_OUTOF10: 10
PAINLEVEL_OUTOF10: 0

## 2022-04-13 NOTE — ED NOTES
Discharge instructions reviewed with patient and all questions addressed. Patient alert and oriented x4 at time of discharge. Patient ambulatory and steady gait.       Courtney Fernandes RN  04/13/22 5010

## 2022-04-13 NOTE — ED PROVIDER NOTES
EMERGENCY DEPARTMENT ENCOUNTER      PCP: GEN Bermudez CNP    CHIEF COMPLAINT    Chief Complaint   Patient presents with    Wound Check     states that he has surgery about a year ago; wants to have foot checked;right sided    Foot Pain     left sided; states that he has some wounds formning       This patient was not evaluated by the attending physician. I have independently evaluated this patient. HPI    Cece Davison is a 48 y.o. male who presents with bilateral foot pain. Patient states that he had bilateral toe amputations with Dr. Chasidy Collazo from frostbite approximately 2 years ago. Patient states he has had pain since this time. Patient denies any new injury. Patient previously followed with wound care who told him the wound seem to be healing. Patient denies fever, chest pain or shortness of breath. Pain worsens with ambulation. No known alleviating factors.       REVIEW OF SYSTEMS    General: No fever or chills  Skin: see HPI  Musculoskeletal: No other joint pain    All other review of systems are negative  See HPI and nursing notes for additional information       PAST MEDICAL & SURGICAL HISTORY    Past Medical History:   Diagnosis Date    Hypertension     does not take medications     Past Surgical History:   Procedure Laterality Date    FOOT DEBRIDEMENT Bilateral 2/19/2021    BILATERAL TOE  DEBRIDEMENT INCISION AND DRAINAGE, AMPUTATION OF LEFT 1ST, 2ND, 3RD AND TIP OF 4TH TOE AND RIGHT GREAT TOE performed by Gwen Adler MD at [address] [place] [address]    Current Outpatient Rx   Medication Sig Dispense Refill    naproxen (NAPROSYN) 500 MG tablet Take 1 tablet by mouth 2 times daily 15 tablet 0    hydrocortisone (ANUSOL-HC) 25 MG suppository Place 1 suppository rectally every 12 hours 12 suppository 0    metoprolol tartrate (LOPRESSOR) 25 MG tablet Take 1 tablet by mouth 2 times daily 60 tablet 0    hydroCHLOROthiazide (HYDRODIURIL) 25 MG tablet Take 1 tablet by mouth daily 30 tablet 0    amLODIPine (NORVASC) 10 MG tablet Take 1 tablet by mouth daily 30 tablet 0    ondansetron (ZOFRAN ODT) 4 MG disintegrating tablet Take 1 tablet by mouth every 8 hours as needed for Nausea 20 tablet 0    gabapentin (NEURONTIN) 100 MG capsule Take 1 capsule by mouth 3 times daily for 20 days. 60 capsule 0    omeprazole (PRILOSEC) 20 MG delayed release capsule Take 20 mg by mouth daily         ALLERGIES    Allergies   Allergen Reactions    Lisinopril Angioedema       SOCIAL & FAMILY HISTORY    Social History     Socioeconomic History    Marital status: Single     Spouse name: None    Number of children: None    Years of education: None    Highest education level: None   Occupational History    None   Tobacco Use    Smoking status: Former Smoker     Quit date: 2021     Years since quittin.2    Smokeless tobacco: Never Used   Vaping Use    Vaping Use: Never used   Substance and Sexual Activity    Alcohol use: Yes     Comment: most days    Drug use: No    Sexual activity: None   Other Topics Concern    None   Social History Narrative    None     Social Determinants of Health     Financial Resource Strain:     Difficulty of Paying Living Expenses: Not on file   Food Insecurity:     Worried About Running Out of Food in the Last Year: Not on file    Shelby of Food in the Last Year: Not on file   Transportation Needs:     Lack of Transportation (Medical): Not on file    Lack of Transportation (Non-Medical):  Not on file   Physical Activity:     Days of Exercise per Week: Not on file    Minutes of Exercise per Session: Not on file   Stress:     Feeling of Stress : Not on file   Social Connections:     Frequency of Communication with Friends and Family: Not on file    Frequency of Social Gatherings with Friends and Family: Not on file    Attends Protestant Services: Not on file    Active Member of Clubs or Organizations: Not on file    Attends Club or Organization Meetings: Not on file    Marital Status: Not on file   Intimate Partner Violence:     Fear of Current or Ex-Partner: Not on file    Emotionally Abused: Not on file    Physically Abused: Not on file    Sexually Abused: Not on file   Housing Stability:     Unable to Pay for Housing in the Last Year: Not on file    Number of [place] in the Last Year: Not on file    Unstable Housing in the Last Year: Not on file     History reviewed. No pertinent family history. PHYSICAL EXAM    VITAL SIGNS: /76   Pulse 85   Temp 97.6 °F (36.4 °C) (Oral)   Resp 15   Ht 6' (1.829 m)   Wt 179 lb (81.2 kg)   SpO2 93%   BMI 24.28 kg/m²   Constitutional:  Well developed, appears comfortable  HENT:  Atraumatic  Respiratory:  No retractions  Musculoskeletal:   Partial amputations to bilateral great toes, second toes. No acute appearing open wounds. Callus formation to right plantar aspect of foot. Distal sensation, capillary refill intact. Vascular: Posterior tibialis pulse 2+, capillary refill intact. Integument:  No open wounds   Neurologic:  Awake alert, no slurred speech     RADIOLOGY   XR FOOT RIGHT (MIN 3 VIEWS)   Final Result   No findings to suggest osteomyelitis. No acute osseous abnormality. XR FOOT LEFT (MIN 3 VIEWS)   Final Result   Stable appearing radiographs of the left foot, with no evidence of acute   osseous fracture or osteomyelitis. ED COURSE & MEDICAL DECISION MAKING      Patient presents as above. Feet are well-appearing bilaterally without evidence for acute infection. Distal sensation and pulses intact. Patient denies chest pain or shortness of breath. Patient provided naproxen for pain. Bilateral foot x-ray showed no acute osseous abnormality or evidence for osteomyelitis. Patient provided prescription for naproxen for pain. I recommend follow-up with primary care provider or his general surgeon in 3 to 5 days for recheck.     Clinical IMPRESSION    1. Bilateral foot pain          Diagnosis and plan discussed in detail with patient who understands and agrees. Patient agrees to return emergency department if symptoms worsen or any new symptoms develop. Comment: Please note this report has been produced using speech recognition software and may contain errors related to that system including errors in grammar, punctuation, and spelling, as well as words and phrases that may be inappropriate. If there are any questions or concerns please feel free to contact the dictating provider for clarification.             Catherine Hurtado PA-C  04/13/22 0134

## 2024-03-26 ENCOUNTER — HOSPITAL ENCOUNTER (EMERGENCY)
Age: 49
Discharge: HOME | End: 2024-03-26
Payer: MEDICAID

## 2024-03-26 ENCOUNTER — APPOINTMENT (OUTPATIENT)
Dept: GENERAL RADIOLOGY | Age: 49
End: 2024-03-26
Attending: EMERGENCY MEDICINE
Payer: MEDICAID

## 2024-03-26 VITALS
HEART RATE: 105 BPM | SYSTOLIC BLOOD PRESSURE: 145 MMHG | WEIGHT: 185 LBS | TEMPERATURE: 97.4 F | BODY MASS INDEX: 25.06 KG/M2 | OXYGEN SATURATION: 98 % | DIASTOLIC BLOOD PRESSURE: 105 MMHG | HEIGHT: 72 IN | RESPIRATION RATE: 17 BRPM

## 2024-03-26 DIAGNOSIS — M25.512 ACUTE PAIN OF LEFT SHOULDER: Primary | ICD-10-CM

## 2024-03-26 PROCEDURE — 6370000000 HC RX 637 (ALT 250 FOR IP): Performed by: NURSE PRACTITIONER

## 2024-03-26 PROCEDURE — 73030 X-RAY EXAM OF SHOULDER: CPT

## 2024-03-26 PROCEDURE — 99283 EMERGENCY DEPT VISIT LOW MDM: CPT

## 2024-03-26 RX ORDER — METHOCARBAMOL 500 MG/1
750 TABLET, FILM COATED ORAL ONCE
Status: COMPLETED | OUTPATIENT
Start: 2024-03-26 | End: 2024-03-26

## 2024-03-26 RX ORDER — IBUPROFEN 600 MG/1
600 TABLET ORAL ONCE
Status: COMPLETED | OUTPATIENT
Start: 2024-03-26 | End: 2024-03-26

## 2024-03-26 RX ADMIN — IBUPROFEN 600 MG: 600 TABLET, FILM COATED ORAL at 22:02

## 2024-03-26 RX ADMIN — METHOCARBAMOL TABLETS 750 MG: 500 TABLET, COATED ORAL at 22:02

## 2024-03-26 ASSESSMENT — PAIN SCALES - GENERAL: PAINLEVEL_OUTOF10: 6

## 2024-03-26 ASSESSMENT — PAIN DESCRIPTION - LOCATION: LOCATION: NECK

## 2024-03-26 ASSESSMENT — ENCOUNTER SYMPTOMS: ABDOMINAL PAIN: 0

## 2024-03-26 ASSESSMENT — PAIN DESCRIPTION - DESCRIPTORS: DESCRIPTORS: ACHING;DISCOMFORT

## 2024-03-27 NOTE — ED PROVIDER NOTES
[facility] EMERGENCY DEPARTMENT  EMERGENCY DEPARTMENT ENCOUNTER      Pt Name: Nehemiah Zhang  MRN: [id number]  Birthdate 1975  Date of evaluation: 3/26/2024  Provider: GEN Lindsay CNP  PCP: Violeta Griffith APRN - CNP  Note Started: 8:28 PM EDT 3/26/24    I am the Primary Clinician of Record.  JACQUES. I have evaluated this patient.    CHIEF COMPLAINT       Chief Complaint   Patient presents with    Neck Pain     Patient fell back in February and thinks it might be related to it.    Shoulder Pain     L shoulder       HISTORY OF PRESENT ILLNESS: 1 or more Elements   History from : Patient    Limitations to history : None    Nehemiah Zhang is a 48 y.o. male who presents to the ER with chief complaint of sharp left shoulder pain that began about three weeks ago.  He has been using icy hot without relief. He states he fell on ice in February but denies any other injuries.  Denies decrease range of motion.     I have reviewed the nursing triage documentation and agree unless otherwise noted.    REVIEW OF SYSTEMS :    Review of Systems   Gastrointestinal:  Negative for abdominal pain.   Genitourinary:  Negative for urgency.   Musculoskeletal:  Positive for arthralgias and myalgias. Negative for joint swelling and neck pain.   Skin:  Negative for pallor, rash and wound.     Positives and Pertinent negatives as per HPI.   SURGICAL HISTORY     Past Surgical History:   Procedure Laterality Date    FOOT DEBRIDEMENT Bilateral 2/19/2021    BILATERAL TOE  DEBRIDEMENT INCISION AND DRAINAGE, AMPUTATION OF LEFT 1ST, 2ND, 3RD AND TIP OF 4TH TOE AND RIGHT GREAT TOE performed by Jules Pollock MD at [facility] OR     CURRENTMEDICATIONS       Discharge Medication List as of 3/26/2024 10:00 PM        CONTINUE these medications which have NOT CHANGED    Details   naproxen (NAPROSYN) 500 MG tablet Take 1 tablet by mouth 2 times daily, Disp-15 tablet,  TABLET    Take 1 tablet by mouth daily    HYDROCORTISONE (ANUSOL-HC) 25 MG SUPPOSITORY    Place 1 suppository rectally every 12 hours    METOPROLOL TARTRATE (LOPRESSOR) 25 MG TABLET    Take 1 tablet by mouth 2 times daily    NAPROXEN (NAPROSYN) 500 MG TABLET    Take 1 tablet by mouth 2 times daily    OMEPRAZOLE (PRILOSEC) 20 MG DELAYED RELEASE CAPSULE    Take 20 mg by mouth daily    ONDANSETRON (ZOFRAN ODT) 4 MG DISINTEGRATING TABLET    Take 1 tablet by mouth every 8 hours as needed for Nausea     ALLERGIES     Lisinopril    FAMILYHISTORY     History reviewed. No pertinent family history.     SOCIAL HISTORY       Social History     Tobacco Use    Smoking status: Former     Current packs/day: 0.00     Types: Cigarettes     Quit date: 1/1/2021     Years since quitting: 3.2    Smokeless tobacco: Never   Vaping Use    Vaping Use: Never used   Substance Use Topics    Alcohol use: Yes     Comment: most days    Drug use: No     SCREENINGS    [place] Coma Scale  Eye Opening: Spontaneous  Best Verbal Response: Oriented  Best Motor Response: Obeys commands  Lauren Coma Scale Score: 15      PHYSICAL EXAM:      ED Triage Vitals [03/26/24 1935]   BP Temp Temp Source Pulse Respirations SpO2 Height Weight - Scale   (!) 145/105 97.4 °F (36.3 °C) Oral (!) 105 17 98 % 1.829 m (6') 83.9 kg (185 lb)      Physical Exam    Constitutional:  Well developed, Well nourished.  No distress  HENT: sclerae white with no injection, non-icterus. Nose without any rhinorrhea or epistaxis. Oral mucosa is moist.   Neck: Supple, full range of motion, trachea midline  Cardiac:  tachycardia, No murmurs, rubs, clicks or gallops  Lungs: Lungs are clear to auscultation.   Musculoskeletal: 5 out of 5 strength in all 4 extremities. Full flexion, extension, abduction, adduction, supination  and pronation of all extremities and all digits. No obvious muscle atrophy is noted. No focal muscle deficits are appreciated. There is no edema, asymmetry of the

## 2025-07-05 ENCOUNTER — APPOINTMENT (OUTPATIENT)
Dept: GENERAL RADIOLOGY | Age: 50
DRG: 201 | End: 2025-07-05
Payer: MEDICAID

## 2025-07-05 ENCOUNTER — HOSPITAL ENCOUNTER (INPATIENT)
Age: 50
LOS: 1 days | Discharge: LEFT BEFORE DISCHARGE | DRG: 201 | End: 2025-07-07
Attending: EMERGENCY MEDICINE | Admitting: INTERNAL MEDICINE
Payer: MEDICAID

## 2025-07-05 DIAGNOSIS — R79.89 TROPONIN LEVEL ELEVATED: ICD-10-CM

## 2025-07-05 DIAGNOSIS — I47.10 PAROXYSMAL SUPRAVENTRICULAR TACHYCARDIA: ICD-10-CM

## 2025-07-05 DIAGNOSIS — R94.39 ABNORMAL STRESS TEST: ICD-10-CM

## 2025-07-05 DIAGNOSIS — R06.89 DYSPNEA AND RESPIRATORY ABNORMALITIES: ICD-10-CM

## 2025-07-05 DIAGNOSIS — R06.00 DYSPNEA AND RESPIRATORY ABNORMALITIES: ICD-10-CM

## 2025-07-05 DIAGNOSIS — I20.9 ANGINA PECTORIS: ICD-10-CM

## 2025-07-05 DIAGNOSIS — R07.9 CHEST PAIN, UNSPECIFIED TYPE: Primary | ICD-10-CM

## 2025-07-05 LAB
BASOPHILS # BLD: 0.02 K/UL
BASOPHILS NFR BLD: 0 % (ref 0–1)
EOSINOPHIL # BLD: 0 K/UL
EOSINOPHILS RELATIVE PERCENT: 0 % (ref 0–3)
ERYTHROCYTE [DISTWIDTH] IN BLOOD BY AUTOMATED COUNT: 16.4 % (ref 11.7–14.9)
HCT VFR BLD AUTO: 48.5 % (ref 42–52)
HGB BLD-MCNC: 15.2 G/DL (ref 13.5–18)
IMM GRANULOCYTES # BLD AUTO: 0.03 K/UL
IMM GRANULOCYTES NFR BLD: 0 %
INR PPP: 1
LACTATE BLDV-SCNC: 12.1 MMOL/L (ref 0.4–2)
LYMPHOCYTES NFR BLD: 1.12 K/UL
LYMPHOCYTES RELATIVE PERCENT: 12 % (ref 24–44)
MCH RBC QN AUTO: 28.5 PG (ref 27–31)
MCHC RBC AUTO-ENTMCNC: 31.3 G/DL (ref 32–36)
MCV RBC AUTO: 91 FL (ref 78–100)
MONOCYTES NFR BLD: 0.19 K/UL
MONOCYTES NFR BLD: 2 % (ref 0–5)
NEUTROPHILS NFR BLD: 86 % (ref 36–66)
NEUTS SEG NFR BLD: 8.14 K/UL
PARTIAL THROMBOPLASTIN TIME: 20.1 SEC (ref 25.1–37.1)
PLATELET # BLD AUTO: 391 K/UL (ref 140–440)
PMV BLD AUTO: 9.9 FL (ref 7.5–11.1)
PROTHROMBIN TIME: 13.3 SEC (ref 11.7–14.5)
RBC # BLD AUTO: 5.33 M/UL (ref 4.6–6.2)
WBC OTHER # BLD: 9.5 K/UL (ref 4–10.5)

## 2025-07-05 PROCEDURE — 84484 ASSAY OF TROPONIN QUANT: CPT

## 2025-07-05 PROCEDURE — 82550 ASSAY OF CK (CPK): CPT

## 2025-07-05 PROCEDURE — 85025 COMPLETE CBC W/AUTO DIFF WBC: CPT

## 2025-07-05 PROCEDURE — 6360000002 HC RX W HCPCS: Performed by: EMERGENCY MEDICINE

## 2025-07-05 PROCEDURE — 85610 PROTHROMBIN TIME: CPT

## 2025-07-05 PROCEDURE — 96361 HYDRATE IV INFUSION ADD-ON: CPT

## 2025-07-05 PROCEDURE — 80053 COMPREHEN METABOLIC PANEL: CPT

## 2025-07-05 PROCEDURE — 96375 TX/PRO/DX INJ NEW DRUG ADDON: CPT

## 2025-07-05 PROCEDURE — 76937 US GUIDE VASCULAR ACCESS: CPT

## 2025-07-05 PROCEDURE — 84443 ASSAY THYROID STIM HORMONE: CPT

## 2025-07-05 PROCEDURE — 2580000003 HC RX 258: Performed by: EMERGENCY MEDICINE

## 2025-07-05 PROCEDURE — 36415 COLL VENOUS BLD VENIPUNCTURE: CPT

## 2025-07-05 PROCEDURE — G0480 DRUG TEST DEF 1-7 CLASSES: HCPCS

## 2025-07-05 PROCEDURE — 83880 ASSAY OF NATRIURETIC PEPTIDE: CPT

## 2025-07-05 PROCEDURE — 93005 ELECTROCARDIOGRAM TRACING: CPT | Performed by: EMERGENCY MEDICINE

## 2025-07-05 PROCEDURE — 93005 ELECTROCARDIOGRAM TRACING: CPT | Performed by: PHYSICIAN ASSISTANT

## 2025-07-05 PROCEDURE — 83605 ASSAY OF LACTIC ACID: CPT

## 2025-07-05 PROCEDURE — 6370000000 HC RX 637 (ALT 250 FOR IP): Performed by: EMERGENCY MEDICINE

## 2025-07-05 PROCEDURE — 71045 X-RAY EXAM CHEST 1 VIEW: CPT

## 2025-07-05 PROCEDURE — 99285 EMERGENCY DEPT VISIT HI MDM: CPT

## 2025-07-05 PROCEDURE — 85730 THROMBOPLASTIN TIME PARTIAL: CPT

## 2025-07-05 RX ORDER — MORPHINE SULFATE 4 MG/ML
4 INJECTION, SOLUTION INTRAMUSCULAR; INTRAVENOUS EVERY 30 MIN PRN
Refills: 0 | Status: DISCONTINUED | OUTPATIENT
Start: 2025-07-05 | End: 2025-07-07 | Stop reason: HOSPADM

## 2025-07-05 RX ORDER — ASPIRIN 81 MG/1
324 TABLET, CHEWABLE ORAL ONCE
Status: COMPLETED | OUTPATIENT
Start: 2025-07-05 | End: 2025-07-05

## 2025-07-05 RX ORDER — ONDANSETRON 2 MG/ML
4 INJECTION INTRAMUSCULAR; INTRAVENOUS EVERY 30 MIN PRN
Status: DISCONTINUED | OUTPATIENT
Start: 2025-07-05 | End: 2025-07-06 | Stop reason: SDUPTHER

## 2025-07-05 RX ORDER — 0.9 % SODIUM CHLORIDE 0.9 %
1000 INTRAVENOUS SOLUTION INTRAVENOUS ONCE
Status: COMPLETED | OUTPATIENT
Start: 2025-07-05 | End: 2025-07-05

## 2025-07-05 RX ORDER — ADENOSINE 3 MG/ML
6 INJECTION, SOLUTION INTRAVENOUS ONCE
Status: COMPLETED | OUTPATIENT
Start: 2025-07-05 | End: 2025-07-05

## 2025-07-05 RX ADMIN — ASPIRIN 324 MG: 81 TABLET, CHEWABLE ORAL at 22:01

## 2025-07-05 RX ADMIN — MORPHINE SULFATE 4 MG: 4 INJECTION, SOLUTION INTRAMUSCULAR; INTRAVENOUS at 22:03

## 2025-07-05 RX ADMIN — SODIUM CHLORIDE 1000 ML: 0.9 INJECTION, SOLUTION INTRAVENOUS at 22:05

## 2025-07-05 RX ADMIN — ADENOSINE 6 MG: 3 INJECTION INTRAVENOUS at 22:07

## 2025-07-05 RX ADMIN — ONDANSETRON 4 MG: 2 INJECTION INTRAMUSCULAR; INTRAVENOUS at 22:02

## 2025-07-05 RX ADMIN — SODIUM CHLORIDE 1000 ML: 0.9 INJECTION, SOLUTION INTRAVENOUS at 22:06

## 2025-07-05 ASSESSMENT — PAIN DESCRIPTION - LOCATION
LOCATION: CHEST

## 2025-07-05 ASSESSMENT — PAIN - FUNCTIONAL ASSESSMENT: PAIN_FUNCTIONAL_ASSESSMENT: 0-10

## 2025-07-05 ASSESSMENT — LIFESTYLE VARIABLES
HOW OFTEN DO YOU HAVE A DRINK CONTAINING ALCOHOL: NEVER
HOW MANY STANDARD DRINKS CONTAINING ALCOHOL DO YOU HAVE ON A TYPICAL DAY: PATIENT DOES NOT DRINK

## 2025-07-05 ASSESSMENT — PAIN SCALES - GENERAL
PAINLEVEL_OUTOF10: 6
PAINLEVEL_OUTOF10: 8
PAINLEVEL_OUTOF10: 8

## 2025-07-05 ASSESSMENT — PAIN DESCRIPTION - DESCRIPTORS
DESCRIPTORS: ACHING

## 2025-07-06 ENCOUNTER — APPOINTMENT (OUTPATIENT)
Dept: CT IMAGING | Age: 50
DRG: 201 | End: 2025-07-06
Payer: MEDICAID

## 2025-07-06 PROBLEM — I47.10 SVT (SUPRAVENTRICULAR TACHYCARDIA): Status: ACTIVE | Noted: 2025-07-06

## 2025-07-06 LAB
ALBUMIN SERPL-MCNC: 3.9 G/DL (ref 3.4–5)
ALBUMIN SERPL-MCNC: 4.4 G/DL (ref 3.4–5)
ALBUMIN/GLOB SERPL: 1.3 {RATIO} (ref 1.1–2.2)
ALBUMIN/GLOB SERPL: 1.5 {RATIO} (ref 1.1–2.2)
ALP SERPL-CCNC: 69 U/L (ref 40–129)
ALP SERPL-CCNC: 81 U/L (ref 40–129)
ALT SERPL-CCNC: 46 U/L (ref 10–40)
ALT SERPL-CCNC: 57 U/L (ref 10–40)
AMPHET UR QL SCN: NEGATIVE
ANION GAP SERPL CALCULATED.3IONS-SCNC: 22 MMOL/L (ref 9–17)
ANION GAP SERPL CALCULATED.3IONS-SCNC: 26 MMOL/L (ref 9–17)
ANTI-XA UNFRAC HEPARIN: 0.13 IU/L
ANTI-XA UNFRAC HEPARIN: 0.39 IU/L
ANTI-XA UNFRAC HEPARIN: <0.1 IU/L
ARTERIAL PATENCY WRIST A: ABNORMAL
AST SERPL-CCNC: 89 U/L (ref 15–37)
AST SERPL-CCNC: 90 U/L (ref 15–37)
BARBITURATES UR QL SCN: NEGATIVE
BASOPHILS # BLD: 0.01 K/UL
BASOPHILS NFR BLD: 0 % (ref 0–1)
BENZODIAZ UR QL: NEGATIVE
BILIRUB SERPL-MCNC: 0.6 MG/DL (ref 0–1)
BILIRUB SERPL-MCNC: 0.7 MG/DL (ref 0–1)
BILIRUB UR QL STRIP: NEGATIVE
BNP SERPL-MCNC: <36 PG/ML (ref 0–125)
BODY TEMPERATURE: 37
BUN SERPL-MCNC: 14 MG/DL (ref 7–20)
BUN SERPL-MCNC: 16 MG/DL (ref 7–20)
CALCIUM SERPL-MCNC: 8.4 MG/DL (ref 8.3–10.6)
CALCIUM SERPL-MCNC: 8.6 MG/DL (ref 8.3–10.6)
CANNABINOIDS UR QL SCN: NEGATIVE
CASTS #/AREA URNS LPF: ABNORMAL /LPF
CHLORIDE SERPL-SCNC: 98 MMOL/L (ref 99–110)
CHLORIDE SERPL-SCNC: 99 MMOL/L (ref 99–110)
CK SERPL-CCNC: 358 U/L (ref 26–192)
CK SERPL-CCNC: 395 U/L (ref 26–192)
CLARITY UR: CLEAR
CO2 SERPL-SCNC: 13 MMOL/L (ref 21–32)
CO2 SERPL-SCNC: 15 MMOL/L (ref 21–32)
COCAINE UR QL SCN: NEGATIVE
COHGB MFR BLD: 0.8 % (ref 0.5–1.5)
COLOR UR: YELLOW
CREAT SERPL-MCNC: 1.5 MG/DL (ref 0.9–1.3)
CREAT SERPL-MCNC: 1.5 MG/DL (ref 0.9–1.3)
EKG ATRIAL RATE: 118 BPM
EKG ATRIAL RATE: 205 BPM
EKG DIAGNOSIS: NORMAL
EKG DIAGNOSIS: NORMAL
EKG P AXIS: 47 DEGREES
EKG P-R INTERVAL: 128 MS
EKG Q-T INTERVAL: 206 MS
EKG Q-T INTERVAL: 356 MS
EKG QRS DURATION: 82 MS
EKG QRS DURATION: 84 MS
EKG QTC CALCULATION (BAZETT): 380 MS
EKG QTC CALCULATION (BAZETT): 498 MS
EKG R AXIS: -20 DEGREES
EKG R AXIS: 0 DEGREES
EKG T AXIS: 126 DEGREES
EKG T AXIS: 71 DEGREES
EKG VENTRICULAR RATE: 118 BPM
EKG VENTRICULAR RATE: 205 BPM
EOSINOPHIL # BLD: 0 K/UL
EOSINOPHILS RELATIVE PERCENT: 0 % (ref 0–3)
EPI CELLS #/AREA URNS HPF: 1 /HPF
ERYTHROCYTE [DISTWIDTH] IN BLOOD BY AUTOMATED COUNT: 16.3 % (ref 11.7–14.9)
ERYTHROCYTE [DISTWIDTH] IN BLOOD BY AUTOMATED COUNT: 16.3 % (ref 11.7–14.9)
ETHANOLAMINE SERPL-MCNC: 94 MG/DL (ref 0–0.08)
FENTANYL UR QL: NEGATIVE
GFR, ESTIMATED: 49 ML/MIN/1.73M2
GFR, ESTIMATED: 49 ML/MIN/1.73M2
GLUCOSE SERPL-MCNC: 76 MG/DL (ref 74–99)
GLUCOSE SERPL-MCNC: 91 MG/DL (ref 74–99)
GLUCOSE UR STRIP-MCNC: NEGATIVE MG/DL
HCO3 VENOUS: 16.3 MMOL/L (ref 22–29)
HCT VFR BLD AUTO: 35.5 % (ref 42–52)
HCT VFR BLD AUTO: 35.5 % (ref 42–52)
HGB BLD-MCNC: 11.4 G/DL (ref 13.5–18)
HGB BLD-MCNC: 11.5 G/DL (ref 13.5–18)
HGB UR QL STRIP.AUTO: NEGATIVE
IMM GRANULOCYTES # BLD AUTO: 0.03 K/UL
IMM GRANULOCYTES NFR BLD: 0 %
INR PPP: 1.1
KETONES UR STRIP-MCNC: 15 MG/DL
LACTATE BLDV-SCNC: 1.6 MMOL/L (ref 0.4–2)
LACTATE BLDV-SCNC: 2 MMOL/L (ref 0.4–2)
LACTATE BLDV-SCNC: 5.9 MMOL/L (ref 0.4–2)
LACTATE BLDV-SCNC: 8 MMOL/L (ref 0.4–2)
LEUKOCYTE ESTERASE UR QL STRIP: NEGATIVE
LYMPHOCYTES NFR BLD: 0.99 K/UL
LYMPHOCYTES RELATIVE PERCENT: 15 % (ref 24–44)
MCH RBC QN AUTO: 28.1 PG (ref 27–31)
MCH RBC QN AUTO: 28.3 PG (ref 27–31)
MCHC RBC AUTO-ENTMCNC: 32.1 G/DL (ref 32–36)
MCHC RBC AUTO-ENTMCNC: 32.4 G/DL (ref 32–36)
MCV RBC AUTO: 87.2 FL (ref 78–100)
MCV RBC AUTO: 87.7 FL (ref 78–100)
METHEMOGLOBIN: 0.5 % (ref 0.5–1.5)
MONOCYTES NFR BLD: 0.38 K/UL
MONOCYTES NFR BLD: 6 % (ref 0–5)
MUCOUS THREADS URNS QL MICRO: ABNORMAL
NEGATIVE BASE EXCESS, VEN: 7.9 MMOL/L (ref 0–3)
NEUTROPHILS NFR BLD: 79 % (ref 36–66)
NEUTS SEG NFR BLD: 5.36 K/UL
NITRITE UR QL STRIP: NEGATIVE
OPIATES UR QL SCN: POSITIVE
OXYCODONE UR QL SCN: NEGATIVE
OXYHGB MFR BLD: 69 %
PARTIAL THROMBOPLASTIN TIME: 24.3 SEC (ref 25.1–37.1)
PCO2 VENOUS: 29.6 MM HG (ref 38–54)
PH UR STRIP: 6 [PH] (ref 5–8)
PH VENOUS: 7.36 (ref 7.32–7.43)
PLATELET # BLD AUTO: 245 K/UL (ref 140–440)
PLATELET # BLD AUTO: 248 K/UL (ref 140–440)
PMV BLD AUTO: 8.8 FL (ref 7.5–11.1)
PMV BLD AUTO: 9.1 FL (ref 7.5–11.1)
PO2 VENOUS: 39.6 MM HG (ref 23–48)
POTASSIUM SERPL-SCNC: 4.8 MMOL/L (ref 3.5–5.1)
POTASSIUM SERPL-SCNC: 5.6 MMOL/L (ref 3.5–5.1)
PROT SERPL-MCNC: 6.6 G/DL (ref 6.4–8.2)
PROT SERPL-MCNC: 7.7 G/DL (ref 6.4–8.2)
PROT UR STRIP-MCNC: 30 MG/DL
PROTHROMBIN TIME: 14.6 SEC (ref 11.7–14.5)
RBC # BLD AUTO: 4.05 M/UL (ref 4.6–6.2)
RBC # BLD AUTO: 4.07 M/UL (ref 4.6–6.2)
RBC #/AREA URNS HPF: 1 /HPF (ref 0–2)
SODIUM SERPL-SCNC: 136 MMOL/L (ref 136–145)
SODIUM SERPL-SCNC: 138 MMOL/L (ref 136–145)
SP GR UR STRIP: 1.02 (ref 1–1.03)
TEST INFORMATION: ABNORMAL
TROPONIN I SERPL HS-MCNC: 38 NG/L (ref 0–22)
TROPONIN I SERPL HS-MCNC: 47 NG/L (ref 0–22)
TROPONIN I SERPL HS-MCNC: 65 NG/L (ref 0–22)
TROPONIN I SERPL HS-MCNC: 70 NG/L (ref 0–22)
TROPONIN I SERPL HS-MCNC: 76 NG/L (ref 0–22)
TSH SERPL DL<=0.05 MIU/L-ACNC: 1.29 UIU/ML (ref 0.27–4.2)
UROBILINOGEN UR STRIP-ACNC: 0.2 EU/DL (ref 0–1)
WBC #/AREA URNS HPF: 1 /HPF (ref 0–5)
WBC OTHER # BLD: 6.8 K/UL (ref 4–10.5)
WBC OTHER # BLD: 6.9 K/UL (ref 4–10.5)

## 2025-07-06 PROCEDURE — 85520 HEPARIN ASSAY: CPT

## 2025-07-06 PROCEDURE — 81001 URINALYSIS AUTO W/SCOPE: CPT

## 2025-07-06 PROCEDURE — G0378 HOSPITAL OBSERVATION PER HR: HCPCS

## 2025-07-06 PROCEDURE — 71275 CT ANGIOGRAPHY CHEST: CPT

## 2025-07-06 PROCEDURE — 96365 THER/PROPH/DIAG IV INF INIT: CPT

## 2025-07-06 PROCEDURE — 5A2204Z RESTORATION OF CARDIAC RHYTHM, SINGLE: ICD-10-PCS | Performed by: INTERNAL MEDICINE

## 2025-07-06 PROCEDURE — 6360000002 HC RX W HCPCS: Performed by: INTERNAL MEDICINE

## 2025-07-06 PROCEDURE — 6370000000 HC RX 637 (ALT 250 FOR IP): Performed by: INTERNAL MEDICINE

## 2025-07-06 PROCEDURE — 85027 COMPLETE CBC AUTOMATED: CPT

## 2025-07-06 PROCEDURE — 96366 THER/PROPH/DIAG IV INF ADDON: CPT

## 2025-07-06 PROCEDURE — 94761 N-INVAS EAR/PLS OXIMETRY MLT: CPT

## 2025-07-06 PROCEDURE — 85025 COMPLETE CBC W/AUTO DIFF WBC: CPT

## 2025-07-06 PROCEDURE — 96376 TX/PRO/DX INJ SAME DRUG ADON: CPT

## 2025-07-06 PROCEDURE — 6360000002 HC RX W HCPCS: Performed by: EMERGENCY MEDICINE

## 2025-07-06 PROCEDURE — 83605 ASSAY OF LACTIC ACID: CPT

## 2025-07-06 PROCEDURE — 93010 ELECTROCARDIOGRAM REPORT: CPT | Performed by: INTERNAL MEDICINE

## 2025-07-06 PROCEDURE — 85610 PROTHROMBIN TIME: CPT

## 2025-07-06 PROCEDURE — 2500000003 HC RX 250 WO HCPCS: Performed by: INTERNAL MEDICINE

## 2025-07-06 PROCEDURE — 85730 THROMBOPLASTIN TIME PARTIAL: CPT

## 2025-07-06 PROCEDURE — 80307 DRUG TEST PRSMV CHEM ANLYZR: CPT

## 2025-07-06 PROCEDURE — 96368 THER/DIAG CONCURRENT INF: CPT

## 2025-07-06 PROCEDURE — 82805 BLOOD GASES W/O2 SATURATION: CPT

## 2025-07-06 PROCEDURE — 2060000000 HC ICU INTERMEDIATE R&B

## 2025-07-06 PROCEDURE — 99254 IP/OBS CNSLTJ NEW/EST MOD 60: CPT | Performed by: INTERNAL MEDICINE

## 2025-07-06 PROCEDURE — 96361 HYDRATE IV INFUSION ADD-ON: CPT

## 2025-07-06 PROCEDURE — 6360000004 HC RX CONTRAST MEDICATION: Performed by: INTERNAL MEDICINE

## 2025-07-06 PROCEDURE — 36415 COLL VENOUS BLD VENIPUNCTURE: CPT

## 2025-07-06 PROCEDURE — 80053 COMPREHEN METABOLIC PANEL: CPT

## 2025-07-06 PROCEDURE — 84484 ASSAY OF TROPONIN QUANT: CPT

## 2025-07-06 PROCEDURE — 96375 TX/PRO/DX INJ NEW DRUG ADDON: CPT

## 2025-07-06 PROCEDURE — 2580000003 HC RX 258: Performed by: EMERGENCY MEDICINE

## 2025-07-06 PROCEDURE — 82550 ASSAY OF CK (CPK): CPT

## 2025-07-06 RX ORDER — NITROGLYCERIN 0.4 MG/1
0.4 TABLET SUBLINGUAL EVERY 5 MIN PRN
Status: DISCONTINUED | OUTPATIENT
Start: 2025-07-05 | End: 2025-07-07 | Stop reason: HOSPADM

## 2025-07-06 RX ORDER — NITROGLYCERIN 20 MG/100ML
5-200 INJECTION INTRAVENOUS CONTINUOUS
Status: DISCONTINUED | OUTPATIENT
Start: 2025-07-06 | End: 2025-07-07

## 2025-07-06 RX ORDER — PHENOBARBITAL 32.4 MG/1
32.4 TABLET ORAL 4 TIMES DAILY
Status: DISCONTINUED | OUTPATIENT
Start: 2025-07-07 | End: 2025-07-06 | Stop reason: SDUPTHER

## 2025-07-06 RX ORDER — ONDANSETRON 2 MG/ML
4 INJECTION INTRAMUSCULAR; INTRAVENOUS EVERY 6 HOURS PRN
Status: DISCONTINUED | OUTPATIENT
Start: 2025-07-06 | End: 2025-07-07 | Stop reason: HOSPADM

## 2025-07-06 RX ORDER — ACETAMINOPHEN 650 MG/1
650 SUPPOSITORY RECTAL EVERY 6 HOURS PRN
Status: DISCONTINUED | OUTPATIENT
Start: 2025-07-06 | End: 2025-07-07 | Stop reason: HOSPADM

## 2025-07-06 RX ORDER — 0.9 % SODIUM CHLORIDE 0.9 %
1000 INTRAVENOUS SOLUTION INTRAVENOUS ONCE
Status: COMPLETED | OUTPATIENT
Start: 2025-07-06 | End: 2025-07-06

## 2025-07-06 RX ORDER — PHENOBARBITAL 32.4 MG/1
64.8 TABLET ORAL EVERY 6 HOURS PRN
Status: DISCONTINUED | OUTPATIENT
Start: 2025-07-06 | End: 2025-07-06 | Stop reason: SDUPTHER

## 2025-07-06 RX ORDER — POLYETHYLENE GLYCOL 3350 17 G/17G
17 POWDER, FOR SOLUTION ORAL DAILY PRN
Status: DISCONTINUED | OUTPATIENT
Start: 2025-07-06 | End: 2025-07-07 | Stop reason: HOSPADM

## 2025-07-06 RX ORDER — ACETAMINOPHEN 325 MG/1
650 TABLET ORAL EVERY 6 HOURS PRN
Status: DISCONTINUED | OUTPATIENT
Start: 2025-07-06 | End: 2025-07-07 | Stop reason: HOSPADM

## 2025-07-06 RX ORDER — AMLODIPINE BESYLATE 5 MG/1
5 TABLET ORAL DAILY
Status: DISCONTINUED | OUTPATIENT
Start: 2025-07-06 | End: 2025-07-07

## 2025-07-06 RX ORDER — PHENOBARBITAL 32.4 MG/1
32.4 TABLET ORAL DAILY
Status: DISCONTINUED | OUTPATIENT
Start: 2025-07-08 | End: 2025-07-07 | Stop reason: HOSPADM

## 2025-07-06 RX ORDER — PHENOBARBITAL SODIUM 65 MG/ML
65 INJECTION, SOLUTION INTRAMUSCULAR; INTRAVENOUS EVERY 6 HOURS PRN
Status: DISCONTINUED | OUTPATIENT
Start: 2025-07-06 | End: 2025-07-07 | Stop reason: HOSPADM

## 2025-07-06 RX ORDER — SODIUM CHLORIDE 0.9 % (FLUSH) 0.9 %
5-40 SYRINGE (ML) INJECTION EVERY 12 HOURS SCHEDULED
Status: DISCONTINUED | OUTPATIENT
Start: 2025-07-06 | End: 2025-07-07 | Stop reason: HOSPADM

## 2025-07-06 RX ORDER — ONDANSETRON 4 MG/1
4 TABLET, ORALLY DISINTEGRATING ORAL EVERY 8 HOURS PRN
Status: DISCONTINUED | OUTPATIENT
Start: 2025-07-06 | End: 2025-07-07 | Stop reason: HOSPADM

## 2025-07-06 RX ORDER — HEPARIN SODIUM 1000 [USP'U]/ML
4000 INJECTION, SOLUTION INTRAVENOUS; SUBCUTANEOUS PRN
Status: DISCONTINUED | OUTPATIENT
Start: 2025-07-06 | End: 2025-07-07

## 2025-07-06 RX ORDER — CARVEDILOL 6.25 MG/1
6.25 TABLET ORAL 2 TIMES DAILY WITH MEALS
Status: DISCONTINUED | OUTPATIENT
Start: 2025-07-06 | End: 2025-07-07 | Stop reason: HOSPADM

## 2025-07-06 RX ORDER — LABETALOL HYDROCHLORIDE 5 MG/ML
10 INJECTION, SOLUTION INTRAVENOUS ONCE
Status: COMPLETED | OUTPATIENT
Start: 2025-07-06 | End: 2025-07-06

## 2025-07-06 RX ORDER — HEPARIN SODIUM 1000 [USP'U]/ML
4000 INJECTION, SOLUTION INTRAVENOUS; SUBCUTANEOUS ONCE
Status: COMPLETED | OUTPATIENT
Start: 2025-07-06 | End: 2025-07-06

## 2025-07-06 RX ORDER — PHENOBARBITAL 32.4 MG/1
32.4 TABLET ORAL 2 TIMES DAILY
Status: DISCONTINUED | OUTPATIENT
Start: 2025-07-07 | End: 2025-07-07 | Stop reason: HOSPADM

## 2025-07-06 RX ORDER — LANOLIN ALCOHOL/MO/W.PET/CERES
100 CREAM (GRAM) TOPICAL DAILY
Status: DISCONTINUED | OUTPATIENT
Start: 2025-07-06 | End: 2025-07-07 | Stop reason: HOSPADM

## 2025-07-06 RX ORDER — PHENOBARBITAL 32.4 MG/1
16.2 TABLET ORAL EVERY 6 HOURS PRN
Status: DISCONTINUED | OUTPATIENT
Start: 2025-07-09 | End: 2025-07-06 | Stop reason: SDUPTHER

## 2025-07-06 RX ORDER — HEPARIN SODIUM 1000 [USP'U]/ML
2000 INJECTION, SOLUTION INTRAVENOUS; SUBCUTANEOUS PRN
Status: DISCONTINUED | OUTPATIENT
Start: 2025-07-06 | End: 2025-07-07

## 2025-07-06 RX ORDER — MAGNESIUM SULFATE IN WATER 40 MG/ML
2000 INJECTION, SOLUTION INTRAVENOUS PRN
Status: DISCONTINUED | OUTPATIENT
Start: 2025-07-06 | End: 2025-07-07 | Stop reason: HOSPADM

## 2025-07-06 RX ORDER — PHENOBARBITAL 32.4 MG/1
64.8 TABLET ORAL 2 TIMES DAILY
Status: COMPLETED | OUTPATIENT
Start: 2025-07-06 | End: 2025-07-06

## 2025-07-06 RX ORDER — PANTOPRAZOLE SODIUM 40 MG/1
40 TABLET, DELAYED RELEASE ORAL
Status: DISCONTINUED | OUTPATIENT
Start: 2025-07-06 | End: 2025-07-07 | Stop reason: HOSPADM

## 2025-07-06 RX ORDER — PHENOBARBITAL 32.4 MG/1
32.4 TABLET ORAL 2 TIMES DAILY
Status: DISCONTINUED | OUTPATIENT
Start: 2025-07-08 | End: 2025-07-06 | Stop reason: SDUPTHER

## 2025-07-06 RX ORDER — ONDANSETRON 2 MG/ML
4 INJECTION INTRAMUSCULAR; INTRAVENOUS EVERY 30 MIN PRN
Status: DISCONTINUED | OUTPATIENT
Start: 2025-07-05 | End: 2025-07-06 | Stop reason: SDUPTHER

## 2025-07-06 RX ORDER — IOPAMIDOL 755 MG/ML
80 INJECTION, SOLUTION INTRAVASCULAR
Status: COMPLETED | OUTPATIENT
Start: 2025-07-06 | End: 2025-07-06

## 2025-07-06 RX ORDER — SODIUM CHLORIDE 0.9 % (FLUSH) 0.9 %
5-40 SYRINGE (ML) INJECTION PRN
Status: DISCONTINUED | OUTPATIENT
Start: 2025-07-06 | End: 2025-07-07 | Stop reason: HOSPADM

## 2025-07-06 RX ORDER — PHENOBARBITAL 32.4 MG/1
32.4 TABLET ORAL EVERY 6 HOURS PRN
Status: DISCONTINUED | OUTPATIENT
Start: 2025-07-07 | End: 2025-07-06 | Stop reason: SDUPTHER

## 2025-07-06 RX ORDER — HEPARIN SODIUM 10000 [USP'U]/100ML
5-30 INJECTION, SOLUTION INTRAVENOUS CONTINUOUS
Status: DISCONTINUED | OUTPATIENT
Start: 2025-07-06 | End: 2025-07-07

## 2025-07-06 RX ORDER — SODIUM CHLORIDE 9 MG/ML
INJECTION, SOLUTION INTRAVENOUS PRN
Status: DISCONTINUED | OUTPATIENT
Start: 2025-07-06 | End: 2025-07-07 | Stop reason: HOSPADM

## 2025-07-06 RX ORDER — PHENOBARBITAL 32.4 MG/1
16.2 TABLET ORAL 2 TIMES DAILY
Status: DISCONTINUED | OUTPATIENT
Start: 2025-07-09 | End: 2025-07-06 | Stop reason: SDUPTHER

## 2025-07-06 RX ORDER — ENOXAPARIN SODIUM 100 MG/ML
40 INJECTION SUBCUTANEOUS DAILY
Status: DISCONTINUED | OUTPATIENT
Start: 2025-07-06 | End: 2025-07-06 | Stop reason: ALTCHOICE

## 2025-07-06 RX ORDER — PHENOBARBITAL 32.4 MG/1
64.8 TABLET ORAL 4 TIMES DAILY
Status: DISCONTINUED | OUTPATIENT
Start: 2025-07-06 | End: 2025-07-06 | Stop reason: SDUPTHER

## 2025-07-06 RX ADMIN — HEPARIN SODIUM 12 UNITS/KG/HR: 10000 INJECTION, SOLUTION INTRAVENOUS at 09:59

## 2025-07-06 RX ADMIN — IOPAMIDOL 80 ML: 755 INJECTION, SOLUTION INTRAVENOUS at 03:33

## 2025-07-06 RX ADMIN — SODIUM CHLORIDE 1000 ML: 0.9 INJECTION, SOLUTION INTRAVENOUS at 00:42

## 2025-07-06 RX ADMIN — CARVEDILOL 6.25 MG: 6.25 TABLET, FILM COATED ORAL at 17:11

## 2025-07-06 RX ADMIN — HEPARIN SODIUM 4000 UNITS: 1000 INJECTION INTRAVENOUS; SUBCUTANEOUS at 09:42

## 2025-07-06 RX ADMIN — PHENOBARBITAL 64.8 MG: 32.4 TABLET ORAL at 09:42

## 2025-07-06 RX ADMIN — MORPHINE SULFATE 4 MG: 4 INJECTION, SOLUTION INTRAMUSCULAR; INTRAVENOUS at 00:43

## 2025-07-06 RX ADMIN — NITROGLYCERIN 5 MCG/MIN: 20 INJECTION INTRAVENOUS at 02:04

## 2025-07-06 RX ADMIN — LABETALOL HYDROCHLORIDE 10 MG: 5 INJECTION, SOLUTION INTRAVENOUS at 00:45

## 2025-07-06 RX ADMIN — LIDOCAINE HYDROCHLORIDE: 20 SOLUTION ORAL at 00:41

## 2025-07-06 RX ADMIN — PANTOPRAZOLE SODIUM 40 MG: 40 TABLET, DELAYED RELEASE ORAL at 09:41

## 2025-07-06 RX ADMIN — HEPARIN SODIUM 2000 UNITS: 1000 INJECTION INTRAVENOUS; SUBCUTANEOUS at 18:02

## 2025-07-06 RX ADMIN — Medication 100 MG: at 09:41

## 2025-07-06 RX ADMIN — CARVEDILOL 6.25 MG: 6.25 TABLET, FILM COATED ORAL at 10:02

## 2025-07-06 RX ADMIN — SODIUM CHLORIDE, PRESERVATIVE FREE 10 ML: 5 INJECTION INTRAVENOUS at 21:21

## 2025-07-06 RX ADMIN — AMLODIPINE BESYLATE 5 MG: 5 TABLET ORAL at 10:02

## 2025-07-06 RX ADMIN — PHENOBARBITAL 64.8 MG: 32.4 TABLET ORAL at 21:21

## 2025-07-06 ASSESSMENT — PAIN SCALES - GENERAL
PAINLEVEL_OUTOF10: 3
PAINLEVEL_OUTOF10: 8
PAINLEVEL_OUTOF10: 8
PAINLEVEL_OUTOF10: 6
PAINLEVEL_OUTOF10: 4
PAINLEVEL_OUTOF10: 7
PAINLEVEL_OUTOF10: 7
PAINLEVEL_OUTOF10: 3
PAINLEVEL_OUTOF10: 7

## 2025-07-06 ASSESSMENT — PAIN DESCRIPTION - LOCATION
LOCATION: CHEST

## 2025-07-06 ASSESSMENT — PAIN DESCRIPTION - DESCRIPTORS
DESCRIPTORS: ACHING
DESCRIPTORS: ACHING

## 2025-07-06 ASSESSMENT — PAIN SCALES - WONG BAKER: WONGBAKER_NUMERICALRESPONSE: HURTS EVEN MORE

## 2025-07-06 NOTE — PLAN OF CARE
Problem: Safety - Adult  Goal: Free from fall injury  7/6/2025 1928 by Lori Manjarrez RN  Outcome: Progressing  7/6/2025 0855 by Fabienne Quispe RN  Outcome: Progressing     Problem: Discharge Planning  Goal: Discharge to home or other facility with appropriate resources  7/6/2025 1928 by Lori Manjarrez RN  Outcome: Progressing  7/6/2025 0855 by Fabienne Quispe RN  Outcome: Progressing     Problem: Pain  Goal: Verbalizes/displays adequate comfort level or baseline comfort level  7/6/2025 1928 by Lori Manjarrez RN  Outcome: Progressing  7/6/2025 0855 by Fabienne Quispe RN  Outcome: Progressing     Problem: Seizure Precautions  Goal: Remains free of injury related to seizures activity  7/6/2025 1928 by Lori Manjarrez RN  Outcome: Progressing  7/6/2025 0855 by Fabienne Quispe RN  Outcome: Progressing

## 2025-07-06 NOTE — ED TRIAGE NOTES
Patient arrived to ED via walk in. Patient c/o chest pain. When patient arrived to room patient heart rate at 204. Patient A&O x4.

## 2025-07-06 NOTE — PROGRESS NOTES
Electronically Signed By: Rizwan Carranza MD [facility] 7/6/2025 4:07        XR CHEST PORTABLE  Result Date: 7/5/2025  PORTABLE CHEST X-RAY Date of Service: 7/5/2025 21:58 HISTORY: 49 years male complaining of chest pain. COMPARISON:  12/24/2021 FINDINGS: The costophrenic angles are clear. The cardiac and mediastinal silhouettes are within normal limits. There is no evidence of pneumonia, pleural  effusions, or a pneumothorax. The osseous structures are unremarkable. There has been no interval change when compared with the prior study. IMPRESSION: Negative portable chest radiograph.  Dictated and Electronically Signed By: Elbert Cason DO [facility] 7/5/2025 22:47          Electronically signed by Shelby Correa MD on 7/6/2025 at 7:47 AM

## 2025-07-06 NOTE — H&P
History and Physical      Name:  Nehemiah Zhang /Age/Sex: 1975  (49 y.o. male)   MRN & CSN:  [id number] & [id number] Encounter Date/Time: 2025 12:22 AM EDT   Location:   PCP: Violeta Griffith APRN - CNP       Hospital Day: 2    Assessment and Plan:     #.  SVT  - Converted to sinus with 1 dose of adenosine  - Still tachycardic  - Patient is on metoprolol-noncompliant  - Resume home medications after urine drug screen    #.  Hypertensive urgency  #.  Chest pain  - Received labetalol in ER  - Started on nitro drip  - CTA chest ordered as patient continued to have chest pain in spite of morphine, nitro  - Consult cardiology  - Troponin 38, 47, no acute ST-T wave changes    #.  JOSETTE  #.  Mild hyperkalemia  #.  [place]ongap metabolic acidosis  #.  Lactic acidosis  - BUN/creatinine-14/1.5, was 13/0.9-2021  -CK-395  - Patient received 3 L NS bolus in ER  - Monitor with repeat BMP    #.  Chronically elevated LFTs  - Hepatitis panel negative-2021  - CT abdomen/pelvis--diffuse hepatic steatosis    #.  Alcohol use  - Patient reports occasional alcohol use  - Ethanol level 94  - Patient being hypertensive, tachycardic will treat for alcohol withdrawal  - CIWA protocol ordered  - Phenobarbital ordered  - Continue thiamine    #.  Bilateral toe amputation-2021 secondary to frostbite  - Left first second third, tip of the fourth toe, right great toe amputation    #.  Chronic tobacco dependence  - Reports smoking on and off    Disposition:   Current Living situation: Home    Diet Cardiac   DVT Prophylaxis [x] Lovenox   Code Status Full   Surrogate Decision Maker/ POA      History from:   EMR, patient.    History of Present Illness:     Chief Complaint: SVT (supraventricular tachycardia)  Nehemiah Zhang is a 49 y.o. male presented to ED with complaints of chest pain and shortness of breath.  Rapid response was called when the patient was found in front of the hospital and was

## 2025-07-06 NOTE — PROGRESS NOTES
V2.0  [facility] Hospitalist Progress Note      Name:  Nehemiah Zhang /Age/Sex: 1975  (49 y.o. male)   MRN & CSN:  [id number] & [id number] Encounter Date/Time: 2025 7:47 AM EDT    Location:  -A PCP: Violeta Griffith APRN - CNP       Hospital Day: 2    Assessment and Plan:   Nehemiah Zhang is a 49 y.o. male  who presents with SVT (supraventricular tachycardia)    #.  SVT-resolved  - Converted to sinus with 1 dose of adenosine  - Still tachycardic, now improving  - Patient is on metoprolol-noncompliant  Resume home medications after urine drug screen     #.  Hypertensive urgency-resolved  #.  Chest pain  #NSTEMI  - Received labetalol in ER  -CTA chest limited but not showing any large filling defects within main PA or proximal left/right PA  - Started on nitro drip  - CTA chest ordered as patient continued to have chest pain in spite of morphine, nitro  - Troponin 38--> 47-->76  -EKG with no acute ST-T wave changes  Cardiology consulted, appreciate recs  continue trending troponin  Plan for stress test tomorrow  Npo at midnight     #.  JOSETTE-suspect that this may be CKD rather than JOSETTE   #.  Mild hyperkalemia-resolved  #.  Nongap metabolic acidosis  #.  Lactic acidosis-resolved  - BUN/creatinine-14/1.5, was 13/0.9-2021  -CK-395  - Patient received 3 L NS bolus in ER  Monitor with repeat BMP, continue trending lactic acid level     #.  Chronically elevated LFTs  - Hepatitis panel negative-2021  - CT abdomen/pelvis--diffuse hepatic steatosis     #.  Alcohol use  - Patient reports occasional alcohol use  - Ethanol level 94  - Patient being hypertensive, tachycardic will treat for alcohol withdrawal  - CIWA protocol ordered  - Phenobarbital ordered  - Continue thiamine     #.  Bilateral toe amputation-2021 secondary to frostbite  - Left first second third, tip of the fourth toe, right great toe amputation     #.  Chronic tobacco dependence  - Reports smoking on and  1.1 - 2.2    Total Bilirubin 0.7 0.0 - 1.0 mg/dL    Alkaline Phosphatase 81 40 - 129 U/L    ALT 57 (H) 10 - 40 U/L    AST 90 (H) 15 - 37 U/L   TSH    Collection Time: 07/05/25 11:41 PM   Result Value Ref Range    TSH 1.29 0.27 - 4.20 uIU/mL   Troponin    Collection Time: 07/06/25 12:39 AM   Result Value Ref Range    Troponin, High Sensitivity 47 (H) 0 - 22 ng/L   CBC with Diff    Collection Time: 07/06/25  4:07 AM   Result Value Ref Range    WBC 6.8 4.0 - 10.5 k/uL    RBC 4.05 (L) 4.60 - 6.20 m/uL    Hemoglobin 11.4 (L) 13.5 - 18.0 g/dL    Hematocrit 35.5 (L) 42.0 - 52.0 %    MCV 87.7 78.0 - 100.0 fL    MCH 28.1 27.0 - 31.0 pg    MCHC 32.1 32.0 - 36.0 g/dL    RDW 16.3 (H) 11.7 - 14.9 %    Platelets 248 140 - 440 k/uL    MPV 8.8 7.5 - 11.1 fL    Neutrophils % 79 (H) 36 - 66 %    Lymphocytes % 15 (L) 24 - 44 %    Monocytes % 6 (H) 0 - 5 %    Eosinophils % 0 0.0 - 3.0 %    Basophils % 0 0 - 1 %    Immature Granulocytes % 0 0 %    Neutrophils Absolute 5.36 k/uL    Lymphocytes Absolute 0.99 k/uL    Monocytes Absolute 0.38 k/uL    Eosinophils Absolute 0.00 k/uL    Basophils Absolute 0.01 k/uL    Immature Granulocytes Absolute 0.03 k/uL   Comprehensive Metabolic Panel w/ Reflex to MG    Collection Time: 07/06/25  4:07 AM   Result Value Ref Range    Sodium 136 136 - 145 mmol/L    Potassium 4.8 3.5 - 5.1 mmol/L    Chloride 99 99 - 110 mmol/L    CO2 15 (L) 21 - 32 mmol/L    Anion Gap 22 (H) 9 - 17 mmol/L    Glucose 91 74 - 99 mg/dL    BUN 16 7 - 20 mg/dL    Creatinine 1.5 (H) 0.9 - 1.3 mg/dL    Est, Glom Filt Rate 49 (L) >60 mL/min/1.73m2    Calcium 8.4 8.3 - 10.6 mg/dL    Total Protein 6.6 6.4 - 8.2 g/dL    Albumin 3.9 3.4 - 5.0 g/dL    Albumin/Globulin Ratio 1.5 1.1 - 2.2    Total Bilirubin 0.6 0.0 - 1.0 mg/dL    Alkaline Phosphatase 69 40 - 129 U/L    ALT 46 (H) 10 - 40 U/L    AST 89 (H) 15 - 37 U/L   Troponin    Collection Time: 07/06/25  4:07 AM   Result Value Ref Range    Troponin, High Sensitivity 76 (HH) 0 - 22 ng/L

## 2025-07-06 NOTE — PROGRESS NOTES
4 Eyes Skin Assessment     NAME:  Nehemiah Zhang  YOB: 1975  MEDICAL RECORD NUMBER:  [id number]    The patient is being assessed for  Admission    I agree that at least one RN has performed a thorough Head to Toe Skin Assessment on the patient. ALL assessment sites listed below have been assessed.      Areas assessed by both nurses:    Head, Face, Ears, Shoulders, Back, Chest, Arms, Elbows, Hands, Sacrum. Buttock, Coccyx, Ischium, Legs. Feet and Heels, and Under Medical Devices         Does the Patient have a Wound? No noted wound(s)       Naveen Prevention initiated by RN: No  Wound Care Orders initiated by RN: No    Pressure Injury (Stage 1,2,3,4, Unstageable, DTI, NWPT, and Complex wounds) if present, place Wound referral order by RN under : No    New Ostomies, if present place, Ostomy referral order under : No     Nurse 1 eSignature: Electronically signed by Roger Allen RN on 7/6/25 at 6:17 AM EDT    **SHARE this note so that the co-signing nurse can place an eSignature**    Nurse 2 eSignature: Electronically signed by Claus Pozo RN on 7/6/25 at 6:40 AM EDT

## 2025-07-06 NOTE — CONSULTS
IV Consult complete.  Nexiva 20g 1.75\" PIV Inserted in right Forearm  x 1 attempt using sterile ultrasound guided technique.  Brisk blood return, flushes easy.

## 2025-07-06 NOTE — ED PROVIDER NOTES
[facility]      TRIAGE CHIEF COMPLAINT:   Chest Pain      [place]:  Nehemiah Zhang is a 49 y.o. male that presents with complaint of chest pain, shortness of breath.  Patient was walking here to the hospital states he was at the park all day today a rapid response was called outside the hospital doors because  found him on the park bench complain of chest pain shortness of breath.  Patient brought in emergently to the ER.  Patient states he was walking and he had chest pain shortness of breath palpitations nausea lightheaded.  Denies any headache denies any heart problems or abdominal pain or lung problems.  Denies any drug use he has been drinking some alcohol today.  No other questions or concerns.  Upon arrival he is in SVT heart rate of 200..    REVIEW OF SYSTEMS:  At least 10 systems reviewed and otherwise acutely negative except as in the [place].    Review of Systems   Constitutional:  Positive for activity change, diaphoresis and fatigue.   HENT: Negative.     Eyes: Negative.    Respiratory:  Positive for shortness of breath.    Cardiovascular:  Positive for chest pain and palpitations.   Gastrointestinal:  Positive for nausea and vomiting.   Endocrine: Negative.    Genitourinary: Negative.    Musculoskeletal: Negative.    Skin: Negative.    Allergic/Immunologic: Negative.    Neurological:  Positive for light-headedness.   Hematological: Negative.    Psychiatric/Behavioral: Negative.     All other systems reviewed and are negative.      Past Medical History:   Diagnosis Date    Hypertension     does not take medications     Past Surgical History:   Procedure Laterality Date    FOOT DEBRIDEMENT Bilateral 2/19/2021    BILATERAL TOE  DEBRIDEMENT INCISION AND DRAINAGE, AMPUTATION OF LEFT 1ST, 2ND, 3RD AND TIP OF 4TH TOE AND RIGHT GREAT TOE performed by Jules Pollock MD at [facility] OR     History reviewed. No pertinent family history.  Social History     Socioeconomic  applicable):  No follow-up provider specified.    DISPOSITION MEDICATIONS (if applicable):  Current Discharge Medication List             Quoc Phillips DO     [     Quoc Phillips DO  07/06/25 1958

## 2025-07-06 NOTE — ED NOTES
ED TO INPATIENT SBAR HANDOFF    Patient Name: Nehemiah Zhang   :  1975  49 y.o.   Preferred Name  Hardeep  Family/Caregiver Present no   Restraints no   C-SSRS: Risk of Suicide: No Risk  Sitter no   Sepsis Risk Score Sepsis V2 Risk Score: 17    PLEASE NOTE--Encounter / Re-Admission Within 30 Days  This patient has had another encounter or admission within the last 30 days.      No data recorded    Situation  Chief Complaint   Patient presents with    Chest Pain     Brief Description of Patient's Condition:     Patient arrived to ED via walk in. Patient c/o chest pain. When patient arrived to room patient heart rate at 204. Patient A&O x4.      Mental Status: oriented and alert  Arrived from:   home  Imaging:   XR CHEST PORTABLE   Final Result        Abnormal labs:   Abnormal Labs Reviewed   CBC WITH AUTO DIFFERENTIAL - Abnormal; Notable for the following components:       Result Value    MCHC 31.3 (*)     RDW 16.4 (*)     Neutrophils % 86 (*)     Lymphocytes % 12 (*)     All other components within normal limits   TROPONIN - Abnormal; Notable for the following components:    Troponin, High Sensitivity 38 (*)     All other components within normal limits   LACTATE, SEPSIS - Abnormal; Notable for the following components:    Lactic Acid, Sepsis 12.1 (*)     All other components within normal limits   LACTATE, SEPSIS - Abnormal; Notable for the following components:    Lactic Acid, Sepsis 8.0 (*)     All other components within normal limits   APTT - Abnormal; Notable for the following components:    APTT 20.1 (*)     All other components within normal limits   ETHANOL - Abnormal; Notable for the following components:    Ethanol Lvl 94 (*)     All other components within normal limits   CK - Abnormal; Notable for the following components:    Total  (*)     All other components within normal limits   COMPREHENSIVE METABOLIC PANEL - Abnormal; Notable for the following components:    Potassium 5.6 (*)      Chloride 98 (*)     CO2 13 (*)     Anion Gap 26 (*)     Creatinine 1.5 (*)     Est, Glom Filt Rate 49 (*)     ALT 57 (*)     AST 90 (*)     All other components within normal limits        Background  History:   Past Medical History:   Diagnosis Date    Hypertension     does not take medications       Assessment    Vitals: MEWS Score: 5  Level of Consciousness: Alert (0)   Vitals:    07/06/25 0008 07/06/25 0017 07/06/25 0032 07/06/25 0043   BP: (!) 184/110 (!) 180/105 (!) 188/104    Pulse:  (!) 112 (!) 113    Resp:  22 17 18   Temp:       TempSrc:       SpO2:  99% 98%    Weight:       Height:           PO Status: Cardiac    O2 Flow Rate: O2 Device: None (Room air)      Cardiac Rhythm: sinus tachycardia    Last documented pain medication administered: 4mg morphine @ 0050    NIH Score: NIH       Active LDA's:   Peripheral IV 07/05/25 Proximal;Right Forearm (Active)   Site Assessment Clean, dry & intact 07/05/25 2239   Line Status Brisk blood return;Flushed;Normal saline locked 07/05/25 2239   Line Care Connections checked and tightened 07/05/25 2239   Phlebitis Assessment No symptoms 07/05/25 2239   Infiltration Assessment 0 07/05/25 2239   Alcohol Cap Used Yes 07/05/25 2239   Dressing Status New dressing applied;Clean, dry & intact 07/05/25 2239   Dressing Type Transparent 07/05/25 2239   Dressing Intervention New 07/05/25 2239       Pertinent or High Risk Medications/Drips: no   If Yes, please provide details:       Blood Product Administration: no  If Yes, please provide details:     Recommendation    Incomplete orders ---urinalysis needed  Additional Comments:   If any further questions, please call Sending RN at [phone]    Electronically signed by: Electronically signed by Zita Medrano RN on 7/6/2025 at 12:49 AM

## 2025-07-06 NOTE — CONSULTS
CARDIOLOGY CONSULT NOTE         Reason for consultation: SVT      Primary care physician: Violeta Griffith, APRN - CNP      Chief Complaints :  Chief Complaint   Patient presents with    Chest Pain        History of present illness:Nehemiah is a 49 y.o.year old male who has prior history of bilateral toe amputation in 2021 due to frostbite, alcohol abuse, hypertension but not taking any medications.  He was admitted yesterday via emergency room.  Patient states that he came to the emergency room because of ongoing chest pain and palpitations.  In the emergency room he was noted to be in narrow complex tachycardia at 200 bpm.  It converted to sinus rhythm with adenosine.  He was subsequently noted to have acute kidney injury and uncontrolled hypertension.  Currently patient feels better.  He remains in sinus rhythm with occasional PACs.  His blood pressure still pretty high.  He has minimal chest discomfort at present.    Review of Systems:     All systems negative except as marked.        Physical Examination:    Vitals:    07/06/25 1002   BP: (!) 191/93   Pulse: 85   Resp:    Temp:    SpO2:         General Appearance:  No distress, conversant    Constitutional:  No acute distress, non-toxic appearance.    HENT:  Normocephalic, Atraumatic,   Eyes:  PERRL, EOMI, Conjunctiva normal, No discharge.   Respiratory:  No respiratory distress, No wheezing  Cardiovascular: S1, S2, no murmurs, gallops. JVD wnl  Abdomen /GI:   Soft, No tenderness   Genitourinary: No costovertebral angle tenderness   Musculoskeletal:  No edema, no tenderness, no deformities.   Integument:  Well hydrated, no rash   Neurologic:  Alert & oriented x 3, no focal deficits noted       Medical decision making and Data review:    Lab Review   Recent Labs     07/06/25  0756   WBC 6.9   HGB 11.5*   HCT 35.5*         Recent Labs     07/06/25  0407      K 4.8   CL 99   CO2 15*   BUN 16   CREATININE 1.5*     Recent Labs     07/06/25  0407   AST

## 2025-07-07 ENCOUNTER — APPOINTMENT (OUTPATIENT)
Dept: NUCLEAR MEDICINE | Age: 50
DRG: 201 | End: 2025-07-07
Payer: MEDICAID

## 2025-07-07 ENCOUNTER — APPOINTMENT (OUTPATIENT)
Dept: NON INVASIVE DIAGNOSTICS | Age: 50
DRG: 201 | End: 2025-07-07
Attending: INTERNAL MEDICINE
Payer: MEDICAID

## 2025-07-07 ENCOUNTER — HOSPITAL ENCOUNTER (INPATIENT)
Age: 50
LOS: 2 days | Discharge: HOME | End: 2025-07-09
Attending: STUDENT IN AN ORGANIZED HEALTH CARE EDUCATION/TRAINING PROGRAM | Admitting: INTERNAL MEDICINE
Payer: MEDICAID

## 2025-07-07 ENCOUNTER — HOSPITAL ENCOUNTER (INPATIENT)
Dept: NON INVASIVE DIAGNOSTICS | Age: 50
Discharge: HOME | DRG: 201 | End: 2025-07-09
Payer: MEDICAID

## 2025-07-07 ENCOUNTER — APPOINTMENT (OUTPATIENT)
Dept: GENERAL RADIOLOGY | Age: 50
End: 2025-07-07
Payer: MEDICAID

## 2025-07-07 VITALS
HEIGHT: 71 IN | DIASTOLIC BLOOD PRESSURE: 126 MMHG | WEIGHT: 185 LBS | BODY MASS INDEX: 25.9 KG/M2 | TEMPERATURE: 98.4 F | HEART RATE: 67 BPM | OXYGEN SATURATION: 99 % | RESPIRATION RATE: 14 BRPM | SYSTOLIC BLOOD PRESSURE: 156 MMHG

## 2025-07-07 DIAGNOSIS — R94.39 ABNORMAL STRESS TEST: ICD-10-CM

## 2025-07-07 DIAGNOSIS — R07.9 CHEST PAIN: ICD-10-CM

## 2025-07-07 DIAGNOSIS — I16.0 HYPERTENSIVE URGENCY: Primary | ICD-10-CM

## 2025-07-07 DIAGNOSIS — Z87.898 HISTORY OF ALCOHOL USE: ICD-10-CM

## 2025-07-07 LAB
ALBUMIN SERPL-MCNC: 4 G/DL (ref 3.4–5)
ALBUMIN SERPL-MCNC: 4.3 G/DL (ref 3.4–5)
ALBUMIN/GLOB SERPL: 1.3 {RATIO} (ref 1.1–2.2)
ALBUMIN/GLOB SERPL: 1.5 {RATIO} (ref 1.1–2.2)
ALP SERPL-CCNC: 66 U/L (ref 40–129)
ALP SERPL-CCNC: 69 U/L (ref 40–129)
ALT SERPL-CCNC: 38 U/L (ref 10–40)
ALT SERPL-CCNC: 40 U/L (ref 10–40)
ANION GAP SERPL CALCULATED.3IONS-SCNC: 11 MMOL/L (ref 9–17)
ANION GAP SERPL CALCULATED.3IONS-SCNC: 15 MMOL/L (ref 9–17)
ANTI-XA UNFRAC HEPARIN: 0.43 IU/L
AST SERPL-CCNC: 54 U/L (ref 15–37)
AST SERPL-CCNC: 56 U/L (ref 15–37)
BASOPHILS # BLD: 0.01 K/UL
BASOPHILS NFR BLD: 0 % (ref 0–1)
BILIRUB SERPL-MCNC: 0.7 MG/DL (ref 0–1)
BILIRUB SERPL-MCNC: 0.8 MG/DL (ref 0–1)
BNP SERPL-MCNC: 277 PG/ML (ref 0–125)
BUN SERPL-MCNC: 13 MG/DL (ref 7–20)
BUN SERPL-MCNC: 14 MG/DL (ref 7–20)
CALCIUM SERPL-MCNC: 9.3 MG/DL (ref 8.3–10.6)
CALCIUM SERPL-MCNC: 9.5 MG/DL (ref 8.3–10.6)
CHLORIDE SERPL-SCNC: 100 MMOL/L (ref 99–110)
CHLORIDE SERPL-SCNC: 98 MMOL/L (ref 99–110)
CO2 SERPL-SCNC: 22 MMOL/L (ref 21–32)
CO2 SERPL-SCNC: 24 MMOL/L (ref 21–32)
CREAT SERPL-MCNC: 1.1 MG/DL (ref 0.9–1.3)
CREAT SERPL-MCNC: 1.2 MG/DL (ref 0.9–1.3)
ECHO AO ROOT DIAM: 2.5 CM
ECHO AO ROOT INDEX: 1.23 CM/M2
ECHO AV AREA PEAK VELOCITY: 1.9 CM2
ECHO AV AREA VTI: 2.3 CM2
ECHO AV AREA/BSA PEAK VELOCITY: 0.9 CM2/M2
ECHO AV AREA/BSA VTI: 1.1 CM2/M2
ECHO AV MEAN GRADIENT: 4 MMHG
ECHO AV MEAN VELOCITY: 1 M/S
ECHO AV PEAK GRADIENT: 6 MMHG
ECHO AV PEAK VELOCITY: 1.2 M/S
ECHO AV VELOCITY RATIO: 0.5
ECHO AV VTI: 21.6 CM
ECHO BSA: 2.05 M2
ECHO LA AREA 4C: 17.3 CM2
ECHO LA DIAMETER INDEX: 1.47 CM/M2
ECHO LA DIAMETER: 3 CM
ECHO LA MAJOR AXIS: 6.3 CM
ECHO LA TO AORTIC ROOT RATIO: 1.2
ECHO LA VOL MOD A4C: 44 ML (ref 18–58)
ECHO LA VOLUME INDEX MOD A4C: 22 ML/M2 (ref 16–34)
ECHO LV E' LATERAL VELOCITY: 7.5 CM/S
ECHO LV E' SEPTAL VELOCITY: 4.5 CM/S
ECHO LV EDV A4C: 84 ML
ECHO LV EDV INDEX A4C: 41 ML/M2
ECHO LV EF PHYSICIAN: 50 %
ECHO LV EJECTION FRACTION A4C: 49 %
ECHO LV ESV A4C: 43 ML
ECHO LV ESV INDEX A4C: 21 ML/M2
ECHO LV FRACTIONAL SHORTENING: 30 % (ref 28–44)
ECHO LV INTERNAL DIMENSION DIASTOLE INDEX: 2.99 CM/M2
ECHO LV INTERNAL DIMENSION DIASTOLIC: 6.1 CM (ref 4.2–5.9)
ECHO LV INTERNAL DIMENSION SYSTOLIC INDEX: 2.11 CM/M2
ECHO LV INTERNAL DIMENSION SYSTOLIC: 4.3 CM
ECHO LV IVSD: 1 CM (ref 0.6–1)
ECHO LV MASS 2D: 237.7 G (ref 88–224)
ECHO LV MASS INDEX 2D: 116.5 G/M2 (ref 49–115)
ECHO LV POSTERIOR WALL DIASTOLIC: 0.9 CM (ref 0.6–1)
ECHO LV RELATIVE WALL THICKNESS RATIO: 0.3
ECHO LVOT AREA: 3.8 CM2
ECHO LVOT AV VTI INDEX: 0.61
ECHO LVOT DIAM: 2.2 CM
ECHO LVOT MEAN GRADIENT: 1 MMHG
ECHO LVOT PEAK GRADIENT: 1 MMHG
ECHO LVOT PEAK VELOCITY: 0.6 M/S
ECHO LVOT STROKE VOLUME INDEX: 24.6 ML/M2
ECHO LVOT SV: 50.2 ML
ECHO LVOT VTI: 13.2 CM
ECHO MV A VELOCITY: 1.03 M/S
ECHO MV E DECELERATION TIME (DT): 236 MS
ECHO MV E VELOCITY: 0.42 M/S
ECHO MV E/A RATIO: 0.41
ECHO MV E/E' LATERAL: 5.6
ECHO MV E/E' RATIO (AVERAGED): 7.47
ECHO MV E/E' SEPTAL: 9.33
ECHO RV MID DIMENSION: 2.1 CM
EOSINOPHIL # BLD: 0.01 K/UL
EOSINOPHILS RELATIVE PERCENT: 0 % (ref 0–3)
ERYTHROCYTE [DISTWIDTH] IN BLOOD BY AUTOMATED COUNT: 15.9 % (ref 11.7–14.9)
ERYTHROCYTE [DISTWIDTH] IN BLOOD BY AUTOMATED COUNT: 15.9 % (ref 11.7–14.9)
ETHANOLAMINE SERPL-MCNC: <10 MG/DL (ref 0–0.08)
GFR, ESTIMATED: 66 ML/MIN/1.73M2
GFR, ESTIMATED: 70 ML/MIN/1.73M2
GLUCOSE SERPL-MCNC: 100 MG/DL (ref 74–99)
GLUCOSE SERPL-MCNC: 129 MG/DL (ref 74–99)
HCT VFR BLD AUTO: 35.3 % (ref 42–52)
HCT VFR BLD AUTO: 38.5 % (ref 42–52)
HGB BLD-MCNC: 11.7 G/DL (ref 13.5–18)
HGB BLD-MCNC: 12.4 G/DL (ref 13.5–18)
IMM GRANULOCYTES # BLD AUTO: 0.01 K/UL
IMM GRANULOCYTES NFR BLD: 0 %
LYMPHOCYTES NFR BLD: 0.99 K/UL
LYMPHOCYTES RELATIVE PERCENT: 22 % (ref 24–44)
MAGNESIUM SERPL-MCNC: 1.8 MG/DL (ref 1.8–2.4)
MAGNESIUM SERPL-MCNC: 1.9 MG/DL (ref 1.8–2.4)
MCH RBC QN AUTO: 28.4 PG (ref 27–31)
MCH RBC QN AUTO: 29.3 PG (ref 27–31)
MCHC RBC AUTO-ENTMCNC: 32.2 G/DL (ref 32–36)
MCHC RBC AUTO-ENTMCNC: 33.1 G/DL (ref 32–36)
MCV RBC AUTO: 88.3 FL (ref 78–100)
MCV RBC AUTO: 88.3 FL (ref 78–100)
MONOCYTES NFR BLD: 0.47 K/UL
MONOCYTES NFR BLD: 11 % (ref 0–5)
NEUTROPHILS NFR BLD: 66 % (ref 36–66)
NEUTS SEG NFR BLD: 2.92 K/UL
PHOSPHATE SERPL-MCNC: 2.2 MG/DL (ref 2.5–4.9)
PLATELET # BLD AUTO: 187 K/UL (ref 140–440)
PLATELET # BLD AUTO: 206 K/UL (ref 140–440)
PMV BLD AUTO: 10.6 FL (ref 7.5–11.1)
PMV BLD AUTO: 9.3 FL (ref 7.5–11.1)
POTASSIUM SERPL-SCNC: 4.3 MMOL/L (ref 3.5–5.1)
POTASSIUM SERPL-SCNC: 4.8 MMOL/L (ref 3.5–5.1)
PROT SERPL-MCNC: 7 G/DL (ref 6.4–8.2)
PROT SERPL-MCNC: 7.1 G/DL (ref 6.4–8.2)
RBC # BLD AUTO: 4 M/UL (ref 4.6–6.2)
RBC # BLD AUTO: 4.36 M/UL (ref 4.6–6.2)
SODIUM SERPL-SCNC: 135 MMOL/L (ref 136–145)
SODIUM SERPL-SCNC: 135 MMOL/L (ref 136–145)
TROPONIN I SERPL HS-MCNC: 37 NG/L (ref 0–22)
WBC OTHER # BLD: 4.4 K/UL (ref 4–10.5)
WBC OTHER # BLD: 4.5 K/UL (ref 4–10.5)

## 2025-07-07 PROCEDURE — 83880 ASSAY OF NATRIURETIC PEPTIDE: CPT

## 2025-07-07 PROCEDURE — 93005 ELECTROCARDIOGRAM TRACING: CPT | Performed by: STUDENT IN AN ORGANIZED HEALTH CARE EDUCATION/TRAINING PROGRAM

## 2025-07-07 PROCEDURE — G0480 DRUG TEST DEF 1-7 CLASSES: HCPCS

## 2025-07-07 PROCEDURE — 6370000000 HC RX 637 (ALT 250 FOR IP): Performed by: INTERNAL MEDICINE

## 2025-07-07 PROCEDURE — 80053 COMPREHEN METABOLIC PANEL: CPT

## 2025-07-07 PROCEDURE — 84484 ASSAY OF TROPONIN QUANT: CPT

## 2025-07-07 PROCEDURE — 84100 ASSAY OF PHOSPHORUS: CPT

## 2025-07-07 PROCEDURE — 94761 N-INVAS EAR/PLS OXIMETRY MLT: CPT

## 2025-07-07 PROCEDURE — 2500000003 HC RX 250 WO HCPCS: Performed by: INTERNAL MEDICINE

## 2025-07-07 PROCEDURE — 36415 COLL VENOUS BLD VENIPUNCTURE: CPT

## 2025-07-07 PROCEDURE — 93306 TTE W/DOPPLER COMPLETE: CPT | Performed by: INTERNAL MEDICINE

## 2025-07-07 PROCEDURE — 85025 COMPLETE CBC W/AUTO DIFF WBC: CPT

## 2025-07-07 PROCEDURE — 93016 CV STRESS TEST SUPVJ ONLY: CPT | Performed by: INTERNAL MEDICINE

## 2025-07-07 PROCEDURE — 80048 BASIC METABOLIC PNL TOTAL CA: CPT

## 2025-07-07 PROCEDURE — 85027 COMPLETE CBC AUTOMATED: CPT

## 2025-07-07 PROCEDURE — 93017 CV STRESS TEST TRACING ONLY: CPT

## 2025-07-07 PROCEDURE — APPNB30 APP NON BILLABLE TIME 0-30 MINS

## 2025-07-07 PROCEDURE — 99285 EMERGENCY DEPT VISIT HI MDM: CPT

## 2025-07-07 PROCEDURE — 85520 HEPARIN ASSAY: CPT

## 2025-07-07 PROCEDURE — G0378 HOSPITAL OBSERVATION PER HR: HCPCS

## 2025-07-07 PROCEDURE — 83735 ASSAY OF MAGNESIUM: CPT

## 2025-07-07 PROCEDURE — 78452 HT MUSCLE IMAGE SPECT MULT: CPT | Performed by: INTERNAL MEDICINE

## 2025-07-07 PROCEDURE — 93306 TTE W/DOPPLER COMPLETE: CPT

## 2025-07-07 PROCEDURE — A9500 TC99M SESTAMIBI: HCPCS | Performed by: INTERNAL MEDICINE

## 2025-07-07 PROCEDURE — 3430000000 HC RX DIAGNOSTIC RADIOPHARMACEUTICAL: Performed by: INTERNAL MEDICINE

## 2025-07-07 PROCEDURE — 6360000002 HC RX W HCPCS: Performed by: INTERNAL MEDICINE

## 2025-07-07 PROCEDURE — 78452 HT MUSCLE IMAGE SPECT MULT: CPT

## 2025-07-07 PROCEDURE — 93018 CV STRESS TEST I&R ONLY: CPT | Performed by: INTERNAL MEDICINE

## 2025-07-07 PROCEDURE — 71045 X-RAY EXAM CHEST 1 VIEW: CPT

## 2025-07-07 PROCEDURE — 1200000000 HC SEMI PRIVATE

## 2025-07-07 PROCEDURE — 96366 THER/PROPH/DIAG IV INF ADDON: CPT

## 2025-07-07 RX ORDER — ATORVASTATIN CALCIUM 40 MG/1
40 TABLET, FILM COATED ORAL NIGHTLY
Status: DISCONTINUED | OUTPATIENT
Start: 2025-07-07 | End: 2025-07-09 | Stop reason: HOSPADM

## 2025-07-07 RX ORDER — ENOXAPARIN SODIUM 100 MG/ML
40 INJECTION SUBCUTANEOUS DAILY
Status: DISCONTINUED | OUTPATIENT
Start: 2025-07-08 | End: 2025-07-09 | Stop reason: HOSPADM

## 2025-07-07 RX ORDER — ONDANSETRON 4 MG/1
4 TABLET, ORALLY DISINTEGRATING ORAL EVERY 8 HOURS PRN
Status: DISCONTINUED | OUTPATIENT
Start: 2025-07-07 | End: 2025-07-09 | Stop reason: HOSPADM

## 2025-07-07 RX ORDER — POTASSIUM CHLORIDE 1500 MG/1
40 TABLET, EXTENDED RELEASE ORAL PRN
Status: DISCONTINUED | OUTPATIENT
Start: 2025-07-07 | End: 2025-07-09 | Stop reason: HOSPADM

## 2025-07-07 RX ORDER — TETRAKIS(2-METHOXYISOBUTYLISOCYANIDE)COPPER(I) TETRAFLUOROBORATE 1 MG/ML
33 INJECTION, POWDER, LYOPHILIZED, FOR SOLUTION INTRAVENOUS
Status: COMPLETED | OUTPATIENT
Start: 2025-07-07 | End: 2025-07-07

## 2025-07-07 RX ORDER — PANTOPRAZOLE SODIUM 40 MG/1
40 TABLET, DELAYED RELEASE ORAL
Status: DISCONTINUED | OUTPATIENT
Start: 2025-07-08 | End: 2025-07-09 | Stop reason: HOSPADM

## 2025-07-07 RX ORDER — POTASSIUM CHLORIDE 7.45 MG/ML
10 INJECTION INTRAVENOUS PRN
Status: DISCONTINUED | OUTPATIENT
Start: 2025-07-07 | End: 2025-07-09 | Stop reason: HOSPADM

## 2025-07-07 RX ORDER — CARVEDILOL 6.25 MG/1
6.25 TABLET ORAL 2 TIMES DAILY WITH MEALS
Status: DISCONTINUED | OUTPATIENT
Start: 2025-07-07 | End: 2025-07-08

## 2025-07-07 RX ORDER — CHLORTHALIDONE 25 MG/1
25 TABLET ORAL DAILY
Status: DISCONTINUED | OUTPATIENT
Start: 2025-07-08 | End: 2025-07-09

## 2025-07-07 RX ORDER — SODIUM CHLORIDE 0.9 % (FLUSH) 0.9 %
5-40 SYRINGE (ML) INJECTION EVERY 12 HOURS SCHEDULED
Status: DISCONTINUED | OUTPATIENT
Start: 2025-07-07 | End: 2025-07-08 | Stop reason: SDUPTHER

## 2025-07-07 RX ORDER — MAGNESIUM SULFATE IN WATER 40 MG/ML
2000 INJECTION, SOLUTION INTRAVENOUS PRN
Status: DISCONTINUED | OUTPATIENT
Start: 2025-07-07 | End: 2025-07-09 | Stop reason: HOSPADM

## 2025-07-07 RX ORDER — TETRAKIS(2-METHOXYISOBUTYLISOCYANIDE)COPPER(I) TETRAFLUOROBORATE 1 MG/ML
11 INJECTION, POWDER, LYOPHILIZED, FOR SOLUTION INTRAVENOUS
Status: COMPLETED | OUTPATIENT
Start: 2025-07-07 | End: 2025-07-07

## 2025-07-07 RX ORDER — ASPIRIN 81 MG/1
81 TABLET, CHEWABLE ORAL DAILY
Status: DISCONTINUED | OUTPATIENT
Start: 2025-07-08 | End: 2025-07-09 | Stop reason: HOSPADM

## 2025-07-07 RX ORDER — AMLODIPINE BESYLATE 5 MG/1
5 TABLET ORAL DAILY
Status: DISCONTINUED | OUTPATIENT
Start: 2025-07-08 | End: 2025-07-08

## 2025-07-07 RX ORDER — ACETAMINOPHEN 650 MG/1
650 SUPPOSITORY RECTAL EVERY 6 HOURS PRN
Status: DISCONTINUED | OUTPATIENT
Start: 2025-07-07 | End: 2025-07-09 | Stop reason: HOSPADM

## 2025-07-07 RX ORDER — CHLORTHALIDONE 25 MG/1
25 TABLET ORAL DAILY
Status: DISCONTINUED | OUTPATIENT
Start: 2025-07-07 | End: 2025-07-07 | Stop reason: HOSPADM

## 2025-07-07 RX ORDER — AMLODIPINE BESYLATE 10 MG/1
10 TABLET ORAL DAILY
Status: DISCONTINUED | OUTPATIENT
Start: 2025-07-08 | End: 2025-07-07 | Stop reason: HOSPADM

## 2025-07-07 RX ORDER — POLYETHYLENE GLYCOL 3350 17 G/17G
17 POWDER, FOR SOLUTION ORAL DAILY PRN
Status: DISCONTINUED | OUTPATIENT
Start: 2025-07-07 | End: 2025-07-09 | Stop reason: HOSPADM

## 2025-07-07 RX ORDER — AMLODIPINE BESYLATE 5 MG/1
5 TABLET ORAL ONCE
Status: DISCONTINUED | OUTPATIENT
Start: 2025-07-07 | End: 2025-07-07 | Stop reason: HOSPADM

## 2025-07-07 RX ORDER — SODIUM CHLORIDE 9 MG/ML
INJECTION, SOLUTION INTRAVENOUS PRN
Status: DISCONTINUED | OUTPATIENT
Start: 2025-07-07 | End: 2025-07-08 | Stop reason: SDUPTHER

## 2025-07-07 RX ORDER — SODIUM CHLORIDE 0.9 % (FLUSH) 0.9 %
5-40 SYRINGE (ML) INJECTION PRN
Status: DISCONTINUED | OUTPATIENT
Start: 2025-07-07 | End: 2025-07-08 | Stop reason: SDUPTHER

## 2025-07-07 RX ORDER — ONDANSETRON 2 MG/ML
4 INJECTION INTRAMUSCULAR; INTRAVENOUS EVERY 6 HOURS PRN
Status: DISCONTINUED | OUTPATIENT
Start: 2025-07-07 | End: 2025-07-09 | Stop reason: HOSPADM

## 2025-07-07 RX ORDER — AMINOPHYLLINE 25 MG/ML
75 INJECTION, SOLUTION INTRAVENOUS ONCE
Status: COMPLETED | OUTPATIENT
Start: 2025-07-07 | End: 2025-07-07

## 2025-07-07 RX ORDER — ACETAMINOPHEN 325 MG/1
650 TABLET ORAL EVERY 6 HOURS PRN
Status: DISCONTINUED | OUTPATIENT
Start: 2025-07-07 | End: 2025-07-08 | Stop reason: SDUPTHER

## 2025-07-07 RX ADMIN — KIT FOR THE PREPARATION OF TECHNETIUM TC99M SESTAMIBI 11 MILLICURIE: 1 INJECTION, POWDER, LYOPHILIZED, FOR SOLUTION PARENTERAL at 07:15

## 2025-07-07 RX ADMIN — CARVEDILOL 6.25 MG: 6.25 TABLET, FILM COATED ORAL at 21:35

## 2025-07-07 RX ADMIN — AMINOPHYLLINE 75 MG: 25 INJECTION, SOLUTION INTRAVENOUS at 10:25

## 2025-07-07 RX ADMIN — AMLODIPINE BESYLATE 5 MG: 5 TABLET ORAL at 08:12

## 2025-07-07 RX ADMIN — HEPARIN SODIUM 14 UNITS/KG/HR: 10000 INJECTION, SOLUTION INTRAVENOUS at 07:33

## 2025-07-07 RX ADMIN — PHENOBARBITAL 32.4 MG: 32.4 TABLET ORAL at 08:11

## 2025-07-07 RX ADMIN — ATORVASTATIN CALCIUM 40 MG: 40 TABLET, FILM COATED ORAL at 21:35

## 2025-07-07 RX ADMIN — PANTOPRAZOLE SODIUM 40 MG: 40 TABLET, DELAYED RELEASE ORAL at 05:40

## 2025-07-07 RX ADMIN — KIT FOR THE PREPARATION OF TECHNETIUM TC99M SESTAMIBI 33 MILLICURIE: 1 INJECTION, POWDER, LYOPHILIZED, FOR SOLUTION PARENTERAL at 10:15

## 2025-07-07 RX ADMIN — CARVEDILOL 6.25 MG: 6.25 TABLET, FILM COATED ORAL at 08:11

## 2025-07-07 RX ADMIN — Medication 100 MG: at 08:11

## 2025-07-07 ASSESSMENT — PAIN DESCRIPTION - DESCRIPTORS: DESCRIPTORS: ACHING

## 2025-07-07 ASSESSMENT — PAIN DESCRIPTION - LOCATION: LOCATION: CHEST

## 2025-07-07 ASSESSMENT — PAIN SCALES - GENERAL: PAINLEVEL_OUTOF10: 2

## 2025-07-07 NOTE — PROGRESS NOTES
Patient wanting to leave AMA.    States that he just wants to lock up his house and come back.    I did discuss the risks of him leaving including MI, syncope, stroke, death.  Patient understands the risks.  States that he is going to go lock up his house and come right back to the hospital.    Patient to sign AMA forms with beside nurse.     Discussed with cardiology, patient given cardiology office information for follow up just in case he is not able to be re-admitted.

## 2025-07-07 NOTE — CARE COORDINATION
07/07/25 1513   Service Assessment   Patient Orientation Alert and Oriented   Cognition Alert   History Provided By Patient   Support Systems Children;Family Members   Patient's Healthcare Decision Maker is: Legal Next of Kin   PCP Verified by CM Yes   Last Visit to PCP Within last two years   Prior Functional Level Independent in ADLs/IADLs   Current Functional Level Independent in ADLs/IADLs   Can patient return to prior living arrangement Yes   Ability to make needs known: Good   Family able to assist with home care needs: Yes   Would you like for me to discuss the discharge plan with any other family members/significant others, and if so, who? No   Financial Resources Medicaid   Community Resources None     Discharge planning initiated. Patient is from home alone. Patient independent prior to admission. PCP and  insurance. Patient has a cane that he uses PRN. Patient does not have any C services. Patient has reliable transportation to appointments. ETOH resources added to AVS. Patient denies any discharge needs and plans to return home alone at MD. CM following for needs.

## 2025-07-07 NOTE — ED NOTES
ED TO INPATIENT SBAR HANDOFF    Patient Name: Nehemiah Zhang   :  1975  49 y.o.   Preferred Name  Clarence  Family/Caregiver Present no   Restraints no   C-SSRS: Risk of Suicide: No Risk  Sitter no   Sepsis Risk Score Sepsis V2 Risk Score: 3.3    PLEASE NOTE--Encounter / Re-Admission Within 30 Days  This patient has had another encounter or admission within the last 30 days.      Readmission Risk Score: 8.1      Situation  Chief Complaint   Patient presents with    Abnormal Test Results     Pt reports an abnormal stress test and was scheduled for a left heart cath this morning. Pt left AMA and has returned to get admitted for the heart cath. Pt endorses intermittent mid-sternal CP w/ N/V, denies symptoms currently.     Brief Description of Patient's Condition: Pt presents with c/o having an abnormal stress test and needing a cath. Pt left AMA today and returned. Pt has intermittent chest pain with N/V.   Mental Status: oriented and alert  Arrived from: home    Imaging:   XR CHEST PORTABLE   Final Result        Abnormal labs:   Abnormal Labs Reviewed   CBC - Abnormal; Notable for the following components:       Result Value    RBC 4.36 (*)     Hemoglobin 12.4 (*)     Hematocrit 38.5 (*)     RDW 15.9 (*)     All other components within normal limits   COMPREHENSIVE METABOLIC PANEL - Abnormal; Notable for the following components:    Sodium 135 (*)     Chloride 98 (*)     Glucose 129 (*)     AST 54 (*)     All other components within normal limits   TROPONIN - Abnormal; Notable for the following components:    Troponin, High Sensitivity 37 (*)     All other components within normal limits   BRAIN NATRIURETIC PEPTIDE - Abnormal; Notable for the following components:    NT Pro- (*)     All other components within normal limits        Background  History:   Past Medical History:   Diagnosis Date    Hypertension     does not take medications       Assessment    Vitals:        Vitals:    25 1650

## 2025-07-07 NOTE — PROGRESS NOTES
tobacco dependence  - Reports smoking on and off      Diet Diet NPO Exceptions are: Sips of Water with Meds   DVT Prophylaxis [] Lovenox, [x]  Heparin, [] SCDs, [] Ambulation,  [] Eliquis, [] Xarelto  [] Coumadin   Code Status Full Code   Disposition From: Home  Expected Disposition: Home  Estimated Date of Discharge: 2 days  Patient requires continued admission due to NSTEMI   Surrogate Decision Maker/ PIYUSH Decker     Subjective:     Patient seen after his stress test.  Feels okay.  Wants to go home.  Worried about the rain because his flat screen TV is near the window.    Review of Systems:      As above     Objective:     Intake/Output Summary (Last 24 hours) at 7/7/2025 1411  Last data filed at 7/7/2025 0600  Gross per 24 hour   Intake 578.75 ml   Output 1200 ml   Net -621.25 ml        Vitals:   Vitals:    07/07/25 1234   BP: (!) 156/126   Pulse: 67   Resp:    Temp:    SpO2:        Physical Exam:     General: NAD, AAO x3-4  Eyes: EOMI  HENT: Atraumatic  Respiratory:  no respiratory distress  GI:  soft, nondistended, nontender  MSK: no lower extremity edema  Skin: Intact, dry, warm, no rashes  Neuro: CN II to XII grossly intact  Psych: Normal mood    Medications:   Medications:    sodium chloride flush  5-40 mL IntraVENous 2 times per day    pantoprazole  40 mg Oral QAM AC    thiamine  100 mg Oral Daily    PHENobarbital  32.4 mg Oral BID    Followed by    [START ON 7/8/2025] PHENobarbital  32.4 mg Oral Daily    carvedilol  6.25 mg Oral BID WC    amLODIPine  5 mg Oral Daily      Infusions:    sodium chloride      nitroGLYCERIN Stopped (07/07/25 1228)    heparin (PORCINE) Infusion 14 Units/kg/hr (07/07/25 0733)     PRN Meds: nitroGLYCERIN, 0.4 mg, Q5 Min PRN  sodium chloride flush, 5-40 mL, PRN  sodium chloride, , PRN  magnesium sulfate, 2,000 mg, PRN  ondansetron, 4 mg, Q8H PRN   Or  ondansetron, 4 mg, Q6H PRN  polyethylene glycol, 17 g, Daily PRN  acetaminophen, 650 mg, Q6H PRN   Or  acetaminophen, 650 mg, Q6H  Volume Index 24.6 mL/m2    LA Volume Index MOD A4C 22 16 - 34 ml/m2    LA Size Index 1.47 cm/m2    LA/AO Root Ratio 1.20     Ao Root Index 1.23 cm/m2    AV Velocity Ratio 0.50     LVOT:AV VTI Index 0.61     ELEANOR/BSA VTI 1.1 cm2/m2    ELEANOR/BSA Peak Velocity 0.9 cm2/m2   Nuclear stress test with myocardial perfusion    Collection Time: 07/07/25 11:30 AM   Result Value Ref Range    Baseline Systolic  mmHg    Baseline Diastolic  mmHg    Stress Systolic  mmHg    Stress Diastolic  mmHg    Baseline HR 77 BPM    Stress Peak HR 97 BPM    Stress Estimated Workload 1.0 METS    Body Surface Area 2.05 m2    Stress Rate Pressure Product 14,162 BPM*mmHg    Stress Target  bpm    Stress Percent HR Achieved 57 %    TID 1.20     Nuc Stress EF 37 %   CBC with Auto Differential    Collection Time: 07/07/25  1:23 PM   Result Value Ref Range    WBC 4.4 4.0 - 10.5 k/uL    RBC 4.00 (L) 4.60 - 6.20 m/uL    Hemoglobin 11.7 (L) 13.5 - 18.0 g/dL    Hematocrit 35.3 (L) 42.0 - 52.0 %    MCV 88.3 78.0 - 100.0 fL    MCH 29.3 27.0 - 31.0 pg    MCHC 33.1 32.0 - 36.0 g/dL    RDW 15.9 (H) 11.7 - 14.9 %    Platelets 206 140 - 440 k/uL    MPV 10.6 7.5 - 11.1 fL    Neutrophils % 66 36 - 66 %    Lymphocytes % 22 (L) 24 - 44 %    Monocytes % 11 (H) 0 - 5 %    Eosinophils % 0 0.0 - 3.0 %    Basophils % 0 0 - 1 %    Immature Granulocytes % 0 0 %    Neutrophils Absolute 2.92 k/uL    Lymphocytes Absolute 0.99 k/uL    Monocytes Absolute 0.47 k/uL    Eosinophils Absolute 0.01 k/uL    Basophils Absolute 0.01 k/uL    Immature Granulocytes Absolute 0.01 k/uL        Imaging/Diagnostics Last 24 Hours   CTA PULMONARY W CONTRAST  Result Date: 7/6/2025  EXAMINATION: CTA PULMONARY W CONTRAST DATE OF EXAM:  7/6/2025 3:33 DEMOGRAPHICS: 49 years old Male INDICATION: unrelieved chest pain, r/o pulmonary embolism, dissection Contrast utilized and relevant clinical information: IOPAMIDOL 76 % IV SOLN 80mL COMPARISON: No existing relevant imaging

## 2025-07-07 NOTE — PROGRESS NOTES
[facility] [place] [address]  Phone: [phone]    Fax [phone]                  Eber Wheat MD, [facility]       Robinson Boston MD, [facility]  Darvin Oneill MD, [facility]    MD Jose Mcbride MD Tariq Rizvi, MD Bilal Alam, MD Dr. Waseem Sajjad MD Melissa Kellis, APRANGELLA Delgado, APRANGELLA Palencia, APRANGELLA Juarez, APRANGELLA Garcia PA-C    CARDIOLOGY  NOTE      Name:  Nehemiah Zhang /Age/Sex: 1975  (49 y.o. male)   MRN & CSN:  [id number] & [id number] Admission Date/Time: 2025  9:28 PM   Location:  -A PCP: Violeta Griffith APRN - CNP       Hospital Day: 3    - Cardiology consult is for: SVT      ASSESSMENT/ PLAN:  Supraventricular tachycardia  Elevated troponin  - Arrhythmia has since resolved.  - Noncompliant with metoprolol.  -Goal potassium 4.0-4.5, magnesium 2.0-2.2.   -Likely exacerbated by alcohol abuse and electrolyte abnormalities  -Stress test concerning for ischemia  - Recommended heart catheterization but patient decided to leave AMA  -Recommend aspirin, Coreg 6.25 mg twice daily and statin  - Discontinue heparin drip  Uncontrolled hypertension  -Has since been weaned off nitro drip.  - Increase amlodipine to 10 mg daily.  - Continue chlorthalidone 25 mg daily  Alcohol withdrawal  -Per primary team            Subjective:  Nehemiah is a 49 y.o.year old   Discussed care with primary team and nursing staff.    Had lengthy discussion with patient about abnormal stress test and need for further workup.  Patient states that he is interested in having heart catheterization however he is having social issues at home.  Patient states that his windows were left open and that he needs to go close it.  Asked patient if he could have someone else close it so he can remain hospitalized in order for us to control his blood pressure and take care of his

## 2025-07-07 NOTE — ED PROVIDER NOTES
[facility] EMERGENCY DEPARTMENT  EMERGENCY DEPARTMENT ENCOUNTER        Pt Name: Nehemiah Zhang  MRN: [id number]  Birthdate 1975  Date of evaluation: 7/7/2025  Provider: GEN Rodas CNP  PCP: Violeta Griffith APRN - CNP  Note Started: 4:50 PM EDT 7/7/25       I have seen and evaluated this patient with my supervising physician Darlene Arizmendi*.      CHIEF COMPLAINT       Chief Complaint   Patient presents with    Abnormal Test Results     Pt reports an abnormal stress test and was scheduled for a left heart cath this morning. Pt left AMA and has returned to get admitted for the heart cath. Pt endorses intermittent mid-sternal CP w/ N/V, denies symptoms currently.       HISTORY OF PRESENT ILLNESS: 1 or more Elements     History From: Patient    Limitations to history : None    Social Determinants Significantly Affecting Health : None    Chief Complaint: Return to get left heart cath    Nehemiah Zhang is a 49 y.o. male history of alcohol use, SVT, hypertensive urgency, abnormal stress test, homelessness, cellulitis, hyponatremia, alcohol withdrawal who presents to ED stating he left AMA before he got his heart cath today.  Stated when he initially came in 3 days ago he is having significant chest pain.  States he has not had any chest pain since this morning.  Stated he went home only to close up his windows and then came directly back.  Denies any current chest pain or shortness of breath.  Denies any abdominal pain nausea vomiting or diarrhea.  States he is ready to be admitted at this time.    Nursing Notes were all reviewed and agreed with or any disagreements were addressed in the HPI.    REVIEW OF SYSTEMS :      Review of Systems    Positives and Pertinent negatives as per HPI.     SURGICAL HISTORY     Past Surgical History:   Procedure Laterality Date    FOOT DEBRIDEMENT Bilateral 2/19/2021    BILATERAL TOE  DEBRIDEMENT INCISION AND DRAINAGE,

## 2025-07-07 NOTE — H&P
History and Physical      Name:  Nehemiah Zhang /Age/Sex: 1975  (49 y.o. male)   MRN & CSN:  [id number] & [id number] Encounter Date/Time: 2025 12:22 AM EDT   Location:  ED27/ED-27 PCP: Violeta Griffith APRN - CNP       Hospital Day: 1    Assessment and Plan:     #.  Abnormal stress test  -LHC was recommended, left AMA today morning  - EKG with T wave inversions in 1, aVL  - Keep n.p.o. after midnight  - Continue aspirin, carvedilol, atorvastatin    #.  Uncontrolled hypertension  - Patient was started on amlodipine, carvedilol, chlorthalidone during recent admission-continue    #.  Bilateral toe amputation-2021 secondary to frostbite  - Left first second third, tip of the fourth toe, right great toe amputation    #.  Chronic tobacco dependence    #.  Remote history of CVA as per patient  - No residual deficits    Disposition:   Current Living situation: Home    Diet Cardiac   DVT Prophylaxis [x] Lovenox   Code Status Full   Surrogate Decision Maker/ POA      History from:   EMR, patient.    History of Present Illness:     Chief Complaint: Abnormal stress test  Nehemiah Zhang is a 49 y.o. male who was recently admitted to the hospital, but left AMA today morning.  Patient states that he had to lock up his house and come back.  Patient still complains of mild chest pain, 3/10 intensity, intermittent, associated with nausea, denied any shortness of breath, diaphoresis.  Patient denied any alcohol use or smoking after leaving hospital. Denied any fever, chills, cough, denying any abdominal pain.  Patient was recently admitted for SVT which converted with 1 dose of adenosine.  Patient had a stress test done which was abnormal and LHC was recommended.  But patient left AMA prior to this being completed.  Vitals on arrival-/102, , RR 18, temp 97.7, saturating % on room air.  Labs significant for sodium 135, chloride 98, random glucose 129, magnesium 1.8, troponin 37,

## 2025-07-07 NOTE — PROGRESS NOTES
Pt was recommenced at [facility] due to abnormal stress test. Pt states he can not do it right now because he has to secure his home as he left his window open. Cardiology, attending, and this RN spoke to pt and he wanted to leave AMA. AMA form signed. PIV removed/ Pt states he will be returning to the ED tonight after closing his windows with the intent to follow through with the [facility].     Pt understood all education and recommendations from [facility] staff.

## 2025-07-07 NOTE — PROGRESS NOTES
V2.0  [facility] Hospitalist Progress Note      Name:  Nehemiah Zhang /Age/Sex: 1975  (49 y.o. male)   MRN & CSN:  [id number] & [id number] Encounter Date/Time: 2025 7:47 AM EDT    Location:  -A PCP: Violeta Griffith APRN - CNP       Hospital Day: 3    Assessment and Plan:   Nehemiah Zhang is a 49 y.o. male  who presents with SVT (supraventricular tachycardia)    #.  SVT-resolved  - Converted to sinus with 1 dose of adenosine  - Still tachycardic, now improving  - Patient is on metoprolol-noncompliant  Resume home medications after urine drug screen     #.  Hypertensive urgency-resolved  #.  Chest pain  #NSTEMI  - Received labetalol in ER  -CTA chest limited but not showing any large filling defects within main PA or proximal left/right PA  - Started on nitro drip  - CTA chest ordered as patient continued to have chest pain in spite of morphine, nitro  - Troponin 38--> 47-->76  -EKG with no acute ST-T wave changes  Cardiology consulted, appreciate recs  continue trending troponin  Plan for stress test tomorrow  Npo at midnight  Started on carvedilol 6.25 mg twice daily and amlodipine 5 mg daily     #.  JOSETTE-suspect that this may be CKD rather than JOSETTE   #.  Mild hyperkalemia-resolved  #.  [place]ongap metabolic acidosis  #.  Lactic acidosis-resolved  - BUN/creatinine-14/1.5, was 13/0.9-2021  -CK-395  - Patient received 3 L NS bolus in ER  Monitor with repeat BMP, continue trending lactic acid level     #.  Chronically elevated LFTs  - Hepatitis panel negative-2021  - CT abdomen/pelvis--diffuse hepatic steatosis     #.  Alcohol use  - Patient reports occasional alcohol use  - Ethanol level 94  - Patient being hypertensive, tachycardic will treat for alcohol withdrawal  - CIWA protocol ordered  - Phenobarbital ordered  - Continue thiamine     #.  Bilateral toe amputation-2021 secondary to frostbite  - Left first second third, tip of the fourth toe, right great toe

## 2025-07-08 LAB
ANION GAP SERPL CALCULATED.3IONS-SCNC: 14 MMOL/L (ref 9–17)
BUN SERPL-MCNC: 12 MG/DL (ref 7–20)
CALCIUM SERPL-MCNC: 9.5 MG/DL (ref 8.3–10.6)
CHLORIDE SERPL-SCNC: 100 MMOL/L (ref 99–110)
CO2 SERPL-SCNC: 23 MMOL/L (ref 21–32)
CREAT SERPL-MCNC: 1.1 MG/DL (ref 0.9–1.3)
ECHO BSA: 2.05 M2
ECHO BSA: 2.05 M2
EKG ATRIAL RATE: 107 BPM
EKG DIAGNOSIS: NORMAL
EKG P AXIS: 38 DEGREES
EKG P-R INTERVAL: 152 MS
EKG Q-T INTERVAL: 362 MS
EKG QRS DURATION: 96 MS
EKG QTC CALCULATION (BAZETT): 483 MS
EKG R AXIS: -27 DEGREES
EKG T AXIS: 94 DEGREES
EKG VENTRICULAR RATE: 107 BPM
ERYTHROCYTE [DISTWIDTH] IN BLOOD BY AUTOMATED COUNT: 15.8 % (ref 11.7–14.9)
GFR, ESTIMATED: 73 ML/MIN/1.73M2
GLUCOSE SERPL-MCNC: 107 MG/DL (ref 74–99)
HCT VFR BLD AUTO: 37.8 % (ref 42–52)
HGB BLD-MCNC: 12.3 G/DL (ref 13.5–18)
MCH RBC QN AUTO: 28.4 PG (ref 27–31)
MCHC RBC AUTO-ENTMCNC: 32.5 G/DL (ref 32–36)
MCV RBC AUTO: 87.3 FL (ref 78–100)
NUC STRESS EJECTION FRACTION: 37 %
PLATELET # BLD AUTO: 186 K/UL (ref 140–440)
PMV BLD AUTO: 9.3 FL (ref 7.5–11.1)
POTASSIUM SERPL-SCNC: 3.6 MMOL/L (ref 3.5–5.1)
RBC # BLD AUTO: 4.33 M/UL (ref 4.6–6.2)
SODIUM SERPL-SCNC: 136 MMOL/L (ref 136–145)
STRESS BASELINE DIAS BP: 107 MMHG
STRESS BASELINE HR: 77 BPM
STRESS BASELINE SYS BP: 146 MMHG
STRESS ESTIMATED WORKLOAD: 1 METS
STRESS PEAK DIAS BP: 107 MMHG
STRESS PEAK SYS BP: 146 MMHG
STRESS PERCENT HR ACHIEVED: 57 %
STRESS POST PEAK HR: 97 BPM
STRESS RATE PRESSURE PRODUCT: NORMAL BPM*MMHG
STRESS TARGET HR: 169 BPM
TID: 1.2
TROPONIN I SERPL HS-MCNC: 37 NG/L (ref 0–22)
WBC OTHER # BLD: 3.4 K/UL (ref 4–10.5)

## 2025-07-08 PROCEDURE — 1200000000 HC SEMI PRIVATE

## 2025-07-08 PROCEDURE — APPNB15 APP NON BILLABLE TIME 0-15 MINS

## 2025-07-08 PROCEDURE — 6370000000 HC RX 637 (ALT 250 FOR IP): Performed by: INTERNAL MEDICINE

## 2025-07-08 PROCEDURE — 6360000002 HC RX W HCPCS: Performed by: INTERNAL MEDICINE

## 2025-07-08 PROCEDURE — 93010 ELECTROCARDIOGRAM REPORT: CPT | Performed by: INTERNAL MEDICINE

## 2025-07-08 PROCEDURE — 93452 LEFT HRT CATH W/VENTRCLGRPHY: CPT | Performed by: INTERNAL MEDICINE

## 2025-07-08 PROCEDURE — 4A023N7 MEASUREMENT OF CARDIAC SAMPLING AND PRESSURE, LEFT HEART, PERCUTANEOUS APPROACH: ICD-10-PCS | Performed by: INTERNAL MEDICINE

## 2025-07-08 PROCEDURE — 94761 N-INVAS EAR/PLS OXIMETRY MLT: CPT

## 2025-07-08 PROCEDURE — 7100000011 HC PHASE II RECOVERY - ADDTL 15 MIN: Performed by: INTERNAL MEDICINE

## 2025-07-08 PROCEDURE — 6370000000 HC RX 637 (ALT 250 FOR IP)

## 2025-07-08 PROCEDURE — B2111ZZ FLUOROSCOPY OF MULTIPLE CORONARY ARTERIES USING LOW OSMOLAR CONTRAST: ICD-10-PCS | Performed by: INTERNAL MEDICINE

## 2025-07-08 PROCEDURE — 7100000010 HC PHASE II RECOVERY - FIRST 15 MIN: Performed by: INTERNAL MEDICINE

## 2025-07-08 PROCEDURE — 2500000003 HC RX 250 WO HCPCS: Performed by: INTERNAL MEDICINE

## 2025-07-08 PROCEDURE — 2709999900 HC NON-CHARGEABLE SUPPLY: Performed by: INTERNAL MEDICINE

## 2025-07-08 PROCEDURE — C1769 GUIDE WIRE: HCPCS | Performed by: INTERNAL MEDICINE

## 2025-07-08 PROCEDURE — C1894 INTRO/SHEATH, NON-LASER: HCPCS | Performed by: INTERNAL MEDICINE

## 2025-07-08 PROCEDURE — 2580000003 HC RX 258: Performed by: INTERNAL MEDICINE

## 2025-07-08 PROCEDURE — 99233 SBSQ HOSP IP/OBS HIGH 50: CPT | Performed by: INTERNAL MEDICINE

## 2025-07-08 PROCEDURE — 93454 CORONARY ARTERY ANGIO S&I: CPT | Performed by: INTERNAL MEDICINE

## 2025-07-08 PROCEDURE — 6360000004 HC RX CONTRAST MEDICATION: Performed by: INTERNAL MEDICINE

## 2025-07-08 RX ORDER — AMLODIPINE BESYLATE 10 MG/1
10 TABLET ORAL DAILY
Status: DISCONTINUED | OUTPATIENT
Start: 2025-07-09 | End: 2025-07-09 | Stop reason: HOSPADM

## 2025-07-08 RX ORDER — CARVEDILOL 6.25 MG/1
12.5 TABLET ORAL 2 TIMES DAILY WITH MEALS
Status: DISCONTINUED | OUTPATIENT
Start: 2025-07-08 | End: 2025-07-09 | Stop reason: HOSPADM

## 2025-07-08 RX ORDER — SODIUM CHLORIDE 9 MG/ML
INJECTION, SOLUTION INTRAVENOUS CONTINUOUS PRN
Status: COMPLETED | OUTPATIENT
Start: 2025-07-08 | End: 2025-07-08

## 2025-07-08 RX ORDER — ACETAMINOPHEN 325 MG/1
650 TABLET ORAL EVERY 4 HOURS PRN
Status: DISCONTINUED | OUTPATIENT
Start: 2025-07-08 | End: 2025-07-09 | Stop reason: HOSPADM

## 2025-07-08 RX ORDER — SODIUM CHLORIDE 0.9 % (FLUSH) 0.9 %
5-40 SYRINGE (ML) INJECTION EVERY 12 HOURS SCHEDULED
Status: DISCONTINUED | OUTPATIENT
Start: 2025-07-08 | End: 2025-07-09 | Stop reason: HOSPADM

## 2025-07-08 RX ORDER — MIDAZOLAM HYDROCHLORIDE 1 MG/ML
INJECTION, SOLUTION INTRAMUSCULAR; INTRAVENOUS PRN
Status: DISCONTINUED | OUTPATIENT
Start: 2025-07-08 | End: 2025-07-08 | Stop reason: HOSPADM

## 2025-07-08 RX ORDER — AMLODIPINE BESYLATE 5 MG/1
5 TABLET ORAL ONCE
Status: COMPLETED | OUTPATIENT
Start: 2025-07-08 | End: 2025-07-08

## 2025-07-08 RX ORDER — IOPAMIDOL 755 MG/ML
INJECTION, SOLUTION INTRAVASCULAR PRN
Status: DISCONTINUED | OUTPATIENT
Start: 2025-07-08 | End: 2025-07-08 | Stop reason: HOSPADM

## 2025-07-08 RX ORDER — SODIUM CHLORIDE 0.9 % (FLUSH) 0.9 %
5-40 SYRINGE (ML) INJECTION PRN
Status: DISCONTINUED | OUTPATIENT
Start: 2025-07-08 | End: 2025-07-09 | Stop reason: HOSPADM

## 2025-07-08 RX ORDER — SODIUM CHLORIDE 9 MG/ML
INJECTION, SOLUTION INTRAVENOUS PRN
Status: DISCONTINUED | OUTPATIENT
Start: 2025-07-08 | End: 2025-07-09 | Stop reason: HOSPADM

## 2025-07-08 RX ORDER — HYDRALAZINE HYDROCHLORIDE 20 MG/ML
10 INJECTION INTRAMUSCULAR; INTRAVENOUS EVERY 6 HOURS PRN
Status: DISCONTINUED | OUTPATIENT
Start: 2025-07-08 | End: 2025-07-09 | Stop reason: HOSPADM

## 2025-07-08 RX ADMIN — AMLODIPINE BESYLATE 5 MG: 5 TABLET ORAL at 12:18

## 2025-07-08 RX ADMIN — CHLORTHALIDONE 25 MG: 25 TABLET ORAL at 10:16

## 2025-07-08 RX ADMIN — SODIUM CHLORIDE, PRESERVATIVE FREE 10 ML: 5 INJECTION INTRAVENOUS at 10:28

## 2025-07-08 RX ADMIN — HYDRALAZINE HYDROCHLORIDE 10 MG: 20 INJECTION INTRAMUSCULAR; INTRAVENOUS at 01:38

## 2025-07-08 RX ADMIN — HYDRALAZINE HYDROCHLORIDE 10 MG: 20 INJECTION INTRAMUSCULAR; INTRAVENOUS at 10:28

## 2025-07-08 RX ADMIN — CARVEDILOL 12.5 MG: 6.25 TABLET, FILM COATED ORAL at 17:09

## 2025-07-08 RX ADMIN — ATORVASTATIN CALCIUM 40 MG: 40 TABLET, FILM COATED ORAL at 20:06

## 2025-07-08 ASSESSMENT — ENCOUNTER SYMPTOMS
WHEEZING: 0
CONSTIPATION: 0
NAUSEA: 0
DIARRHEA: 0
ABDOMINAL DISTENTION: 0
SORE THROAT: 0
SHORTNESS OF BREATH: 0
EYE DISCHARGE: 0
COUGH: 0
BACK PAIN: 0

## 2025-07-08 NOTE — DISCHARGE INSTRUCTIONS
Patient Teaching   1. Avoid flexion and extension and lifting objects weighing more than 5 lb with the affected wrist for 5 days.   2. Keep the occlusive dressing in place for 24 hours and to remove it after his first shower.  3. Avoid submerging his affected wrist in water for 3 days.  4. Local swelling of the wrist can be expected;  apply ice and to take an analgesic for relief.   5. Monitor the insertion site for infection which are redness, warmth, swelling, drainage and or fever. Inform your Doctor immediately.  6. If bleeding or a hematoma (a hard bruise) should occur hold pressure and come into the ER.    Heart Cath Limitations  -No lifting over 15 pounds for 7 days.  -Avoid driving for 48 hours  -No water submersion (bath, hot tub, swimming), but okay to shower after discharge.    Some bruising is common and should not be alarming.   Advised to return to emergency department ASAP if you notice bright red blood coming from catheter access site or if there is a firm mass/lump that is growing. Potential life threatening condition. Hold pressure on site immediately  Informed to contact the office if they notice new fever, excessive warmth, redness, swelling, or pus draining from site.

## 2025-07-08 NOTE — PROGRESS NOTES
4 Eyes Skin Assessment     NAME:  Nehemiah Zhang  YOB: 1975  MEDICAL RECORD NUMBER:  [id number]    The patient is being assessed for  Admission    I agree that at least one RN has performed a thorough Head to Toe Skin Assessment on the patient. ALL assessment sites listed below have been assessed.      Areas assessed by both nurses:    Head, Face, Ears, Shoulders, Back, Chest, Arms, Elbows, Hands, Sacrum. Buttock, Coccyx, Ischium, Legs. Feet and Heels, and Under Medical Devices         Does the Patient have a Wound? No noted wound(s)       Naveen Prevention initiated by RN: No  Wound Care Orders initiated by RN: No    Pressure Injury (Stage 1,2,3,4, Unstageable, DTI, NWPT, and Complex wounds) if present, place Wound referral order by RN under : No    New Ostomies, if present place, Ostomy referral order under : No     Nurse 1 eSignature: Electronically signed by Zenaida Marcelo RN on 7/7/25 at 10:22 PM EDT    **SHARE this note so that the co-signing nurse can place an eSignature**    Nurse 2 eSignature: Electronically signed by Magdiel Yarbrough RN on 7/7/25 at 10:23 PM EDT

## 2025-07-08 NOTE — PROGRESS NOTES
Cardiology Progress Note     Admit Date:  7/7/2025      Subjective and  Overnight Events : Patient yesterday signed out AMA.  He came back within an hour.  He still has intermittent chest pain.  His stress test was positive for inferior ischemia.        Physical Exam:  Vitals:    07/08/25 0829   BP:    Pulse: 69   Resp:    Temp:    SpO2:         General Appearance:  No distress, conversant  Respiratory:  Normal breath sounds, No respiratory distress, No wheezing  Cardiovascular: S1, S2, no murmurs, gallops. JVD wnl  Abdomen /GI:  Bowel sounds normal, Soft, No tenderness   Integument:  Well hydrated, no rash   Neurologic:  Alert & oriented x 3, no focal deficits noted       Lab Data:  CBC:   Recent Labs     07/07/25  1323 07/07/25  1810 07/07/25  2330   WBC 4.4 4.5 3.4*   HGB 11.7* 12.4* 12.3*   HCT 35.3* 38.5* 37.8*   MCV 88.3 88.3 87.3    187 186     BMP:   Recent Labs     07/07/25  1323 07/07/25  1810 07/07/25  2330   * 135* 136   K 4.8 4.3 3.6    98* 100   CO2 24 22 23   PHOS 2.2*  --   --    BUN 14 13 12   CREATININE 1.1 1.2 1.1     PT/INR:   Recent Labs     07/05/25  2137 07/06/25  0756   PROTIME 13.3 14.6*   INR 1.0 1.1     BNP:    Recent Labs     07/05/25  2341 07/07/25  1810   PROBNP <36 277*     TROPONIN: No results for input(s): \"TROPONINT\" in the last 72 hours.       Assessment and Recommendations:      SVT  Elevated troponin with positive stress test  Uncontrolled hypertension  Alcohol withdrawal      As above he is still having intermittent chest pain.  We will proceed with coronary angiography later today.  We have also increase carvedilol to 12.5 mg twice a day and if blood pressure remains uncontrolled then increase amlodipine to 10 mg daily.    All labs, medications and tests reviewed by myself , continue all other medications of all above medical condition listed as is except for changes mentioned

## 2025-07-08 NOTE — PROGRESS NOTES
V2.0  [facility] [place]      Name:  Nehemiah Zhang /Age/Sex: 1975  (49 y.o. male)   MRN & CSN:  [id number] & [id number] Encounter Date/Time: 2025    Location:  [address] [place] [address]- PCP: Violeta Griffith APRN - CNP       Hospital Day: 2    Assessment and Plan:   eNhemiah Zhang is a 49 y.o. male who presents with Abnormal stress test      Plan:  #.  NSTEM   #.  SVT-resolved   -LHC was recommended, left AMA today morning  - EKG with T wave inversions in 1, aVL  - Keep n.p.o. after midnight  - Continue aspirin, carvedilol, atorvastatin  - Discussed with cardiology.  Plan for heart cath today     #.  Uncontrolled hypertension  #   Hypertensive urgency   - Patient was started on amlodipine, carvedilol, chlorthalidone during recent admission-continue     #.  Bilateral toe amputation-2021 secondary to frostbite  - Left first second third, tip of the fourth toe, right great toe amputation    #.  Chronically elevated LFTs  - Hepatitis panel negative-2021  - CT abdomen/pelvis--diffuse hepatic steatosis     #.  Chronic tobacco dependence     #.  Remote history of CVA as per patient  - No residual deficits    Diet Diet NPO   DVT Prophylaxis [] Lovenox, []  Heparin, [] SCDs, [] Ambulation,  [] Eliquis, [] Xarelto  [] Coumadin   Code Status Full Code   Disposition From: Home  Expected Disposition: Home  Estimated Date of Discharge: 1 to 2 days  Patient requires continued admission due to cardiac cath   Surrogate Decision Maker/ POA      Subjective:     Chief Complaint: Abnormal Test Results (Pt reports an abnormal stress test and was scheduled for a left heart cath this morning. Pt left AMA and has returned to get admitted for the heart cath. Pt endorses intermittent mid-sternal CP w/ N/V, denies symptoms currently.)     Patient seen and evaluated at bedside.  No acute events since admission.  Patient denies any chest pain or shortness of breath.  Awaiting cardiac cath.  Plan of  Ref Range    Troponin, High Sensitivity 37 (H) 0 - 22 ng/L   Magnesium    Collection Time: 07/07/25  6:10 PM   Result Value Ref Range    Magnesium 1.8 1.8 - 2.4 mg/dL   Brain Natriuretic Peptide    Collection Time: 07/07/25  6:10 PM   Result Value Ref Range    NT Pro- (H) 0 - 125 pg/mL   Ethanol    Collection Time: 07/07/25  6:10 PM   Result Value Ref Range    Ethanol Lvl <10 <10 mg/dL   Basic Metabolic Panel w/ Reflex to MG    Collection Time: 07/07/25 11:30 PM   Result Value Ref Range    Sodium 136 136 - 145 mmol/L    Potassium 3.6 3.5 - 5.1 mmol/L    Chloride 100 99 - 110 mmol/L    CO2 23 21 - 32 mmol/L    Anion Gap 14 9 - 17 mmol/L    Glucose 107 (H) 74 - 99 mg/dL    BUN 12 7 - 20 mg/dL    Creatinine 1.1 0.9 - 1.3 mg/dL    Est, Glom Filt Rate 73 >60 mL/min/1.73m2    Calcium 9.5 8.3 - 10.6 mg/dL   CBC    Collection Time: 07/07/25 11:30 PM   Result Value Ref Range    WBC 3.4 (L) 4.0 - 10.5 k/uL    RBC 4.33 (L) 4.60 - 6.20 m/uL    Hemoglobin 12.3 (L) 13.5 - 18.0 g/dL    Hematocrit 37.8 (L) 42.0 - 52.0 %    MCV 87.3 78.0 - 100.0 fL    MCH 28.4 27.0 - 31.0 pg    MCHC 32.5 32.0 - 36.0 g/dL    RDW 15.8 (H) 11.7 - 14.9 %    Platelets 186 140 - 440 k/uL    MPV 9.3 7.5 - 11.1 fL   Troponin    Collection Time: 07/07/25 11:30 PM   Result Value Ref Range    Troponin, High Sensitivity 37 (H) 0 - 22 ng/L        Imaging/Diagnostics Last 24 Hours   XR CHEST PORTABLE  Result Date: 7/7/2025  PROCEDURE: XR CHEST PORTABLE DATE OF EXAM:  7/7/2025 17:29 DEMOGRAPHICS: 49 years old Male INDICATION: Chest Pain COMPARISON: 7/5/2025 TECHNIQUE: Single frontal view of the chest was obtained. FINDINGS: Moderate cardiomegaly, no change. Lungs are clear. IMPRESSION: 1.  No evidence for acute disease. No change from previous. [facility] This dictation was created with voice recognition software.  While attempts have  been made to review the dictation as it is transcribed, on occasion the spoken word can be misinterpreted by the technology

## 2025-07-08 NOTE — PLAN OF CARE
Problem: Discharge Planning  Goal: Discharge to home or other facility with appropriate resources  7/7/2025 2224 by Zenaida Marcelo, RN  Outcome: Progressing  7/7/2025 2223 by Zenaida Marcelo, RN  Outcome: Progressing

## 2025-07-08 NOTE — PROGRESS NOTES
Informed of abnormal stress test.  Educated patient on need for left heart catheterization.  Procedure was explained in detail as well as risks and benefits.  Patient voiced understanding and was agreeable to undergoing procedure.  Consent was obtained.     Plan for left heart catheterization today    Mathieu Garcia PA-C 07/08/25 12:17 PM

## 2025-07-09 VITALS
RESPIRATION RATE: 14 BRPM | HEIGHT: 73 IN | DIASTOLIC BLOOD PRESSURE: 94 MMHG | OXYGEN SATURATION: 99 % | BODY MASS INDEX: 23.99 KG/M2 | TEMPERATURE: 98 F | WEIGHT: 181 LBS | SYSTOLIC BLOOD PRESSURE: 151 MMHG | HEART RATE: 79 BPM

## 2025-07-09 PROCEDURE — 2500000003 HC RX 250 WO HCPCS: Performed by: INTERNAL MEDICINE

## 2025-07-09 PROCEDURE — 6370000000 HC RX 637 (ALT 250 FOR IP)

## 2025-07-09 PROCEDURE — 6370000000 HC RX 637 (ALT 250 FOR IP): Performed by: INTERNAL MEDICINE

## 2025-07-09 RX ORDER — CARVEDILOL 12.5 MG/1
12.5 TABLET ORAL 2 TIMES DAILY WITH MEALS
Qty: 60 TABLET | Refills: 3 | Status: SHIPPED | OUTPATIENT
Start: 2025-07-09

## 2025-07-09 RX ORDER — CHLORTHALIDONE 50 MG/1
50 TABLET ORAL DAILY
Qty: 30 TABLET | Refills: 3 | Status: SHIPPED | OUTPATIENT
Start: 2025-07-09

## 2025-07-09 RX ORDER — ASPIRIN 81 MG/1
81 TABLET, CHEWABLE ORAL DAILY
Qty: 30 TABLET | Refills: 3 | Status: SHIPPED | OUTPATIENT
Start: 2025-07-09

## 2025-07-09 RX ORDER — POTASSIUM CHLORIDE 1500 MG/1
20 TABLET, EXTENDED RELEASE ORAL
Status: DISCONTINUED | OUTPATIENT
Start: 2025-07-09 | End: 2025-07-09 | Stop reason: HOSPADM

## 2025-07-09 RX ORDER — ATORVASTATIN CALCIUM 40 MG/1
40 TABLET, FILM COATED ORAL NIGHTLY
Qty: 30 TABLET | Refills: 3 | Status: SHIPPED | OUTPATIENT
Start: 2025-07-09

## 2025-07-09 RX ORDER — CHLORTHALIDONE 25 MG/1
50 TABLET ORAL DAILY
Status: DISCONTINUED | OUTPATIENT
Start: 2025-07-09 | End: 2025-07-09 | Stop reason: HOSPADM

## 2025-07-09 RX ADMIN — SODIUM CHLORIDE, PRESERVATIVE FREE 10 ML: 5 INJECTION INTRAVENOUS at 08:17

## 2025-07-09 RX ADMIN — ASPIRIN 81 MG: 81 TABLET, CHEWABLE ORAL at 08:14

## 2025-07-09 RX ADMIN — POTASSIUM CHLORIDE 20 MEQ: 1500 TABLET, EXTENDED RELEASE ORAL at 08:14

## 2025-07-09 RX ADMIN — CARVEDILOL 12.5 MG: 6.25 TABLET, FILM COATED ORAL at 08:15

## 2025-07-09 RX ADMIN — PANTOPRAZOLE SODIUM 40 MG: 40 TABLET, DELAYED RELEASE ORAL at 06:53

## 2025-07-09 RX ADMIN — CHLORTHALIDONE 50 MG: 25 TABLET ORAL at 08:15

## 2025-07-09 RX ADMIN — AMLODIPINE BESYLATE 10 MG: 10 TABLET ORAL at 08:15

## 2025-07-09 NOTE — DISCHARGE SUMMARY
V2.0  Discharge Summary    Name:  Nehemiah Zhang /Age/Sex: 1975 (49 y.o. male)   Admit Date: 2025  Discharge Date: 25    MRN & CSN:  [id number] & [id number] Encounter Date and Time 25 7:56 AM EDT    Attending:  Malini Orlando MD Discharging Provider: Malini Orlando MD       Hospital Course:     Brief HPI: Nehemiah Zhang is a 49 y.o. male who presented with chest pain and elevated troponin.  Patient underwent stress test which was positive and heart cath were recommended however patient left AMA to lock up his house and returned and was admitted and seen by cardiology.  Underwent cardiac cath which did not reveal any significant CAD.  Hospital course notable for uncontrolled HTN.  Medication optimized by cardiology.  Patient continued improved clinically and was optimized for discharge home.    Discharge plan and medications discussed with cardiology.  Patient's medications were optimized on day of discharge and patient cleared for discharge.    Brief Problem Based Course:   #.  NSTEM   #.  SVT-resolved   -LHC was recommended, left AMA today morning  - EKG with T wave inversions in 1, aVL  - Keep n.p.o. after midnight  - Continue aspirin, carvedilol, atorvastatin  - Discussed with cardiology.  S/p heart cath , non acute     #.  Uncontrolled hypertension  #   Hypertensive urgency   - Patient was started on amlodipine, carvedilol, chlorthalidone during recent admission-continue     #.  Bilateral toe amputation-2021 secondary to frostbite  - Left first second third, tip of the fourth toe, right great toe amputation     #.  Chronically elevated LFTs  - Hepatitis panel negative-2021  - CT abdomen/pelvis--diffuse hepatic steatosis     #.  Chronic tobacco dependence     #.  Remote history of CVA as per patient  - No residual deficits      The patient expressed appropriate understanding of, and agreement with the discharge recommendations, medications, and plan.     Consults this  0.4 mg IV Lexiscan.  Post Lexiscan patient complained of shortness of breath and lightheadedness.  Was given 75 mg of aminophylline. ECG interpretation: Baseline EKG showed sinus rhythm.  There were no significant ischemic changes noted.  Post Lexiscan administration there were no significant ischemic changes noted either.  No arrhythmias were noted as well. Nuclear stress imaging interpretation: Rest images showed normal tracer uptake in all myocardial segments.  With stress there was creased tracer uptake noted in basal and mid inferior segments.  This is consistent with small area of mild inferior wall ischemia.  Calculated ejection fraction was noted to be 41 % with mild global hypokinesis.    Cardiac procedure  Result Date: 7/8/2025  Procedure note Left cardiac catheter-selective coronary angiography Indication Abnormal Cardiolite stress Findings Left Main large tubular vessel with no significant stenosis Left descending artery has mild disease does not appear to be flow-limiting mild disease in the midsegment Circumflex vessel is a nondominant vessel has no significant stenosis Right coronary is a dominant vessel has no significant stenosis Assessment plan Continue with medical therapy As per his cardiologist consult patient can be discharged     XR CHEST PORTABLE  Result Date: 7/7/2025  PROCEDURE: XR CHEST PORTABLE DATE OF EXAM:  7/7/2025 17:29 DEMOGRAPHICS: 49 years old Male INDICATION: Chest Pain COMPARISON: 7/5/2025 TECHNIQUE: Single frontal view of the chest was obtained. FINDINGS: Moderate cardiomegaly, no change. Lungs are clear. IMPRESSION: 1.  No evidence for acute disease. No change from previous. [facility] This dictation was created with voice recognition software.  While attempts have  been made to review the dictation as it is transcribed, on occasion the spoken word can be misinterpreted by the technology leading to omissions or inappropriate words, phrases or sentences.  Dictated and

## 2025-07-09 NOTE — PROGRESS NOTES
Completed form for [employer]. Will walk with patient to ED security desk to establish transport.

## 2025-07-09 NOTE — PLAN OF CARE
Problem: Discharge Planning  Goal: Discharge to home or other facility with appropriate resources  7/9/2025 0957 by Nori Phipps, RN  Outcome: Adequate for Discharge  7/9/2025 0802 by Nargis Kenny, RN  Outcome: Progressing  7/9/2025 0626 by Hardeep Calzada, RN  Outcome: Progressing  Flowsheets  Taken 7/9/2025 0626  Discharge to home or other facility with appropriate resources:   Arrange for needed discharge resources and transportation as appropriate   Arrange for interpreters to assist at discharge as needed  Taken 7/8/2025 2100  Discharge to home or other facility with appropriate resources: Identify barriers to discharge with patient and caregiver

## 2025-07-09 NOTE — CARE COORDINATION
CM reviewed pt’s medical record. CM in to see pt at bedside to discuss discharge plan. Pt plans to return home. Transportation will need set-up via [employer]. CM spoke with pt's nurse, YAYA/Kurt.

## 2025-07-09 NOTE — PROGRESS NOTES
Outpatient Pharmacy Progress Note for Meds-to-Beds    Total number of Prescriptions Filled: 4    Additional Documentation:  Medication(s) were delivered to the patient's room prior to discharge      Thank you for letting us serve your patients.  [address] [address]    Phone: [phone]    Fax: [phone]

## 2025-07-09 NOTE — PLAN OF CARE
Problem: Discharge Planning  Goal: Discharge to home or other facility with appropriate resources  7/9/2025 0802 by Nargis Kenny, RN  Outcome: Progressing  7/9/2025 0626 by Hardeep Calzada RN  Outcome: Progressing  Flowsheets  Taken 7/9/2025 0626  Discharge to home or other facility with appropriate resources:   Arrange for needed discharge resources and transportation as appropriate   Arrange for interpreters to assist at discharge as needed  Taken 7/8/2025 2100  Discharge to home or other facility with appropriate resources: Identify barriers to discharge with patient and caregiver

## 2025-07-09 NOTE — PLAN OF CARE
Problem: Discharge Planning  Goal: Discharge to home or other facility with appropriate resources  7/9/2025 0626 by Hardeep Calzada, RN  Outcome: Progressing  Flowsheets  Taken 7/9/2025 0626  Discharge to home or other facility with appropriate resources:   Arrange for needed discharge resources and transportation as appropriate   Arrange for interpreters to assist at discharge as needed  Taken 7/8/2025 2100  Discharge to home or other facility with appropriate resources: Identify barriers to discharge with patient and caregiver  7/8/2025 1643 by Aylin Lainez, RN  Outcome: Progressing

## 2025-07-09 NOTE — PROGRESS NOTES
Patient states he would like to eat lunch before his discharge. This RN confirmed this was perfectly fine and shared this information with the charge nurse Nori.

## 2025-07-15 ENCOUNTER — OFFICE VISIT (OUTPATIENT)
Dept: CARDIOLOGY CLINIC | Age: 50
End: 2025-07-15
Payer: MEDICAID

## 2025-07-15 VITALS
OXYGEN SATURATION: 98 % | BODY MASS INDEX: 24.89 KG/M2 | HEIGHT: 71 IN | DIASTOLIC BLOOD PRESSURE: 80 MMHG | HEART RATE: 88 BPM | WEIGHT: 177.8 LBS | SYSTOLIC BLOOD PRESSURE: 120 MMHG

## 2025-07-15 DIAGNOSIS — I10 ESSENTIAL HYPERTENSION: ICD-10-CM

## 2025-07-15 DIAGNOSIS — I47.10 SVT (SUPRAVENTRICULAR TACHYCARDIA): Primary | ICD-10-CM

## 2025-07-15 PROCEDURE — 3079F DIAST BP 80-89 MM HG: CPT | Performed by: INTERNAL MEDICINE

## 2025-07-15 PROCEDURE — 3074F SYST BP LT 130 MM HG: CPT | Performed by: INTERNAL MEDICINE

## 2025-07-15 PROCEDURE — 99214 OFFICE O/P EST MOD 30 MIN: CPT | Performed by: INTERNAL MEDICINE

## 2025-07-15 NOTE — PROGRESS NOTES
MG tablet Take 1 tablet by mouth daily (Patient not taking: Reported on 7/15/2025) 30 tablet 0    ondansetron (ZOFRAN ODT) 4 MG disintegrating tablet Take 1 tablet by mouth every 8 hours as needed for Nausea (Patient not taking: Reported on 7/15/2025) 20 tablet 0    gabapentin (NEURONTIN) 100 MG capsule Take 1 capsule by mouth 3 times daily for 20 days. (Patient not taking: Reported on 7/15/2025) 60 capsule 0    omeprazole (PRILOSEC) 20 MG delayed release capsule Take 20 mg by mouth daily (Patient not taking: Reported on 7/15/2025)       No current facility-administered medications for this visit.     Review of Systems:   Constitutional: No Fever or Weight Loss   Eyes: No Decreased Vision  ENT: No Headaches, Hearing Loss or Vertigo  Cardiovascular: As per HPI  Respiratory: As per HPI  Gastrointestinal: No abdominal pain, appetite loss, blood in stools, constipation, diarrhea or heartburn  Genitourinary: No dysuria, trouble voiding, or hematuria  Musculoskeletal:  No gait disturbance, weakness or joint complaints  Integumentary: No rash or pruritis  Neurological: No TIA or stroke symptoms  Psychiatric: No anxiety or depression  Endocrine: No malaise, fatigue or temperature intolerance  Hematologic/Lymphatic: No bleeding problems, blood clots or swollen lymph nodes  Allergic/Immunologic: No nasal congestion or hives  All systems negative except as marked.        Physical Examination:    Vitals:    07/15/25 1414   BP: 120/80   BP Site: Right Upper Arm   Patient Position: Sitting   BP Cuff Size: Medium Adult   Pulse: 88   SpO2: 98%   Weight: 80.6 kg (177 lb 12.8 oz)   Height: 1.803 m (5' 11\")       General Appearance:  No distress, conversant    Constitutional:  No acute distress, Non-toxic appearance.    HENT:  Normocephalic, Atraumatic, Bilateral external ears normal, Oropharynx moist,   Eyes:  PERRL, EOMI, Conjunctiva normal, No discharge.   Respiratory:  Normal breath sounds, No respiratory distress, No

## 2025-07-15 NOTE — PATIENT INSTRUCTIONS
Thank you for allowing us to care for you today!   We want to ensure we can follow your treatment plan and we strive to give you the best outcomes and experience possible.   If you ever have a life threatening emergency and call 911 - for an ambulance (EMS)  REMEMBER  Our providers can only care for you at:   [facility] or [facility]   Even if you have someone take you or you drive yourself we can only care for you in a [facility] facility. Our providers are not setup at the other healthcare locations!    PLEASE CALL OUR OFFICE DURING NORMAL BUSINESS HOURS  Monday through Friday 8 am to 5 pm  AFTER HOURS the physician on-call cannot help with scheduling, rescheduling, procedure instruction questions or any type of medication need or issue.  [facility] P:[phone] - [facility] P:[phone] - [facility] P:[phone]      If you receive a survey:  We would appreciate you taking the time to share your experience concerning your provider visit in the office.    These surveys are confidential!  We are eager to improve and are counting on you to share your feedback so we can ensure you get the best care possible.

## 2025-07-19 NOTE — DISCHARGE SUMMARY
V2.0  Discharge Summary    Name:  Nehemiah Zhang /Age/Sex: 1975 (49 y.o. male)   Admit Date: 2025  Discharge Date: 25    MRN & CSN:  [id number] & [id number] Encounter Date and Time 25 4:17 PM EDT    Attending:  No att. providers found Discharging Provider: Shelby Correa MD       Hospital Course:     Brief HPI: Nehemiah Zhang is a 49 y.o. male who presented with chest pain and shortness of breath.  Rapid response was called when the patient was found in front of the hospital and was complaining of chest pain and shortness of breath.  Patient was brought inside and was noted to have heart rate 200's.  Patient was noted to be in SVT.  Received a dose of adenosine.  Patient states that he started having chest pain around afternoon, substernal, 7-8/10 intensity, feels nauseated.  Denied any sweating, diaphoresis, fever, chills, cough, denying any abdominal pain, denying any urinary complaints, no constipation or diarrhea.  Patient states that he has not been taking his medications for almost a month.  Vitals on arrival-/94, , RR 26, temp 97.9, saturating 100% on room air.  Patient received a dose of adenosine.  Also received 3 L NS bolus, IV labetalol, aspirin 324 mg, morphine, SL nitro.  Got a dose of GI cocktail.  Patient continued to be hypertensive, tachycardic, complaining of chest pain.  Ordered nitro drip.  Evaluated patient after transfer to floor.  Still complained of chest pain.  CTA chest ordered.    Brief Problem Based Course:     Patient was originally admitted with SVT, which converted to sinus with 1 dose of adenosine.  Patient noncompliant with his home medications.  Unfortunately patient did leave AMA and said he was going to come back in 1 hour.    #.  SVT-resolved  - Converted to sinus with 1 dose of adenosine  - Still tachycardic, now improving  - Patient is on metoprolol-noncompliant  Resume home medications after urine drug screen     #.

## (undated) DEVICE — ANGIOGRAPHY KIT CUST MANIFOLD

## (undated) DEVICE — TRAY PREP DRY W/ PREM GLV 2 APPL 6 SPNG 2 UNDPD 1 OVERWRAP

## (undated) DEVICE — TIBURON BILATERAL LIMB SHEET: Brand: CONVERTORS

## (undated) DEVICE — NEEDLE ANGIO L1IN DIA21GA 1 THN WALL SMOOTH STD HUB

## (undated) DEVICE — GLOVE ORANGE PI 7 1/2   MSG9075

## (undated) DEVICE — INTENDED FOR TISSUE SEPARATION, AND OTHER PROCEDURES THAT REQUIRE A SHARP SURGICAL BLADE TO PUNCTURE OR CUT.: Brand: BARD-PARKER ® STAINLESS STEEL BLADES

## (undated) DEVICE — SUTURE NONABSORBABLE MONOFILAMENT 4-0 FS-2 18 IN ETHILON 662H

## (undated) DEVICE — ELECTRODE ES AD CRDLSS PT RET REM POLYHESIVE

## (undated) DEVICE — TUBING, SUCTION, 9/32" X 10', STRAIGHT: Brand: MEDLINE

## (undated) DEVICE — GAUZE,SPONGE,4"X4",16PLY,XRAY,STRL,LF: Brand: MEDLINE

## (undated) DEVICE — TOWEL,OR,DSP,ST,BLUE,STD,6/PK,12PK/CS: Brand: MEDLINE

## (undated) DEVICE — YANKAUER,FLEXIBLE HANDLE,REGLR CAPACITY: Brand: MEDLINE INDUSTRIES, INC.

## (undated) DEVICE — SOLUTION IV 1000ML 0.9% SOD CHL FOR IRRIG PLAS CONT

## (undated) DEVICE — DRESSING,GAUZE,XEROFORM,CURAD,1"X8",ST: Brand: CURAD

## (undated) DEVICE — SPONGE GZ W4XL8IN COT WVN 12 PLY

## (undated) DEVICE — GLOVE ORANGE PI 7   MSG9070

## (undated) DEVICE — GUIDEWIRE VASC L260CM DIA0.035IN RAD 3MM J TIP L7CM PTFE

## (undated) DEVICE — SOLUTION IV IRRIG WATER 1000ML POUR BRL 2F7114

## (undated) DEVICE — PACK,BASIC,IX: Brand: MEDLINE

## (undated) DEVICE — PENCIL ES CRD L10FT HND SWCHING ROCK SWCH W/ EDGE COAT BLDE

## (undated) DEVICE — Device

## (undated) DEVICE — MARKER SURG SKIN UTIL REGULAR/FINE 2 TIP W/ RUL AND 9 LBL

## (undated) DEVICE — RADIFOCUS OPTITORQUE ANGIOGRAPHIC CATHETER: Brand: OPTITORQUE

## (undated) DEVICE — SYRINGE MED 10ML LUERLOCK TIP W/O SFTY DISP

## (undated) DEVICE — NEEDLE HYPO 25GA L1.5IN BLU POLYPR HUB S STL REG BVL STR

## (undated) DEVICE — SYRINGE IRRIG 60ML SFT PLIABLE BLB EZ TO GRP 1 HND USE W/

## (undated) DEVICE — BANDAGE,GAUZE,BULKEE II,4.5"X4.1YD,STRL: Brand: MEDLINE

## (undated) DEVICE — SUTURE VCRL SZ 2-0 L27IN ABSRB UD L26MM SH 1/2 CIR J417H

## (undated) DEVICE — SPONGE LAP W18XL18IN WHT COT 4 PLY FLD STRUNG RADPQ DISP ST

## (undated) DEVICE — SUTURE VCRL SZ 3-0 L27IN ABSRB VLT L26MM SH 1/2 CIR J316H

## (undated) DEVICE — PAD,ABDOMINAL,5"X9",ST,LF,25/BX: Brand: MEDLINE INDUSTRIES, INC.

## (undated) DEVICE — LINER,SEMI-RIGID,3000CC,50EA/CS: Brand: MEDLINE

## (undated) DEVICE — SUTURE VCRL SZ 3-0 L27IN ABSRB UD L26MM SH 1/2 CIR J416H

## (undated) DEVICE — DRAPE SHEET ULTRAGARD: Brand: MEDLINE

## (undated) DEVICE — GLIDESHEATH SLENDER ACCESS KIT: Brand: GLIDESHEATH SLENDER

## (undated) DEVICE — GOWN,SIRUS,POLYRNF,BRTHSLV,XLN/XL,20/CS: Brand: MEDLINE

## (undated) DEVICE — BAND COMPR L24CM REG CLR PLAS HEMSTAT EXT HK AND LOOP RETEN